# Patient Record
Sex: FEMALE | Race: WHITE | Employment: OTHER | URBAN - METROPOLITAN AREA
[De-identification: names, ages, dates, MRNs, and addresses within clinical notes are randomized per-mention and may not be internally consistent; named-entity substitution may affect disease eponyms.]

---

## 2018-01-16 ENCOUNTER — TELEPHONE (OUTPATIENT)
Dept: PULMONOLOGY | Age: 78
End: 2018-01-16

## 2018-01-30 ENCOUNTER — HOSPITAL ENCOUNTER (OUTPATIENT)
Dept: OCCUPATIONAL THERAPY | Age: 78
Discharge: HOME | End: 2018-01-31
Admitting: ORTHOPAEDIC SURGERY

## 2018-01-30 ENCOUNTER — OFFICE VISIT (OUTPATIENT)
Dept: ORTHOPEDIC SURGERY | Age: 78
End: 2018-01-30

## 2018-01-30 VITALS — HEIGHT: 66 IN | BODY MASS INDEX: 25.72 KG/M2 | WEIGHT: 160.05 LBS

## 2018-01-30 DIAGNOSIS — M25.552 PAIN IN LEFT HIP: ICD-10-CM

## 2018-01-30 DIAGNOSIS — S42.401A CLOSED FRACTURE OF DISTAL END OF RIGHT HUMERUS, UNSPECIFIED FRACTURE MORPHOLOGY, INITIAL ENCOUNTER: ICD-10-CM

## 2018-01-30 DIAGNOSIS — M89.8X2 PAIN OF RIGHT HUMERUS: Primary | ICD-10-CM

## 2018-01-30 PROCEDURE — G8484 FLU IMMUNIZE NO ADMIN: HCPCS | Performed by: ORTHOPAEDIC SURGERY

## 2018-01-30 PROCEDURE — 1036F TOBACCO NON-USER: CPT | Performed by: ORTHOPAEDIC SURGERY

## 2018-01-30 PROCEDURE — 99214 OFFICE O/P EST MOD 30 MIN: CPT | Performed by: ORTHOPAEDIC SURGERY

## 2018-01-30 PROCEDURE — 3017F COLORECTAL CA SCREEN DOC REV: CPT | Performed by: ORTHOPAEDIC SURGERY

## 2018-01-30 PROCEDURE — 1090F PRES/ABSN URINE INCON ASSESS: CPT | Performed by: ORTHOPAEDIC SURGERY

## 2018-01-30 PROCEDURE — G8400 PT W/DXA NO RESULTS DOC: HCPCS | Performed by: ORTHOPAEDIC SURGERY

## 2018-01-30 PROCEDURE — 4040F PNEUMOC VAC/ADMIN/RCVD: CPT | Performed by: ORTHOPAEDIC SURGERY

## 2018-01-30 PROCEDURE — G8419 CALC BMI OUT NRM PARAM NOF/U: HCPCS | Performed by: ORTHOPAEDIC SURGERY

## 2018-01-30 PROCEDURE — 1123F ACP DISCUSS/DSCN MKR DOCD: CPT | Performed by: ORTHOPAEDIC SURGERY

## 2018-01-30 PROCEDURE — G8427 DOCREV CUR MEDS BY ELIG CLIN: HCPCS | Performed by: ORTHOPAEDIC SURGERY

## 2018-02-01 ENCOUNTER — HOSPITAL ENCOUNTER (OUTPATIENT)
Dept: OCCUPATIONAL THERAPY | Age: 78
Discharge: HOME | End: 2018-02-28
Attending: ORTHOPAEDIC SURGERY | Admitting: ORTHOPAEDIC SURGERY

## 2018-02-27 ENCOUNTER — OFFICE VISIT (OUTPATIENT)
Dept: ORTHOPEDIC SURGERY | Age: 78
End: 2018-02-27

## 2018-02-27 VITALS — WEIGHT: 160.05 LBS | HEIGHT: 66 IN | BODY MASS INDEX: 25.72 KG/M2

## 2018-02-27 DIAGNOSIS — S42.401P: ICD-10-CM

## 2018-02-27 DIAGNOSIS — M89.8X2 PAIN OF RIGHT HUMERUS: Primary | ICD-10-CM

## 2018-02-27 PROCEDURE — G8484 FLU IMMUNIZE NO ADMIN: HCPCS | Performed by: ORTHOPAEDIC SURGERY

## 2018-02-27 PROCEDURE — G8400 PT W/DXA NO RESULTS DOC: HCPCS | Performed by: ORTHOPAEDIC SURGERY

## 2018-02-27 PROCEDURE — 4040F PNEUMOC VAC/ADMIN/RCVD: CPT | Performed by: ORTHOPAEDIC SURGERY

## 2018-02-27 PROCEDURE — 1036F TOBACCO NON-USER: CPT | Performed by: ORTHOPAEDIC SURGERY

## 2018-02-27 PROCEDURE — 1090F PRES/ABSN URINE INCON ASSESS: CPT | Performed by: ORTHOPAEDIC SURGERY

## 2018-02-27 PROCEDURE — G8427 DOCREV CUR MEDS BY ELIG CLIN: HCPCS | Performed by: ORTHOPAEDIC SURGERY

## 2018-02-27 PROCEDURE — 99212 OFFICE O/P EST SF 10 MIN: CPT | Performed by: ORTHOPAEDIC SURGERY

## 2018-02-27 PROCEDURE — G8419 CALC BMI OUT NRM PARAM NOF/U: HCPCS | Performed by: ORTHOPAEDIC SURGERY

## 2018-02-27 PROCEDURE — 3017F COLORECTAL CA SCREEN DOC REV: CPT | Performed by: ORTHOPAEDIC SURGERY

## 2018-02-27 PROCEDURE — 1123F ACP DISCUSS/DSCN MKR DOCD: CPT | Performed by: ORTHOPAEDIC SURGERY

## 2018-03-01 ENCOUNTER — HOSPITAL ENCOUNTER (OUTPATIENT)
Dept: OCCUPATIONAL THERAPY | Age: 78
Discharge: HOME | End: 2018-03-31
Attending: ORTHOPAEDIC SURGERY | Admitting: ORTHOPAEDIC SURGERY

## 2018-03-14 ENCOUNTER — TELEPHONE (OUTPATIENT)
Dept: PULMONOLOGY | Age: 78
End: 2018-03-14

## 2018-04-27 ENCOUNTER — OFFICE VISIT (OUTPATIENT)
Dept: ORTHOPEDIC SURGERY | Age: 78
End: 2018-04-27

## 2018-04-27 DIAGNOSIS — S42.201D OPEN FRACTURE OF PROXIMAL END OF RIGHT HUMERUS WITH ROUTINE HEALING, UNSPECIFIED FRACTURE MORPHOLOGY, SUBSEQUENT ENCOUNTER: Primary | ICD-10-CM

## 2018-04-27 PROCEDURE — 4040F PNEUMOC VAC/ADMIN/RCVD: CPT | Performed by: ORTHOPAEDIC SURGERY

## 2018-04-27 PROCEDURE — G8428 CUR MEDS NOT DOCUMENT: HCPCS | Performed by: ORTHOPAEDIC SURGERY

## 2018-04-27 PROCEDURE — 1090F PRES/ABSN URINE INCON ASSESS: CPT | Performed by: ORTHOPAEDIC SURGERY

## 2018-04-27 PROCEDURE — 1123F ACP DISCUSS/DSCN MKR DOCD: CPT | Performed by: ORTHOPAEDIC SURGERY

## 2018-04-27 PROCEDURE — G8400 PT W/DXA NO RESULTS DOC: HCPCS | Performed by: ORTHOPAEDIC SURGERY

## 2018-04-27 PROCEDURE — 99212 OFFICE O/P EST SF 10 MIN: CPT | Performed by: ORTHOPAEDIC SURGERY

## 2018-04-27 PROCEDURE — 3017F COLORECTAL CA SCREEN DOC REV: CPT | Performed by: ORTHOPAEDIC SURGERY

## 2018-04-27 PROCEDURE — G8419 CALC BMI OUT NRM PARAM NOF/U: HCPCS | Performed by: ORTHOPAEDIC SURGERY

## 2018-04-27 PROCEDURE — 1036F TOBACCO NON-USER: CPT | Performed by: ORTHOPAEDIC SURGERY

## 2018-08-30 ENCOUNTER — OFFICE VISIT (OUTPATIENT)
Dept: PULMONOLOGY | Age: 78
End: 2018-08-30

## 2018-08-30 VITALS
DIASTOLIC BLOOD PRESSURE: 86 MMHG | RESPIRATION RATE: 18 BRPM | OXYGEN SATURATION: 93 % | HEART RATE: 83 BPM | WEIGHT: 155 LBS | TEMPERATURE: 98.3 F | BODY MASS INDEX: 24.33 KG/M2 | HEIGHT: 67 IN | SYSTOLIC BLOOD PRESSURE: 130 MMHG

## 2018-08-30 DIAGNOSIS — R05.3 CHRONIC COUGH: ICD-10-CM

## 2018-08-30 DIAGNOSIS — G30.9 ALZHEIMER'S DEMENTIA WITHOUT BEHAVIORAL DISTURBANCE, UNSPECIFIED TIMING OF DEMENTIA ONSET: ICD-10-CM

## 2018-08-30 DIAGNOSIS — F02.80 ALZHEIMER'S DEMENTIA WITHOUT BEHAVIORAL DISTURBANCE, UNSPECIFIED TIMING OF DEMENTIA ONSET: ICD-10-CM

## 2018-08-30 DIAGNOSIS — R06.02 SHORTNESS OF BREATH: ICD-10-CM

## 2018-08-30 PROCEDURE — 99204 OFFICE O/P NEW MOD 45 MIN: CPT | Performed by: INTERNAL MEDICINE

## 2018-08-30 PROCEDURE — 1101F PT FALLS ASSESS-DOCD LE1/YR: CPT | Performed by: INTERNAL MEDICINE

## 2018-08-30 PROCEDURE — G8400 PT W/DXA NO RESULTS DOC: HCPCS | Performed by: INTERNAL MEDICINE

## 2018-08-30 PROCEDURE — 1036F TOBACCO NON-USER: CPT | Performed by: INTERNAL MEDICINE

## 2018-08-30 PROCEDURE — 4040F PNEUMOC VAC/ADMIN/RCVD: CPT | Performed by: INTERNAL MEDICINE

## 2018-08-30 PROCEDURE — G8420 CALC BMI NORM PARAMETERS: HCPCS | Performed by: INTERNAL MEDICINE

## 2018-08-30 PROCEDURE — G8427 DOCREV CUR MEDS BY ELIG CLIN: HCPCS | Performed by: INTERNAL MEDICINE

## 2018-08-30 PROCEDURE — 1090F PRES/ABSN URINE INCON ASSESS: CPT | Performed by: INTERNAL MEDICINE

## 2018-08-30 PROCEDURE — 3017F COLORECTAL CA SCREEN DOC REV: CPT | Performed by: INTERNAL MEDICINE

## 2018-08-30 PROCEDURE — 1123F ACP DISCUSS/DSCN MKR DOCD: CPT | Performed by: INTERNAL MEDICINE

## 2018-08-30 ASSESSMENT — SLEEP AND FATIGUE QUESTIONNAIRES
HOW LIKELY ARE YOU TO NOD OFF OR FALL ASLEEP WHILE LYING DOWN TO REST IN THE AFTERNOON WHEN CIRCUMSTANCES PERMIT: 0
HOW LIKELY ARE YOU TO NOD OFF OR FALL ASLEEP WHILE SITTING QUIETLY AFTER LUNCH WITHOUT ALCOHOL: 0
HOW LIKELY ARE YOU TO NOD OFF OR FALL ASLEEP WHILE WATCHING TV: 0
ESS TOTAL SCORE: 0
HOW LIKELY ARE YOU TO NOD OFF OR FALL ASLEEP WHILE SITTING INACTIVE IN A PUBLIC PLACE: 0
HOW LIKELY ARE YOU TO NOD OFF OR FALL ASLEEP WHEN YOU ARE A PASSENGER IN A CAR FOR AN HOUR WITHOUT A BREAK: 0
HOW LIKELY ARE YOU TO NOD OFF OR FALL ASLEEP IN A CAR, WHILE STOPPED FOR A FEW MINUTES IN TRAFFIC: 0
NECK CIRCUMFERENCE (INCHES): 14
HOW LIKELY ARE YOU TO NOD OFF OR FALL ASLEEP WHILE SITTING AND TALKING TO SOMEONE: 0
HOW LIKELY ARE YOU TO NOD OFF OR FALL ASLEEP WHILE SITTING AND READING: 0

## 2018-08-30 NOTE — PROGRESS NOTES
CHIEF COMPLAINT:  Cough and shortness of breath     Patient is being seen at the request of Dr. Nena Julian for a consultation for cough and shortness of breath     HPI: She is a 81-year-old woman with a past medical history of former tobacco abuse, Alzheimer's dementia, nursing home resident and diabetes mellitus who presents for evaluation of progressively worsening shortness of breath and chronic cough. Patient complains of shortness of breath. Symptoms include drainage from nose, dyspnea on exertion, morning cough and productive cough. Symptoms began 3 years ago, gradually worsening since that time. The dyspnea occurs with minimal activity. Patient denies hemoptysis . Svetlana Grayson Aggravating factors include exertion and exercise. The patient reports an exercise tolerance of approximately <1 block on the flat, limited primarily by dyspnea and back pain. She smoked < 1 ppd x 20 years, quit in 1995. She has been a resident in the nursing home for 3-4 years. She notes she has low oxygen saturation at nighttime and is on supplemental oxygen for the last year. She does not use any regular inhaled bronchodilators. REVIEW OF SYSTEMS:    CONSTITUTIONAL: Negative for fevers and chills, + sweats  EYES: no conjunctival injection, no redness or drainage  ENT: Negative for oropharyngeal exudate, post nasal drip, sinus pain / pressure, + nasal congestion, no oral ulcerations  RESPIRATORY:  No hemoptysis or pleuritic pain. CARDIOVASCULAR: Negative for chest pain, + palpitations, PND  GASTROINTESTINAL: Negative for nausea, vomiting, diarrhea, constipation and abdominal pain, + dysphagia  MUSCULOSKELETAL:  No arthralgias/arthritis or muscle weakness  HEMATOLOGICAL/LYMPH: Negative for adenopathy  SKIN:  No rash or nodules  EXTREMITIES: Negative for cyanosis or edema.   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 Social History:    TOBACCO:   reports that she quit smoking about 20 years ago. She has a 15.00 pack-year smoking history. She has never used smokeless tobacco.  ETOH:   reports that she does not drink alcohol. Patient currently lives in [place].   Family History:   Family History   Problem Relation Age of Onset    Heart Disease Mother        Current Medications:    Current Outpatient Prescriptions:     Menthol, Topical Analgesic, (BIOFREEZE) 4 % GEL, Apply topically, Disp: , Rfl:     cyanocobalamin 1000 MCG/ML injection, Inject 1,000 mcg into the muscle every 30 days, Disp: , Rfl:     fenofibrate (TRICOR) 145 MG tablet, Take 145 mg by mouth daily, Disp: , Rfl:     loratadine (CLARITIN) 10 MG capsule, Take 10 mg by mouth daily, Disp: , Rfl:     guaiFENesin (MUCINEX) 600 MG extended release tablet, Take 1,200 mg by mouth 2 times daily, Disp: , Rfl:     potassium chloride (MICRO-K) 10 MEQ extended murmur or rub. Normal S1 and S2. No edema. GI: Abdomen non-tender. Non-distended. No masses. No organomegaly. Normal bowel sounds. No hernia. Skin: Warm and dry. No nodules on exposed extremities. No rash on exposed extremities. Lymph: No cervical LAD. No supraclavicular LAD. M/S: No cyanosis. No synovitis or joint deformity. No clubbing. Neuro: Cranial nerves are grossly intact. Moving all extremities. Motor and sensation grossly intact. R hand tremor  Psych: Oriented x 3. No anxiety or agitation. DATA:      LABS:      No PFTs available for review today. IMAGING:      I personally reviewed and interpreted the following today in the office :     CXR (dated 1/5/18): The heart is mildly enlarged.  Mediastinum is normal.  Mild perihilar  opacities are present centrally. Daneil Leak is stable from prior comparison  study.  The lungs are otherwise clear.  There are no skeletal findings        ASSESSMENT AND PLAN:      Shortness of breath    The etiology of the patient's dyspnea is not clear. The Ddx is broad and includes obstructive lung disease, other pulmonary diseases (such as interstitial lung disease or pulmonary arterial hypertension),  cardiac disease, anemia or deconditioning. I plan to get full pfts with lung volumes and diffusing capacity and cxr to further evaluate. Chronic cough  The etiology of the patient's cough is not clear. The differential diagnosis includes the common causes such as GERD, post-nasal drip, cough-variant asthma, chronic bronchitis, medications. - get cxr  - PFTs    Alzheimer's disease  - appears mild by exam          The information above included the review and summarization of old records. The history was obtained from these old records and from the patient directly. Risks, benefits and alternatives to the management plan were discussed with the patient.

## 2018-08-30 NOTE — LETTER
[facility] Pulmonary, Critical Care, and Sleep  [address] , [address] [place] [address]  Phone: [phone]  Fax: [phone]    August 30, 2018     Patient: Tata Bowden   MR Number: [id number]   YOB: 1940   Date of Visit: 8/30/2018     Dear Dr. Alena Fitzgerald: Thank you for the request for consultation for Tanvir Gilbert to me for the evaluation of shortness of breath and cough. Below are the relevant portions of my assessment and plan of care. Shortness of breath    The etiology of the patient's dyspnea is not clear. The Ddx is broad and includes obstructive lung disease, other pulmonary diseases (such as interstitial lung disease or pulmonary arterial hypertension),  cardiac disease, anemia or deconditioning. I plan to get full pfts with lung volumes and diffusing capacity and cxr to further evaluate. Chronic cough  The etiology of the patient's cough is not clear. The differential diagnosis includes the common causes such as GERD, post-nasal drip, cough-variant asthma, chronic bronchitis, medications. - get cxr  - PFTs    Alzheimer's disease  - appears mild by exam      If you have questions, please do not hesitate to call me. I look forward to following [facility] along with you.     Sincerely,        Ira Weldon MD

## 2018-09-18 ENCOUNTER — TELEPHONE (OUTPATIENT)
Dept: PULMONOLOGY | Age: 78
End: 2018-09-18

## 2018-09-18 NOTE — TELEPHONE ENCOUNTER
Patient cancelled appointment on 9/20/18 with Ismael Go for follow up and same day PFT. Reason: Transportation     Patient did reschedule appointment. PFT rescheduled to 9/28/18    Appointment rescheduled for 10/25/18. ASSESSMENT AND PLAN:OV 8/30/18         Shortness of breath     The etiology of the patient's dyspnea is not clear. The Ddx is broad and includes obstructive lung disease, other pulmonary diseases (such as interstitial lung disease or pulmonary arterial hypertension),  cardiac disease, anemia or deconditioning.      I plan to get full pfts with lung volumes and diffusing capacity and cxr to further evaluate.         Chronic cough  The etiology of the patient's cough is not clear. The differential diagnosis includes the common causes such as GERD, post-nasal drip, cough-variant asthma, chronic bronchitis, medications.      - get cxr  - PFTs     Alzheimer's disease  - appears mild by exam              The information above included the review and summarization of old records. The history was obtained from these old records and from the patient directly. Risks, benefits and alternatives to the management plan were discussed with the patient.

## 2018-10-19 ENCOUNTER — HOSPITAL ENCOUNTER (OUTPATIENT)
Dept: PULMONOLOGY | Age: 78
Discharge: HOME | End: 2018-10-19
Payer: MEDICARE

## 2018-10-19 VITALS — OXYGEN SATURATION: 94 %

## 2018-10-19 PROCEDURE — 6360000002 HC RX W HCPCS: Performed by: INTERNAL MEDICINE

## 2018-10-19 PROCEDURE — 94726 PLETHYSMOGRAPHY LUNG VOLUMES: CPT

## 2018-10-19 PROCEDURE — 94729 DIFFUSING CAPACITY: CPT

## 2018-10-19 PROCEDURE — 94640 AIRWAY INHALATION TREATMENT: CPT

## 2018-10-19 PROCEDURE — 94664 DEMO&/EVAL PT USE INHALER: CPT

## 2018-10-19 PROCEDURE — 94060 EVALUATION OF WHEEZING: CPT

## 2018-10-19 PROCEDURE — 94760 N-INVAS EAR/PLS OXIMETRY 1: CPT

## 2018-10-19 RX ORDER — ALBUTEROL SULFATE 2.5 MG/3ML
2.5 SOLUTION RESPIRATORY (INHALATION) ONCE
Status: COMPLETED | OUTPATIENT
Start: 2018-10-19 | End: 2018-10-19

## 2018-10-19 RX ADMIN — ALBUTEROL SULFATE 2.5 MG: 2.5 SOLUTION RESPIRATORY (INHALATION) at 11:23

## 2018-10-25 ENCOUNTER — OFFICE VISIT (OUTPATIENT)
Dept: PULMONOLOGY | Age: 78
End: 2018-10-25
Payer: MEDICARE

## 2018-10-25 VITALS
HEIGHT: 67 IN | TEMPERATURE: 97.7 F | DIASTOLIC BLOOD PRESSURE: 72 MMHG | RESPIRATION RATE: 20 BRPM | HEART RATE: 74 BPM | WEIGHT: 152 LBS | OXYGEN SATURATION: 92 % | BODY MASS INDEX: 23.86 KG/M2 | SYSTOLIC BLOOD PRESSURE: 124 MMHG

## 2018-10-25 DIAGNOSIS — F02.80 LATE ONSET ALZHEIMER'S DISEASE WITHOUT BEHAVIORAL DISTURBANCE (HCC): ICD-10-CM

## 2018-10-25 DIAGNOSIS — R06.02 SHORTNESS OF BREATH: Primary | ICD-10-CM

## 2018-10-25 DIAGNOSIS — G30.1 LATE ONSET ALZHEIMER'S DISEASE WITHOUT BEHAVIORAL DISTURBANCE (HCC): ICD-10-CM

## 2018-10-25 DIAGNOSIS — R05.3 CHRONIC COUGH: ICD-10-CM

## 2018-10-25 PROCEDURE — 4040F PNEUMOC VAC/ADMIN/RCVD: CPT | Performed by: INTERNAL MEDICINE

## 2018-10-25 PROCEDURE — 1123F ACP DISCUSS/DSCN MKR DOCD: CPT | Performed by: INTERNAL MEDICINE

## 2018-10-25 PROCEDURE — 99214 OFFICE O/P EST MOD 30 MIN: CPT | Performed by: INTERNAL MEDICINE

## 2018-10-25 PROCEDURE — 1036F TOBACCO NON-USER: CPT | Performed by: INTERNAL MEDICINE

## 2018-10-25 PROCEDURE — 1090F PRES/ABSN URINE INCON ASSESS: CPT | Performed by: INTERNAL MEDICINE

## 2018-10-25 PROCEDURE — G8484 FLU IMMUNIZE NO ADMIN: HCPCS | Performed by: INTERNAL MEDICINE

## 2018-10-25 PROCEDURE — 1101F PT FALLS ASSESS-DOCD LE1/YR: CPT | Performed by: INTERNAL MEDICINE

## 2018-10-25 PROCEDURE — G8420 CALC BMI NORM PARAMETERS: HCPCS | Performed by: INTERNAL MEDICINE

## 2018-10-25 PROCEDURE — G8427 DOCREV CUR MEDS BY ELIG CLIN: HCPCS | Performed by: INTERNAL MEDICINE

## 2018-10-25 PROCEDURE — G8400 PT W/DXA NO RESULTS DOC: HCPCS | Performed by: INTERNAL MEDICINE

## 2018-10-25 RX ORDER — ALBUTEROL SULFATE 90 UG/1
2 AEROSOL, METERED RESPIRATORY (INHALATION) EVERY 6 HOURS PRN
Qty: 1 INHALER | Refills: 5 | Status: ON HOLD
Start: 2018-10-25 | End: 2021-08-19

## 2018-10-25 NOTE — PROGRESS NOTES
CHIEF COMPLAINT:  Cough and shortness of breath     Patient is being seen at the request of Dr. Alecia Jones for a consultation for cough and shortness of breath     HPI:   Presenting hx: She is a 82-year-old woman with a past medical history of former tobacco abuse, Alzheimer's dementia, nursing home resident and diabetes mellitus who presents for evaluation of progressively worsening shortness of breath and chronic cough. Patient complains of shortness of breath. Symptoms include drainage from nose, dyspnea on exertion, morning cough and productive cough. Symptoms began 3 years ago, gradually worsening since that time. The dyspnea occurs with minimal activity. Patient denies hemoptysis . Moriah Ac Aggravating factors include exertion and exercise. The patient reports an exercise tolerance of approximately <1 block on the flat, limited primarily by dyspnea and back pain. She smoked < 1 ppd x 20 years, quit in 1995. She has been a resident in the nursing home for 3-4 years. She notes she has low oxygen saturation at nighttime and is on supplemental oxygen for the last year. She does not use any regular inhaled bronchodilators. Since last clinic visit, the patient reports that her shortness of breath is about the same. She has a persistent cough with some postnasal drip. She's been taking Claritin and Flonase without relief. REVIEW OF SYSTEMS:    CONSTITUTIONAL: Negative for fevers and chills, + sweats  EYES: no conjunctival injection, no redness or drainage  ENT: Negative for oropharyngeal exudate, post nasal drip, sinus pain / pressure, + nasal congestion, no oral ulcerations  RESPIRATORY:  No hemoptysis or pleuritic pain.   CARDIOVASCULAR: Negative for chest pain, + palpitations, PND  GASTROINTESTINAL: Negative for nausea, vomiting, diarrhea, constipation and abdominal pain, + dysphagia  MUSCULOSKELETAL:  No arthralgias/arthritis or muscle weakness  HEMATOLOGICAL/LYMPH: Negative for adenopathy  SKIN:  No rash or drip including antihistamine and inhaled nasal steroid     Alzheimer's disease  - appears mild by exam

## 2018-12-10 ENCOUNTER — TELEPHONE (OUTPATIENT)
Dept: PULMONOLOGY | Age: 78
End: 2018-12-10

## 2018-12-10 NOTE — TELEPHONE ENCOUNTER
Per overdue results pt did not complete cxr as ordered    10/25/18    ASSESSMENT AND PLAN:  Shortness of breath  The etiology of the patient's dyspnea is not clear. The Ddx is broad and includes obstructive lung disease, other pulmonary diseases (such as interstitial lung disease or pulmonary arterial hypertension),  cardiac disease, anemia or deconditioning. PFTs were difficult to interpret as she had trouble with testing  - get cxr   - Trial of albuterol MDI as needed     Chronic cough  The etiology of the patient's cough is not clear.  The differential diagnosis includes the common causes such as GERD, post-nasal drip, cough-variant asthma, chronic bronchitis, medications.    - still need cxr- get that today  - On treatment for postnasal drip including antihistamine and inhaled nasal steroid     Alzheimer's disease  - appears mild by exam

## 2018-12-13 NOTE — TELEPHONE ENCOUNTER
Called [facility] to check status of CXR. Was on hold for over 5 minutes. Will try back at another time.

## 2018-12-21 ENCOUNTER — APPOINTMENT (OUTPATIENT)
Dept: CT IMAGING | Age: 78
DRG: 291 | End: 2018-12-21
Payer: MEDICARE

## 2018-12-21 ENCOUNTER — APPOINTMENT (OUTPATIENT)
Dept: GENERAL RADIOLOGY | Age: 78
DRG: 291 | End: 2018-12-21
Payer: MEDICARE

## 2018-12-21 ENCOUNTER — HOSPITAL ENCOUNTER (INPATIENT)
Age: 78
LOS: 3 days | Discharge: NURSING, REHAB OR LONG-TERM CARE | DRG: 291 | End: 2018-12-24
Attending: EMERGENCY MEDICINE | Admitting: INTERNAL MEDICINE
Payer: MEDICARE

## 2018-12-21 DIAGNOSIS — F41.9 ANXIETY: ICD-10-CM

## 2018-12-21 DIAGNOSIS — G60.9 IDIOPATHIC PERIPHERAL NEUROPATHY: ICD-10-CM

## 2018-12-21 DIAGNOSIS — I50.9 ACUTE CONGESTIVE HEART FAILURE, UNSPECIFIED HEART FAILURE TYPE (HCC): Primary | ICD-10-CM

## 2018-12-21 DIAGNOSIS — M54.5 LOW BACK PAIN, UNSPECIFIED BACK PAIN LATERALITY, UNSPECIFIED CHRONICITY, WITH SCIATICA PRESENCE UNSPECIFIED: ICD-10-CM

## 2018-12-21 DIAGNOSIS — R09.02 HYPOXIA: ICD-10-CM
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
## 2018-12-21 PROBLEM — R06.00 DYSPNEA: Status: ACTIVE | Noted: 2018-12-21

## 2018-12-21 LAB
A/G RATIO: 1 (ref 1.1–2.2)
ALBUMIN SERPL-MCNC: 3.8 G/DL (ref 3.4–5)
ALP BLD-CCNC: 61 U/L (ref 40–129)
ALT SERPL-CCNC: 10 U/L (ref 10–40)
ANION GAP SERPL CALCULATED.3IONS-SCNC: 10 MMOL/L (ref 3–16)
ANISOCYTOSIS: ABNORMAL
AST SERPL-CCNC: 17 U/L (ref 15–37)
BACTERIA: ABNORMAL /HPF
BANDED NEUTROPHILS RELATIVE PERCENT: 6 % (ref 0–7)
BASE EXCESS ARTERIAL: 7.8 MMOL/L (ref -3–3)
BASOPHILS ABSOLUTE: 0.2 K/UL (ref 0–0.2)
BASOPHILS RELATIVE PERCENT: 2 %
BILIRUB SERPL-MCNC: 0.6 MG/DL (ref 0–1)
BILIRUBIN URINE: NEGATIVE
BLOOD, URINE: NEGATIVE
BUN BLDV-MCNC: 16 MG/DL (ref 7–20)
CALCIUM SERPL-MCNC: 9.9 MG/DL (ref 8.3–10.6)
CARBOXYHEMOGLOBIN ARTERIAL: 1.5 % (ref 0–1.5)
CHLORIDE BLD-SCNC: 89 MMOL/L (ref 99–110)
CLARITY: ABNORMAL
CO2: 34 MMOL/L (ref 21–32)
COLOR: YELLOW
CREAT SERPL-MCNC: 0.8 MG/DL (ref 0.6–1.2)
EOSINOPHILS ABSOLUTE: 0.1 K/UL (ref 0–0.6)
EOSINOPHILS RELATIVE PERCENT: 1 %
GFR AFRICAN AMERICAN: >60
GFR NON-AFRICAN AMERICAN: >60
GLOBULIN: 3.9 G/DL
GLUCOSE BLD-MCNC: 247 MG/DL (ref 70–99)
GLUCOSE BLD-MCNC: 315 MG/DL (ref 70–99)
GLUCOSE URINE: 250 MG/DL
HCO3 ARTERIAL: 34.2 MMOL/L (ref 21–29)
HCT VFR BLD CALC: 35.3 % (ref 36–48)
HEMOGLOBIN, ART, EXTENDED: 11.3 G/DL (ref 12–16)
HEMOGLOBIN: 11.7 G/DL (ref 12–16)
KETONES, URINE: ABNORMAL MG/DL
LACTIC ACID, SEPSIS: 1.1 MMOL/L (ref 0.4–1.9)
LEUKOCYTE ESTERASE, URINE: NEGATIVE
LYMPHOCYTES ABSOLUTE: 0.4 K/UL (ref 1–5.1)
LYMPHOCYTES RELATIVE PERCENT: 5 %
MCH RBC QN AUTO: 30 PG (ref 26–34)
MCHC RBC AUTO-ENTMCNC: 33.2 G/DL (ref 31–36)
MCV RBC AUTO: 90.4 FL (ref 80–100)
METAMYELOCYTES RELATIVE PERCENT: 1 %
METHEMOGLOBIN ARTERIAL: 0.1 %
MICROSCOPIC EXAMINATION: YES
MONOCYTES ABSOLUTE: 0.1 K/UL (ref 0–1.3)
MONOCYTES RELATIVE PERCENT: 1 %
NEUTROPHILS ABSOLUTE: 7 K/UL (ref 1.7–7.7)
NEUTROPHILS RELATIVE PERCENT: 84 %
NITRITE, URINE: NEGATIVE
NUCLEATED RED BLOOD CELLS: 1 /100 WBC
O2 CONTENT ARTERIAL: 16 ML/DL
O2 SAT, ARTERIAL: 98.4 %
O2 THERAPY: ABNORMAL
PCO2 ARTERIAL: 57.2 MMHG (ref 35–45)
PDW BLD-RTO: 12.9 % (ref 12.4–15.4)
PERFORMED ON: ABNORMAL
PH ARTERIAL: 7.39 (ref 7.35–7.45)
PH UA: 7
PLATELET # BLD: 201 K/UL (ref 135–450)
PMV BLD AUTO: 8 FL (ref 5–10.5)
PO2 ARTERIAL: 125.7 MMHG (ref 75–108)
POLYCHROMASIA: ABNORMAL
POTASSIUM REFLEX MAGNESIUM: 4.5 MMOL/L (ref 3.5–5.1)
PRO-BNP: 2431 PG/ML (ref 0–449)
PROTEIN UA: 100 MG/DL
RBC # BLD: 3.91 M/UL (ref 4–5.2)
RBC UA: ABNORMAL /HPF (ref 0–2)
SODIUM BLD-SCNC: 133 MMOL/L (ref 136–145)
SPECIFIC GRAVITY UA: 1.02
TCO2 ARTERIAL: 36 MMOL/L
TOTAL PROTEIN: 7.7 G/DL (ref 6.4–8.2)
TROPONIN: <0.01 NG/ML
URINE TYPE: ABNORMAL
UROBILINOGEN, URINE: 0.2 E.U./DL
WBC # BLD: 7.7 K/UL (ref 4–11)
WBC UA: ABNORMAL /HPF (ref 0–5)

## 2018-12-21 PROCEDURE — 2060000000 HC ICU INTERMEDIATE R&B

## 2018-12-21 PROCEDURE — 81001 URINALYSIS AUTO W/SCOPE: CPT

## 2018-12-21 PROCEDURE — 94762 N-INVAS EAR/PLS OXIMTRY CONT: CPT

## 2018-12-21 PROCEDURE — 87077 CULTURE AEROBIC IDENTIFY: CPT

## 2018-12-21 PROCEDURE — 87086 URINE CULTURE/COLONY COUNT: CPT

## 2018-12-21 PROCEDURE — 96374 THER/PROPH/DIAG INJ IV PUSH: CPT

## 2018-12-21 PROCEDURE — 72125 CT NECK SPINE W/O DYE: CPT

## 2018-12-21 PROCEDURE — 6370000000 HC RX 637 (ALT 250 FOR IP): Performed by: INTERNAL MEDICINE

## 2018-12-21 PROCEDURE — 6360000002 HC RX W HCPCS: Performed by: EMERGENCY MEDICINE

## 2018-12-21 PROCEDURE — 87040 BLOOD CULTURE FOR BACTERIA: CPT

## 2018-12-21 PROCEDURE — 80053 COMPREHEN METABOLIC PANEL: CPT

## 2018-12-21 PROCEDURE — 87186 SC STD MICRODIL/AGAR DIL: CPT

## 2018-12-21 PROCEDURE — 83036 HEMOGLOBIN GLYCOSYLATED A1C: CPT

## 2018-12-21 PROCEDURE — 82803 BLOOD GASES ANY COMBINATION: CPT

## 2018-12-21 PROCEDURE — 83880 ASSAY OF NATRIURETIC PEPTIDE: CPT

## 2018-12-21 PROCEDURE — 71045 X-RAY EXAM CHEST 1 VIEW: CPT

## 2018-12-21 PROCEDURE — 36415 COLL VENOUS BLD VENIPUNCTURE: CPT

## 2018-12-21 PROCEDURE — 83605 ASSAY OF LACTIC ACID: CPT

## 2018-12-21 PROCEDURE — 2700000000 HC OXYGEN THERAPY PER DAY

## 2018-12-21 PROCEDURE — 70450 CT HEAD/BRAIN W/O DYE: CPT

## 2018-12-21 PROCEDURE — 84484 ASSAY OF TROPONIN QUANT: CPT

## 2018-12-21 PROCEDURE — 85025 COMPLETE CBC W/AUTO DIFF WBC: CPT

## 2018-12-21 PROCEDURE — 2580000003 HC RX 258: Performed by: INTERNAL MEDICINE

## 2018-12-21 PROCEDURE — 99285 EMERGENCY DEPT VISIT HI MDM: CPT

## 2018-12-21 PROCEDURE — 73502 X-RAY EXAM HIP UNI 2-3 VIEWS: CPT

## 2018-12-21 RX ORDER — MAGNESIUM OXIDE 400 MG/1
400 TABLET ORAL DAILY
COMMUNITY
End: 2022-09-07

## 2018-12-21 RX ORDER — FLUTICASONE PROPIONATE 50 MCG
2 SPRAY, SUSPENSION (ML) NASAL DAILY
Status: DISCONTINUED | OUTPATIENT
Start: 2018-12-22 | End: 2018-12-24 | Stop reason: HOSPADM

## 2018-12-21 RX ORDER — CARVEDILOL 6.25 MG/1
6.25 TABLET ORAL 2 TIMES DAILY WITH MEALS
Status: ON HOLD | COMMUNITY
End: 2022-09-13 | Stop reason: SDUPTHER

## 2018-12-21 RX ORDER — IPRATROPIUM BROMIDE AND ALBUTEROL SULFATE 2.5; .5 MG/3ML; MG/3ML
1 SOLUTION RESPIRATORY (INHALATION) EVERY 4 HOURS PRN
Status: DISCONTINUED | OUTPATIENT
Start: 2018-12-21 | End: 2018-12-24 | Stop reason: HOSPADM

## 2018-12-21 RX ORDER — PANTOPRAZOLE SODIUM 40 MG/1
40 TABLET, DELAYED RELEASE ORAL
Status: DISCONTINUED | OUTPATIENT
Start: 2018-12-22 | End: 2018-12-24 | Stop reason: HOSPADM

## 2018-12-21 RX ORDER — ALBUTEROL SULFATE 90 UG/1
2 AEROSOL, METERED RESPIRATORY (INHALATION) EVERY 6 HOURS PRN
Status: DISCONTINUED | OUTPATIENT
Start: 2018-12-21 | End: 2018-12-24 | Stop reason: HOSPADM

## 2018-12-21 RX ORDER — ALBUTEROL SULFATE 90 UG/1
2 AEROSOL, METERED RESPIRATORY (INHALATION) EVERY 6 HOURS PRN
Status: DISCONTINUED | OUTPATIENT
Start: 2018-12-21 | End: 2018-12-21

## 2018-12-21 RX ORDER — DONEPEZIL HYDROCHLORIDE 5 MG/1
10 TABLET, FILM COATED ORAL NIGHTLY
Status: DISCONTINUED | OUTPATIENT
Start: 2018-12-21 | End: 2018-12-24 | Stop reason: HOSPADM

## 2018-12-21 RX ORDER — SODIUM CHLORIDE 0.9 % (FLUSH) 0.9 %
10 SYRINGE (ML) INJECTION EVERY 12 HOURS SCHEDULED
Status: DISCONTINUED | OUTPATIENT
Start: 2018-12-21 | End: 2018-12-24 | Stop reason: HOSPADM

## 2018-12-21 RX ORDER — INSULIN GLARGINE 100 [IU]/ML
20 INJECTION, SOLUTION SUBCUTANEOUS NIGHTLY
Status: DISCONTINUED | OUTPATIENT
Start: 2018-12-21 | End: 2018-12-24 | Stop reason: HOSPADM

## 2018-12-21 RX ORDER — HYDROCHLOROTHIAZIDE 25 MG/1
25 TABLET ORAL DAILY
Status: DISCONTINUED | OUTPATIENT
Start: 2018-12-22 | End: 2018-12-24 | Stop reason: HOSPADM

## 2018-12-21 RX ORDER — IMIPRAMINE HCL 25 MG
50 TABLET ORAL NIGHTLY
Status: DISCONTINUED | OUTPATIENT
Start: 2018-12-21 | End: 2018-12-24 | Stop reason: HOSPADM

## 2018-12-21 RX ORDER — IPRATROPIUM BROMIDE AND ALBUTEROL SULFATE 2.5; .5 MG/3ML; MG/3ML
1 SOLUTION RESPIRATORY (INHALATION)
Status: DISCONTINUED | OUTPATIENT
Start: 2018-12-21 | End: 2018-12-21

## 2018-12-21 RX ORDER — ACETAMINOPHEN 325 MG/1
325 TABLET ORAL EVERY 6 HOURS PRN
Status: DISCONTINUED | OUTPATIENT
Start: 2018-12-21 | End: 2018-12-24 | Stop reason: HOSPADM

## 2018-12-21 RX ORDER — GUAIFENESIN 100 MG/5ML
10 SOLUTION ORAL EVERY 4 HOURS PRN
Status: DISCONTINUED | OUTPATIENT
Start: 2018-12-21 | End: 2018-12-24 | Stop reason: HOSPADM

## 2018-12-21 RX ORDER — DIPHENHYDRAMINE HCL 25 MG
25 TABLET ORAL EVERY 6 HOURS PRN
Status: ON HOLD | COMMUNITY
End: 2021-08-19

## 2018-12-21 RX ORDER — CETIRIZINE HYDROCHLORIDE 10 MG/1
10 TABLET ORAL DAILY
Status: DISCONTINUED | OUTPATIENT
Start: 2018-12-22 | End: 2018-12-24 | Stop reason: HOSPADM

## 2018-12-21 RX ORDER — CLONAZEPAM 0.5 MG/1
0.5 TABLET ORAL NIGHTLY
Status: DISCONTINUED | OUTPATIENT
Start: 2018-12-21 | End: 2018-12-24 | Stop reason: HOSPADM

## 2018-12-21 RX ORDER — DEXTROSE MONOHYDRATE 25 G/50ML
12.5 INJECTION, SOLUTION INTRAVENOUS PRN
Status: DISCONTINUED | OUTPATIENT
Start: 2018-12-21 | End: 2018-12-24 | Stop reason: HOSPADM

## 2018-12-21 RX ORDER — NICOTINE POLACRILEX 4 MG
15 LOZENGE BUCCAL PRN
Status: DISCONTINUED | OUTPATIENT
Start: 2018-12-21 | End: 2018-12-24 | Stop reason: HOSPADM

## 2018-12-21 RX ORDER — FUROSEMIDE 10 MG/ML
20 INJECTION INTRAMUSCULAR; INTRAVENOUS ONCE
Status: COMPLETED | OUTPATIENT
Start: 2018-12-21 | End: 2018-12-21

## 2018-12-21 RX ORDER — LOSARTAN POTASSIUM AND HYDROCHLOROTHIAZIDE 12.5; 5 MG/1; MG/1
2 TABLET ORAL DAILY
Status: DISCONTINUED | OUTPATIENT
Start: 2018-12-21 | End: 2018-12-21 | Stop reason: CLARIF

## 2018-12-21 RX ORDER — FERROUS SULFATE 325(65) MG
325 TABLET ORAL DAILY
Status: DISCONTINUED | OUTPATIENT
Start: 2018-12-22 | End: 2018-12-24 | Stop reason: HOSPADM

## 2018-12-21 RX ORDER — DEXTROSE MONOHYDRATE 50 MG/ML
100 INJECTION, SOLUTION INTRAVENOUS PRN
Status: DISCONTINUED | OUTPATIENT
Start: 2018-12-21 | End: 2018-12-24 | Stop reason: HOSPADM

## 2018-12-21 RX ORDER — IPRATROPIUM BROMIDE AND ALBUTEROL SULFATE 2.5; .5 MG/3ML; MG/3ML
1 SOLUTION RESPIRATORY (INHALATION) EVERY 6 HOURS PRN
Status: DISCONTINUED | OUTPATIENT
Start: 2018-12-21 | End: 2018-12-21

## 2018-12-21 RX ORDER — ONDANSETRON 4 MG/1
4 TABLET, ORALLY DISINTEGRATING ORAL EVERY 8 HOURS PRN
Status: DISCONTINUED | OUTPATIENT
Start: 2018-12-21 | End: 2018-12-24 | Stop reason: HOSPADM

## 2018-12-21 RX ORDER — FENOFIBRATE 54 MG/1
54 TABLET ORAL DAILY
Status: DISCONTINUED | OUTPATIENT
Start: 2018-12-22 | End: 2018-12-24 | Stop reason: HOSPADM

## 2018-12-21 RX ORDER — TRAMADOL HYDROCHLORIDE 50 MG/1
50 TABLET ORAL EVERY 6 HOURS PRN
Status: ON HOLD | COMMUNITY
End: 2018-12-24

## 2018-12-21 RX ORDER — ONDANSETRON 2 MG/ML
4 INJECTION INTRAMUSCULAR; INTRAVENOUS EVERY 6 HOURS PRN
Status: DISCONTINUED | OUTPATIENT
Start: 2018-12-21 | End: 2018-12-24 | Stop reason: HOSPADM

## 2018-12-21 RX ORDER — CARVEDILOL 6.25 MG/1
6.25 TABLET ORAL 2 TIMES DAILY WITH MEALS
Status: DISCONTINUED | OUTPATIENT
Start: 2018-12-21 | End: 2018-12-24 | Stop reason: HOSPADM

## 2018-12-21 RX ORDER — IPRATROPIUM BROMIDE AND ALBUTEROL SULFATE 2.5; .5 MG/3ML; MG/3ML
1 SOLUTION RESPIRATORY (INHALATION) EVERY 6 HOURS PRN
Status: DISCONTINUED | OUTPATIENT
Start: 2018-12-21 | End: 2018-12-24 | Stop reason: HOSPADM

## 2018-12-21 RX ORDER — SODIUM CHLORIDE 0.9 % (FLUSH) 0.9 %
10 SYRINGE (ML) INJECTION PRN
Status: DISCONTINUED | OUTPATIENT
Start: 2018-12-21 | End: 2018-12-24 | Stop reason: HOSPADM

## 2018-12-21 RX ORDER — PREDNISONE 1 MG/1
2.5 TABLET ORAL DAILY
Status: DISCONTINUED | OUTPATIENT
Start: 2018-12-22 | End: 2018-12-24 | Stop reason: HOSPADM

## 2018-12-21 RX ORDER — GABAPENTIN 300 MG/1
300 CAPSULE ORAL 3 TIMES DAILY
Status: DISCONTINUED | OUTPATIENT
Start: 2018-12-22 | End: 2018-12-22

## 2018-12-21 RX ORDER — AMLODIPINE BESYLATE 5 MG/1
5 TABLET ORAL DAILY
Status: DISCONTINUED | OUTPATIENT
Start: 2018-12-22 | End: 2018-12-24 | Stop reason: HOSPADM

## 2018-12-21 RX ORDER — POTASSIUM CHLORIDE 20 MEQ/1
40 TABLET, EXTENDED RELEASE ORAL DAILY
Status: DISCONTINUED | OUTPATIENT
Start: 2018-12-22 | End: 2018-12-24 | Stop reason: HOSPADM

## 2018-12-21 RX ORDER — DULOXETIN HYDROCHLORIDE 60 MG/1
60 CAPSULE, DELAYED RELEASE ORAL NIGHTLY
Status: DISCONTINUED | OUTPATIENT
Start: 2018-12-22 | End: 2018-12-24 | Stop reason: HOSPADM

## 2018-12-21 RX ORDER — PRAVASTATIN SODIUM 40 MG
80 TABLET ORAL NIGHTLY
Status: DISCONTINUED | OUTPATIENT
Start: 2018-12-21 | End: 2018-12-24 | Stop reason: HOSPADM

## 2018-12-21 RX ORDER — LOSARTAN POTASSIUM 100 MG/1
100 TABLET ORAL DAILY
Status: DISCONTINUED | OUTPATIENT
Start: 2018-12-22 | End: 2018-12-24 | Stop reason: HOSPADM

## 2018-12-21 RX ADMIN — CLONAZEPAM 0.5 MG: 0.5 TABLET ORAL at 22:06

## 2018-12-21 RX ADMIN — FUROSEMIDE 20 MG: 10 INJECTION, SOLUTION INTRAMUSCULAR; INTRAVENOUS at 17:26

## 2018-12-21 RX ADMIN — DONEPEZIL HYDROCHLORIDE 10 MG: 5 TABLET, FILM COATED ORAL at 22:07

## 2018-12-21 RX ADMIN — Medication 10 ML: at 22:08

## 2018-12-21 RX ADMIN — CARVEDILOL 6.25 MG: 6.25 TABLET, FILM COATED ORAL at 22:06

## 2018-12-21 RX ADMIN — IMIPRAMINE HYDROCHLORIDE 50 MG: 25 TABLET ORAL at 22:07

## 2018-12-21 RX ADMIN — INSULIN LISPRO 5 UNITS: 100 INJECTION, SOLUTION INTRAVENOUS; SUBCUTANEOUS at 22:07

## 2018-12-21 RX ADMIN — PRAVASTATIN SODIUM 80 MG: 40 TABLET ORAL at 22:06

## 2018-12-21 RX ADMIN — INSULIN GLARGINE 20 UNITS: 100 INJECTION, SOLUTION SUBCUTANEOUS at 22:08

## 2018-12-21 RX ADMIN — APIXABAN 5 MG: 5 TABLET, FILM COATED ORAL at 22:06

## 2018-12-21 ASSESSMENT — ENCOUNTER SYMPTOMS
BACK PAIN: 0
ABDOMINAL PAIN: 0
VOMITING: 0
COUGH: 0
DIARRHEA: 0
NAUSEA: 0
EYE DISCHARGE: 0
SORE THROAT: 0

## 2018-12-21 NOTE — LETTER
Beneficiary Notification Letter     This [place] Provider is Participating in an Innovative Payment and [address] [place] [address] [place] from [place]:   Neida is participating in a Medicare initiative called the [facility] for [facility]. You are receiving this letter because your health care provider has identified you as a patient who is receiving care through this initiative. Health care providers participating in the [facility], including Neida, will work with Medicare to improve care for patients. Your Medicare rights have not been changed. You still have all the same Medicare rights and protections, including the right to choose which hospital, doctor, or other health care provider you see. However, because Neida chose to participate in the [facility], all Medicare beneficiaries who meet the eligibility criteria of this initiative will receive care under the initiative. If you do not wish to receive care under the [facility], you must choose a health care provider that does not participate in this initiative for your care. Regardless of which health care provider you see, Medicare will continue to cover all of your medically necessary services. [employer] for Care Improvement Advanced aims to help improve your care     The [facility] [facility] [facility] is an innovative Medicare initiative that encourages your doctors, hospitals, and other health care providers to work more closely together so you get better care during and following certain hospital stays.  In this initiative, doctors and hospitals may work closely with certain health care providers and suppliers that help patients recover after discharge from the hospital,

## 2018-12-22 LAB
ANION GAP SERPL CALCULATED.3IONS-SCNC: 9 MMOL/L (ref 3–16)
ANISOCYTOSIS: ABNORMAL
BASOPHILS ABSOLUTE: 0.1 K/UL (ref 0–0.2)
BASOPHILS RELATIVE PERCENT: 1 %
BUN BLDV-MCNC: 21 MG/DL (ref 7–20)
CALCIUM SERPL-MCNC: 9.6 MG/DL (ref 8.3–10.6)
CHLORIDE BLD-SCNC: 91 MMOL/L (ref 99–110)
CO2: 36 MMOL/L (ref 21–32)
CREAT SERPL-MCNC: 0.8 MG/DL (ref 0.6–1.2)
EOSINOPHILS ABSOLUTE: 0 K/UL (ref 0–0.6)
EOSINOPHILS RELATIVE PERCENT: 0 %
ESTIMATED AVERAGE GLUCOSE: 200.1 MG/DL
GFR AFRICAN AMERICAN: >60
GFR NON-AFRICAN AMERICAN: >60
GLUCOSE BLD-MCNC: 160 MG/DL (ref 70–99)
GLUCOSE BLD-MCNC: 173 MG/DL (ref 70–99)
GLUCOSE BLD-MCNC: 182 MG/DL (ref 70–99)
GLUCOSE BLD-MCNC: 188 MG/DL (ref 70–99)
GLUCOSE BLD-MCNC: 242 MG/DL (ref 70–99)
GLUCOSE BLD-MCNC: 254 MG/DL (ref 70–99)
HBA1C MFR BLD: 8.6 %
HCT VFR BLD CALC: 30.4 % (ref 36–48)
HEMOGLOBIN: 10.2 G/DL (ref 12–16)
LYMPHOCYTES ABSOLUTE: 0.8 K/UL (ref 1–5.1)
LYMPHOCYTES RELATIVE PERCENT: 15 %
MCH RBC QN AUTO: 30.3 PG (ref 26–34)
MCHC RBC AUTO-ENTMCNC: 33.5 G/DL (ref 31–36)
MCV RBC AUTO: 90.3 FL (ref 80–100)
MONOCYTES ABSOLUTE: 0.7 K/UL (ref 0–1.3)
MONOCYTES RELATIVE PERCENT: 12 %
NEUTROPHILS ABSOLUTE: 4 K/UL (ref 1.7–7.7)
NEUTROPHILS RELATIVE PERCENT: 72 %
PDW BLD-RTO: 13.1 % (ref 12.4–15.4)
PERFORMED ON: ABNORMAL
PLATELET # BLD: 188 K/UL (ref 135–450)
PLATELET SLIDE REVIEW: ADEQUATE
PMV BLD AUTO: 8.3 FL (ref 5–10.5)
POLYCHROMASIA: ABNORMAL
POTASSIUM REFLEX MAGNESIUM: 3.7 MMOL/L (ref 3.5–5.1)
RBC # BLD: 3.36 M/UL (ref 4–5.2)
SLIDE REVIEW: ABNORMAL
SODIUM BLD-SCNC: 136 MMOL/L (ref 136–145)
WBC # BLD: 5.5 K/UL (ref 4–11)

## 2018-12-22 PROCEDURE — 6360000002 HC RX W HCPCS: Performed by: INTERNAL MEDICINE

## 2018-12-22 PROCEDURE — 36415 COLL VENOUS BLD VENIPUNCTURE: CPT

## 2018-12-22 PROCEDURE — 6370000000 HC RX 637 (ALT 250 FOR IP): Performed by: INTERNAL MEDICINE

## 2018-12-22 PROCEDURE — 2060000000 HC ICU INTERMEDIATE R&B

## 2018-12-22 PROCEDURE — 94664 DEMO&/EVAL PT USE INHALER: CPT

## 2018-12-22 PROCEDURE — 80048 BASIC METABOLIC PNL TOTAL CA: CPT

## 2018-12-22 PROCEDURE — 2580000003 HC RX 258: Performed by: INTERNAL MEDICINE

## 2018-12-22 PROCEDURE — 94762 N-INVAS EAR/PLS OXIMTRY CONT: CPT

## 2018-12-22 PROCEDURE — 94640 AIRWAY INHALATION TREATMENT: CPT

## 2018-12-22 PROCEDURE — 85025 COMPLETE CBC W/AUTO DIFF WBC: CPT

## 2018-12-22 PROCEDURE — 2580000003 HC RX 258

## 2018-12-22 PROCEDURE — 2700000000 HC OXYGEN THERAPY PER DAY

## 2018-12-22 RX ORDER — ASPIRIN 100 %
81 POWDER (GRAM) MISCELLANEOUS DAILY
Status: DISCONTINUED | OUTPATIENT
Start: 2018-12-22 | End: 2018-12-22

## 2018-12-22 RX ORDER — PREGABALIN 25 MG/1
75 CAPSULE ORAL 2 TIMES DAILY
Status: DISCONTINUED | OUTPATIENT
Start: 2018-12-22 | End: 2018-12-24 | Stop reason: HOSPADM

## 2018-12-22 RX ORDER — TRAMADOL HYDROCHLORIDE 50 MG/1
50 TABLET ORAL EVERY 6 HOURS PRN
Status: DISCONTINUED | OUTPATIENT
Start: 2018-12-22 | End: 2018-12-24 | Stop reason: HOSPADM

## 2018-12-22 RX ORDER — SODIUM CHLORIDE 9 MG/ML
INJECTION, SOLUTION INTRAVENOUS
Status: COMPLETED
Start: 2018-12-22 | End: 2018-12-22

## 2018-12-22 RX ORDER — FUROSEMIDE 20 MG/1
20 TABLET ORAL DAILY
Status: DISCONTINUED | OUTPATIENT
Start: 2018-12-22 | End: 2018-12-24 | Stop reason: HOSPADM

## 2018-12-22 RX ORDER — PREGABALIN 25 MG/1
50 CAPSULE ORAL 2 TIMES DAILY
Status: DISCONTINUED | OUTPATIENT
Start: 2018-12-22 | End: 2018-12-22

## 2018-12-22 RX ORDER — GUAIFENESIN 600 MG/1
1200 TABLET, EXTENDED RELEASE ORAL 2 TIMES DAILY
Status: DISCONTINUED | OUTPATIENT
Start: 2018-12-22 | End: 2018-12-24 | Stop reason: HOSPADM

## 2018-12-22 RX ORDER — LOPERAMIDE HYDROCHLORIDE 2 MG/1
2 CAPSULE ORAL 4 TIMES DAILY PRN
Status: DISCONTINUED | OUTPATIENT
Start: 2018-12-22 | End: 2018-12-24 | Stop reason: HOSPADM

## 2018-12-22 RX ORDER — LOSARTAN POTASSIUM AND HYDROCHLOROTHIAZIDE 12.5; 5 MG/1; MG/1
2 TABLET ORAL DAILY
Status: DISCONTINUED | OUTPATIENT
Start: 2018-12-23 | End: 2018-12-22

## 2018-12-22 RX ORDER — IPRATROPIUM BROMIDE AND ALBUTEROL SULFATE 2.5; .5 MG/3ML; MG/3ML
1 SOLUTION RESPIRATORY (INHALATION) 4 TIMES DAILY
Status: DISCONTINUED | OUTPATIENT
Start: 2018-12-22 | End: 2018-12-23

## 2018-12-22 RX ADMIN — INSULIN LISPRO 2 UNITS: 100 INJECTION, SOLUTION INTRAVENOUS; SUBCUTANEOUS at 18:04

## 2018-12-22 RX ADMIN — Medication 10 ML: at 21:14

## 2018-12-22 RX ADMIN — OYSTER SHELL CALCIUM WITH VITAMIN D 1 TABLET: 500; 200 TABLET, FILM COATED ORAL at 08:59

## 2018-12-22 RX ADMIN — PANTOPRAZOLE SODIUM 40 MG: 40 TABLET, DELAYED RELEASE ORAL at 06:53

## 2018-12-22 RX ADMIN — DULOXETINE HYDROCHLORIDE 60 MG: 60 CAPSULE, DELAYED RELEASE ORAL at 21:07

## 2018-12-22 RX ADMIN — LOSARTAN POTASSIUM 100 MG: 100 TABLET ORAL at 08:59

## 2018-12-22 RX ADMIN — CARVEDILOL 6.25 MG: 6.25 TABLET, FILM COATED ORAL at 18:02

## 2018-12-22 RX ADMIN — PREGABALIN 75 MG: 25 CAPSULE ORAL at 21:06

## 2018-12-22 RX ADMIN — APIXABAN 5 MG: 5 TABLET, FILM COATED ORAL at 08:59

## 2018-12-22 RX ADMIN — GUAIFENESIN 1200 MG: 600 TABLET, EXTENDED RELEASE ORAL at 15:31

## 2018-12-22 RX ADMIN — GABAPENTIN 300 MG: 300 CAPSULE ORAL at 08:59

## 2018-12-22 RX ADMIN — Medication 10 ML: at 09:00

## 2018-12-22 RX ADMIN — INSULIN LISPRO 2 UNITS: 100 INJECTION, SOLUTION INTRAVENOUS; SUBCUTANEOUS at 09:12

## 2018-12-22 RX ADMIN — FERROUS SULFATE TAB 325 MG (65 MG ELEMENTAL FE) 325 MG: 325 (65 FE) TAB at 08:59

## 2018-12-22 RX ADMIN — INSULIN LISPRO 2 UNITS: 100 INJECTION, SOLUTION INTRAVENOUS; SUBCUTANEOUS at 12:11

## 2018-12-22 RX ADMIN — INSULIN LISPRO 2 UNITS: 100 INJECTION, SOLUTION INTRAVENOUS; SUBCUTANEOUS at 21:07

## 2018-12-22 RX ADMIN — LINAGLIPTIN 5 MG: 5 TABLET, FILM COATED ORAL at 08:59

## 2018-12-22 RX ADMIN — LOPERAMIDE HYDROCHLORIDE 2 MG: 2 CAPSULE ORAL at 21:06

## 2018-12-22 RX ADMIN — PRAVASTATIN SODIUM 80 MG: 40 TABLET ORAL at 21:06

## 2018-12-22 RX ADMIN — CARVEDILOL 6.25 MG: 6.25 TABLET, FILM COATED ORAL at 09:00

## 2018-12-22 RX ADMIN — IMIPRAMINE HYDROCHLORIDE 50 MG: 25 TABLET ORAL at 21:13

## 2018-12-22 RX ADMIN — DONEPEZIL HYDROCHLORIDE 10 MG: 5 TABLET, FILM COATED ORAL at 21:06

## 2018-12-22 RX ADMIN — PREGABALIN 75 MG: 25 CAPSULE ORAL at 15:31

## 2018-12-22 RX ADMIN — OYSTER SHELL CALCIUM WITH VITAMIN D 1 TABLET: 500; 200 TABLET, FILM COATED ORAL at 21:07

## 2018-12-22 RX ADMIN — HYDROCHLOROTHIAZIDE 25 MG: 25 TABLET ORAL at 08:59

## 2018-12-22 RX ADMIN — PREDNISONE 2.5 MG: 5 TABLET ORAL at 08:59

## 2018-12-22 RX ADMIN — SODIUM CHLORIDE 250 ML: 9 INJECTION, SOLUTION INTRAVENOUS at 15:32

## 2018-12-22 RX ADMIN — GUAIFENESIN 1200 MG: 600 TABLET, EXTENDED RELEASE ORAL at 21:06

## 2018-12-22 RX ADMIN — FUROSEMIDE 20 MG: 20 TABLET ORAL at 15:31

## 2018-12-22 RX ADMIN — APIXABAN 5 MG: 5 TABLET, FILM COATED ORAL at 21:07

## 2018-12-22 RX ADMIN — INSULIN GLARGINE 20 UNITS: 100 INJECTION, SOLUTION SUBCUTANEOUS at 21:07

## 2018-12-22 RX ADMIN — CETIRIZINE HYDROCHLORIDE 10 MG: 10 TABLET ORAL at 09:00

## 2018-12-22 RX ADMIN — AMLODIPINE BESYLATE 5 MG: 5 TABLET ORAL at 08:59

## 2018-12-22 RX ADMIN — FENOFIBRATE 54 MG: 54 TABLET ORAL at 09:00

## 2018-12-22 RX ADMIN — IPRATROPIUM BROMIDE AND ALBUTEROL SULFATE 1 AMPULE: .5; 3 SOLUTION RESPIRATORY (INHALATION) at 20:27

## 2018-12-22 RX ADMIN — FLUTICASONE PROPIONATE 2 SPRAY: 50 SPRAY, METERED NASAL at 12:11

## 2018-12-22 RX ADMIN — CEFTRIAXONE SODIUM 1 G: 1 INJECTION, POWDER, FOR SOLUTION INTRAMUSCULAR; INTRAVENOUS at 15:31

## 2018-12-22 RX ADMIN — POTASSIUM CHLORIDE 40 MEQ: 20 TABLET, EXTENDED RELEASE ORAL at 08:59

## 2018-12-22 RX ADMIN — CLONAZEPAM 0.5 MG: 0.5 TABLET ORAL at 21:07

## 2018-12-22 RX ADMIN — VITAMIN D, TAB 1000IU (100/BT) 1000 UNITS: 25 TAB at 08:59

## 2018-12-22 NOTE — PROGRESS NOTES
Patient's EF (Ejection Fraction) is greater than 40%    Patient has a past medical history of Alzheimer disease; Alzheimer's dementia; Anxiety; Arthritis; Back pain; Depression; Diabetes mellitus ([facility] Fall; GERD (gastroesophageal reflux disease); Hepatitis A; Hernia; Hypercholesteremia; Hypertension; IBS (irritable bowel syndrome); Incontinence of urine; Kidney stone; MDRO (multiple drug resistant organisms) resistance; Memory change; Neuropathy; Seizures ([facility] and UTI (lower urinary tract infection). Comorbidities reviewed and education provided. Patient and family's stated goal of care: reduce shortness of breath, better understand heart failure and disease management, be more comfortable and reduce lower extremity edema prior to discharge    Patient's current functional capacity:  Slight limitation of physical activity. Comfortable at rest. Ordinary physical activity results in fatigue, palpitation, dyspnea. Pt resting in bed at this time on 2 L O2. Pt denies shortness of breath. Pt without lower extremity edema.  Patient's weights and intake/output reviewed:    Patient Vitals for the past 96 hrs (Last 3 readings):   Weight   12/21/18 2134 156 lb 14.4 oz (71.2 kg)   12/21/18 1458 150 lb (68 kg)       Intake/Output Summary (Last 24 hours) at 12/21/18 2243  Last data filed at 12/21/18 2204   Gross per 24 hour   Intake                0 ml   Output              351 ml   Net             -351 ml         >>For CHF and Comorbidity Education Time and Topics, please see education tab

## 2018-12-22 NOTE — PROGRESS NOTES
Pt is lying in bed with their eyes closed. Respirations are easy and even. Call light within reach bed in lowest position with the wheels locked. Will continue to monitor.  Chinyere Durham

## 2018-12-22 NOTE — DISCHARGE INSTR - COC
Continuity of Care Form    Patient Name: Linda Ortega   :  1940  MRN:  [id number]    Admit date:  2018  Discharge date:  18    Code Status Order: [facility]   Advance Directives:   [facility] Documentation     Date/Time Healthcare Directive Type of Healthcare Directive Copy in [address] Agent's Name Healthcare Agent's Phone Number    [phone]  Yes, patient has an advance directive for healthcare treatment  Living will  No, copy requested from medical records  --  --  --          Admitting Physician:  Ervin Chavarria MD  PCP: Jese Galvin MD    Discharging Nurse: [facility] Unit/Room#: /2663-39  Discharging Unit Phone Number: [phone]    Emergency Contact:   Extended Emergency Contact Information  Primary Emergency Contact: [facility] [id number] [place] [address] Phone: [phone]  Relation: Child  Secondary Emergency Contact: Fab Irwin  [address] [place] [address] Phone: [phone]  Relation: Child    Past Surgical History:  Past Surgical History:   Procedure Laterality Date    BREAST SURGERY      BENIGN CYST    CHOLECYSTECTOMY      OTHER SURGICAL HISTORY  12    OPEN REDUCTION INTERNAL FIXATION RIGHT PROXIMAL HUMERAL SHAFT FRACTURE SYNTHES    OTHER SURGICAL HISTORY  12    incision and drainage of bursa rt elbow    SHOULDER SURGERY Bilateral     rtc repair       Immunization History:   Immunization History   Administered Date(s) Administered    Influenza Whole 10/18/2010    Pneumococcal Conjugate 7-valent 2008       Active Problems:  Patient Active Problem List   Diagnosis Code    HTN (hypertension), benign I10    DM (diabetes mellitus), type 2, uncontrolled ([facility] E11.65    Fracture, humerus S42.309A    Anemia W31.4    Metabolic encephalopathy W21.18    Trochanteric bursitis, left hip M70.62    IT band syndrome M76.30    Left lumbar radiculitis M54.16    Spondylolisthesis, lumbar region M43.16    Low back pain M54.5    Left hip pain M25.552    Shortness of breath R06.02    Chronic cough R05    Alzheimer's disease G30.9, F02.80    Dyspnea R06.00       Isolation/Infection:   Isolation          No Isolation        Patient Infection Status     Infection Encounter Level? Added Added By Resolved Resolved By Review Date Onset Date    MDRO (multi-drug resistant organism) No 10/20/16 Shelton Tee RN 01/02/18 Cesilia Solis RN            Nurse Assessment:  Last Vital Signs: /75   Pulse 75   Temp 97.8 °F (36.6 °C) (Oral)   Resp 16   Wt 152 lb 9.6 oz (69.2 kg)   SpO2 98%   BMI 23.90 kg/m²     Last documented pain score (0-10 scale):    Last Weight:   Wt Readings from Last 1 Encounters:   12/22/18 152 lb 9.6 oz (69.2 kg)     Mental Status:  oriented and alert    IV Access:  - None    Nursing Mobility/ADLs:  Walking   Assisted  Transfer  Assisted  Bathing  Assisted  Dressing  Assisted  Toileting  Assisted  Feeding  Assisted  Med Admin  Assisted  Med Delivery   whole and prefers mixed with applesauce    Wound Care Documentation and Therapy:  Wound 04/21/12 Abrasion(s) Knee Right (Active)   Number of days: 2436       Incision 06/06/12 Shoulder Right (Active)   Number of days: 2389       Incision 07/18/12 Elbow Posterior (Active)   Number of days: 2348        Elimination:  Continence:   · Bowel: Yes  · Bladder: Yes  Urinary Catheter: None   Colostomy/Ileostomy/Ileal Conduit: No       Date of Last BM:     Intake/Output Summary (Last 24 hours) at 12/22/18 1603  Last data filed at 12/22/18 1242   Gross per 24 hour   Intake              330 ml   Output              676 ml   Net             -346 ml     I/O last 3 completed shifts:   In: 330 [P.O.:330]  Out: 676 [Urine:675; Emesis/NG output:1]    Safety Concerns:     History of Falls (last 30 days) and At Risk for Falls    Impairments/Disabilities:      None    Nutrition Therapy:  Current Nutrition

## 2018-12-22 NOTE — H&P
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 Prior to Admission medications    Medication Sig Start Date End Date Taking? Authorizing Provider   apixaban (ELIQUIS) 5 MG TABS tablet Take 5 mg by mouth 2 times daily   Yes Historical Provider, MD   diphenhydrAMINE (BENADRYL) 25 MG tablet Take 25 mg by mouth every 6 hours as needed for Itching   Yes Historical Provider, MD   carvedilol (COREG) 6.25 MG tablet Take 6.25 mg by mouth 2 times daily (with meals)   Yes Historical Provider, MD   magnesium oxide (MAG-OX) 400 MG tablet Take 400 mg by mouth daily   Yes Historical Provider, MD   traMADol (ULTRAM) 50 MG tablet Take 50 mg by mouth every 6 hours as needed for Pain. .   Yes Historical Provider, MD   albuterol sulfate HFA (PROAIR HFA) 108 (90 Base) MCG/ACT inhaler Inhale 2 puffs into the lungs every 6 hours as needed for Wheezing 10/25/18  Yes Pennelope , MD   cyanocobalamin 1000 MCG/ML injection Inject 1,000 mcg into the muscle every 30 days   Yes Historical Provider, MD   fenofibrate (TRICOR) 145 MG tablet Take 145 mg by mouth daily   Yes Historical Provider, MD   loratadine (CLARITIN) 10 MG capsule Take 10 mg by mouth daily   Yes Historical Provider, MD   guaiFENesin (MUCINEX) 600 MG extended release tablet Take 1,200 mg by mouth 2 times daily   Yes Historical Provider, MD   potassium chloride (MICRO-K) 10 MEQ extended release capsule Take 40 mEq by mouth daily    Yes Historical Provider, MD   predniSONE (DELTASONE) 2.5 MG tablet Take 2.5 mg by mouth daily   Yes Historical Provider, MD   guaiFENesin (ROBITUSSIN) 100 MG/5ML SOLN oral solution Take 10 mLs by mouth every 4 hours as needed for Cough   Yes Historical Provider, MD   pregabalin (LYRICA) 50 MG capsule Take 50 mg by mouth 2 times daily   Yes Historical Provider, MD   traZODone (DESYREL) 50 MG tablet Take 50 mg by mouth nightly   Yes Historical Provider, MD   nabumetone (RELAFEN) 500 MG tablet Take 1 tablet by mouth 2 times daily 10/24/16  Yes Maira Smith MD   amLODIPine (NORVASC) 5 MG tablet Take 1 tablet by mouth daily 10/18/16  Yes Kwan Pandya MD   loperamide (IMODIUM) 2 MG capsule Take 2 mg by mouth 4 times daily as needed for Diarrhea   Yes Historical Provider, MD   DULoxetine (CYMBALTA) 60 MG extended release capsule Take 60 mg by mouth nightly    Yes Historical Provider, MD   calcium-vitamin D (OSCAL-500) 500-200 MG-UNIT per tablet Take 1 tablet by mouth 2 times daily   Yes Historical Provider, MD   insulin glargine (LANTUS) 100 UNIT/ML injection vial Inject 20 Units into the skin nightly    Yes Historical Provider, MD   vitamin D (CHOLECALCIFEROL) 1000 UNIT TABS tablet Take 1,000 Units by mouth daily   Yes Historical Provider, MD   ipratropium-albuterol (DUONEB) 0.5-2.5 (3) MG/3ML SOLN nebulizer solution Inhale 1 vial into the lungs every 6 hours as needed for Shortness of Breath   Yes Historical Provider, MD   imipramine (TOFRANIL) 50 MG tablet Take 50 mg by mouth nightly   Yes Historical Provider, MD   fluticasone (FLONASE) 50 MCG/ACT nasal spray 2 sprays by Nasal route daily   Yes Historical Provider, MD   sitaGLIPtin (JANUVIA) 50 MG tablet Take 50 mg by mouth daily   Yes Historical Provider, MD   ondansetron (ZOFRAN ODT) 4 MG disintegrating tablet Take 1 tablet by mouth every 8 hours as needed for Nausea. Let dissolve in mouth. 8/5/13  Yes Jose Estrella,    acetaminophen (TYLENOL) 500 MG tablet Take 325 mg by mouth every 4 hours as needed    Yes Historical Provider, MD   clonazePAM (KLONOPIN) 0.5 MG tablet Take 1.5 mg by mouth nightly    Yes Historical Provider, MD   Donepezil HCl (ARICEPT) 23 MG TABS Take by mouth daily    Yes Historical Provider, MD   losartan-hydrochlorothiazide (HYZAAR) 50-12.5 MG per tablet Take 2 tablets by mouth daily    Yes Historical Provider, MD   ferrous sulfate 325 (65 FE) MG tablet Take 1 tablet by mouth daily. 4/23/12  Yes Rachid Malcolm MD   omeprazole (PRILOSEC) 20 MG capsule Take 20 mg by mouth nightly    Yes Historical Provider, MD   pravastatin (PRAVACHOL) 40 MG tablet Take 80 mg by mouth nightly    Yes Historical Provider, MD   gabapentin (NEURONTIN) 300 MG capsule Take 300 mg by mouth 3 times daily  1/15/11  Yes Historical Provider, MD   ASPIRIN Take 81 mg by mouth daily.  1/15/11  Yes Historical Provider, MD       Social History     Social History    Marital status:      Spouse name: N/A    Number of children: N/A    Years of education: N/A     Social History Main Topics    Smoking status: Former Smoker     Packs/day: 1.00     Years: 15.00     Quit date: 4/21/1998    Smokeless tobacco: Never Used    Alcohol use No    Drug use: No    Sexual activity: Not Currently     Other Topics Concern    None     Social History Narrative    None       Family History   Problem Relation Age of Onset    Heart Disease Mother        REVIEW OF SYSTEMS:   Constitutional: Negative for fever   HEENT: Negative for sore throat   Eyes: Negative for redness   Respiratory:Positive for dyspnea, cough -appears chronic   Cardiovascular: Negative for chest pain   Gastrointestinal: Negative for vomiting, diarrhea   Genitourinary: Negative for hematuria   Musculoskeletal: Negative for arthralgias   Chronic leg pain from severe neuropathy   Skin: Negative for rash

## 2018-12-22 NOTE — PROGRESS NOTES
RESPIRATORY THERAPY ASSESSMENT    Name:  Selena [facility] [address] [address] Record Number:  [id number]  Age: 70 y.o. Gender: female  : 1940  Today's Date:  2018  Room:  /0326-01    Assessment     Is the patient being admitted for a COPD or Asthma exacerbation? No   (If yes the patient will be seen every 4 hours for the first 24 hours and then reassessed)    Patient Admission Diagnosis      Allergies  Allergies   Allergen Reactions    Codeine Hives     Other reaction(s): Unknown  itching    Morphine And Related        Minimum Predicted Vital Capacity:     816          Actual Vital Capacity:      240          Pulmonary History:former smoker  Home Oxygen Therapy:  2L at hs  Home Respiratory Therapy:albuterolprn/duoneb prn   Current Respiratory Therapy:  duoneb q4wa          Respiratory Severity Index(RSI)   Patients with orders for inhalation medications, oxygen, or any therapeutic treatment modality will be placed on Respiratory Protocol. They will be assessed with the first treatment and at least every 72 hours thereafter. The following severity scale will be used to determine frequency of treatment intervention.     Smoking History: Pulmonary Disease or Smoking History, Greater than 15 pack year = 2    Social History  Social History   Substance Use Topics    Smoking status: Former Smoker     Packs/day: 1.00     Years: 15.00     Quit date: 1998    Smokeless tobacco: Never Used    Alcohol use No       Recent Surgical History: None = 0  Past Surgical History  Past Surgical History:   Procedure Laterality Date    BREAST SURGERY      BENIGN CYST    CHOLECYSTECTOMY      OTHER SURGICAL HISTORY  12    OPEN REDUCTION INTERNAL FIXATION RIGHT PROXIMAL HUMERAL SHAFT FRACTURE SYNTHES    OTHER SURGICAL HISTORY  12    incision and drainage of bursa rt elbow    SHOULDER SURGERY Bilateral     rtc repair       Level of Consciousness: Alert, Oriented, and Cooperative = 0    Level of Activity: Walking with assistance = 1    Respiratory Pattern: Regular Pattern; RR 8-20 = 0    Breath Sounds: Diminshed bilaterally and/or crackles = 2    Sputum   ,  ,    Cough: Strong, spontaneous, non-productive = 0    Vital Signs   /65   Pulse 76   Temp 97.7 °F (36.5 °C) (Oral)   Resp 16   Wt 150 lb (68 kg)   SpO2 94%   BMI 23.49 kg/m²   SPO2 (COPD values may differ): 90-91% on room air or greater than 92% on FiO2 24- 28% = 1    Peak Flow (asthma only): not applicable = 0    RSI: 5-6 = Q4hr PRN (every four hours as needed) for dyspnea        Plan       Goals: medication delivery    Patient/caregiver was educated on the proper method of use for Respiratory Care Devices:  Yes      Level of patient/caregiver understanding able to:   [] Verbalize understanding   [] Demonstrate understanding       [] Teach back        [] Needs reinforcement       []  No available caregiver               []  Other:     Response to education:  Very Good     Is patient being placed on Home Treatment Regimen? Yes     Does the patient have everything they need prior to discharge? NA     Comments: chart reviewed and patient assessed    Plan of Care: home regimen     Electronically signed by Dana Desai RCP on 12/21/2018 at 8:19 PM    Respiratory Protocol Guidelines     1. Assessment and treatment by Respiratory Therapy will be initiated for medication and therapeutic interventions upon initiation of aerosolized medication. 2. Physician will be contacted for respiratory rate (RR) greater than 35 breaths per minute. Therapy will be held for heart rate (HR) greater than 140 beats per minute, pending direction from physician. 3. Bronchodilators will be administered via Metered Dose Inhaler (MDI) with spacer when the following criteria are met:  a. Alert and cooperative     b. HR < 140 bpm  c. RR < 30 bpm                d. Can demonstrate a 2-3 second inspiratory hold  4.  Bronchodilators will be administered via Hand Held Nebulizer [facility]) to patients when ANY of the following criteria are met  a. Incognizant or uncooperative          b. Patients treated with HHN at Home        c. Unable to demonstrate proper use of MDI with spacer     d. RR > 30 bpm   5. Bronchodilators will be delivered via Metered Dose Inhaler (MDI), HHN, Aerogen to intubated patients on mechanical ventilation. 6. Inhalation medication orders will be delivered and/or substituted as outlined below. Aerosolized Medications Ordering and Administration Guidelines:    1. All Medications will be ordered by a physician, and their frequency and/or modality will be adjusted as defined by the patients Respiratory Severity Index (RSI) score. 2. If the patient does not have documented COPD, consider discontinuing anticholinergics when RSI is less than 9.  3. If the bronchospasm worsens (increased RSI), then the bronchodilator frequency can be increased to a maximum of every 4 hours. If greater than every 4 hours is required, the physician will be contacted. 4. If the bronchospasm improves, the frequency of the bronchodilator can be decreased, based on the patient's RSI, but not less than home treatment regimen frequency. 5. Bronchodilator(s) will be discontinued if patient has a RSI less than 9 and has received no scheduled or as needed treatment for 72  Hrs. Patients Ordered on a Mucolytic Agent:    1. Must always be administered with a bronchodilator. 2. Discontinue if patient experiences worsened bronchospasm, or secretions have lessened to the point that the patient is able to clear them with a cough. Anti-inflammatory and Combination Medications:    1. If the patient lacks prior history of lung disease, is not using inhaled anti-inflammatory medication at home, and lacks wheezing by examination or by history for at least 24 hours, contact physician for possible discontinuation.

## 2018-12-23 LAB
ANION GAP SERPL CALCULATED.3IONS-SCNC: 9 MMOL/L (ref 3–16)
BUN BLDV-MCNC: 20 MG/DL (ref 7–20)
CALCIUM SERPL-MCNC: 9.9 MG/DL (ref 8.3–10.6)
CHLORIDE BLD-SCNC: 90 MMOL/L (ref 99–110)
CO2: 37 MMOL/L (ref 21–32)
CREAT SERPL-MCNC: 0.8 MG/DL (ref 0.6–1.2)
GFR AFRICAN AMERICAN: >60
GFR NON-AFRICAN AMERICAN: >60
GLUCOSE BLD-MCNC: 141 MG/DL (ref 70–99)
GLUCOSE BLD-MCNC: 182 MG/DL (ref 70–99)
GLUCOSE BLD-MCNC: 209 MG/DL (ref 70–99)
GLUCOSE BLD-MCNC: 255 MG/DL (ref 70–99)
GLUCOSE BLD-MCNC: 88 MG/DL (ref 70–99)
GLUCOSE BLD-MCNC: 92 MG/DL (ref 70–99)
MAGNESIUM: 1.4 MG/DL (ref 1.8–2.4)
ORGANISM: ABNORMAL
PERFORMED ON: ABNORMAL
PERFORMED ON: NORMAL
PHOSPHORUS: 2.9 MG/DL (ref 2.5–4.9)
POTASSIUM SERPL-SCNC: 3.1 MMOL/L (ref 3.5–5.1)
SODIUM BLD-SCNC: 136 MMOL/L (ref 136–145)
URINE CULTURE, ROUTINE: ABNORMAL
URINE CULTURE, ROUTINE: ABNORMAL

## 2018-12-23 PROCEDURE — 80048 BASIC METABOLIC PNL TOTAL CA: CPT

## 2018-12-23 PROCEDURE — 2580000003 HC RX 258: Performed by: INTERNAL MEDICINE

## 2018-12-23 PROCEDURE — 6370000000 HC RX 637 (ALT 250 FOR IP): Performed by: INTERNAL MEDICINE

## 2018-12-23 PROCEDURE — 84100 ASSAY OF PHOSPHORUS: CPT

## 2018-12-23 PROCEDURE — 2060000000 HC ICU INTERMEDIATE R&B

## 2018-12-23 PROCEDURE — G8987 SELF CARE CURRENT STATUS: HCPCS

## 2018-12-23 PROCEDURE — G8978 MOBILITY CURRENT STATUS: HCPCS

## 2018-12-23 PROCEDURE — G8979 MOBILITY GOAL STATUS: HCPCS

## 2018-12-23 PROCEDURE — 94762 N-INVAS EAR/PLS OXIMTRY CONT: CPT

## 2018-12-23 PROCEDURE — 36415 COLL VENOUS BLD VENIPUNCTURE: CPT

## 2018-12-23 PROCEDURE — 94640 AIRWAY INHALATION TREATMENT: CPT

## 2018-12-23 PROCEDURE — 94664 DEMO&/EVAL PT USE INHALER: CPT

## 2018-12-23 PROCEDURE — 97535 SELF CARE MNGMENT TRAINING: CPT

## 2018-12-23 PROCEDURE — 97166 OT EVAL MOD COMPLEX 45 MIN: CPT

## 2018-12-23 PROCEDURE — 6360000002 HC RX W HCPCS: Performed by: INTERNAL MEDICINE

## 2018-12-23 PROCEDURE — 94150 VITAL CAPACITY TEST: CPT

## 2018-12-23 PROCEDURE — 97116 GAIT TRAINING THERAPY: CPT

## 2018-12-23 PROCEDURE — G8988 SELF CARE GOAL STATUS: HCPCS

## 2018-12-23 PROCEDURE — 83735 ASSAY OF MAGNESIUM: CPT

## 2018-12-23 PROCEDURE — 2700000000 HC OXYGEN THERAPY PER DAY

## 2018-12-23 PROCEDURE — 97161 PT EVAL LOW COMPLEX 20 MIN: CPT

## 2018-12-23 RX ORDER — POTASSIUM CHLORIDE 750 MG/1
40 TABLET, EXTENDED RELEASE ORAL ONCE
Status: COMPLETED | OUTPATIENT
Start: 2018-12-23 | End: 2018-12-23

## 2018-12-23 RX ORDER — POTASSIUM CHLORIDE 750 MG/1
40 TABLET, EXTENDED RELEASE ORAL DAILY
Status: DISCONTINUED | OUTPATIENT
Start: 2018-12-23 | End: 2018-12-23

## 2018-12-23 RX ORDER — IPRATROPIUM BROMIDE AND ALBUTEROL SULFATE 2.5; .5 MG/3ML; MG/3ML
1 SOLUTION RESPIRATORY (INHALATION) 3 TIMES DAILY
Status: DISCONTINUED | OUTPATIENT
Start: 2018-12-23 | End: 2018-12-24 | Stop reason: HOSPADM

## 2018-12-23 RX ADMIN — POTASSIUM CHLORIDE 40 MEQ: 10 TABLET, EXTENDED RELEASE ORAL at 17:59

## 2018-12-23 RX ADMIN — APIXABAN 5 MG: 5 TABLET, FILM COATED ORAL at 08:36

## 2018-12-23 RX ADMIN — DONEPEZIL HYDROCHLORIDE 10 MG: 5 TABLET, FILM COATED ORAL at 21:41

## 2018-12-23 RX ADMIN — FUROSEMIDE 20 MG: 20 TABLET ORAL at 08:32

## 2018-12-23 RX ADMIN — INSULIN LISPRO 3 UNITS: 100 INJECTION, SOLUTION INTRAVENOUS; SUBCUTANEOUS at 22:04

## 2018-12-23 RX ADMIN — PRAVASTATIN SODIUM 80 MG: 40 TABLET ORAL at 21:40

## 2018-12-23 RX ADMIN — IPRATROPIUM BROMIDE AND ALBUTEROL SULFATE 1 AMPULE: .5; 3 SOLUTION RESPIRATORY (INHALATION) at 11:34

## 2018-12-23 RX ADMIN — POTASSIUM CHLORIDE 40 MEQ: 20 TABLET, EXTENDED RELEASE ORAL at 08:35

## 2018-12-23 RX ADMIN — Medication 10 ML: at 08:36

## 2018-12-23 RX ADMIN — VITAMIN D, TAB 1000IU (100/BT) 1000 UNITS: 25 TAB at 08:36

## 2018-12-23 RX ADMIN — FLUTICASONE PROPIONATE 2 SPRAY: 50 SPRAY, METERED NASAL at 08:37

## 2018-12-23 RX ADMIN — IPRATROPIUM BROMIDE AND ALBUTEROL SULFATE 1 AMPULE: .5; 3 SOLUTION RESPIRATORY (INHALATION) at 15:10

## 2018-12-23 RX ADMIN — PREDNISONE 2.5 MG: 5 TABLET ORAL at 08:33

## 2018-12-23 RX ADMIN — APIXABAN 5 MG: 5 TABLET, FILM COATED ORAL at 21:41

## 2018-12-23 RX ADMIN — AMLODIPINE BESYLATE 5 MG: 5 TABLET ORAL at 08:36

## 2018-12-23 RX ADMIN — CETIRIZINE HYDROCHLORIDE 10 MG: 10 TABLET ORAL at 08:33

## 2018-12-23 RX ADMIN — PANTOPRAZOLE SODIUM 40 MG: 40 TABLET, DELAYED RELEASE ORAL at 08:36

## 2018-12-23 RX ADMIN — OYSTER SHELL CALCIUM WITH VITAMIN D 1 TABLET: 500; 200 TABLET, FILM COATED ORAL at 21:41

## 2018-12-23 RX ADMIN — PREGABALIN 75 MG: 25 CAPSULE ORAL at 08:36

## 2018-12-23 RX ADMIN — FERROUS SULFATE TAB 325 MG (65 MG ELEMENTAL FE) 325 MG: 325 (65 FE) TAB at 08:36

## 2018-12-23 RX ADMIN — HYDROCHLOROTHIAZIDE 25 MG: 25 TABLET ORAL at 08:36

## 2018-12-23 RX ADMIN — CARVEDILOL 6.25 MG: 6.25 TABLET, FILM COATED ORAL at 18:04

## 2018-12-23 RX ADMIN — INSULIN GLARGINE 20 UNITS: 100 INJECTION, SOLUTION SUBCUTANEOUS at 22:04

## 2018-12-23 RX ADMIN — Medication 10 ML: at 21:42

## 2018-12-23 RX ADMIN — IPRATROPIUM BROMIDE AND ALBUTEROL SULFATE 1 AMPULE: .5; 3 SOLUTION RESPIRATORY (INHALATION) at 07:30

## 2018-12-23 RX ADMIN — DULOXETINE HYDROCHLORIDE 60 MG: 60 CAPSULE, DELAYED RELEASE ORAL at 21:41

## 2018-12-23 RX ADMIN — OYSTER SHELL CALCIUM WITH VITAMIN D 1 TABLET: 500; 200 TABLET, FILM COATED ORAL at 08:36

## 2018-12-23 RX ADMIN — FENOFIBRATE 54 MG: 54 TABLET ORAL at 08:33

## 2018-12-23 RX ADMIN — INSULIN LISPRO 4 UNITS: 100 INJECTION, SOLUTION INTRAVENOUS; SUBCUTANEOUS at 18:05

## 2018-12-23 RX ADMIN — CARVEDILOL 6.25 MG: 6.25 TABLET, FILM COATED ORAL at 08:33

## 2018-12-23 RX ADMIN — GUAIFENESIN 1200 MG: 600 TABLET, EXTENDED RELEASE ORAL at 21:41

## 2018-12-23 RX ADMIN — CLONAZEPAM 0.5 MG: 0.5 TABLET ORAL at 21:41

## 2018-12-23 RX ADMIN — LOSARTAN POTASSIUM 100 MG: 100 TABLET ORAL at 08:35

## 2018-12-23 RX ADMIN — TRAMADOL HYDROCHLORIDE 50 MG: 50 TABLET, FILM COATED ORAL at 04:48

## 2018-12-23 RX ADMIN — GUAIFENESIN 1200 MG: 600 TABLET, EXTENDED RELEASE ORAL at 08:36

## 2018-12-23 RX ADMIN — IMIPRAMINE HYDROCHLORIDE 50 MG: 25 TABLET ORAL at 21:42

## 2018-12-23 RX ADMIN — CEFTRIAXONE SODIUM 1 G: 1 INJECTION, POWDER, FOR SOLUTION INTRAMUSCULAR; INTRAVENOUS at 16:26

## 2018-12-23 RX ADMIN — INSULIN LISPRO 2 UNITS: 100 INJECTION, SOLUTION INTRAVENOUS; SUBCUTANEOUS at 11:58

## 2018-12-23 RX ADMIN — IPRATROPIUM BROMIDE AND ALBUTEROL SULFATE 1 AMPULE: .5; 3 SOLUTION RESPIRATORY (INHALATION) at 19:56

## 2018-12-23 RX ADMIN — Medication 400 MG: at 08:36

## 2018-12-23 RX ADMIN — PREGABALIN 75 MG: 25 CAPSULE ORAL at 21:40

## 2018-12-23 RX ADMIN — LINAGLIPTIN 5 MG: 5 TABLET, FILM COATED ORAL at 08:32

## 2018-12-23 ASSESSMENT — PAIN SCALES - GENERAL: PAINLEVEL_OUTOF10: 9

## 2018-12-23 NOTE — PROGRESS NOTES
medication at home, and lacks wheezing by examination or by history for at least 24 hours, contact physician for possible discontinuation.

## 2018-12-23 NOTE — PROGRESS NOTES
[facility] awake in bed and denies needs.

## 2018-12-23 NOTE — FLOWSHEET NOTE
12/22/18 1911   Vital Signs   Temp 98.2 °F (36.8 °C)   Temp Source Oral   Pulse 78   Heart Rate Source Monitor   Resp 16   /65   BP Location Right Arm   BP Upper/Lower Upper   Patient Position Semi fowlers   Level of Consciousness 0   MEWS Score 1   Oxygen Therapy   SpO2 97 %   O2 Device Nasal cannula   O2 Flow Rate (L/min) 2 L/min   Patient assessment complete. Pt lying in bed quietly. Lung sounds diminished. Pt remain on 02 2liters via nasal canula. Nightly medications given with applesauce. Bed exit alarm on and working properly. Denies needs. Call light within reach.

## 2018-12-23 NOTE — PROGRESS NOTES
Physical Therapy  Inpatient Physical Therapy Evaluation and Treatment    Unit: PCU  Date:  12/23/2018  Patient Name:    Saumya Corrales  \"Lis\"  Admitting diagnosis:  Dyspnea [R06.00], Acute CHF, UTI,   Admit Date:  12/21/2018  Precautions/Restrictions/WB Status/ Lines/ Wounds/ Oxygen:  Hx R humeral fx, hx dementia, hx seizures. Treatment Time:  6391-0254  Treatment Number:  1     Patient Goals for Therapy: \"go home (to NH)\"        Discharge Recommendations:  Return to NH with PT intervention. DME needs for discharge:  None. AM-PAC Mobility Score   AM-PAC Inpatient Mobility Raw Score : 23     Preadmission Environment    Pt. Lives at [facility] [place] x5 years. Preadmission Status   Pt. Able to ambulate with rollator. Normally independent at NH    Pain    Rating:  Chronic low back pain, worse since fall, \"spinal stenosis\"  Location:  Pain Medicine Status: Denies need     Cognition    A&Ox4, patient appropriate and cooperative. Patient lying supine in bed with no family present. Follows 1 step and 2 step commands. Upper Extremity ROM/Strength  Please see OT evaluation. Lower Extremity ROM / Strength    AROM WFL B LE (donned her own socks, good functional external rotation). MMT deferred for functional mobility, 4-/5 B iliopsoas. Lower Extremity Sensation    Pain B feet (hx neuropathy). Lower Extremity Proprioception:   NT.  Has neuropathy from DM. Coordination and Tone  WNL    Balance  Static Sitting:  Intact. Dynamic Sitting:  Intact for donning socks and silas-care. Static Standing:  Intact. Dynamic Standing:  Intact for handwashing and managing pants. Bed mobility    Supine to sit:  Supervision, HOB up, rail up. Sit to supine: Supervision, HOB up, rail up. Scooting to head of bed:  Supervision. Scooting in sitting:  Supervision. Rolling:  Supervision. Transfers    Sit to stand:  SBA (commode, chair). Stand to sit:  SBA (commode, chair). Bed to chair:  Ambulated.     Gait    Ambulated with

## 2018-12-23 NOTE — PROGRESS NOTES
trimethoprim-sulfamethoxazole Sensitive <=0.5/9.5 mcg/mL               Assessment:  Active Problems:    Dyspnea  Resolved Problems:    * No resolved hospital problems. *      Plan: 1. Generalized weakness and falls  Appears multifactorial - > related to hypoxia, UTI  -And likely polypharmacy  -see plan of care below  -Increase activity as tolerated . Consult PTOT     2.  Hypoxia  -Acute on chronic hypoxic respiratory failure  -Likely secondary to mildly decompensated CHF, she also has underlying COPD  -Continue supplemental oxygen. Currently sats are stable on 2 L. Discussed with patient, she willlikely need continuous oxygen in the nursing home   -Discontinue trazodone at night     3. Acute on chronic diastolic CHF  - started on Lasix 20 mg daily - > continue   -Continue losartan/HCTZ  -Check echocardiogram  -Monitor strict Is and Os, and BMP     4. COPD  -On chronic prednisone  - ordered  nebulizer treatments     5. Severe peripheral neuropathy, chronic leg pain  - Resumed Lyrica- adjust dose for pain control. Stop gabapentin  -Continue Ultram/ Tylenol as needed for pain     6. Chronic back pain from spinal stenosis  -Pain management as above  -Stop Relafen     7.  Urinary tract infection  -Check urine cultures growing E coli  - continue  Rocephin     8. Hypertension  -Blood pressure stable  -Continue home medications     9. Patient is on chronic anticoagulation with  eliquis  - Unclear diagnosis     10 . Diabetes mellitus  -Insulin-dependent  -Continue home regimen     11. Mild dementia  -Continue Aricept     12. Hyperlipidemia   - on statins    13.   Hypokalemia   - replete        DNR-CC   DIET CARB CONTROL;     likely DC back to NH in AM     Hiral Loomis MD 12/23/2018 1:49 PM

## 2018-12-23 NOTE — PROGRESS NOTES
Assessment of Deficits: Self Care [id number]  Pt demonstrated decreased activity tolerance, decreased safety awareness, decreased strength, decreased ROM, decreased balance, and decreased bed mobility, transfers, dressing, and bathing. Pt. Limited during evaluation by mild cognitive impairments. Pt will benefit from skilled OT while in the hospital and upon d/c in order to facilitate progress toward safe and more indpt functioning. At end of evaluation, pt. In bed, alarm activated with call light and needs within reach. RN notified. Goals :   Self Care [id number]  To be met in 3 Visits:  1). Indep with UE ex x 10 reps    To be met in 5 Visits:  1). Supine to Sit: Indpt  3). Upper Body Bathing: Torres  4). Lower Body Bathing: Torres  5). Upper Body Dressing: Torres  6). Lower Body Dressing: Torres  7). Pt to nhung UE exs [id number]    Rehabilitation Potential:  Good for goals listed above. Strengths for achieving goals include: PLOF, motivated, friendly   Barriers to achieving goals include: mild cognitive impairment      Plan: To be seen 3-5x/week while in acute care setting for therapeutic exercises, bed mobility, transfers, dressing, bathing, family/patient education with adaptive equipment, breathing technique instruction.      Timed Code Treatment Minutes: 10 min    Total Treatment Time:   20   minutes    Signature and License #    KELLY Daniel, OTR/L   [id number]         If patient discharges from this facility prior to next visit, this note will serve as the Discharge Summary

## 2018-12-24 VITALS
HEART RATE: 84 BPM | WEIGHT: 146.8 LBS | OXYGEN SATURATION: 96 % | RESPIRATION RATE: 16 BRPM | DIASTOLIC BLOOD PRESSURE: 78 MMHG | BODY MASS INDEX: 22.99 KG/M2 | TEMPERATURE: 98.2 F | SYSTOLIC BLOOD PRESSURE: 145 MMHG

## 2018-12-24 PROBLEM — R06.00 DYSPNEA: Status: RESOLVED | Noted: 2018-12-21 | Resolved: 2018-12-24

## 2018-12-24 LAB
ANION GAP SERPL CALCULATED.3IONS-SCNC: 9 MMOL/L (ref 3–16)
BUN BLDV-MCNC: 24 MG/DL (ref 7–20)
CALCIUM SERPL-MCNC: 10.3 MG/DL (ref 8.3–10.6)
CHLORIDE BLD-SCNC: 91 MMOL/L (ref 99–110)
CO2: 33 MMOL/L (ref 21–32)
CREAT SERPL-MCNC: 0.7 MG/DL (ref 0.6–1.2)
GFR AFRICAN AMERICAN: >60
GFR NON-AFRICAN AMERICAN: >60
GLUCOSE BLD-MCNC: 154 MG/DL (ref 70–99)
GLUCOSE BLD-MCNC: 185 MG/DL (ref 70–99)
GLUCOSE BLD-MCNC: 78 MG/DL (ref 70–99)
GLUCOSE BLD-MCNC: 94 MG/DL (ref 70–99)
LV EF: 58 %
LVEF MODALITY: NORMAL
MAGNESIUM: 1.4 MG/DL (ref 1.8–2.4)
PERFORMED ON: ABNORMAL
PERFORMED ON: ABNORMAL
PERFORMED ON: NORMAL
PHOSPHORUS: 3.3 MG/DL (ref 2.5–4.9)
POTASSIUM SERPL-SCNC: 3.7 MMOL/L (ref 3.5–5.1)
SODIUM BLD-SCNC: 133 MMOL/L (ref 136–145)

## 2018-12-24 PROCEDURE — 84100 ASSAY OF PHOSPHORUS: CPT

## 2018-12-24 PROCEDURE — 6370000000 HC RX 637 (ALT 250 FOR IP): Performed by: INTERNAL MEDICINE

## 2018-12-24 PROCEDURE — 99238 HOSP IP/OBS DSCHRG MGMT 30/<: CPT | Performed by: INTERNAL MEDICINE

## 2018-12-24 PROCEDURE — 2580000003 HC RX 258: Performed by: INTERNAL MEDICINE

## 2018-12-24 PROCEDURE — 94640 AIRWAY INHALATION TREATMENT: CPT

## 2018-12-24 PROCEDURE — 94761 N-INVAS EAR/PLS OXIMETRY MLT: CPT

## 2018-12-24 PROCEDURE — 6360000002 HC RX W HCPCS: Performed by: INTERNAL MEDICINE

## 2018-12-24 PROCEDURE — 80048 BASIC METABOLIC PNL TOTAL CA: CPT

## 2018-12-24 PROCEDURE — 36415 COLL VENOUS BLD VENIPUNCTURE: CPT

## 2018-12-24 PROCEDURE — 93306 TTE W/DOPPLER COMPLETE: CPT

## 2018-12-24 PROCEDURE — 2700000000 HC OXYGEN THERAPY PER DAY

## 2018-12-24 PROCEDURE — 83735 ASSAY OF MAGNESIUM: CPT

## 2018-12-24 RX ORDER — CEFUROXIME AXETIL 500 MG/1
250 TABLET ORAL 2 TIMES DAILY
Qty: 7 TABLET | Refills: 0 | DISCHARGE
Start: 2018-12-24 | End: 2018-12-31

## 2018-12-24 RX ORDER — PREGABALIN 75 MG/1
75 CAPSULE ORAL 2 TIMES DAILY
Qty: 10 CAPSULE | Refills: 0 | Status: SHIPPED | OUTPATIENT
Start: 2018-12-24 | End: 2019-03-19

## 2018-12-24 RX ORDER — FUROSEMIDE 20 MG/1
20 TABLET ORAL DAILY
DISCHARGE
Start: 2018-12-25 | End: 2022-09-07

## 2018-12-24 RX ORDER — CLONAZEPAM 0.5 MG/1
0.5 TABLET ORAL NIGHTLY
Qty: 7 TABLET | Refills: 0 | Status: SHIPPED | OUTPATIENT
Start: 2018-12-24 | End: 2019-03-19

## 2018-12-24 RX ORDER — TRAMADOL HYDROCHLORIDE 50 MG/1
50 TABLET ORAL EVERY 6 HOURS PRN
Qty: 10 TABLET | Refills: 0 | Status: SHIPPED | OUTPATIENT
Start: 2018-12-24 | End: 2018-12-29

## 2018-12-24 RX ADMIN — HYDROCHLOROTHIAZIDE 25 MG: 25 TABLET ORAL at 09:29

## 2018-12-24 RX ADMIN — LINAGLIPTIN 5 MG: 5 TABLET, FILM COATED ORAL at 09:28

## 2018-12-24 RX ADMIN — CEFTRIAXONE SODIUM 1 G: 1 INJECTION, POWDER, FOR SOLUTION INTRAMUSCULAR; INTRAVENOUS at 11:19

## 2018-12-24 RX ADMIN — PREGABALIN 75 MG: 25 CAPSULE ORAL at 09:28

## 2018-12-24 RX ADMIN — PREDNISONE 2.5 MG: 5 TABLET ORAL at 09:28

## 2018-12-24 RX ADMIN — PANTOPRAZOLE SODIUM 40 MG: 40 TABLET, DELAYED RELEASE ORAL at 06:22

## 2018-12-24 RX ADMIN — AMLODIPINE BESYLATE 5 MG: 5 TABLET ORAL at 09:28

## 2018-12-24 RX ADMIN — Medication 10 ML: at 09:29

## 2018-12-24 RX ADMIN — VITAMIN D, TAB 1000IU (100/BT) 1000 UNITS: 25 TAB at 09:28

## 2018-12-24 RX ADMIN — POTASSIUM CHLORIDE 40 MEQ: 20 TABLET, EXTENDED RELEASE ORAL at 09:27

## 2018-12-24 RX ADMIN — FERROUS SULFATE TAB 325 MG (65 MG ELEMENTAL FE) 325 MG: 325 (65 FE) TAB at 09:29

## 2018-12-24 RX ADMIN — CARVEDILOL 6.25 MG: 6.25 TABLET, FILM COATED ORAL at 09:28

## 2018-12-24 RX ADMIN — IPRATROPIUM BROMIDE AND ALBUTEROL SULFATE 1 AMPULE: .5; 3 SOLUTION RESPIRATORY (INHALATION) at 07:12

## 2018-12-24 RX ADMIN — FENOFIBRATE 54 MG: 54 TABLET ORAL at 09:28

## 2018-12-24 RX ADMIN — OYSTER SHELL CALCIUM WITH VITAMIN D 1 TABLET: 500; 200 TABLET, FILM COATED ORAL at 09:28

## 2018-12-24 RX ADMIN — LOSARTAN POTASSIUM 100 MG: 100 TABLET ORAL at 09:27

## 2018-12-24 RX ADMIN — FLUTICASONE PROPIONATE 2 SPRAY: 50 SPRAY, METERED NASAL at 09:35

## 2018-12-24 RX ADMIN — APIXABAN 5 MG: 5 TABLET, FILM COATED ORAL at 09:28

## 2018-12-24 RX ADMIN — GUAIFENESIN 1200 MG: 600 TABLET, EXTENDED RELEASE ORAL at 09:28

## 2018-12-24 RX ADMIN — TRAMADOL HYDROCHLORIDE 50 MG: 50 TABLET, FILM COATED ORAL at 06:25

## 2018-12-24 RX ADMIN — CETIRIZINE HYDROCHLORIDE 10 MG: 10 TABLET ORAL at 09:27

## 2018-12-24 RX ADMIN — Medication 400 MG: at 09:28

## 2018-12-24 RX ADMIN — FUROSEMIDE 20 MG: 20 TABLET ORAL at 09:29

## 2018-12-24 RX ADMIN — INSULIN LISPRO 2 UNITS: 100 INJECTION, SOLUTION INTRAVENOUS; SUBCUTANEOUS at 09:37

## 2018-12-24 ASSESSMENT — PAIN SCALES - GENERAL: PAINLEVEL_OUTOF10: 8

## 2018-12-24 NOTE — DISCHARGE SUMMARY
Information about where to get these medications is not yet available    Ask your nurse or doctor about these medications  · cefUROXime 500 MG tablet  · furosemide 20 MG tablet           Discharged in stable condition to SNF    Follow Up:   Follow up with physician at [facility]          Manuela Monreal MD   12/24/18

## 2018-12-24 NOTE — PROGRESS NOTES
Bedside report and transfer of care given to [facility]. Pt with eyes closed in bed.

## 2018-12-24 NOTE — CARE COORDINATION
with her at facility. She is agreeable.      Follow Up  Future Appointments  Date Time Provider Luis Vela   2/21/2019 11:45 AM [facility]       Health Maintenance  Health Maintenance Due   Topic Date Due    DEXA (modify frequency per FRAX score)  10/13/2007       Elly Boyce, RN

## 2018-12-26 LAB
BLOOD CULTURE, ROUTINE: NORMAL
CULTURE, BLOOD 2: NORMAL

## 2019-02-21 ENCOUNTER — TELEPHONE (OUTPATIENT)
Dept: PULMONOLOGY | Age: 79
End: 2019-02-21

## 2019-02-21 ENCOUNTER — OFFICE VISIT (OUTPATIENT)
Dept: PULMONOLOGY | Age: 79
End: 2019-02-21
Payer: MEDICARE

## 2019-02-21 VITALS
DIASTOLIC BLOOD PRESSURE: 70 MMHG | OXYGEN SATURATION: 91 % | TEMPERATURE: 98 F | RESPIRATION RATE: 16 BRPM | HEIGHT: 67 IN | HEART RATE: 79 BPM | WEIGHT: 153.2 LBS | SYSTOLIC BLOOD PRESSURE: 118 MMHG | BODY MASS INDEX: 24.04 KG/M2

## 2019-02-21 DIAGNOSIS — R06.02 SHORTNESS OF BREATH: ICD-10-CM

## 2019-02-21 DIAGNOSIS — R05.3 CHRONIC COUGH: Primary | ICD-10-CM

## 2019-02-21 PROCEDURE — 1090F PRES/ABSN URINE INCON ASSESS: CPT | Performed by: INTERNAL MEDICINE

## 2019-02-21 PROCEDURE — G8420 CALC BMI NORM PARAMETERS: HCPCS | Performed by: INTERNAL MEDICINE

## 2019-02-21 PROCEDURE — G8427 DOCREV CUR MEDS BY ELIG CLIN: HCPCS | Performed by: INTERNAL MEDICINE

## 2019-02-21 PROCEDURE — 1036F TOBACCO NON-USER: CPT | Performed by: INTERNAL MEDICINE

## 2019-02-21 PROCEDURE — 99213 OFFICE O/P EST LOW 20 MIN: CPT | Performed by: INTERNAL MEDICINE

## 2019-02-21 PROCEDURE — G8400 PT W/DXA NO RESULTS DOC: HCPCS | Performed by: INTERNAL MEDICINE

## 2019-02-21 PROCEDURE — 1101F PT FALLS ASSESS-DOCD LE1/YR: CPT | Performed by: INTERNAL MEDICINE

## 2019-02-21 PROCEDURE — 4040F PNEUMOC VAC/ADMIN/RCVD: CPT | Performed by: INTERNAL MEDICINE

## 2019-02-21 PROCEDURE — G8484 FLU IMMUNIZE NO ADMIN: HCPCS | Performed by: INTERNAL MEDICINE

## 2019-02-21 PROCEDURE — 1123F ACP DISCUSS/DSCN MKR DOCD: CPT | Performed by: INTERNAL MEDICINE

## 2019-02-21 RX ORDER — TRAMADOL HYDROCHLORIDE 50 MG/1
50 TABLET ORAL EVERY 6 HOURS PRN
Status: ON HOLD | COMMUNITY
End: 2021-08-23 | Stop reason: SDUPTHER

## 2019-02-21 RX ORDER — CLONAZEPAM 1 MG/1
1 TABLET ORAL NIGHTLY
Status: ON HOLD | COMMUNITY
End: 2021-08-23 | Stop reason: SDUPTHER

## 2019-02-21 RX ORDER — PREGABALIN 75 MG/1
50 CAPSULE ORAL 2 TIMES DAILY
Status: ON HOLD | COMMUNITY
End: 2021-08-23 | Stop reason: SDUPTHER

## 2019-02-21 RX ORDER — BISACODYL 10 MG
10 SUPPOSITORY, RECTAL RECTAL DAILY PRN
Status: ON HOLD | COMMUNITY
End: 2021-08-19

## 2019-02-21 RX ORDER — DULOXETIN HYDROCHLORIDE 20 MG/1
20 CAPSULE, DELAYED RELEASE ORAL NIGHTLY
COMMUNITY

## 2019-02-21 RX ORDER — SENNA PLUS 8.6 MG/1
1 TABLET ORAL 2 TIMES DAILY
COMMUNITY
End: 2022-09-07

## 2019-03-19 RX ORDER — ACETAMINOPHEN 325 MG/1
650 TABLET ORAL EVERY 4 HOURS PRN
COMMUNITY

## 2019-03-20 RX ORDER — HYDROCORTISONE 0.5 %
CREAM (GRAM) TOPICAL EVERY 8 HOURS PRN
COMMUNITY

## 2019-03-20 RX ORDER — GUAIFENESIN 100 MG/5ML
10 SYRUP ORAL EVERY 4 HOURS PRN
COMMUNITY

## 2019-03-20 RX ORDER — IPRATROPIUM BROMIDE AND ALBUTEROL SULFATE 2.5; .5 MG/3ML; MG/3ML
1 SOLUTION RESPIRATORY (INHALATION) EVERY 6 HOURS PRN
Status: ON HOLD | COMMUNITY
End: 2021-08-19

## 2019-03-22 ENCOUNTER — ANESTHESIA EVENT (OUTPATIENT)
Dept: OPERATING ROOM | Age: 79
End: 2019-03-22
Payer: MEDICARE

## 2019-03-25 ENCOUNTER — ANESTHESIA (OUTPATIENT)
Dept: OPERATING ROOM | Age: 79
End: 2019-03-25
Payer: MEDICARE

## 2019-03-25 ENCOUNTER — HOSPITAL ENCOUNTER (OUTPATIENT)
Age: 79
Setting detail: OUTPATIENT SURGERY
Discharge: NURSING, REHAB OR LONG-TERM CARE | End: 2019-03-25
Attending: OPHTHALMOLOGY | Admitting: OPHTHALMOLOGY
Payer: MEDICARE

## 2019-03-25 VITALS
WEIGHT: 151 LBS | HEIGHT: 66 IN | RESPIRATION RATE: 15 BRPM | BODY MASS INDEX: 24.27 KG/M2 | OXYGEN SATURATION: 97 % | HEART RATE: 78 BPM | DIASTOLIC BLOOD PRESSURE: 57 MMHG | TEMPERATURE: 97.3 F | SYSTOLIC BLOOD PRESSURE: 129 MMHG

## 2019-03-25 VITALS — SYSTOLIC BLOOD PRESSURE: 154 MMHG | OXYGEN SATURATION: 100 % | DIASTOLIC BLOOD PRESSURE: 65 MMHG

## 2019-03-25 LAB
GLUCOSE BLD-MCNC: 119 MG/DL (ref 70–99)
GLUCOSE BLD-MCNC: 140 MG/DL (ref 70–99)
PERFORMED ON: ABNORMAL
PERFORMED ON: ABNORMAL

## 2019-03-25 PROCEDURE — 3700000001 HC ADD 15 MINUTES (ANESTHESIA): Performed by: OPHTHALMOLOGY

## 2019-03-25 PROCEDURE — 3700000000 HC ANESTHESIA ATTENDED CARE: Performed by: OPHTHALMOLOGY

## 2019-03-25 PROCEDURE — 6360000002 HC RX W HCPCS: Performed by: OPHTHALMOLOGY

## 2019-03-25 PROCEDURE — V2632 POST CHMBR INTRAOCULAR LENS: HCPCS | Performed by: OPHTHALMOLOGY

## 2019-03-25 PROCEDURE — 6360000002 HC RX W HCPCS: Performed by: NURSE ANESTHETIST, CERTIFIED REGISTERED

## 2019-03-25 PROCEDURE — 3600000003 HC SURGERY LEVEL 3 BASE: Performed by: OPHTHALMOLOGY

## 2019-03-25 PROCEDURE — 7100000010 HC PHASE II RECOVERY - FIRST 15 MIN: Performed by: OPHTHALMOLOGY

## 2019-03-25 PROCEDURE — 6370000000 HC RX 637 (ALT 250 FOR IP): Performed by: OPHTHALMOLOGY

## 2019-03-25 PROCEDURE — 7100000011 HC PHASE II RECOVERY - ADDTL 15 MIN: Performed by: OPHTHALMOLOGY

## 2019-03-25 PROCEDURE — 2500000003 HC RX 250 WO HCPCS: Performed by: NURSE ANESTHETIST, CERTIFIED REGISTERED

## 2019-03-25 PROCEDURE — 2500000003 HC RX 250 WO HCPCS: Performed by: OPHTHALMOLOGY

## 2019-03-25 PROCEDURE — 2709999900 HC NON-CHARGEABLE SUPPLY: Performed by: OPHTHALMOLOGY

## 2019-03-25 PROCEDURE — 2580000003 HC RX 258: Performed by: ANESTHESIOLOGY

## 2019-03-25 PROCEDURE — 3600000013 HC SURGERY LEVEL 3 ADDTL 15MIN: Performed by: OPHTHALMOLOGY

## 2019-03-25 PROCEDURE — 2580000003 HC RX 258: Performed by: OPHTHALMOLOGY

## 2019-03-25 DEVICE — ACRYSOF(R) IQ ASPHERIC IOL SP ACRYLIC FOLDABLELENS WULTRASERT(TM) DELIVERY SYSTEM UV WBLUE LIGHT FILTER. 13.0MM LENGTH 6.0MM ANTERIORASYMMETRIC BICONVEX OPTIC PLANAR HAPTICS.
Type: IMPLANTABLE DEVICE | Site: EYE | Status: FUNCTIONAL
Brand: ACRYSOF ULTRASERT

## 2019-03-25 RX ORDER — MORPHINE SULFATE 2 MG/ML
1 INJECTION, SOLUTION INTRAMUSCULAR; INTRAVENOUS EVERY 5 MIN PRN
Status: DISCONTINUED | OUTPATIENT
Start: 2019-03-25 | End: 2019-03-25 | Stop reason: HOSPADM

## 2019-03-25 RX ORDER — MIDAZOLAM HYDROCHLORIDE 1 MG/ML
INJECTION INTRAMUSCULAR; INTRAVENOUS PRN
Status: DISCONTINUED | OUTPATIENT
Start: 2019-03-25 | End: 2019-03-25 | Stop reason: SDUPTHER

## 2019-03-25 RX ORDER — SODIUM CHLORIDE 0.9 % (FLUSH) 0.9 %
10 SYRINGE (ML) INJECTION PRN
Status: DISCONTINUED | OUTPATIENT
Start: 2019-03-25 | End: 2019-03-25 | Stop reason: HOSPADM

## 2019-03-25 RX ORDER — MAGNESIUM HYDROXIDE 1200 MG/15ML
LIQUID ORAL PRN
Status: DISCONTINUED | OUTPATIENT
Start: 2019-03-25 | End: 2019-03-25 | Stop reason: ALTCHOICE

## 2019-03-25 RX ORDER — OXYCODONE HYDROCHLORIDE AND ACETAMINOPHEN 5; 325 MG/1; MG/1
1 TABLET ORAL PRN
Status: DISCONTINUED | OUTPATIENT
Start: 2019-03-25 | End: 2019-03-25 | Stop reason: HOSPADM

## 2019-03-25 RX ORDER — ONDANSETRON 2 MG/ML
4 INJECTION INTRAMUSCULAR; INTRAVENOUS PRN
Status: DISCONTINUED | OUTPATIENT
Start: 2019-03-25 | End: 2019-03-25 | Stop reason: HOSPADM

## 2019-03-25 RX ORDER — SODIUM CHLORIDE, SODIUM LACTATE, POTASSIUM CHLORIDE, CALCIUM CHLORIDE 600; 310; 30; 20 MG/100ML; MG/100ML; MG/100ML; MG/100ML
INJECTION, SOLUTION INTRAVENOUS CONTINUOUS
Status: DISCONTINUED | OUTPATIENT
Start: 2019-03-25 | End: 2019-03-25 | Stop reason: HOSPADM

## 2019-03-25 RX ORDER — DIPHENHYDRAMINE HYDROCHLORIDE 50 MG/ML
12.5 INJECTION INTRAMUSCULAR; INTRAVENOUS
Status: DISCONTINUED | OUTPATIENT
Start: 2019-03-25 | End: 2019-03-25 | Stop reason: HOSPADM

## 2019-03-25 RX ORDER — PROPOFOL 10 MG/ML
INJECTION, EMULSION INTRAVENOUS PRN
Status: DISCONTINUED | OUTPATIENT
Start: 2019-03-25 | End: 2019-03-25 | Stop reason: SDUPTHER

## 2019-03-25 RX ORDER — MORPHINE SULFATE 2 MG/ML
2 INJECTION, SOLUTION INTRAMUSCULAR; INTRAVENOUS EVERY 5 MIN PRN
Status: DISCONTINUED | OUTPATIENT
Start: 2019-03-25 | End: 2019-03-25 | Stop reason: HOSPADM

## 2019-03-25 RX ORDER — MEPERIDINE HYDROCHLORIDE 50 MG/ML
12.5 INJECTION INTRAMUSCULAR; INTRAVENOUS; SUBCUTANEOUS EVERY 5 MIN PRN
Status: DISCONTINUED | OUTPATIENT
Start: 2019-03-25 | End: 2019-03-25 | Stop reason: HOSPADM

## 2019-03-25 RX ORDER — SODIUM CHLORIDE 0.9 % (FLUSH) 0.9 %
10 SYRINGE (ML) INJECTION PRN
Status: DISCONTINUED | OUTPATIENT
Start: 2019-03-25 | End: 2019-03-25 | Stop reason: SDUPTHER

## 2019-03-25 RX ORDER — HYDRALAZINE HYDROCHLORIDE 20 MG/ML
5 INJECTION INTRAMUSCULAR; INTRAVENOUS EVERY 10 MIN PRN
Status: DISCONTINUED | OUTPATIENT
Start: 2019-03-25 | End: 2019-03-25 | Stop reason: HOSPADM

## 2019-03-25 RX ORDER — PROMETHAZINE HYDROCHLORIDE 25 MG/ML
6.25 INJECTION, SOLUTION INTRAMUSCULAR; INTRAVENOUS
Status: DISCONTINUED | OUTPATIENT
Start: 2019-03-25 | End: 2019-03-25 | Stop reason: HOSPADM

## 2019-03-25 RX ORDER — LIDOCAINE HYDROCHLORIDE 10 MG/ML
1 INJECTION, SOLUTION EPIDURAL; INFILTRATION; INTRACAUDAL; PERINEURAL
Status: DISCONTINUED | OUTPATIENT
Start: 2019-03-25 | End: 2019-03-25 | Stop reason: HOSPADM

## 2019-03-25 RX ORDER — SODIUM CHLORIDE 0.9 % (FLUSH) 0.9 %
10 SYRINGE (ML) INJECTION EVERY 12 HOURS SCHEDULED
Status: DISCONTINUED | OUTPATIENT
Start: 2019-03-25 | End: 2019-03-25 | Stop reason: HOSPADM

## 2019-03-25 RX ORDER — SODIUM CHLORIDE 0.9 % (FLUSH) 0.9 %
10 SYRINGE (ML) INJECTION EVERY 12 HOURS SCHEDULED
Status: DISCONTINUED | OUTPATIENT
Start: 2019-03-25 | End: 2019-03-25 | Stop reason: SDUPTHER

## 2019-03-25 RX ORDER — LABETALOL HYDROCHLORIDE 5 MG/ML
5 INJECTION, SOLUTION INTRAVENOUS EVERY 10 MIN PRN
Status: DISCONTINUED | OUTPATIENT
Start: 2019-03-25 | End: 2019-03-25 | Stop reason: HOSPADM

## 2019-03-25 RX ORDER — LIDOCAINE HYDROCHLORIDE 20 MG/ML
INJECTION, SOLUTION EPIDURAL; INFILTRATION; INTRACAUDAL; PERINEURAL PRN
Status: DISCONTINUED | OUTPATIENT
Start: 2019-03-25 | End: 2019-03-25 | Stop reason: SDUPTHER

## 2019-03-25 RX ORDER — OXYCODONE HYDROCHLORIDE AND ACETAMINOPHEN 5; 325 MG/1; MG/1
2 TABLET ORAL PRN
Status: DISCONTINUED | OUTPATIENT
Start: 2019-03-25 | End: 2019-03-25 | Stop reason: HOSPADM

## 2019-03-25 RX ADMIN — SODIUM CHLORIDE, POTASSIUM CHLORIDE, SODIUM LACTATE AND CALCIUM CHLORIDE: 600; 310; 30; 20 INJECTION, SOLUTION INTRAVENOUS at 08:19

## 2019-03-25 RX ADMIN — Medication 0.3 ML: at 08:18

## 2019-03-25 RX ADMIN — PROPOFOL 40 MG: 10 INJECTION, EMULSION INTRAVENOUS at 09:52

## 2019-03-25 RX ADMIN — MIDAZOLAM HYDROCHLORIDE 1 MG: 2 INJECTION, SOLUTION INTRAMUSCULAR; INTRAVENOUS at 09:50

## 2019-03-25 RX ADMIN — Medication 0.3 ML: at 08:29

## 2019-03-25 RX ADMIN — SODIUM CHLORIDE, POTASSIUM CHLORIDE, SODIUM LACTATE AND CALCIUM CHLORIDE: 600; 310; 30; 20 INJECTION, SOLUTION INTRAVENOUS at 09:24

## 2019-03-25 RX ADMIN — Medication 0.3 ML: at 08:08

## 2019-03-25 RX ADMIN — LIDOCAINE HYDROCHLORIDE 50 MG: 20 INJECTION, SOLUTION EPIDURAL; INFILTRATION; INTRACAUDAL; PERINEURAL at 09:52

## 2019-03-25 ASSESSMENT — PULMONARY FUNCTION TESTS
PIF_VALUE: 0
PIF_VALUE: 1
PIF_VALUE: 0
PIF_VALUE: 1
PIF_VALUE: 0
PIF_VALUE: 1
PIF_VALUE: 1
PIF_VALUE: 0
PIF_VALUE: 1
PIF_VALUE: 0
PIF_VALUE: 1
PIF_VALUE: 0

## 2019-03-25 ASSESSMENT — PAIN SCALES - GENERAL: PAINLEVEL_OUTOF10: 0

## 2019-03-25 ASSESSMENT — ENCOUNTER SYMPTOMS: SHORTNESS OF BREATH: 1

## 2019-03-25 ASSESSMENT — PAIN DESCRIPTION - DESCRIPTORS: DESCRIPTORS: BURNING;CONSTANT;ACHING

## 2019-03-25 ASSESSMENT — PAIN - FUNCTIONAL ASSESSMENT: PAIN_FUNCTIONAL_ASSESSMENT: 0-10

## 2019-04-01 NOTE — PROGRESS NOTES
Hasmukh Brasher    Age 70 y.o.    female    1940    MRN [id number]    Date___________   Arrival Time_____________  OR Time____________Duration____     Procedure(s):  PHACOEMULSIFICATION OF CATARACT LEFT EYE WITH INTRAOCULAR LENS IMPLANT    Surgeon  ________________________________  [place]   General   [place]       Phone [phone] (home)       [facility]  Date  [address]  Cell Work  ______________________________________________________________________________________________________________________________________________________________________________________________________________________________________________________________________________________________________________________________________________________________    PCP__________________________Phone__________________________________      H&P__________________Bringing      Chart              Epic    DOS           Called_______  EKG__________________Bringing      Chart              Epic    DOS           Called_______  LAB__________________ Bringing      Chart              Epic    DOS           Called_______  CardiacClearance _______Bringing      Chart              Epic    DOS           Called_______      Cardiologist________________________ Phone___________________________      ? Mandaen concerns / Waiver on Chart            PAT Communications________________  ? Pre-op Instructions Given [place]          _________________________________  ? Directions to [facility] Center                          _________________________________  ? Transportation Home_______________      _________________________________  ?  Crutches/Walker__________________        _________________________________      ________Pre-op Orders   _______Transcribed    _______Wt.  ________Pharmacy          _______SCD  ______VTE     ______Beta Blocker  ________Consent

## 2019-04-07 ENCOUNTER — ANESTHESIA EVENT (OUTPATIENT)
Dept: OPERATING ROOM | Age: 79
End: 2019-04-07
Payer: MEDICARE

## 2019-04-07 ASSESSMENT — ENCOUNTER SYMPTOMS: SHORTNESS OF BREATH: 1

## 2019-04-07 NOTE — ANESTHESIA PRE PROCEDURE
Department of Anesthesiology  Preprocedure Note       Name:  Daniel Hays   Age:  70 y.o.  :  1940                                          MRN:  [id number]         Date:  2019      Surgeon: Naomi Goins MD    Procedure: PHACOEMULSIFICATION OF CATARACT LEFT EYE WITH INTRAOCULAR LENS IMPLANT    Medications prior to admission:   ARTIFICIAL TEAR OP Apply 2 drops to eye 4 times daily   methyl salicylate-menthol (LYNNETTE NEGRO GREASELESS) 10-15 % CREA Apply topically every 8 hours as needed for Pain   CRANBERRY PO Take 2 tablets by mouth daily   guaiFENesin (ROBITUSSIN) 100 MG/5ML syrup Take 10 mLs by mouth every 4 hours as needed for Cough   ipratropium-albuterol (DUONEB)  solution Take 1 neb every 6 hours as needed for Shortness of Breath   acetaminophen (TYLENOL) 325 MG tablet Take 650 mg by mouth every 4 hours as needed for Pain   bisacodyl (DULCOLAX) 10 MG suppository Place 10 mg rectally daily as needed    clonazePAM (KLONOPIN) 1 MG tablet Take 1 mg by mouth nightly. DULoxetine (CYMBALTA) 20 MG extended release capsule Take 20 mg by mouth nightly   pregabalin (LYRICA) 50 MG capsule Take 50 mg by mouth 3 times daily. .   magnesium hydroxide (MILK OF MAGNESIA) 400 MG/5ML susp Take 30 mLs by mouth daily as needed for Constipation   traMADol (ULTRAM) 50 MG tablet Take 50 mg by mouth every 6 hours as needed for Pain. .   senna (SENOKOT) 8.6 MG tablet Take 1 tablet by mouth 2 times daily   furosemide (LASIX) 20 MG tablet Take 1 tablet by mouth daily   apixaban (ELIQUIS) 5 MG TABS tablet Take 5 mg by mouth 2 times daily   diphenhydrAMINE (BENADRYL) 25 MG tablet Take 25 mg by mouth every 6 hours as needed for Itching   carvedilol (COREG) 6.25 MG tablet Take 6.25 mg by mouth 2 times daily (with meals)   magnesium oxide (MAG-OX) 400 MG tablet Take 400 mg by mouth daily   albuterol sulfate HFA (PROAIR HFA) inhaler Inhale 2 puffs into the lungs every 6 hours as needed for Wheezing   cyanocobalamin 1000 MCG/ML injection Inject 1,000 mcg into the muscle every 30 days   fenofibrate (TRICOR) 145 MG tablet Take 145 mg by mouth daily   loratadine (CLARITIN) 10 MG capsule Take 10 mg by mouth daily   guaiFENesin (MUCINEX) 600 MG extended release tablet Take 600 mg by mouth 2 times daily    potassium chloride (MICRO-K) 10 MEQ XL Take 40 mEq by mouth daily    predniSONE (DELTASONE) 2.5 MG tablet Take 2.5 mg by mouth daily   amLODIPine (NORVASC) 5 MG tablet Take 1 tablet by mouth daily   loperamide (IMODIUM) 2 MG capsule Take 2 mg by mouth 4 times daily as needed for Diarrhea   DULoxetine (CYMBALTA) 60 MG XL Take 60 mg by mouth every morning    calcium-vitamin D (OSCAL-500) 500-200 MG-UNIT Take 1 tablet by mouth 2 times daily   insulin glargine (LANTUS) 100 UNIT/ML injection vial Inject 20 Units into the skin nightly    ipratropium-albuterol (DUONEB) nebulizer solution Inhale 1 vial every 6 hours as needed for Shortness of Breath   imipramine (TOFRANIL) 50 MG tablet Take 50 mg by mouth nightly   fluticasone (FLONASE) 50 MCG/ACT nasal spray 2 sprays by Nasal route daily   sitaGLIPtin (JANUVIA) 50 MG tablet Take 50 mg by mouth daily   ondansetron (ZOFRAN ODT) 4 MG disintegrating tablet Take 1 tablet by mouth every 8 hours as needed for Nausea. Donepezil HCl (ARICEPT) 23 MG TABS Take by mouth daily    losartan-hydrochlorothiazide (HYZAAR) 50-12.5 MG Take 2 tablets by mouth daily    ferrous sulfate 325 (65 FE) MG tablet Take 1 tablet by mouth daily. omeprazole (PRILOSEC) 20 MG capsule Take 20 mg by mouth nightly    pravastatin (PRAVACHOL) 40 MG tablet Take 80 mg by mouth nightly    ASPIRIN Take 81 mg by mouth daily.      Allergies:     Codeine Hives     itching    Morphine And Related      Problem List:      HTN (hypertension), benign I10    DM (diabetes mellitus), type 2, uncontrolled ([facility] [address] E11.65    Fracture, humerus S42.309A    Anemia N12.3    Metabolic encephalopathy M60.19    Trochanteric bursitis, left hip M70.62    IT band syndrome M76.30    Left lumbar radiculitis M54.16    Spondylolisthesis, lumbar region M43.16    Low back pain M54.5    Left hip pain M25.552    Shortness of breath R06.02    Chronic cough R05    Alzheimer's disease G30.9, F02.80    Acute congestive heart failure (HCC) I50.9    Hypoxia R09.02    Idiopathic peripheral neuropathy G60.9     Past Medical History:     A-fib ([facility] [address]     Alzheimer's dementia     Anxiety     Arthritis     Back pain     COPD (chronic obstructive pulmonary disease) ([facility] [address]     Depression     Diabetes mellitus (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    Memory change     dementia    Neuropathy     Seizures (HCC)     possible because of her falls pt unsure    UTI (lower urinary tract infection)      Past Surgical History:     BREAST SURGERY      BENIGN CYST    CHOLECYSTECTOMY      COLONOSCOPY      INTRACAPSULAR CATARACT EXTRACTION Right 3/25/2019    PHACOEMULSIFICATION OF CATARACT RIGHT EYE WITH INTRAOCULAR LENS IMPLANT performed by Shena Torres MD at [facility]  12    OPEN REDUCTION INTERNAL FIXATION RIGHT PROXIMAL HUMERAL SHAFT FRACTURE SYNTHES    OTHER SURGICAL HISTORY  12    incision and drainage of bursa rt elbow    PANCREAS BIOPSY      SHOULDER SURGERY Bilateral     rtc repair     Social History:     Smoking status: Former Smoker     Packs/day: 1.00     Years: 15.00     Pack years: 15.00     Types: Cigarettes     Last attempt to quit: 1998     Years since quittin.9    Smokeless tobacco: Never Used   Substance Use Topics    Alcohol use: No     Vital Signs (Current):      BP: 139/73 Pulse: 88   Resp: 16 SpO2: 94   Temp: 97.1 °F (36.2 °C)   Height: 5' 7\" (1.702 m)  (04/08/19) Weight: 150 lb (68 kg)  (04/08/19)   BMI: 23.5                         BP Readings from Last 3 Encounters:   03/25/19 (!) 154/65   03/25/19 (!) 129/57   02/21/19 118/70     NPO Status: >8 hrs    CBC:    HGB 10.2 12/22/2018    HCT 30.4 12/22/2018     12/22/2018     CMP:     12/24/2018    K 3.7 12/24/2018    CL 91 12/24/2018    CO2 33 12/24/2018    BUN 24 12/24/2018    CREATININE 0.7 12/24/2018    GLUCOSE 78 12/24/2018    PROT 7.7 12/21/2018    CALCIUM 10.3 12/24/2018    BILITOT 0.6 12/21/2018    ALKPHOS 61 12/21/2018    AST 17 12/21/2018    ALT 10 12/21/2018     Coags:    PROTIME 12.9 10/12/2016    INR 1.13 10/12/2016     ABGs:    PHART 7.395 12/21/2018    PO2ART 125.7 12/21/2018    CDU9WFP 57.2 12/21/2018    MBJ7CGE 34.2 12/21/2018    BEART 7.8 12/21/2018    R8LFNMXJ 98.4 12/21/2018      Anesthesia Evaluation  Patient summary reviewed and Nursing notes reviewed  Airway: Mallampati: II  TM distance: >3 FB   Neck ROM: limited  Mouth opening: > = 3 FB Dental:          Pulmonary:   (+) COPD: moderate and severe,  shortness of breath: chronic and no interval change,      (-) not a current smoker                           Cardiovascular:  Exercise tolerance: good (>4 METS),   (+) hypertension:, dysrhythmias: atrial fibrillation, CHF:, pulmonary hypertension: mild,             Echocardiogram reviewed               ROS comment: ECHO: Left ventricular systolic function is normal with ejection fraction estimated at 55-60 %. No regional wall motion abnormalities are noted. Left ventricular size is decreased. There is mild concentric left ventricular hypertrophy. Elevated left ventricular diastolic filling pressure.  Mild posterior mitral annular calcification is present. Aortic valve sclerosis without aortic stenosis. Moderate tricuspid regurgitation.  Systolic pulmonary artery pressure (SPAP) estimated at 40 consistent with mild pulmonary hypertension. Mild pulmonic regurgitation present. Neuro/Psych:   (+) seizures:, depression/anxiety              ROS comment: +Alzheimer's Dz- mild GI/Hepatic/Renal:   (+) GERD: well controlled, hepatitis: A,           Endo/Other:    (+) DiabetesType II DM, , .    (-) hypothyroidism               Abdominal:           Vascular:     - DVT and PE.       ROS comment: On Eliquis for A Fib. Anesthesia Plan      MAC and TIVA     ASA 3       Induction: intravenous. Anesthetic plan and risks discussed with patient. Plan discussed with CRNA.             Jake Aldrich MD

## 2019-04-08 ENCOUNTER — ANESTHESIA (OUTPATIENT)
Dept: OPERATING ROOM | Age: 79
End: 2019-04-08
Payer: MEDICARE

## 2019-04-08 ENCOUNTER — HOSPITAL ENCOUNTER (OUTPATIENT)
Age: 79
Setting detail: OUTPATIENT SURGERY
Discharge: HOME | End: 2019-04-08
Attending: OPHTHALMOLOGY | Admitting: OPHTHALMOLOGY
Payer: MEDICARE

## 2019-04-08 VITALS — OXYGEN SATURATION: 100 % | DIASTOLIC BLOOD PRESSURE: 83 MMHG | SYSTOLIC BLOOD PRESSURE: 168 MMHG

## 2019-04-08 VITALS
SYSTOLIC BLOOD PRESSURE: 162 MMHG | HEIGHT: 67 IN | RESPIRATION RATE: 16 BRPM | TEMPERATURE: 97.5 F | HEART RATE: 85 BPM | DIASTOLIC BLOOD PRESSURE: 75 MMHG | OXYGEN SATURATION: 98 % | BODY MASS INDEX: 23.54 KG/M2 | WEIGHT: 150 LBS

## 2019-04-08 LAB
GLUCOSE BLD-MCNC: 144 MG/DL (ref 70–99)
GLUCOSE BLD-MCNC: 157 MG/DL (ref 70–99)
PERFORMED ON: ABNORMAL
PERFORMED ON: ABNORMAL

## 2019-04-08 PROCEDURE — 7100000010 HC PHASE II RECOVERY - FIRST 15 MIN: Performed by: OPHTHALMOLOGY

## 2019-04-08 PROCEDURE — 6360000002 HC RX W HCPCS: Performed by: NURSE ANESTHETIST, CERTIFIED REGISTERED

## 2019-04-08 PROCEDURE — 7100000011 HC PHASE II RECOVERY - ADDTL 15 MIN: Performed by: OPHTHALMOLOGY

## 2019-04-08 PROCEDURE — 3600000003 HC SURGERY LEVEL 3 BASE: Performed by: OPHTHALMOLOGY

## 2019-04-08 PROCEDURE — 2580000003 HC RX 258: Performed by: ANESTHESIOLOGY

## 2019-04-08 PROCEDURE — 6360000002 HC RX W HCPCS: Performed by: OPHTHALMOLOGY

## 2019-04-08 PROCEDURE — 6370000000 HC RX 637 (ALT 250 FOR IP): Performed by: OPHTHALMOLOGY

## 2019-04-08 PROCEDURE — 6370000000 HC RX 637 (ALT 250 FOR IP)

## 2019-04-08 PROCEDURE — V2632 POST CHMBR INTRAOCULAR LENS: HCPCS | Performed by: OPHTHALMOLOGY

## 2019-04-08 PROCEDURE — 2709999900 HC NON-CHARGEABLE SUPPLY: Performed by: OPHTHALMOLOGY

## 2019-04-08 PROCEDURE — 2500000003 HC RX 250 WO HCPCS: Performed by: NURSE ANESTHETIST, CERTIFIED REGISTERED

## 2019-04-08 PROCEDURE — 2500000003 HC RX 250 WO HCPCS: Performed by: OPHTHALMOLOGY

## 2019-04-08 PROCEDURE — 3700000001 HC ADD 15 MINUTES (ANESTHESIA): Performed by: OPHTHALMOLOGY

## 2019-04-08 PROCEDURE — 3600000013 HC SURGERY LEVEL 3 ADDTL 15MIN: Performed by: OPHTHALMOLOGY

## 2019-04-08 PROCEDURE — 3700000000 HC ANESTHESIA ATTENDED CARE: Performed by: OPHTHALMOLOGY

## 2019-04-08 DEVICE — ACRYSOF(R) IQ ASPHERIC IOL SP ACRYLIC FOLDABLELENS WULTRASERT(TM) DELIVERY SYSTEM UV WBLUE LIGHT FILTER. 13.0MM LENGTH 6.0MM ANTERIORASYMMETRIC BICONVEX OPTIC PLANAR HAPTICS.
Type: IMPLANTABLE DEVICE | Site: ANTERIOR CHAMBER | Status: FUNCTIONAL
Brand: ACRYSOF ULTRASERT

## 2019-04-08 RX ORDER — LIDOCAINE HYDROCHLORIDE 10 MG/ML
1 INJECTION, SOLUTION EPIDURAL; INFILTRATION; INTRACAUDAL; PERINEURAL
Status: DISCONTINUED | OUTPATIENT
Start: 2019-04-08 | End: 2019-04-08 | Stop reason: HOSPADM

## 2019-04-08 RX ORDER — SODIUM CHLORIDE, POTASSIUM CHLORIDE, CALCIUM CHLORIDE, MAGNESIUM CHLORIDE, SODIUM ACETATE, AND SODIUM CITRATE 6.4; .75; .48; .3; 3.9; 1.7 MG/ML; MG/ML; MG/ML; MG/ML; MG/ML; MG/ML
SOLUTION IRRIGATION PRN
Status: DISCONTINUED | OUTPATIENT
Start: 2019-04-08 | End: 2019-04-08 | Stop reason: ALTCHOICE

## 2019-04-08 RX ORDER — BRIMONIDINE TARTRATE 2 MG/ML
SOLUTION/ DROPS OPHTHALMIC PRN
Status: DISCONTINUED | OUTPATIENT
Start: 2019-04-08 | End: 2019-04-08 | Stop reason: ALTCHOICE

## 2019-04-08 RX ORDER — SODIUM CHLORIDE, SODIUM LACTATE, POTASSIUM CHLORIDE, CALCIUM CHLORIDE 600; 310; 30; 20 MG/100ML; MG/100ML; MG/100ML; MG/100ML
INJECTION, SOLUTION INTRAVENOUS CONTINUOUS
Status: DISCONTINUED | OUTPATIENT
Start: 2019-04-08 | End: 2019-04-08 | Stop reason: HOSPADM

## 2019-04-08 RX ORDER — SODIUM CHLORIDE 0.9 % (FLUSH) 0.9 %
10 SYRINGE (ML) INJECTION EVERY 12 HOURS SCHEDULED
Status: DISCONTINUED | OUTPATIENT
Start: 2019-04-08 | End: 2019-04-08 | Stop reason: HOSPADM

## 2019-04-08 RX ORDER — TETRACAINE HYDROCHLORIDE 5 MG/ML
SOLUTION OPHTHALMIC PRN
Status: DISCONTINUED | OUTPATIENT
Start: 2019-04-08 | End: 2019-04-08 | Stop reason: ALTCHOICE

## 2019-04-08 RX ORDER — MOXIFLOXACIN 5 MG/ML
SOLUTION/ DROPS OPHTHALMIC PRN
Status: DISCONTINUED | OUTPATIENT
Start: 2019-04-08 | End: 2019-04-08 | Stop reason: ALTCHOICE

## 2019-04-08 RX ORDER — BACITRACIN 500 [USP'U]/G
OINTMENT OPHTHALMIC PRN
Status: DISCONTINUED | OUTPATIENT
Start: 2019-04-08 | End: 2019-04-08 | Stop reason: ALTCHOICE

## 2019-04-08 RX ORDER — LIDOCAINE HYDROCHLORIDE 20 MG/ML
INJECTION, SOLUTION EPIDURAL; INFILTRATION; INTRACAUDAL; PERINEURAL PRN
Status: DISCONTINUED | OUTPATIENT
Start: 2019-04-08 | End: 2019-04-08 | Stop reason: SDUPTHER

## 2019-04-08 RX ORDER — PROPOFOL 10 MG/ML
INJECTION, EMULSION INTRAVENOUS PRN
Status: DISCONTINUED | OUTPATIENT
Start: 2019-04-08 | End: 2019-04-08 | Stop reason: SDUPTHER

## 2019-04-08 RX ORDER — SODIUM CHLORIDE 0.9 % (FLUSH) 0.9 %
10 SYRINGE (ML) INJECTION PRN
Status: DISCONTINUED | OUTPATIENT
Start: 2019-04-08 | End: 2019-04-08 | Stop reason: HOSPADM

## 2019-04-08 RX ADMIN — Medication: at 08:22

## 2019-04-08 RX ADMIN — LIDOCAINE HYDROCHLORIDE 40 MG: 20 INJECTION, SOLUTION EPIDURAL; INFILTRATION; INTRACAUDAL; PERINEURAL at 09:58

## 2019-04-08 RX ADMIN — PROPOFOL 40 MG: 10 INJECTION, EMULSION INTRAVENOUS at 09:58

## 2019-04-08 RX ADMIN — SODIUM CHLORIDE, POTASSIUM CHLORIDE, SODIUM LACTATE AND CALCIUM CHLORIDE: 600; 310; 30; 20 INJECTION, SOLUTION INTRAVENOUS at 08:43

## 2019-04-08 ASSESSMENT — PULMONARY FUNCTION TESTS
PIF_VALUE: 0

## 2019-04-08 ASSESSMENT — LIFESTYLE VARIABLES: SMOKING_STATUS: 0

## 2019-04-08 ASSESSMENT — COPD QUESTIONNAIRES: CAT_SEVERITY: MODERATE

## 2019-04-08 ASSESSMENT — PAIN - FUNCTIONAL ASSESSMENT: PAIN_FUNCTIONAL_ASSESSMENT: 0-10

## 2019-04-08 ASSESSMENT — PAIN DESCRIPTION - DESCRIPTORS: DESCRIPTORS: ACHING

## 2019-04-08 NOTE — OP NOTE
[address] NOTE    Preoperative Diagnosis: Cataract, left eye    Postoperative Diagnosis: Cataract, left eye    Procedure: Phacoemulsification with intraocular lens inplantation, left eye    Surgeon: Sarah Clayton    Anesthesia: MAC with peribulbar injection    Complications: none    Specimens: none    Indications for procedure: The patient is a 70y.o. year old with decreased vision, glare and halos around lights, and trouble with activities of daily living. Examination revealed a visually significant cataract in the left eye. Risks, benefits, and alternatives to surgery were discussed with the patient and the patient elected to proceed with phacoemulsification with lens implantation. Details of the procedure: Following informed consent, the patient was taken to the operating room and placed in the supine position. A peribulbar injection of 2% lidocaine and Hylenex was performed. The eye was prepped and draped in the usual sterile fashion using aseptic technique for cataract surgery. A side port incision was made in the temporal cornea. The eye was filled with trypan and rinsed with Epi-Shugarcaine (given mild IFIS for first eye). The eye was then filled with viscoelastic and a 2.4 mm keratome blade was used to make a 3-plane clear corneal incision in the temporal cornea. The cystitome was used to make a tear in the anterior capsule and a Utrata forceps was used to make a complete curvilinear capsulorrhexis. The lens was hydrodissected and freely rotated. Phacoemulsification was performed. Irrigation/aspiration was used to remove all cortical material from the capsular bag. The eye was filled with viscoelastic and a foldable posterior chamber intraocular lens was injected into the capsular bag. Irrigation/aspiration was used to remove all excess viscoelastic. The eye was pressurized and the wounds were checked for leaks and none were found.   The patient had Vigamox and Alphagan and bacitrain solutions placed on the eye. The eye was covered with a metal shield. The patient tolerated the procedure well and was taken to the PACU in excellent condition. They will follow up with me tomorrow for postop care.     CDE: 4.47    Lens: AUOOTO 22.5 D, SN [id number]    Heather Auguste

## 2019-04-08 NOTE — PROGRESS NOTES
Surgeon out to evaluate operative eye- reviewed d/c restrictions with pt and family/verbalized understanding and all questions answered: Lens card and eye kit given:  Pt advanced from lying to sitting without adverse events VSS  Resp WNL-   Shield over operative eye - no periorbital redness/edema/ecchymosis/drainage or pain

## 2019-04-08 NOTE — PROGRESS NOTES
Waiting for ecf transport  Report called to ecf and reviewed discharge instructions with E[facility] RN per Liana Khan / copies sent with patient  Pt alert and orientedX4 / pivots  Stands  VSS

## 2019-04-08 NOTE — ANESTHESIA POSTPROCEDURE EVALUATION
Department of Anesthesiology  Postprocedure Note    Patient: Siobhan Cleary  MRN: [id number]  YOB: 1940  Date of evaluation: 4/8/2019    Procedure Summary     Date:  04/08/19 Room / Location:  [facility] OR 03 / [facility] OR    Anesthesia Start:  0946 Anesthesia Stop:  1033    Procedure:  PHACOEMULSIFICATION OF CATARACT LEFT EYE WITH INTRAOCULAR LENS IMPLANT (Left Eye) Diagnosis:       Combined form of age-related cataract, left eye      (Combined form of age-related cataract, left eye [H25.812])    Surgeon:  Katarina Davison MD Responsible Provider:  Bon Bravo MD    Anesthesia Type:  MAC, TIVA ASA Status:  3        Anesthesia Type: MAC, TIVA    Korey Phase I: Korey Score: 10    Korey Phase II:      Anesthesia Post Evaluation   Anesthetic Problems: no   Last Vitals:     BP: 139/73 Pulse: 88   Resp: 16 SpO2: 94   Temp: 97.1 °F (36.2 °C)     Cardiovascular System Stable: yes  Respiratory Function: Airway Patent yes  ETT no  Ventilator no  Level of consciousness: awake, alert and oriented  Post-op pain: adequate analgesia  Hydration Adequate: yes  Nausea/Vomiting:no  Other Issues:     Feli Sarabia MD

## 2019-04-08 NOTE — H&P
Update to the History and Physical    There have been no changes to the patient's past medical or surgical history since the pre-operative history and physical.  The patient has not been experiencing any new or worsening symptoms.     Naomi Goins

## 2019-04-08 NOTE — PROGRESS NOTES
POCT      Blood Glucose:   157      (Normal Range 70-99)    PT:      (Normal Range 9.8 - 13)    INR:      (Normal Range 0.86-1.14)

## 2019-04-17 NOTE — TELEPHONE ENCOUNTER
What Type Of Note Output Would You Prefer (Optional)?: Standard Output
noted
How Severe Is Your Rash?: moderate
Is This A New Presentation, Or A Follow-Up?: Rash

## 2019-06-21 ENCOUNTER — HOSPITAL ENCOUNTER (OUTPATIENT)
Dept: CT IMAGING | Age: 79
Discharge: HOME | End: 2019-06-21
Payer: MEDICARE

## 2019-06-21 DIAGNOSIS — N20.0 KIDNEY STONE: ICD-10-CM

## 2019-06-21 PROCEDURE — 74176 CT ABD & PELVIS W/O CONTRAST: CPT

## 2019-08-21 ENCOUNTER — TELEPHONE (OUTPATIENT)
Dept: PULMONOLOGY | Age: 79
End: 2019-08-21

## 2019-08-27 ENCOUNTER — OFFICE VISIT (OUTPATIENT)
Dept: ORTHOPEDIC SURGERY | Age: 79
End: 2019-08-27
Payer: MEDICARE

## 2019-08-27 ENCOUNTER — HOSPITAL ENCOUNTER (OUTPATIENT)
Dept: OCCUPATIONAL THERAPY | Age: 79
Setting detail: THERAPIES SERIES
Discharge: HOME | End: 2019-08-27
Payer: MEDICARE

## 2019-08-27 VITALS
HEART RATE: 92 BPM | BODY MASS INDEX: 23.53 KG/M2 | WEIGHT: 149.91 LBS | HEIGHT: 67 IN | DIASTOLIC BLOOD PRESSURE: 63 MMHG | SYSTOLIC BLOOD PRESSURE: 118 MMHG

## 2019-08-27 DIAGNOSIS — S42.401K CLOSED FRACTURE OF DISTAL END OF RIGHT HUMERUS WITH NONUNION, UNSPECIFIED FRACTURE MORPHOLOGY, SUBSEQUENT ENCOUNTER: ICD-10-CM

## 2019-08-27 DIAGNOSIS — M79.601 PAIN OF RIGHT ARM: Primary | ICD-10-CM

## 2019-08-27 PROCEDURE — 1090F PRES/ABSN URINE INCON ASSESS: CPT | Performed by: ORTHOPAEDIC SURGERY

## 2019-08-27 PROCEDURE — 99213 OFFICE O/P EST LOW 20 MIN: CPT | Performed by: ORTHOPAEDIC SURGERY

## 2019-08-27 PROCEDURE — L3702 EO W/O JOINTS CF: HCPCS | Performed by: OCCUPATIONAL THERAPIST

## 2019-08-27 PROCEDURE — 1036F TOBACCO NON-USER: CPT | Performed by: ORTHOPAEDIC SURGERY

## 2019-08-27 PROCEDURE — G8427 DOCREV CUR MEDS BY ELIG CLIN: HCPCS | Performed by: ORTHOPAEDIC SURGERY

## 2019-08-27 PROCEDURE — G8420 CALC BMI NORM PARAMETERS: HCPCS | Performed by: ORTHOPAEDIC SURGERY

## 2019-08-27 PROCEDURE — 1123F ACP DISCUSS/DSCN MKR DOCD: CPT | Performed by: ORTHOPAEDIC SURGERY

## 2019-08-27 PROCEDURE — 4040F PNEUMOC VAC/ADMIN/RCVD: CPT | Performed by: ORTHOPAEDIC SURGERY

## 2019-08-27 PROCEDURE — G8400 PT W/DXA NO RESULTS DOC: HCPCS | Performed by: ORTHOPAEDIC SURGERY

## 2019-08-27 NOTE — PROGRESS NOTES
file     Emotionally abused: Not on file     Physically abused: Not on file     Forced sexual activity: Not on file   Other Topics Concern    Not on file   Social History Narrative    Not on file       Family History   Problem Relation Age of Onset    Heart Disease Mother         Review of Systems  Pertinent items are noted in HPI  Review of systems reviewed from Patient History Form dated on 8/27/2019 and available in the patient's chart under the Media tab. Vital Signs  Vitals:    08/27/19 1400   BP: 118/63   Pulse: 92       General Exam:   Constitutional: Patient is adequately groomed with no evidence of malnutrition  Mental Status: The patient is oriented to time, place and person. The patient's mood and affect are appropriate. Lymphatic: The lymphatic examination bilaterally reveals all areas to be without enlargement or induration. Neurological: The patient has good coordination. There is no weakness or sensory deficit. 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 Able to palpate the distal humeral fracture fragments. Range of Motion: Elbow extension to 0, flexion to 130 with full pronation and supination with motion through the fracture site also noted with rotation    Sensation: In tact to light touch all nerve distributions     Strength: Motor intact AIN/PIN/M/R/U    Special Tests: None    Skin: There are no additional worrisome rashes, ulcerations or lesions. Circulation normal    Additional Examinations:  Left Upper Extremity: Examination of the left upper extremity does not show any tenderness, deformity or injury. Range of motion is unremarkable. There is no gross instability. There are no rashes, ulcerations or lesions. Strength and tone are normal.      Radiology:     X-rays obtained and reviewed in office:  AP and lateral 2 views of the right humerus obtained 8/27/2019 demonstrate nonunion with significant displacement at the right distal humerus fracture. Previous hardware is unchanged. No major degenerative changes to the elbow joint. .      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 Initially last year we attempted nonsurgical treatment after she was seen by another surgeon and then had nonsurgical treatment for the first several weeks before presenting. She did appear to be healing and was doing well clinically upon her last visit. Is unclear when she had a reinjury or refracturing of her fracture site, but it is possible that she had a fibrous nonunion for the most part and that a small amount of force to cause it to become unstable once again. We discussed that there is minimal chance of her healing at this point. We discussed treatment options including continued nonsurgical treatment versus surgery. We discussed that surgery is possible, however it is a relatively high risk surgery given her age, medical comorbidities, and the issue that she does have.   We discussed that we could continue with nonsurgical treatment and provide her with a brace today which would allow her to use her arm for activities that she is able to do comfortably, however I would avoid

## 2019-08-27 NOTE — PLAN OF CARE
[address], [address] [place], [address]              Occupational Therapy Splint Certification Form    Dear Referring Practitioner: Dr. Jaelyn Lawton,  The following patient has been evaluated for occupational therapy services for fabrication of a custom elbow splint. Insurance requires the referring physician to review the treatment plan. Please review the attached evaluation and/or summary of the patient's plan of care, and verify that you agree by signing the attached document and sending it back to our office. Plan of Care/Treatment to date:  [x] Fabrication of custom posterior elbow splint        [x] Instruction on splint use, care and wearing schedule        [x] Follow up as needed for splint modifications          Frequency/Duration:  [x] One time visit for splint fabrication and instructions. Follow up as needed for splint modifcations        [] Splint fabricated, patient to return for full evaluation. Rehab Potential: [x] good [] fair  [] poor         SPLINT EVALUATION  Date: 2019  Name: Jean Paul Hoover            : 1940      Medical/Treatment Diagnosis Information:  · Diagnosis: distal humerus fx (U62.061H)    Insurance/Certification information:  OT Insurance Information: Medicare  Physician Information:  Referring Practitioner: Dr. Jaelyn Lawton       Next MD Appointment: as needed     Subjective  History of Injury/ Mechanism of Injury: previous injury; new fx at distal humerus  Surgery/ Injury Date: few months ago  Dominant Hand:    [x] Right []Left  Occupational/Vocational Status: retired - lives in nursing home  Progress of any previous OT/PT: the patient []has/ [x]has not received OT/PT previously for this diagnosis.     Pain: 0/10    Type of splint:   Posterior elbow splint    Splint Purpose: [x]Immobilize or protect []Promote healing of    []Relieve pain  []Provide support for improved hand

## 2019-10-03 ENCOUNTER — TELEPHONE (OUTPATIENT)
Dept: PULMONOLOGY | Age: 79
End: 2019-10-03

## 2019-10-22 ENCOUNTER — TELEPHONE (OUTPATIENT)
Dept: PULMONOLOGY | Age: 79
End: 2019-10-22

## 2019-12-02 ENCOUNTER — OFFICE VISIT (OUTPATIENT)
Dept: PULMONOLOGY | Age: 79
End: 2019-12-02
Payer: MEDICARE

## 2019-12-02 VITALS
WEIGHT: 151 LBS | DIASTOLIC BLOOD PRESSURE: 70 MMHG | BODY MASS INDEX: 23.7 KG/M2 | RESPIRATION RATE: 18 BRPM | OXYGEN SATURATION: 96 % | HEIGHT: 67 IN | TEMPERATURE: 97.9 F | SYSTOLIC BLOOD PRESSURE: 122 MMHG | HEART RATE: 69 BPM

## 2019-12-02 DIAGNOSIS — R05.3 CHRONIC COUGH: ICD-10-CM

## 2019-12-02 DIAGNOSIS — G30.1 LATE ONSET ALZHEIMER'S DISEASE WITHOUT BEHAVIORAL DISTURBANCE (HCC): ICD-10-CM

## 2019-12-02 DIAGNOSIS — R06.02 SHORTNESS OF BREATH: Primary | ICD-10-CM

## 2019-12-02 DIAGNOSIS — F02.80 LATE ONSET ALZHEIMER'S DISEASE WITHOUT BEHAVIORAL DISTURBANCE (HCC): ICD-10-CM

## 2019-12-02 PROCEDURE — 4040F PNEUMOC VAC/ADMIN/RCVD: CPT | Performed by: INTERNAL MEDICINE

## 2019-12-02 PROCEDURE — 1123F ACP DISCUSS/DSCN MKR DOCD: CPT | Performed by: INTERNAL MEDICINE

## 2019-12-02 PROCEDURE — G8427 DOCREV CUR MEDS BY ELIG CLIN: HCPCS | Performed by: INTERNAL MEDICINE

## 2019-12-02 PROCEDURE — 1090F PRES/ABSN URINE INCON ASSESS: CPT | Performed by: INTERNAL MEDICINE

## 2019-12-02 PROCEDURE — 1036F TOBACCO NON-USER: CPT | Performed by: INTERNAL MEDICINE

## 2019-12-02 PROCEDURE — 99213 OFFICE O/P EST LOW 20 MIN: CPT | Performed by: INTERNAL MEDICINE

## 2019-12-02 PROCEDURE — G8420 CALC BMI NORM PARAMETERS: HCPCS | Performed by: INTERNAL MEDICINE

## 2019-12-02 PROCEDURE — G8400 PT W/DXA NO RESULTS DOC: HCPCS | Performed by: INTERNAL MEDICINE

## 2019-12-02 PROCEDURE — G8484 FLU IMMUNIZE NO ADMIN: HCPCS | Performed by: INTERNAL MEDICINE

## 2020-12-18 ENCOUNTER — VIRTUAL VISIT (OUTPATIENT)
Dept: PULMONOLOGY | Age: 80
End: 2020-12-18
Payer: COMMERCIAL

## 2020-12-18 PROCEDURE — 99442 PR PHYS/QHP TELEPHONE EVALUATION 11-20 MIN: CPT | Performed by: INTERNAL MEDICINE

## 2020-12-18 NOTE — PROGRESS NOTES
Bailey Potter is a 68 y.o. female evaluated via telephone on 2020. Consent:  She and/or health care decision maker is aware that that she may receive a bill for this telephone service, depending on her insurance coverage, and has provided verbal consent to proceed: Yes      Documentation:  I communicated with the patient and/or health care decision maker about  Cough and dyspnea. Details of this discussion including any medical advice provided: treatment      I affirm this is a Patient Initiated Episode with a Patient who has not had a related appointment within my department in the past 7 days or scheduled within the next 24 hours. Patient identification was verified at the start of the visit: Yes    Total Time: minutes: 11-20 minutes    Note: not billable if this call serves to triage the patient into an appointment for the relevant concern      [address] [place] [address] (:  1940) has requested an audio/video evaluation for the following concern(s): dyspnea and chronic cough    Since last clinic visit, the patient reports she has been somewhat short of breath. She uses Inhaled bronchodilators via nebulizer a couple times per day. She has a persistent cough- mostly dry. She has not had covid 19. Prior to Visit Medications    Medication Sig Taking?  Authorizing Provider   OXYGEN Inhale into the lungs 2 liters at night  Historical Provider, MD   ARTIFICIAL TEAR OP Apply 2 drops to eye 4 times daily  Historical Provider, MD   methyl salicylate-menthol (LYNNETTE NEGRO GREASELESS) 10-15 % CREA Apply topically every 8 hours as needed for Pain  Historical Provider, MD   CRANBERRY PO Take 2 tablets by mouth daily  Historical Provider, MD   guaiFENesin (ROBITUSSIN) 100 MG/5ML syrup Take 10 mLs by mouth every 4 hours as needed for Cough  Historical Provider, MD ipratropium-albuterol (DUONEB) 0.5-2.5 (3) MG/3ML SOLN nebulizer solution Take 1 vial by nebulization every 6 hours as needed for Shortness of Breath  Historical Provider, MD   acetaminophen (TYLENOL) 325 MG tablet Take 650 mg by mouth every 4 hours as needed for Pain  Historical Provider, MD   bisacodyl (DULCOLAX) 10 MG suppository Place 10 mg rectally daily as needed   Historical Provider, MD   bisacodyl (DULCOLAX) 5 MG EC tablet Take 10 mg by mouth daily as needed for Constipation   Historical Provider, MD   clonazePAM (KLONOPIN) 1 MG tablet Take 1 mg by mouth nightly. Historical Provider, MD   DULoxetine (CYMBALTA) 20 MG extended release capsule Take 20 mg by mouth nightly  Historical Provider, MD   pregabalin (LYRICA) 50 MG capsule Take 50 mg by mouth 3 times daily. Cornelius Ro Historical Provider, MD   magnesium hydroxide (MILK OF MAGNESIA) 400 MG/5ML suspension Take 30 mLs by mouth daily as needed for Constipation  Historical Provider, MD   traMADol (ULTRAM) 50 MG tablet Take 50 mg by mouth every 6 hours as needed for Pain. Cornelius Ro   Historical Provider, MD   senna (SENOKOT) 8.6 MG tablet Take 1 tablet by mouth 2 times daily  Historical Provider, MD   furosemide (LASIX) 20 MG tablet Take 1 tablet by mouth daily  Claudene Rose, MD   apixaban (ELIQUIS) 5 MG TABS tablet Take 5 mg by mouth 2 times daily  Historical Provider, MD   diphenhydrAMINE (BENADRYL) 25 MG tablet Take 25 mg by mouth every 6 hours as needed for Itching  Historical Provider, MD   carvedilol (COREG) 6.25 MG tablet Take 6.25 mg by mouth 2 times daily (with meals)  Historical Provider, MD   magnesium oxide (MAG-OX) 400 MG tablet Take 400 mg by mouth daily  Historical Provider, MD   albuterol sulfate HFA (PROAIR HFA) 108 (90 Base) MCG/ACT inhaler Inhale 2 puffs into the lungs every 6 hours as needed for Carlene Romo MD   cyanocobalamin 1000 MCG/ML injection Inject 1,000 mcg into the muscle every 30 days  Historical Provider, MD ferrous sulfate 325 (65 FE) MG tablet Take 1 tablet by mouth daily. Be Christiansen MD   omeprazole (PRILOSEC) 20 MG capsule Take 20 mg by mouth nightly   Historical Provider, MD   pravastatin (PRAVACHOL) 40 MG tablet Take 80 mg by mouth nightly   Historical Provider, MD   ASPIRIN Take 81 mg by mouth daily. Historical Provider, MD           ASSESSMENT AND PLAN:  Shortness of breath  The etiology of the patient's dyspnea is not clear. PFTs were difficult to interpret as she had trouble with testing. CXR was ok in 12/18.  Possible COPD.   -albuterol MDI/nebs  as needed     Chronic cough  The etiology of the patient's cough is possibly PND from [place]   -Previously cxr did not show any clear cause  - continue current treatment for postnasal drip including antihistamine and inhaled nasal steroid     Alzheimer's disease  - appears mild by exam      Orders  - refill inhalers  - f/u in 1 year

## 2021-08-19 ENCOUNTER — APPOINTMENT (OUTPATIENT)
Dept: GENERAL RADIOLOGY | Age: 81
DRG: 177 | End: 2021-08-19
Payer: COMMERCIAL

## 2021-08-19 ENCOUNTER — HOSPITAL ENCOUNTER (INPATIENT)
Age: 81
LOS: 4 days | Discharge: HOME | DRG: 177 | End: 2021-08-23
Attending: STUDENT IN AN ORGANIZED HEALTH CARE EDUCATION/TRAINING PROGRAM | Admitting: INTERNAL MEDICINE
Payer: COMMERCIAL

## 2021-08-19 DIAGNOSIS — F51.01 PRIMARY INSOMNIA: ICD-10-CM

## 2021-08-19 DIAGNOSIS — M54.16 LEFT LUMBAR RADICULITIS: ICD-10-CM

## 2021-08-19 DIAGNOSIS — U07.1 COVID-19: Primary | ICD-10-CM

## 2021-08-19 DIAGNOSIS — J44.1 COPD EXACERBATION (HCC): ICD-10-CM

## 2021-08-19 DIAGNOSIS — R09.02 HYPOXIA: ICD-10-CM

## 2021-08-19 DIAGNOSIS — M43.16 SPONDYLOLISTHESIS, LUMBAR REGION: ICD-10-CM

## 2021-08-19 LAB
A/G RATIO: 1.4 (ref 1.1–2.2)
ALBUMIN SERPL-MCNC: 4.1 G/DL (ref 3.4–5)
ALP BLD-CCNC: 63 U/L (ref 40–129)
ALT SERPL-CCNC: 19 U/L (ref 10–40)
ANION GAP SERPL CALCULATED.3IONS-SCNC: 10 MMOL/L (ref 3–16)
AST SERPL-CCNC: 24 U/L (ref 15–37)
BASE EXCESS VENOUS: 3.3 MMOL/L (ref -3–3)
BASOPHILS ABSOLUTE: 0 K/UL (ref 0–0.2)
BASOPHILS RELATIVE PERCENT: 0.4 %
BILIRUB SERPL-MCNC: <0.2 MG/DL (ref 0–1)
BUN BLDV-MCNC: 28 MG/DL (ref 7–20)
CALCIUM SERPL-MCNC: 9.5 MG/DL (ref 8.3–10.6)
CARBOXYHEMOGLOBIN: 7.3 % (ref 0–1.5)
CHLORIDE BLD-SCNC: 94 MMOL/L (ref 99–110)
CO2: 30 MMOL/L (ref 21–32)
CREAT SERPL-MCNC: 1.1 MG/DL (ref 0.6–1.2)
EKG ATRIAL RATE: 357 BPM
EKG DIAGNOSIS: NORMAL
EKG Q-T INTERVAL: 354 MS
EKG QRS DURATION: 68 MS
EKG QTC CALCULATION (BAZETT): 418 MS
EKG R AXIS: -26 DEGREES
EKG T AXIS: 90 DEGREES
EKG VENTRICULAR RATE: 84 BPM
EOSINOPHILS ABSOLUTE: 0.1 K/UL (ref 0–0.6)
EOSINOPHILS RELATIVE PERCENT: 1.6 %
GFR AFRICAN AMERICAN: 58
GFR NON-AFRICAN AMERICAN: 48
GLOBULIN: 3 G/DL
GLUCOSE BLD-MCNC: 241 MG/DL (ref 70–99)
GLUCOSE BLD-MCNC: 248 MG/DL (ref 70–99)
GLUCOSE BLD-MCNC: 263 MG/DL (ref 70–99)
GLUCOSE BLD-MCNC: 368 MG/DL (ref 70–99)
HCO3 VENOUS: 29.2 MMOL/L (ref 23–29)
HCT VFR BLD CALC: 40.5 % (ref 36–48)
HEMOGLOBIN: 13.3 G/DL (ref 12–16)
INFLUENZA A: NOT DETECTED
INFLUENZA B: NOT DETECTED
LYMPHOCYTES ABSOLUTE: 0.9 K/UL (ref 1–5.1)
LYMPHOCYTES RELATIVE PERCENT: 15.4 %
MCH RBC QN AUTO: 29.6 PG (ref 26–34)
MCHC RBC AUTO-ENTMCNC: 32.9 G/DL (ref 31–36)
MCV RBC AUTO: 89.9 FL (ref 80–100)
METHEMOGLOBIN VENOUS: 0.3 %
MONOCYTES ABSOLUTE: 0.9 K/UL (ref 0–1.3)
MONOCYTES RELATIVE PERCENT: 16.3 %
NEUTROPHILS ABSOLUTE: 3.8 K/UL (ref 1.7–7.7)
NEUTROPHILS RELATIVE PERCENT: 66.3 %
O2 CONTENT, VEN: 16 VOL %
O2 SAT, VEN: 85 %
O2 THERAPY: ABNORMAL
PCO2, VEN: 49.1 MMHG (ref 40–50)
PDW BLD-RTO: 14.2 % (ref 12.4–15.4)
PERFORMED ON: ABNORMAL
PH VENOUS: 7.39 (ref 7.35–7.45)
PLATELET # BLD: 120 K/UL (ref 135–450)
PMV BLD AUTO: 8.6 FL (ref 5–10.5)
PO2, VEN: 50.1 MMHG (ref 25–40)
POTASSIUM REFLEX MAGNESIUM: 4.1 MMOL/L (ref 3.5–5.1)
PRO-BNP: 552 PG/ML (ref 0–449)
PROCALCITONIN: 0.08 NG/ML (ref 0–0.15)
RBC # BLD: 4.5 M/UL (ref 4–5.2)
SARS-COV-2 RNA, RT PCR: DETECTED
SODIUM BLD-SCNC: 134 MMOL/L (ref 136–145)
TCO2 CALC VENOUS: 31 MMOL/L
TOTAL PROTEIN: 7.1 G/DL (ref 6.4–8.2)
TROPONIN: <0.01 NG/ML
WBC # BLD: 5.8 K/UL (ref 4–11)

## 2021-08-19 PROCEDURE — 6370000000 HC RX 637 (ALT 250 FOR IP): Performed by: NURSE PRACTITIONER

## 2021-08-19 PROCEDURE — XW033E5 INTRODUCTION OF REMDESIVIR ANTI-INFECTIVE INTO PERIPHERAL VEIN, PERCUTANEOUS APPROACH, NEW TECHNOLOGY GROUP 5: ICD-10-PCS | Performed by: INTERNAL MEDICINE

## 2021-08-19 PROCEDURE — 2580000003 HC RX 258: Performed by: PHYSICIAN ASSISTANT

## 2021-08-19 PROCEDURE — 36415 COLL VENOUS BLD VENIPUNCTURE: CPT

## 2021-08-19 PROCEDURE — 99285 EMERGENCY DEPT VISIT HI MDM: CPT

## 2021-08-19 PROCEDURE — 6370000000 HC RX 637 (ALT 250 FOR IP): Performed by: STUDENT IN AN ORGANIZED HEALTH CARE EDUCATION/TRAINING PROGRAM

## 2021-08-19 PROCEDURE — 80053 COMPREHEN METABOLIC PANEL: CPT

## 2021-08-19 PROCEDURE — 93010 ELECTROCARDIOGRAM REPORT: CPT | Performed by: INTERNAL MEDICINE

## 2021-08-19 PROCEDURE — 82803 BLOOD GASES ANY COMBINATION: CPT

## 2021-08-19 PROCEDURE — 84484 ASSAY OF TROPONIN QUANT: CPT

## 2021-08-19 PROCEDURE — 6370000000 HC RX 637 (ALT 250 FOR IP): Performed by: PHYSICIAN ASSISTANT

## 2021-08-19 PROCEDURE — 6360000002 HC RX W HCPCS: Performed by: STUDENT IN AN ORGANIZED HEALTH CARE EDUCATION/TRAINING PROGRAM

## 2021-08-19 PROCEDURE — 94761 N-INVAS EAR/PLS OXIMETRY MLT: CPT

## 2021-08-19 PROCEDURE — 85025 COMPLETE CBC W/AUTO DIFF WBC: CPT

## 2021-08-19 PROCEDURE — 99223 1ST HOSP IP/OBS HIGH 75: CPT | Performed by: PHYSICIAN ASSISTANT

## 2021-08-19 PROCEDURE — 99223 1ST HOSP IP/OBS HIGH 75: CPT | Performed by: INTERNAL MEDICINE

## 2021-08-19 PROCEDURE — 71045 X-RAY EXAM CHEST 1 VIEW: CPT

## 2021-08-19 PROCEDURE — 1200000000 HC SEMI PRIVATE

## 2021-08-19 PROCEDURE — 84145 PROCALCITONIN (PCT): CPT

## 2021-08-19 PROCEDURE — 94640 AIRWAY INHALATION TREATMENT: CPT

## 2021-08-19 PROCEDURE — 87636 SARSCOV2 & INF A&B AMP PRB: CPT

## 2021-08-19 PROCEDURE — 96374 THER/PROPH/DIAG INJ IV PUSH: CPT

## 2021-08-19 PROCEDURE — 6370000000 HC RX 637 (ALT 250 FOR IP): Performed by: INTERNAL MEDICINE

## 2021-08-19 PROCEDURE — 2700000000 HC OXYGEN THERAPY PER DAY

## 2021-08-19 PROCEDURE — 93005 ELECTROCARDIOGRAM TRACING: CPT | Performed by: STUDENT IN AN ORGANIZED HEALTH CARE EDUCATION/TRAINING PROGRAM

## 2021-08-19 PROCEDURE — 2580000003 HC RX 258: Performed by: NURSE PRACTITIONER

## 2021-08-19 PROCEDURE — 83880 ASSAY OF NATRIURETIC PEPTIDE: CPT

## 2021-08-19 PROCEDURE — 2500000003 HC RX 250 WO HCPCS: Performed by: PHYSICIAN ASSISTANT

## 2021-08-19 RX ORDER — CHOLECALCIFEROL (VITAMIN D3) 125 MCG
500 CAPSULE ORAL DAILY
Status: DISCONTINUED | OUTPATIENT
Start: 2021-08-19 | End: 2021-08-23 | Stop reason: HOSPADM

## 2021-08-19 RX ORDER — OLOPATADINE HYDROCHLORIDE 1 MG/ML
1 SOLUTION/ DROPS OPHTHALMIC DAILY
COMMUNITY
End: 2022-09-07

## 2021-08-19 RX ORDER — BUDESONIDE AND FORMOTEROL FUMARATE DIHYDRATE 160; 4.5 UG/1; UG/1
2 AEROSOL RESPIRATORY (INHALATION) 2 TIMES DAILY
Status: DISCONTINUED | OUTPATIENT
Start: 2021-08-19 | End: 2021-08-19

## 2021-08-19 RX ORDER — DULOXETIN HYDROCHLORIDE 60 MG/1
60 CAPSULE, DELAYED RELEASE ORAL EVERY MORNING
Status: DISCONTINUED | OUTPATIENT
Start: 2021-08-20 | End: 2021-08-23 | Stop reason: HOSPADM

## 2021-08-19 RX ORDER — FLUTICASONE PROPIONATE 50 MCG
2 SPRAY, SUSPENSION (ML) NASAL DAILY
Status: DISCONTINUED | OUTPATIENT
Start: 2021-08-19 | End: 2021-08-23 | Stop reason: HOSPADM

## 2021-08-19 RX ORDER — ATORVASTATIN CALCIUM 40 MG/1
40 TABLET, FILM COATED ORAL NIGHTLY
Status: DISCONTINUED | OUTPATIENT
Start: 2021-08-19 | End: 2021-08-23 | Stop reason: HOSPADM

## 2021-08-19 RX ORDER — INSULIN GLARGINE 100 [IU]/ML
30 INJECTION, SOLUTION SUBCUTANEOUS 2 TIMES DAILY
COMMUNITY

## 2021-08-19 RX ORDER — ASPIRIN 81 MG/1
81 TABLET, CHEWABLE ORAL DAILY
Status: DISCONTINUED | OUTPATIENT
Start: 2021-08-19 | End: 2021-08-23 | Stop reason: HOSPADM

## 2021-08-19 RX ORDER — CETIRIZINE HYDROCHLORIDE 10 MG/1
10 TABLET ORAL DAILY
Status: DISCONTINUED | OUTPATIENT
Start: 2021-08-19 | End: 2021-08-23 | Stop reason: HOSPADM

## 2021-08-19 RX ORDER — ACETAMINOPHEN 325 MG/1
650 TABLET ORAL ONCE
Status: COMPLETED | OUTPATIENT
Start: 2021-08-19 | End: 2021-08-19

## 2021-08-19 RX ORDER — ALBUTEROL SULFATE 90 UG/1
2 AEROSOL, METERED RESPIRATORY (INHALATION) EVERY 6 HOURS PRN
Status: DISCONTINUED | OUTPATIENT
Start: 2021-08-19 | End: 2021-08-23 | Stop reason: HOSPADM

## 2021-08-19 RX ORDER — HYDROCHLOROTHIAZIDE 12.5 MG/1
12.5 CAPSULE, GELATIN COATED ORAL DAILY
Status: ON HOLD | COMMUNITY
End: 2021-08-23 | Stop reason: HOSPADM

## 2021-08-19 RX ORDER — DEXAMETHASONE SODIUM PHOSPHATE 10 MG/ML
6 INJECTION, SOLUTION INTRAMUSCULAR; INTRAVENOUS EVERY 24 HOURS
Status: DISCONTINUED | OUTPATIENT
Start: 2021-08-20 | End: 2021-08-23 | Stop reason: HOSPADM

## 2021-08-19 RX ORDER — IMIPRAMINE HCL 25 MG
50 TABLET ORAL NIGHTLY
Status: DISCONTINUED | OUTPATIENT
Start: 2021-08-19 | End: 2021-08-19 | Stop reason: SDUPTHER

## 2021-08-19 RX ORDER — PRAVASTATIN SODIUM 40 MG
80 TABLET ORAL NIGHTLY
Status: DISCONTINUED | OUTPATIENT
Start: 2021-08-19 | End: 2021-08-19

## 2021-08-19 RX ORDER — POLYETHYLENE GLYCOL 3350 17 G/17G
17 POWDER, FOR SOLUTION ORAL DAILY PRN
Status: DISCONTINUED | OUTPATIENT
Start: 2021-08-19 | End: 2021-08-23 | Stop reason: HOSPADM

## 2021-08-19 RX ORDER — ONDANSETRON 2 MG/ML
4 INJECTION INTRAMUSCULAR; INTRAVENOUS EVERY 6 HOURS PRN
Status: DISCONTINUED | OUTPATIENT
Start: 2021-08-19 | End: 2021-08-23 | Stop reason: HOSPADM

## 2021-08-19 RX ORDER — ATORVASTATIN CALCIUM 40 MG/1
40 TABLET, FILM COATED ORAL NIGHTLY
COMMUNITY
End: 2022-09-07

## 2021-08-19 RX ORDER — SODIUM CHLORIDE 0.9 % (FLUSH) 0.9 %
5-40 SYRINGE (ML) INJECTION PRN
Status: DISCONTINUED | OUTPATIENT
Start: 2021-08-19 | End: 2021-08-23 | Stop reason: HOSPADM

## 2021-08-19 RX ORDER — INSULIN GLARGINE 100 [IU]/ML
20 INJECTION, SOLUTION SUBCUTANEOUS 2 TIMES DAILY
Status: DISCONTINUED | OUTPATIENT
Start: 2021-08-19 | End: 2021-08-23 | Stop reason: HOSPADM

## 2021-08-19 RX ORDER — FENOFIBRATE 160 MG/1
160 TABLET ORAL DAILY
Status: DISCONTINUED | OUTPATIENT
Start: 2021-08-19 | End: 2021-08-23 | Stop reason: HOSPADM

## 2021-08-19 RX ORDER — FERROUS SULFATE 325(65) MG
325 TABLET ORAL DAILY
Status: DISCONTINUED | OUTPATIENT
Start: 2021-08-19 | End: 2021-08-23 | Stop reason: HOSPADM

## 2021-08-19 RX ORDER — ONDANSETRON 4 MG/1
4 TABLET, ORALLY DISINTEGRATING ORAL EVERY 8 HOURS PRN
Status: DISCONTINUED | OUTPATIENT
Start: 2021-08-19 | End: 2021-08-23 | Stop reason: HOSPADM

## 2021-08-19 RX ORDER — VITAMIN B COMPLEX
1000 TABLET ORAL DAILY
Status: DISCONTINUED | OUTPATIENT
Start: 2021-08-19 | End: 2021-08-23 | Stop reason: HOSPADM

## 2021-08-19 RX ORDER — BUDESONIDE AND FORMOTEROL FUMARATE DIHYDRATE 160; 4.5 UG/1; UG/1
2 AEROSOL RESPIRATORY (INHALATION) 2 TIMES DAILY
Status: DISCONTINUED | OUTPATIENT
Start: 2021-08-19 | End: 2021-08-23 | Stop reason: HOSPADM

## 2021-08-19 RX ORDER — INSULIN GLARGINE 100 [IU]/ML
20 INJECTION, SOLUTION SUBCUTANEOUS NIGHTLY
Status: DISCONTINUED | OUTPATIENT
Start: 2021-08-19 | End: 2021-08-19

## 2021-08-19 RX ORDER — IMIPRAMINE HCL 25 MG
50 TABLET ORAL NIGHTLY
Status: DISCONTINUED | OUTPATIENT
Start: 2021-08-19 | End: 2021-08-23 | Stop reason: HOSPADM

## 2021-08-19 RX ORDER — DONEPEZIL HYDROCHLORIDE 23 MG/1
23 TABLET, FILM COATED ORAL DAILY
Status: DISCONTINUED | OUTPATIENT
Start: 2021-08-19 | End: 2021-08-19

## 2021-08-19 RX ORDER — SENNA PLUS 8.6 MG/1
1 TABLET ORAL 2 TIMES DAILY
Status: DISCONTINUED | OUTPATIENT
Start: 2021-08-19 | End: 2021-08-23 | Stop reason: HOSPADM

## 2021-08-19 RX ORDER — ACETAMINOPHEN 650 MG/1
650 SUPPOSITORY RECTAL EVERY 6 HOURS PRN
Status: DISCONTINUED | OUTPATIENT
Start: 2021-08-19 | End: 2021-08-23 | Stop reason: HOSPADM

## 2021-08-19 RX ORDER — SODIUM CHLORIDE 9 MG/ML
25 INJECTION, SOLUTION INTRAVENOUS PRN
Status: DISCONTINUED | OUTPATIENT
Start: 2021-08-19 | End: 2021-08-23 | Stop reason: HOSPADM

## 2021-08-19 RX ORDER — DULOXETIN HYDROCHLORIDE 60 MG/1
60 CAPSULE, DELAYED RELEASE ORAL DAILY
COMMUNITY

## 2021-08-19 RX ORDER — MAGNESIUM OXIDE 400 MG/1
400 TABLET ORAL DAILY
Status: DISCONTINUED | OUTPATIENT
Start: 2021-08-19 | End: 2021-08-19

## 2021-08-19 RX ORDER — GUAIFENESIN 600 MG/1
600 TABLET, EXTENDED RELEASE ORAL 2 TIMES DAILY
Status: DISCONTINUED | OUTPATIENT
Start: 2021-08-19 | End: 2021-08-23 | Stop reason: HOSPADM

## 2021-08-19 RX ORDER — BUDESONIDE AND FORMOTEROL FUMARATE DIHYDRATE 160; 4.5 UG/1; UG/1
2 AEROSOL RESPIRATORY (INHALATION) 2 TIMES DAILY
COMMUNITY

## 2021-08-19 RX ORDER — LOSARTAN POTASSIUM AND HYDROCHLOROTHIAZIDE 12.5; 5 MG/1; MG/1
2 TABLET ORAL DAILY
Status: DISCONTINUED | OUTPATIENT
Start: 2021-08-19 | End: 2021-08-19

## 2021-08-19 RX ORDER — CARVEDILOL 6.25 MG/1
6.25 TABLET ORAL 2 TIMES DAILY WITH MEALS
Status: DISCONTINUED | OUTPATIENT
Start: 2021-08-19 | End: 2021-08-23 | Stop reason: HOSPADM

## 2021-08-19 RX ORDER — TRAMADOL HYDROCHLORIDE 50 MG/1
50 TABLET ORAL EVERY 6 HOURS PRN
Status: DISCONTINUED | OUTPATIENT
Start: 2021-08-19 | End: 2021-08-23 | Stop reason: HOSPADM

## 2021-08-19 RX ORDER — SODIUM CHLORIDE 9 MG/ML
INJECTION, SOLUTION INTRAVENOUS CONTINUOUS
Status: DISCONTINUED | OUTPATIENT
Start: 2021-08-19 | End: 2021-08-22

## 2021-08-19 RX ORDER — PREGABALIN 25 MG/1
50 CAPSULE ORAL 3 TIMES DAILY
Status: DISCONTINUED | OUTPATIENT
Start: 2021-08-19 | End: 2021-08-23 | Stop reason: HOSPADM

## 2021-08-19 RX ORDER — CLONAZEPAM 1 MG/1
1 TABLET ORAL NIGHTLY
Status: DISCONTINUED | OUTPATIENT
Start: 2021-08-19 | End: 2021-08-23 | Stop reason: HOSPADM

## 2021-08-19 RX ORDER — HYDROCHLOROTHIAZIDE 25 MG/1
12.5 TABLET ORAL DAILY
Status: DISCONTINUED | OUTPATIENT
Start: 2021-08-20 | End: 2021-08-23 | Stop reason: HOSPADM

## 2021-08-19 RX ORDER — LOSARTAN POTASSIUM 25 MG/1
50 TABLET ORAL DAILY
Status: DISCONTINUED | OUTPATIENT
Start: 2021-08-19 | End: 2021-08-19

## 2021-08-19 RX ORDER — LOSARTAN POTASSIUM 100 MG/1
100 TABLET ORAL DAILY
Status: DISCONTINUED | OUTPATIENT
Start: 2021-08-20 | End: 2021-08-23 | Stop reason: HOSPADM

## 2021-08-19 RX ORDER — POTASSIUM CHLORIDE 750 MG/1
40 TABLET, EXTENDED RELEASE ORAL DAILY
Status: DISCONTINUED | OUTPATIENT
Start: 2021-08-19 | End: 2021-08-23 | Stop reason: HOSPADM

## 2021-08-19 RX ORDER — GUAIFENESIN 100 MG/5ML
10 SOLUTION ORAL EVERY 4 HOURS PRN
Status: DISCONTINUED | OUTPATIENT
Start: 2021-08-19 | End: 2021-08-23 | Stop reason: HOSPADM

## 2021-08-19 RX ORDER — DEXTROSE MONOHYDRATE 25 G/50ML
12.5 INJECTION, SOLUTION INTRAVENOUS PRN
Status: DISCONTINUED | OUTPATIENT
Start: 2021-08-19 | End: 2021-08-23 | Stop reason: HOSPADM

## 2021-08-19 RX ORDER — FUROSEMIDE 20 MG/1
20 TABLET ORAL DAILY
Status: DISCONTINUED | OUTPATIENT
Start: 2021-08-19 | End: 2021-08-23 | Stop reason: HOSPADM

## 2021-08-19 RX ORDER — PANTOPRAZOLE SODIUM 40 MG/1
40 TABLET, DELAYED RELEASE ORAL
Status: DISCONTINUED | OUTPATIENT
Start: 2021-08-20 | End: 2021-08-23 | Stop reason: HOSPADM

## 2021-08-19 RX ORDER — DEXTROSE MONOHYDRATE 50 MG/ML
100 INJECTION, SOLUTION INTRAVENOUS PRN
Status: DISCONTINUED | OUTPATIENT
Start: 2021-08-19 | End: 2021-08-23 | Stop reason: HOSPADM

## 2021-08-19 RX ORDER — HYDROCHLOROTHIAZIDE 25 MG/1
12.5 TABLET ORAL DAILY
Status: DISCONTINUED | OUTPATIENT
Start: 2021-08-19 | End: 2021-08-19

## 2021-08-19 RX ORDER — ACETAMINOPHEN 325 MG/1
650 TABLET ORAL EVERY 6 HOURS PRN
Status: DISCONTINUED | OUTPATIENT
Start: 2021-08-19 | End: 2021-08-23 | Stop reason: HOSPADM

## 2021-08-19 RX ORDER — AMLODIPINE BESYLATE 5 MG/1
5 TABLET ORAL DAILY
Status: DISCONTINUED | OUTPATIENT
Start: 2021-08-19 | End: 2021-08-23 | Stop reason: HOSPADM

## 2021-08-19 RX ORDER — SODIUM CHLORIDE 0.9 % (FLUSH) 0.9 %
5-40 SYRINGE (ML) INJECTION EVERY 12 HOURS SCHEDULED
Status: DISCONTINUED | OUTPATIENT
Start: 2021-08-19 | End: 2021-08-23 | Stop reason: HOSPADM

## 2021-08-19 RX ORDER — DEXAMETHASONE SODIUM PHOSPHATE 10 MG/ML
10 INJECTION, SOLUTION INTRAMUSCULAR; INTRAVENOUS ONCE
Status: COMPLETED | OUTPATIENT
Start: 2021-08-19 | End: 2021-08-19

## 2021-08-19 RX ORDER — CHOLECALCIFEROL (VITAMIN D3) 125 MCG
500 CAPSULE ORAL DAILY
COMMUNITY

## 2021-08-19 RX ORDER — IPRATROPIUM BROMIDE AND ALBUTEROL SULFATE 2.5; .5 MG/3ML; MG/3ML
1 SOLUTION RESPIRATORY (INHALATION) ONCE
Status: COMPLETED | OUTPATIENT
Start: 2021-08-19 | End: 2021-08-19

## 2021-08-19 RX ORDER — DULOXETIN HYDROCHLORIDE 20 MG/1
20 CAPSULE, DELAYED RELEASE ORAL NIGHTLY
Status: DISCONTINUED | OUTPATIENT
Start: 2021-08-19 | End: 2021-08-23 | Stop reason: HOSPADM

## 2021-08-19 RX ORDER — LOSARTAN POTASSIUM 100 MG/1
50 TABLET ORAL DAILY
Status: ON HOLD | COMMUNITY
End: 2022-09-13 | Stop reason: SDUPTHER

## 2021-08-19 RX ORDER — NICOTINE POLACRILEX 4 MG
15 LOZENGE BUCCAL PRN
Status: DISCONTINUED | OUTPATIENT
Start: 2021-08-19 | End: 2021-08-23 | Stop reason: HOSPADM

## 2021-08-19 RX ORDER — OYSTER SHELL CALCIUM WITH VITAMIN D 500; 200 MG/1; [IU]/1
1 TABLET, FILM COATED ORAL 2 TIMES DAILY
Status: DISCONTINUED | OUTPATIENT
Start: 2021-08-19 | End: 2021-08-23 | Stop reason: HOSPADM

## 2021-08-19 RX ADMIN — ASPIRIN 81 MG: 81 TABLET, CHEWABLE ORAL at 14:48

## 2021-08-19 RX ADMIN — INSULIN LISPRO 2 UNITS: 100 INJECTION, SOLUTION INTRAVENOUS; SUBCUTANEOUS at 22:23

## 2021-08-19 RX ADMIN — ACETAMINOPHEN 650 MG: 325 TABLET ORAL at 21:57

## 2021-08-19 RX ADMIN — INSULIN GLARGINE 20 UNITS: 100 INJECTION, SOLUTION SUBCUTANEOUS at 22:23

## 2021-08-19 RX ADMIN — Medication 500 MCG: at 14:57

## 2021-08-19 RX ADMIN — TRAMADOL HYDROCHLORIDE 50 MG: 50 TABLET, FILM COATED ORAL at 21:57

## 2021-08-19 RX ADMIN — DULOXETINE 20 MG: 20 CAPSULE, DELAYED RELEASE ORAL at 21:56

## 2021-08-19 RX ADMIN — FUROSEMIDE 20 MG: 20 TABLET ORAL at 14:49

## 2021-08-19 RX ADMIN — SENNOSIDES 8.6 MG: 8.6 TABLET, FILM COATED ORAL at 21:56

## 2021-08-19 RX ADMIN — GUAIFENESIN 600 MG: 600 TABLET, EXTENDED RELEASE ORAL at 14:48

## 2021-08-19 RX ADMIN — IMIPRAMINE HYDROCHLORIDE 50 MG: 25 TABLET ORAL at 21:56

## 2021-08-19 RX ADMIN — CETIRIZINE HYDROCHLORIDE 10 MG: 10 TABLET ORAL at 14:49

## 2021-08-19 RX ADMIN — Medication 2 PUFF: at 18:47

## 2021-08-19 RX ADMIN — ACETAMINOPHEN 650 MG: 325 TABLET ORAL at 06:54

## 2021-08-19 RX ADMIN — POTASSIUM CHLORIDE 40 MEQ: 750 TABLET, EXTENDED RELEASE ORAL at 14:50

## 2021-08-19 RX ADMIN — APIXABAN 5 MG: 5 TABLET, FILM COATED ORAL at 14:48

## 2021-08-19 RX ADMIN — FERROUS SULFATE TAB 325 MG (65 MG ELEMENTAL FE) 325 MG: 325 (65 FE) TAB at 14:49

## 2021-08-19 RX ADMIN — Medication 1000 UNITS: at 14:57

## 2021-08-19 RX ADMIN — IPRATROPIUM BROMIDE AND ALBUTEROL SULFATE 1 AMPULE: .5; 3 SOLUTION RESPIRATORY (INHALATION) at 06:54

## 2021-08-19 RX ADMIN — GUAIFENESIN 600 MG: 600 TABLET, EXTENDED RELEASE ORAL at 21:55

## 2021-08-19 RX ADMIN — DICLOFENAC SODIUM 2 G: 10 GEL TOPICAL at 22:08

## 2021-08-19 RX ADMIN — CARVEDILOL 6.25 MG: 6.25 TABLET, FILM COATED ORAL at 17:12

## 2021-08-19 RX ADMIN — APIXABAN 5 MG: 5 TABLET, FILM COATED ORAL at 21:56

## 2021-08-19 RX ADMIN — PREGABALIN 50 MG: 25 CAPSULE ORAL at 21:56

## 2021-08-19 RX ADMIN — PREGABALIN 50 MG: 25 CAPSULE ORAL at 14:48

## 2021-08-19 RX ADMIN — SENNOSIDES 8.6 MG: 8.6 TABLET, FILM COATED ORAL at 14:48

## 2021-08-19 RX ADMIN — DEXAMETHASONE SODIUM PHOSPHATE 10 MG: 10 INJECTION, SOLUTION INTRAMUSCULAR; INTRAVENOUS at 07:59

## 2021-08-19 RX ADMIN — SODIUM CHLORIDE: 9 INJECTION, SOLUTION INTRAVENOUS at 14:44

## 2021-08-19 RX ADMIN — ATORVASTATIN CALCIUM 40 MG: 40 TABLET, FILM COATED ORAL at 21:56

## 2021-08-19 RX ADMIN — SODIUM CHLORIDE, PRESERVATIVE FREE 10 ML: 5 INJECTION INTRAVENOUS at 14:53

## 2021-08-19 RX ADMIN — FENOFIBRATE 160 MG: 160 TABLET ORAL at 14:49

## 2021-08-19 RX ADMIN — REMDESIVIR 200 MG: 100 INJECTION, POWDER, LYOPHILIZED, FOR SOLUTION INTRAVENOUS at 17:05

## 2021-08-19 RX ADMIN — CLONAZEPAM 1 MG: 1 TABLET ORAL at 21:56

## 2021-08-19 RX ADMIN — MAGNESIUM GLUCONATE 500 MG ORAL TABLET 400 MG: 500 TABLET ORAL at 14:49

## 2021-08-19 RX ADMIN — AMLODIPINE BESYLATE 5 MG: 5 TABLET ORAL at 14:49

## 2021-08-19 RX ADMIN — CALCIUM CARBONATE-VITAMIN D TAB 500 MG-200 UNIT 1 TABLET: 500-200 TAB at 21:55

## 2021-08-19 ASSESSMENT — PAIN SCALES - GENERAL
PAINLEVEL_OUTOF10: 8
PAINLEVEL_OUTOF10: 3
PAINLEVEL_OUTOF10: 0

## 2021-08-19 ASSESSMENT — ENCOUNTER SYMPTOMS
PHOTOPHOBIA: 0
COUGH: 1
RHINORRHEA: 0
VOMITING: 0
SORE THROAT: 0
ABDOMINAL PAIN: 0
SHORTNESS OF BREATH: 1
STRIDOR: 0
NAUSEA: 0
BACK PAIN: 0

## 2021-08-19 NOTE — CONSULTS
Patient is being seen at the request of Brianna Dumas for a consultation for COVID-19 and respiratory failure    HISTORY OF PRESENT ILLNESS: This is a 47-year-old previously vaccinated nursing home resident who presented to the emergency department with a 3-day history of general malaise. She subsequently developed a 1 day history of mild to moderate shortness of breath, worse with exertion, associated with hypoxemia. She was treated with oxygen with improvement and was noted to be Covid positive.     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    Memory change     dementia    Neuropathy     Seizures ([address]     possible because of her falls pt unsure    UTI (lower urinary tract infection)      PAST SURGICAL HISTORY:  Past Surgical History:   Procedure Laterality Date    BREAST SURGERY      BENIGN CYST    CHOLECYSTECTOMY      COLONOSCOPY      INTRACAPSULAR CATARACT EXTRACTION Right 3/25/2019    PHACOEMULSIFICATION OF CATARACT RIGHT EYE WITH INTRAOCULAR LENS IMPLANT performed by Glen Blair MD at [facility] Left 4/8/2019    PHACOEMULSIFICATION OF CATARACT LEFT EYE WITH INTRAOCULAR LENS IMPLANT performed by Glen Blair [facility] at [facility]  6-6-12    OPEN REDUCTION INTERNAL FIXATION RIGHT PROXIMAL HUMERAL SHAFT FRACTURE SYNTHES    OTHER SURGICAL HISTORY  07/18/12    incision and drainage of bursa rt elbow    PANCREAS BIOPSY      SHOULDER SURGERY Bilateral     rtc repair       FAMILY HISTORY:  family history includes Heart Disease in her mother. SOCIAL HISTORY:   reports that she quit smoking about 23 years ago. Her smoking use included cigarettes. She has a 15.00 pack-year smoking history.  She has never used smokeless tobacco.    Scheduled Meds:   amLODIPine  5 mg Oral Daily    apixaban  5 mg Oral BID    aspirin  81 mg Oral Daily    calcium-vitamin D  1 tablet Oral BID    carvedilol  6.25 mg Oral BID WC    clonazePAM  1 mg Oral Nightly    DULoxetine  20 mg Oral Nightly    [START ON 8/20/2021] DULoxetine  60 mg Oral QAM    fenofibrate  160 mg Oral Daily    ferrous sulfate  325 mg Oral Daily    fluticasone  2 spray Nasal Daily    furosemide  20 mg Oral Daily    guaiFENesin  600 mg Oral BID    cetirizine  10 mg Oral Daily    [START ON 8/20/2021] pantoprazole  40 mg Oral QAM AC    potassium chloride  40 mEq Oral Daily    pregabalin  50 mg Oral TID    senna  1 tablet Oral BID    sodium chloride flush  5-40 mL Intravenous 2 times per day    insulin lispro  0-6 Units Subcutaneous TID WC    insulin lispro  0-3 Units Subcutaneous Nightly    [START ON 8/20/2021] dexamethasone  6 mg Intravenous Q24H    magnesium oxide  400 mg Oral Daily    atorvastatin  40 mg Oral Nightly    diclofenac sodium  2 g Topical Nightly    vitamin B-12  500 mcg Oral Daily    Vitamin D  1,000 Units Oral Daily    [START ON 8/20/2021] losartan  100 mg Oral Daily    And    [START ON 8/20/2021] hydroCHLOROthiazide  12.5 mg Oral Daily    insulin glargine  20 Units Subcutaneous BID    imipramine  50 mg Oral Nightly    [START ON 8/20/2021] remdesivir IVPB  100 mg Intravenous Q24H    budesonide-formoterol  2 puff Inhalation BID     Continuous Infusions:   sodium chloride      sodium chloride 75 mL/hr at 08/19/21 1444    dextrose       PRN Meds:  albuterol sulfate HFA, guaiFENesin, traMADol, sodium chloride flush, sodium chloride, ondansetron **OR** ondansetron, polyethylene glycol, acetaminophen **OR** acetaminophen, glucose, dextrose, glucagon (rDNA), dextrose    ALLERGIES:  Patient is allergic to codeine and morphine and related. REVIEW OF SYSTEMS:  Constitutional: Negative for fever  HENT: Negative for sore throat  Eyes: Negative for redness   Respiratory: + for dyspnea, cough  Cardiovascular: Negative for chest pain  Gastrointestinal: Negative for vomiting, diarrhea   Genitourinary: Negative for hematuria   Musculoskeletal: Negative for arthralgias   Skin: Negative for rash  Neurological: Negative for syncope, positive for fatigue and memory loss  Hematological: Negative for adenopathy  Psychiatric/Behavorial: Negative for anxiety    PHYSICAL EXAM:  Blood pressure 122/71, pulse 83, temperature 97.2 °F (36.2 °C), temperature source Oral, resp. rate 18, height 5' 7\" (1.702 m), weight 160 lb (72.6 kg), SpO2 98 %, not currently breastfeeding.' on 3 L, 86% on room air when I was in the room  Gen: No distress. Eyes: PERRL. No sclera icterus. No conjunctival injection. ENT: No discharge. Pharynx clear. Neck: Trachea midline. No obvious mass. Resp: No accessory muscle use. + crackles. No wheezes. No rhonchi. No dullness on percussion. CV: Regular rate. Regular rhythm. No murmur or rub. No edema. Peripheral pulses are 2+. Capillary refill is less than 3 seconds. GI: Non-tender. Non-distended. No hernia. Skin: Warm and dry. No nodule on exposed extremities. Lymph: No cervical LAD. No supraclavicular LAD. M/S: No cyanosis. No joint deformity. No clubbing. Neuro: Awake. Alert. Moves all four extremities. Psych: Oriented x 3. No anxiety.      LABS:  CBC:   Recent Labs     08/19/21  0650   WBC 5.8   HGB 13.3   HCT 40.5   MCV 89.9   *     BMP:   Recent Labs     08/19/21  0752   *   K 4.1   CL 94*   CO2 30   BUN 28*   CREATININE 1.1     LIVER PROFILE:   Recent Labs 08/19/21  0752   AST 24   ALT 19   BILITOT <0.2   ALKPHOS 63     PT/INR: No results for input(s): PROTIME, INR in the last 72 hours. APTT: No results for input(s): APTT in the last 72 hours. UA:No results for input(s): NITRITE, COLORU, PHUR, LABCAST, WBCUA, RBCUA, MUCUS, TRICHOMONAS, YEAST, BACTERIA, CLARITYU, SPECGRAV, LEUKOCYTESUR, UROBILINOGEN, BILIRUBINUR, BLOODU, GLUCOSEU, AMORPHOUS in the last 72 hours. Invalid input(s): KETONESU  No results for input(s): PHART, DTC5CWM, PO2ART in the last 72 hours.     Micro:  8/19/2021 SARS-CoV-2 positive    Imaging:  Chest imaging was reviewed by me and showed   CXR 8/19/2021 I suspect right lower lobe infiltrate, however the overlying external devices/wires make this final interpretation difficult    ASSESSMENT:  · COVID-19 pneumonia, with hypoxemia and positive exam findings and some suspicion on x-ray, this does represent Covid pneumonia  · Acute hypoxemic respiratory failure  · Atrial fibrillation on Eliquis  · COPD  · Dementia the Alzheimer's type  · Chronic pain syndrome  · Hyperglycemia    PLAN:   COVID-19 isolation, droplet plus   Supplemental oxygen to maintain SaO2 >92%; monitor saturations closely    Remdesevir D# 1, LFT monitoring for high risk medication   Decadron D#1, 6 mg daily    Check CRP   Inhaled bronchodilators only as needed, MDI preferred    On Eliquis

## 2021-08-19 NOTE — PROGRESS NOTES
PHARMACY NOTE  Treliana Whitlock was ordered D-Mannose capsules. Per the [address], this medication is non-formulary and not stocked by pharmacy. The medication can be reordered at discharge.    JASON BlandPh.8/19/20212:06 PM

## 2021-08-19 NOTE — CARE COORDINATION
Case Management Assessment  Initial Evaluation      Patient Name: Constantino Roldan  YOB: 1940  Diagnosis: Hypoxia [R09.02]  COPD exacerbation ([facility] [J44.1]  YDOIY-27 [U07.1]  Date / Time: 8/19/2021  5:47 AM    Admission status/Date:INPT 8/19/21  Chart Reviewed: Yes      Patient Interviewed: No   Family Interviewed:  Yes Cherrie Matute, son: [phone]      Hospitalization in the last 30 days:  No      Health Care Decision Maker :   Rylee Rodriguez, son: [phone]  (CM - must 1st enter selection under Navigator - emergency contact- [place] Relationship and pick relationship)   Who do you trust or have selected to make healthcare decisions for you      Met with: pt son, Humble Pratt, via telephone  Interview conducted  (bedside/phone):    Current PCP: Damion Gutierres required for SNF : Y, N          3 night stay required - Y, [employer]  Support Systems/Care Needs:    Transportation: EMS transportation    Meal Preparation: per facility    Housing  Living Arrangements: [address] [place] [address]  Steps: n/a  Intent for return to present living arrangements: Yes  Identified Issues: -    Home Care Information  Active with [address] : No Agency:(Services)     Passport/Waiver : No  :                      Phone Number:    Passport/Waiver Services: -          Durable Medical Equiptment   DME Provider: per facility  Equipment: per facility  Walker___Cane___RTS___ BSC___Shower Chair___Hospital Bed___W/C____Other________  02 at ____Liter(s)---wears(frequency)_______ [facility] ___ CPAP___ BiPap___   N/A____      Home O2 Use :  No    If No for home O2---if presently on O2 during hospitalization:  Yes  if yes CM to follow for potential DC O2 need  Informed of need for care provider to bring portable home O2 tank on day of discharge for nursing to connect prior to leaving:   Not Indicated  Verbalized agreement/Understanding:   Not Indicated    Community Service Affiliation  Dialysis:  No    · Agency:  · Location:  · Dialysis Schedule:  · Phone:   · Fax: Other Community Services: (ex:PT/OT,Mental Health,Wound Clinic, Cardio/Pul [address])    DISCHARGE PLAN: Explained Case Management role/services. Reviewed chart and spoke with pt son, Nicole Pinedo, via telephone. Nicole Pinedo states that pt lives at [facility] and will return at discharge. CM spoke with Jalyn Alvarado with [facility] who states that pt can return at discharge as they are opening up their COVID unit today. Jalyn Alvarado states that pt will not need a C-19 test to return and will not require a prior auth at discharge as pt has dual coverage and they can obtain auth when pt returns. CM will cont to follow.

## 2021-08-19 NOTE — PLAN OF CARE
Problem: Falls - Risk of:  Goal: Will remain free from falls  Description: Will remain free from falls  Outcome: Ongoing     Problem: Airway Clearance - Ineffective  Goal: Achieve or maintain patent airway  Outcome: Ongoing     Bed alarm on, and 02 sat 98% on 3l nc.

## 2021-08-19 NOTE — PROGRESS NOTES
RESPIRATORY THERAPY ASSESSMENT    Name:  [facility] [place] [address] [address] Record Number:  [id number]  Age: 68 y.o. Gender: female  : 1940  Today's Date:  2021  Room:  [address] [place] [address]-    Assessment     Is the patient being admitted for a COPD or Asthma exacerbation? No   (If yes the patient will be seen every 4 hours for the first 24 hours and then reassessed)    Patient Admission Diagnosis      Allergies  Allergies   Allergen Reactions    Codeine Hives     Other reaction(s): Unknown  itching    Morphine And Related        Minimum Predicted Vital Capacity:     NA          Actual Vital Capacity:      NA              Pulmonary History:COPD  Home Oxygen Therapy:  room air  Home Respiratory Therapy:Albuterol, Albuterol/Ipratropium Bromide HHN and Budesonide/Formoterol    Current Respiratory Therapy:  Albuterol MDI PRN and Symbicort          Respiratory Severity Index(RSI)   Patients with orders for inhalation medications, oxygen, or any therapeutic treatment modality will be placed on Respiratory Protocol. They will be assessed with the first treatment and at least every 72 hours thereafter. The following severity scale will be used to determine frequency of treatment intervention.     Smoking History: Pulmonary Disease or Smoking History, Greater than 15 pack year = 2    Social History  Social History     Tobacco Use    Smoking status: Former Smoker     Packs/day: 1.00     Years: 15.00     Pack years: 15.00     Types: Cigarettes     Quit date: 1998     Years since quittin.3    Smokeless tobacco: Never Used   Vaping Use    Vaping Use: Never used   Substance Use Topics    Alcohol use: No    Drug use: No       Recent Surgical History: None = 0  Past Surgical History  Past Surgical History:   Procedure Laterality Date    BREAST SURGERY      BENIGN CYST    CHOLECYSTECTOMY      COLONOSCOPY      INTRACAPSULAR CATARACT EXTRACTION Right 3/25/2019    PHACOEMULSIFICATION OF CATARACT RIGHT EYE WITH INTRAOCULAR LENS IMPLANT performed by Ayden Pierson MD at [facility] Left 4/8/2019    PHACOEMULSIFICATION OF CATARACT LEFT EYE WITH INTRAOCULAR LENS IMPLANT performed by [facility]den [facility]erson MD at [facility]  6-6-12    OPEN REDUCTION INTERNAL FIXATION RIGHT PROXIMAL HUMERAL SHAFT FRACTURE SYNTHES    OTHER SURGICAL HISTORY  07/18/12    incision and drainage of bursa rt elbow    PANCREAS BIOPSY      SHOULDER SURGERY Bilateral     rtc repair       Level of Consciousness: Alert, Oriented, and Cooperative = 0    Level of Activity: Mostly sedentary, minimal walking = 2    Respiratory Pattern: Regular Pattern; RR 8-20 = 0    Breath Sounds: Diminshed bilaterally and/or crackles = 2    Sputum   ,  ,    Cough: Strong, spontaneous, non-productive = 0    Vital Signs   /71   Pulse 83   Temp 97.2 °F (36.2 °C) (Oral)   Resp 18   Ht 5' 7\" (1.702 m)   Wt 160 lb (72.6 kg)   SpO2 97%   BMI 25.06 kg/m²   SPO2 (COPD values may differ): 90-91% on room air or greater than 92% on FiO2 24- 28% = 1    Peak Flow (asthma only): not applicable = 0    RSI: 5-6 = Q4hr PRN (every four hours as needed) for dyspnea        Plan       Goals: medication delivery, mobilize retained secretions, volume expansion and improve oxygenation    Patient/caregiver was educated on the proper method of use for Respiratory Care Devices:  Yes      Level of patient/caregiver understanding able to:   ? Verbalize understanding   ? Demonstrate understanding       ? Teach back        ? Needs reinforcement       ? No available caregiver               ? Other:     Response to education:  Excellent     Is patient being placed on Home Treatment Regimen? yes     Does the patient have everything they need prior to discharge? NA     Comments: chart reviewed, patient assessed    Plan of Care: continue current regimen which is equivalent to patients home regimen.      Electronically signed by Loren Hale be discontinued if patient has a RSI less than 9 and has received no scheduled or as needed treatment for 72  Hrs. Patients Ordered on a Mucolytic Agent:    1. Must always be administered with a bronchodilator. 2. Discontinue if patient experiences worsened bronchospasm, or secretions have lessened to the point that the patient is able to clear them with a cough. Anti-inflammatory and Combination Medications:    1. If the patient lacks prior history of lung disease, is not using inhaled anti-inflammatory medication at home, and lacks wheezing by examination or by history for at least 24 hours, contact physician for possible discontinuation.

## 2021-08-19 NOTE — PROGRESS NOTES
Patient admitted to room _20  /ER. Patient oriented to room, call light, bed rails, phone, lights and bathroom. Patient instructed about the schedule of the day including: vital sign frequency, lab draws, possible tests, frequency of MD and staff rounds, daily weights, I &O's and prescribed diet. Bed alarm on patient high fall risk. Telemetry box in place, patient aware of placement and reason. Bed locked, in lowest position, side rails up 2/4, call light within reach. Recliner Assessment:     Patient is not able to demonstrated the ability to move from a reclining position to an upright position within the recliner. however patient is alert, oriented and able to provide informed consent       4 Eyes Skin Assessment     The patient is being assess for   Admission    I agree that 2 RN's have performed a thorough Head to Toe Skin Assessment on the patient. ALL assessment sites listed below have been assessed. Areas assessed for pressure by both nurses:   [x]   Head, Face, and Ears   [x]   Shoulders, Back, and Chest, Abdomen  [x]   Arms, Elbows, and Hands   [x]   Coccyx, Sacrum, and Ischium  [x]   Legs, Feet, and Heels        Skin Assessed Under all Medical Devices by both nurses:  O2 device tubing              All Mepilex Borders were peeled back and area peeked at by both nurses:  yes  Please list where Mepilex Borders are located:  Left elbow skin tear, dime size, and Right forearm treatment dime size piece of silver under per the dermatologist to wound. **SHARE this note so that the co-signing nurse is able to place an eSignature**    Co-signer eSignature: Electronically signed by Emerald Altamirano RN on 3/32/96 at 5:50 PM EDT    Does the Patient have Skin Breakdown related to pressure?   No            Jimmy Prevention initiated:  Yes   Wound Care Orders initiated:  no   [facility] nurse consulted for Pressure Injury (Stage 3,4, Unstageable, DTI, NWPT, Complex wounds)and New or Established Ostomies:  No Primary Nurse eSignature: Electronically signed by Marcio Be RN on 8/19/21 at 2:27 PM EDT

## 2021-08-19 NOTE — H&P
Hospital Medicine History & Physical      PCP: Joseph White MD    Date of Admission: 8/19/2021    Date of Service: Pt seen/examined on 8/19/2021      Chief Complaint:    Chief Complaint   Patient presents with    Shortness of Breath     pt from [facility]. COVID+. pt states she woke up around      History Of Present Illness: The patient is a 68 y.o. female with atrial fib, alzheimer's dementia, COPD, HTN, HLD who presented to [facility] ED with complaint of illness. Patient reports feeling unwell for the last 3 days. She has had NP cough, fever, nausea, general weakness. She started to feel short of breath and was sent to ER last night where she was found to be COVID 19 + and hypoxic requiring supplemental O2. She was vaccinated for COVID 19, completed series Jan 12, 2021.   She is on daily prednisone 2.5 mg      Past Medical History:        Diagnosis Date    A-fib ([address]     Alzheimer's dementia ([address]     Anxiety     Arthritis     Back pain     COPD (chronic obstructive pulmonary disease) ([address]     Depression     Diabetes mellitus ([address]     Fall     multiple falls    GERD (gastroesophageal reflux disease)     Hepatitis A     age 27    Hernia     Hypercholesteremia     Hypertension     IBS (irritable bowel syndrome)     diarrhea  x 20 years    Incontinence of urine     not all the time    Kidney stone     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    Memory change     dementia    Neuropathy     Seizures ([address]     possible because of her falls pt unsure    UTI (lower urinary tract infection)        Past Surgical History:        Procedure Laterality Date    BREAST SURGERY      BENIGN CYST    CHOLECYSTECTOMY      COLONOSCOPY      INTRACAPSULAR CATARACT EXTRACTION Right 3/25/2019    PHACOEMULSIFICATION OF CATARACT RIGHT EYE WITH INTRAOCULAR LENS IMPLANT performed by Denise Cintron MD at [facility] 4/8/2019    PHACOEMULSIFICATION OF CATARACT LEFT EYE WITH INTRAOCULAR LENS IMPLANT performed by Capo López MD at [facility]  6-6-12    OPEN REDUCTION INTERNAL FIXATION RIGHT PROXIMAL HUMERAL SHAFT FRACTURE SYNTHES    OTHER SURGICAL HISTORY  07/18/12    incision and drainage of bursa rt elbow    PANCREAS BIOPSY      SHOULDER SURGERY Bilateral     rtc repair       Medications Prior to Admission:    Prior to Admission medications    Medication Sig Start Date End Date Taking? Authorizing Provider   OXYGEN Inhale into the lungs 2 liters at night    Historical Provider, MD   ARTIFICIAL TEAR OP Apply 2 drops to eye 4 times daily    Historical Provider, MD   methyl salicylate-menthol (LYNNETTE NEGRO GREASELESS) 10-15 % CREA Apply topically every 8 hours as needed for Pain    Historical Provider, MD   CRANBERRY PO Take 2 tablets by mouth daily    Historical Provider, MD   guaiFENesin (ROBITUSSIN) 100 MG/5ML syrup Take 10 mLs by mouth every 4 hours as needed for Cough    Historical Provider, MD   ipratropium-albuterol (DUONEB) 0.5-2.5 (3) MG/3ML SOLN nebulizer solution Take 1 vial by nebulization every 6 hours as needed for Shortness of Breath    Historical Provider, MD   acetaminophen (TYLENOL) 325 MG tablet Take 650 mg by mouth every 4 hours as needed for Pain    Historical Provider, MD   bisacodyl (DULCOLAX) 10 MG suppository Place 10 mg rectally daily as needed     Historical Provider, MD   bisacodyl (DULCOLAX) 5 MG EC tablet Take 10 mg by mouth daily as needed for Constipation     Historical Provider, MD   clonazePAM (KLONOPIN) 1 MG tablet Take 1 mg by mouth nightly. Historical Provider, MD   DULoxetine (CYMBALTA) 20 MG extended release capsule Take 20 mg by mouth nightly    Historical Provider, MD   pregabalin (LYRICA) 50 MG capsule Take 50 mg by mouth 3 times daily. Ana Money     Historical Provider, MD   magnesium hydroxide (MILK OF MAGNESIA) 400 MG/5ML suspension Take 30 mLs by mouth daily as needed for Constipation    Historical Provider, MD   traMADol (ULTRAM) 50 MG tablet Take 50 mg by mouth every 6 hours as needed for Pain. Ana Logan     Historical Provider, MD   senna (SENOKOT) 8.6 MG tablet Take 1 tablet by mouth 2 times daily    Historical Provider, MD   furosemide (LASIX) 20 MG tablet Take 1 tablet by mouth daily 12/25/18   Johny Brambila MD   apixaban (ELIQUIS) 5 MG TABS tablet Take 5 mg by mouth 2 times daily    Historical Provider, MD   diphenhydrAMINE (BENADRYL) 25 MG tablet Take 25 mg by mouth every 6 hours as needed for Itching    Historical Provider, MD   carvedilol (COREG) 6.25 MG tablet Take 6.25 mg by mouth 2 times daily (with meals)    Historical Provider, MD   magnesium oxide (MAG-OX) 400 MG tablet Take 400 mg by mouth daily    Historical Provider, MD   albuterol sulfate HFA (PROAIR HFA) 108 (90 Base) MCG/ACT inhaler Inhale 2 puffs into the lungs every 6 hours as needed for Wheezing 10/25/18   Mauricio Paz MD   cyanocobalamin 1000 MCG/ML injection Inject 1,000 mcg into the muscle every 30 days    Historical Provider, MD   fenofibrate (TRICOR) 145 MG tablet Take 145 mg by mouth daily    Historical Provider, MD   loratadine (CLARITIN) 10 MG capsule Take 10 mg by mouth daily    Historical Provider, MD   guaiFENesin (MUCINEX) 600 MG extended release tablet Take 600 mg by mouth 2 times daily     Historical Provider, MD   potassium chloride (MICRO-K) 10 MEQ extended release capsule Take 40 mEq by mouth daily     Historical Provider, MD   predniSONE (DELTASONE) 2.5 MG tablet Take 2.5 mg by mouth daily    Historical Provider, MD   amLODIPine (NORVASC) 5 MG tablet Take 1 tablet by mouth daily 10/18/16   Kurt Cheng MD   loperamide (IMODIUM) 2 MG capsule Take 2 mg by mouth 4 times daily as needed for Diarrhea    Historical Provider, MD   DULoxetine (CYMBALTA) 60 MG extended release capsule Take 60 mg by mouth every morning     Historical Provider, MD   calcium-vitamin D (Molina Cely) 500-200 MG-UNIT per tablet Take 1 tablet by mouth 2 times daily    Historical Provider, MD   insulin glargine (LANTUS) 100 UNIT/ML injection vial Inject 20 Units into the skin nightly     Historical Provider, MD   ipratropium-albuterol (DUONEB) 0.5-2.5 (3) MG/3ML SOLN nebulizer solution Inhale 1 vial into the lungs every 6 hours as needed for Shortness of Breath    Historical Provider, MD   imipramine (TOFRANIL) 50 MG tablet Take 50 mg by mouth nightly    Historical Provider, MD   fluticasone (FLONASE) 50 MCG/ACT nasal spray 2 sprays by Nasal route daily    Historical Provider, MD   sitaGLIPtin (JANUVIA) 50 MG tablet Take 50 mg by mouth daily    Historical Provider, MD   ondansetron (ZOFRAN ODT) 4 MG disintegrating tablet Take 1 tablet by mouth every 8 hours as needed for Nausea. Let dissolve in mouth. 8/5/13   Jason Estrella DO   Donepezil HCl (ARICEPT) 23 MG TABS Take by mouth daily     Historical Provider, MD   losartan-hydrochlorothiazide (HYZAAR) 50-12.5 MG per tablet Take 2 tablets by mouth daily     Historical Provider, MD   ferrous sulfate 325 (65 FE) MG tablet Take 1 tablet by mouth daily. 4/23/12   Frutoso Gip, MD   omeprazole (PRILOSEC) 20 MG capsule Take 20 mg by mouth nightly     Historical Provider, MD   pravastatin (PRAVACHOL) 40 MG tablet Take 80 mg by mouth nightly     Historical Provider, MD   ASPIRIN Take 81 mg by mouth daily. 1/15/11   Historical Provider, MD       Allergies:  Codeine and Morphine and related    Social History:  The patient currently lives at [place] [address]:   reports that she quit smoking about 23 years ago. Her smoking use included cigarettes. She has a 15.00 pack-year smoking history. She has never used smokeless tobacco.  ETOH:   reports no history of alcohol use.       Family History:   Positive as follows:        Problem Relation Age of Onset    Heart Disease Mother        REVIEW OF SYSTEMS:       Constitutional: +for fever   HENT: Negative for sore throat   Eyes: Negative for redness   Respiratory: +for dyspnea, cough   Cardiovascular: Negative for chest pain   Gastrointestinal: Negative for vomiting, diarrhea   + nausea   Genitourinary: Negative for hematuria   Musculoskeletal: Negative for arthralgias   Skin: Negative for rash   Neurological: Negative for syncope   Hematological: Negative for adenopathy   Psychiatric/Behavorial: Negative for anxiety    PHYSICAL EXAM:    /62   Pulse 80   Temp 99 °F (37.2 °C) (Oral)   Resp 18   Ht 5' 7\" (1.702 m)   Wt 160 lb (72.6 kg)   SpO2 96%   BMI 25.06 kg/m²     Gen: No distress. Alert. Eyes: PERRL. No sclera icterus. No conjunctival injection. ENT: No discharge. Pharynx clear. Neck: No JVD. No Carotid Bruit. Trachea midline. Resp: No accessory muscle use. + crackles. No wheezes. No rhonchi. CV: Regular rate. Irregularly irregular rhythm. No murmur. No rub. No edema. GI: Non-tender. Non-distended. No masses. No organomegaly. Normal bowel sounds. Ventral hernia, soft not tender   Skin: Warm and dry. No nodule on exposed extremities. No rash on exposed extremities. M/S: No cyanosis. No joint deformity. No clubbing. Broken right elbow- chronic deformity   Neuro: Awake. Grossly nonfocal    Psych: Oriented x 3. No anxiety or agitation. CBC:   Recent Labs     08/19/21  0650   WBC 5.8   HGB 13.3   HCT 40.5   MCV 89.9   *     BMP:   Recent Labs     08/19/21  0752   *   K 4.1   CL 94*   CO2 30   BUN 28*   CREATININE 1.1     LIVER PROFILE:   Recent Labs     08/19/21  0752   AST 24   ALT 19   BILITOT <0.2   ALKPHOS 63     PT/INR: No results for input(s): PROTIME, INR in the last 72 hours. APTT: No results for input(s): APTT in the last 72 hours. UA:No results for input(s): NITRITE, COLORU, PHUR, LABCAST, WBCUA, RBCUA, MUCUS, TRICHOMONAS, YEAST, BACTERIA, CLARITYU, SPECGRAV, LEUKOCYTESUR, UROBILINOGEN, BILIRUBINUR, BLOODU, GLUCOSEU, AMORPHOUS in the last 72 hours.     Invalid input(s): [place] ENZYMES  Recent Labs     08/19/21  0752   TROPONINI <0.01       U/A:    Lab Results   Component Value Date    COLORU Yellow 12/21/2018    WBCUA 10-20 12/21/2018    RBCUA None seen 12/21/2018    MUCUS 1+ 08/04/2013    BACTERIA 4+ 12/21/2018    CLARITYU CLOUDY 12/21/2018    SPECGRAV 1.025 12/21/2018    LEUKOCYTESUR Negative 12/21/2018    BLOODU Negative 12/21/2018    GLUCOSEU 250 12/21/2018    GLUCOSEU NEGATIVE 04/21/2012    AMORPHOUS 2+ 10/15/2016       ABG    Lab Results   Component Value Date    IVY4IMM 34.2 12/21/2018    BEART 7.8 12/21/2018    B4YLYZSP 98.4 12/21/2018    PHART 7.395 12/21/2018    CNR1AZV 57.2 12/21/2018    PO2ART 125.7 12/21/2018    TUY4GMD 36.0 12/21/2018       CULTURES  COVID 19, PCR: detected     EKG:  I have reviewed the EKG with the following interpretation:   Atrial fib    RADIOLOGY  XR CHEST PORTABLE   Final Result   No radiographic evidence of acute pulmonary disease. ASSESSMENT/PLAN:    #COVID 23  #Hypoxia  - patient completed vaccination on 1/12/2021. She is on low dose prednisone at 2.5 mg daily   - decadron D#1  - remdesivir D#1  - she does not use continuous O2 at the NH. Currently on 2L - cont supplemental O2 and wean as able    #Atrial fibrillation  - woth CVR  - cont coreg and eliquis    #HTN  - BP stable cont meds losartan, HCTZ, coreg, norvasc    #COPD  - no AE, cont inhalers    #Alzheimer's dementia   - supportive care    #DM2  - cont Lantus, use SSI    #Chronic deformity right elbow  - supportive care     #GERD  - PPI    #Chronic pain  - cont Lyrica, cymbalta , tramadol PRN     #Thrombocytopenia  - could be related to acute viral infection. Monitor CBC    DVT Prophylaxis: Eliquis   Diet: ADULT DIET;  Regular; 4 carb choices (60 gm/meal)  Code Status: DNR-CC    Hector Tinsley PA-C  8/19/2021 2:01 PM

## 2021-08-19 NOTE — PROGRESS NOTES
Consult has been called to Dr. Mario on 8/19/21. Spoke with chris.  11:09 AM    Mahnaz January 8/19/2021

## 2021-08-19 NOTE — ED PROVIDER NOTES
The patient's care was signed out to my by the preceding provider. Please refer to their documentation for further information. CHIEF COMPLAINT  Chief Complaint   Patient presents with    Shortness of Breath     pt from [facility]. COVID+. pt states she woke up around        Briefly, Jose David Jones is a 68 y.o. female  who presents to the ED complaining of cough shortness of breath. Found to be Covid positive    FOCUSED PHYSICAL EXAMINATION  /62   Pulse 81   Temp 99 °F (37.2 °C) (Oral)   Resp 19   Ht 5' 7\" (1.702 m)   Wt 160 lb (72.6 kg)   SpO2 96%   BMI 25.06 kg/m²      Focused physical examination:  General appearance:  Cooperative. No acute distress. Skin:  Warm. Dry. Eye:  Extraocular movements intact. Ears, nose, mouth and throat:  Oral mucosa moist,  Neck:  Trachea midline. Heart:  Regular rate and rhythm  Perfusion:  intact  Respiratory:  Lungs clear to auscultation bilaterally. Respirations nonlabored. Abdominal:   Non distended. Nontender  Neurological:  Alert and oriented x 3. Moves all extremities spontaneously  Musculoskeletal:   Normal ROM, no deformities          Psychiatric:  Normal mood]      MDM: Patient coming from the [address] [place] [address] after being diagnosed with COVID-19. Complaining of shortness of breath. Found to have hypoxic respiratory failure requiring nasal cannula oxygen. Chest x-ray clear. Ultimately did test positive for COVID-19. Remainder of labs are unremarkable the patient will be admitted to the hospital given her hypoxic respiratory failure    CRITICAL CARE TIME   Total Critical Care time was 30 minutes, excluding separately reportable procedures. There was a high probability of clinically significant/life threatening deterioration in the patient's condition which required my urgent intervention.   This time was spent reviewing the patient chart, interpreting diagnostic/laboratory data, administration of supplemental oxygen for hypoxic respiratory failure      During the patient's ED course, the patient was given:  Medications   dexamethasone (PF) (DECADRON) injection 10 mg (10 mg Intravenous Given 8/19/21 3175)   ipratropium-albuterol (DUONEB) nebulizer solution 1 ampule (1 ampule Inhalation Given 8/19/21 0654)   acetaminophen (TYLENOL) tablet 650 mg (650 mg Oral Given 8/19/21 0654)        CLINICAL IMPRESSION  1. COVID-19    2. COPD exacerbation ([facility]    3. Hypoxia        DISPOSITION  Admission      This chart was created using Dragon dictation software. Efforts were made by me to ensure accuracy, however some errors may be present due to limitations of this technology.         Rashida Loredo MD  08/19/21 1365

## 2021-08-19 NOTE — ACP (ADVANCE CARE PLANNING)
Advance Care Planning   Healthcare Decision Maker: Sakshi Hatfield, son: [phone]  Gilmarsharlene Pannick, son: [phone]      Click here to complete Healthcare Decision Makers including selection of the Healthcare Decision Maker Relationship (ie \"Primary\").

## 2021-08-19 NOTE — ED NOTES
[address] [facility] served Dr. Tamra Griffith  08/19/21 0854    0900 - Aydee Wright called back.       Abby Reynolds  08/19/21 0901
Lab called need new green top.      Phyllistine Dancer  08/19/21 2113
160.02

## 2021-08-19 NOTE — ED PROVIDER NOTES
[address] ED  EMERGENCY DEPARTMENT ENCOUNTER      Pt Name: Gianna Cifuentes  MRN: [id number]  [place] 1940  Date of evaluation: 8/19/2021  Provider: Anabela Bishop DO    CHIEF COMPLAINT       Chief Complaint   Patient presents with    Shortness of Breath     pt from [facility]. COVID+. pt states she woke up around          HISTORY OF PRESENT ILLNESS   (Location/Symptom, Timing/Onset, Context/Setting, Quality, Duration, Modifying Factors, Severity)  Note limiting factors. Gianna Cifuentes is a 68 y.o. female who presents to the emergency department complaining of right with complaint of shortness of breath ongoing since overnight. Waking up gasping for air. History of COPD, former smoker. Does not wear oxygen at baseline but was found to be hypoxic into the upper 80s on arrival here. Placed on 2 L. Subjectively feeling better. Denies chest pain abdominal pain nausea vomiting fevers chills. Believes she is been symptomatic for the last 4 to 5 days mainly with body aches and fatigue. Nursing Notes were reviewed.     PAST MEDICAL HISTORY     Past Medical History:   Diagnosis Date    A-fib [facility])     Alzheimer's dementia ([address]     Anxiety     Arthritis     Back pain     COPD (chronic obstructive pulmonary disease) ([address]     Depression     Diabetes mellitus ([address]     Fall     multiple falls    GERD (gastroesophageal reflux disease)     Hepatitis A     age 27    Hernia     Hypercholesteremia     Hypertension     IBS (irritable bowel syndrome)     diarrhea  x 20 years    Incontinence of urine     not all the time    Kidney stone     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    Memory change     dementia    Neuropathy     Seizures ([address]     possible because of her falls pt unsure    UTI (lower urinary tract infection)          SURGICAL HISTORY       Past Surgical History:   Procedure Laterality Date    BREAST SURGERY      BENIGN CYST    CHOLECYSTECTOMY      COLONOSCOPY      INTRACAPSULAR CATARACT EXTRACTION Right 3/25/2019    PHACOEMULSIFICATION OF CATARACT RIGHT EYE WITH INTRAOCULAR LENS IMPLANT performed by Isabel Martines MD at [facility] Left 4/8/2019    PHACOEMULSIFICATION OF CATARACT LEFT EYE WITH INTRAOCULAR LENS IMPLANT performed by Isabel [facility]ines MD at [facility]  6-6-12    OPEN REDUCTION INTERNAL FIXATION RIGHT PROXIMAL HUMERAL SHAFT FRACTURE SYNTHES    OTHER SURGICAL HISTORY  07/18/12    incision and drainage of bursa rt elbow    PANCREAS BIOPSY      SHOULDER SURGERY Bilateral     rtc repair         CURRENT MEDICATIONS       Previous Medications    ACETAMINOPHEN (TYLENOL) 325 MG TABLET    Take 650 mg by mouth every 4 hours as needed for Pain    ALBUTEROL SULFATE HFA (PROAIR HFA) 108 (90 BASE) MCG/ACT INHALER    Inhale 2 puffs into the lungs every 6 hours as needed for Wheezing    AMLODIPINE (NORVASC) 5 MG TABLET    Take 1 tablet by mouth daily    APIXABAN (ELIQUIS) 5 MG TABS TABLET    Take 5 mg by mouth 2 times daily    ARTIFICIAL TEAR OP    Apply 2 drops to eye 4 times daily    ASPIRIN    Take 81 mg by mouth daily. BISACODYL (DULCOLAX) 10 MG SUPPOSITORY    Place 10 mg rectally daily as needed     BISACODYL (DULCOLAX) 5 MG EC TABLET    Take 10 mg by mouth daily as needed for Constipation     CALCIUM-VITAMIN D (OSCAL-500) 500-200 MG-UNIT PER TABLET    Take 1 tablet by mouth 2 times daily    CARVEDILOL (COREG) 6.25 MG TABLET    Take 6.25 mg by mouth 2 times daily (with meals)    CLONAZEPAM (KLONOPIN) 1 MG TABLET    Take 1 mg by mouth nightly.      CRANBERRY PO    Take 2 tablets by mouth daily    CYANOCOBALAMIN 1000 MCG/ML INJECTION    Inject 1,000 mcg into the muscle every 30 days    DIPHENHYDRAMINE (BENADRYL) 25 MG TABLET    Take 25 mg by mouth every 6 hours as needed for Itching    DONEPEZIL HCL (ARICEPT) 23 MG TABS    Take by mouth daily     DULOXETINE (CYMBALTA) 20 MG EXTENDED RELEASE CAPSULE    Take 20 mg by mouth nightly    DULOXETINE (CYMBALTA) 60 MG EXTENDED RELEASE CAPSULE    Take 60 mg by mouth every morning     FENOFIBRATE (TRICOR) 145 MG TABLET    Take 145 mg by mouth daily    FERROUS SULFATE 325 (65 FE) MG TABLET    Take 1 tablet by mouth daily. FLUTICASONE (FLONASE) 50 MCG/ACT NASAL SPRAY    2 sprays by Nasal route daily    FUROSEMIDE (LASIX) 20 MG TABLET    Take 1 tablet by mouth daily    GUAIFENESIN (MUCINEX) 600 MG EXTENDED RELEASE TABLET    Take 600 mg by mouth 2 times daily     GUAIFENESIN (ROBITUSSIN) 100 MG/5ML SYRUP    Take 10 mLs by mouth every 4 hours as needed for Cough    IMIPRAMINE (TOFRANIL) 50 MG TABLET    Take 50 mg by mouth nightly    INSULIN GLARGINE (LANTUS) 100 UNIT/ML INJECTION VIAL    Inject 20 Units into the skin nightly     IPRATROPIUM-ALBUTEROL (DUONEB) 0.5-2.5 (3) MG/3ML SOLN NEBULIZER SOLUTION    Inhale 1 vial into the lungs every 6 hours as needed for Shortness of Breath    IPRATROPIUM-ALBUTEROL (DUONEB) 0.5-2.5 (3) MG/3ML SOLN NEBULIZER SOLUTION    Take 1 vial by nebulization every 6 hours as needed for Shortness of Breath    LOPERAMIDE (IMODIUM) 2 MG CAPSULE    Take 2 mg by mouth 4 times daily as needed for Diarrhea    LORATADINE (CLARITIN) 10 MG CAPSULE    Take 10 mg by mouth daily    LOSARTAN-HYDROCHLOROTHIAZIDE (HYZAAR) 50-12.5 MG PER TABLET    Take 2 tablets by mouth daily     MAGNESIUM HYDROXIDE (MILK OF MAGNESIA) 400 MG/5ML SUSPENSION    Take 30 mLs by mouth daily as needed for Constipation    MAGNESIUM OXIDE (MAG-OX) 400 MG TABLET    Take 400 mg by mouth daily    METHYL SALICYLATE-MENTHOL (LYNNETTE NEGRO GREASELESS) 10-15 % CREA    Apply topically every 8 hours as needed for Pain    OMEPRAZOLE (PRILOSEC) 20 MG CAPSULE    Take 20 mg by mouth nightly     ONDANSETRON (ZOFRAN ODT) 4 MG DISINTEGRATING TABLET    Take 1 tablet by mouth every 8 hours as needed for Nausea. Let dissolve in mouth.     OXYGEN    Inhale into the lungs 2 liters at night    POTASSIUM CHLORIDE (MICRO-K) 10 MEQ EXTENDED RELEASE CAPSULE    Take 40 mEq by mouth daily     PRAVASTATIN (PRAVACHOL) 40 MG TABLET    Take 80 mg by mouth nightly     PREDNISONE (DELTASONE) 2.5 MG TABLET    Take 2.5 mg by mouth daily    PREGABALIN (LYRICA) 50 MG CAPSULE    Take 50 mg by mouth 3 times daily. .    SENNA (SENOKOT) 8.6 MG TABLET    Take 1 tablet by mouth 2 times daily    SITAGLIPTIN (JANUVIA) 50 MG TABLET    Take 50 mg by mouth daily    TRAMADOL (ULTRAM) 50 MG TABLET    Take 50 mg by mouth every 6 hours as needed for Pain. Roger Song ALLERGIES     Codeine and Morphine and related    FAMILY HISTORY       Family History   Problem Relation Age of Onset    Heart Disease Mother           SOCIAL HISTORY       Social History     Socioeconomic History    Marital status:      Spouse name: None    Number of children: None    Years of education: None    Highest education level: None   Occupational History    None   Tobacco Use    Smoking status: Former Smoker     Packs/day: 1.00     Years: 15.00     Pack years: 15.00     Types: Cigarettes     Quit date: 1998     Years since quittin.3    Smokeless tobacco: Never Used   Vaping Use    Vaping Use: Never used   Substance and Sexual Activity    Alcohol use: No    Drug use: No    Sexual activity: Not Currently   Other Topics Concern    None   Social History Narrative    None     Social Determinants of Health     Financial Resource Strain:     Difficulty of Paying Living Expenses:    Food Insecurity:     Worried About Running Out of Food in the Last Year:     Ran Out of Food in the Last Year:    Transportation Needs:     Lack of Transportation (Medical):      Lack of Transportation (Non-Medical):    Physical Activity:     Days of Exercise per Week:     Minutes of Exercise per Session:    Stress:     Feeling of Stress :    Social Connections:     Frequency of Communication with Friends and Family:     Frequency of Social Gatherings with Friends and Family:     Attends Denominational Services:     Active Member of Clubs or Organizations:     Attends Club or Organization Meetings:     Marital Status:    Intimate Partner Violence:     Fear of Current or Ex-Partner:     Emotionally Abused:     Physically Abused:     Sexually Abused:        SCREENINGS        [place] Coma Scale  Eye Opening: Spontaneous  Best Verbal Response: Oriented  Best Motor Response: Obeys commands  Emigdio Coma Scale Score: 15                   REVIEW OF SYSTEMS    (2-9 systems for level 4, 10 or more for level 5)   Review of Systems   Constitutional: Positive for fatigue. Negative for chills and fever. HENT: Negative for congestion, rhinorrhea and sore throat. Eyes: Negative for photophobia and visual disturbance. Respiratory: Positive for cough and shortness of breath. Negative for stridor. Cardiovascular: Negative for chest pain, palpitations and leg swelling. Gastrointestinal: Negative for abdominal pain, nausea and vomiting. Genitourinary: Negative for decreased urine volume. Musculoskeletal: Positive for myalgias. Negative for back pain, neck pain and neck stiffness. Skin: Negative for rash. Allergic/Immunologic: Negative for environmental allergies. Hematological: Negative for adenopathy. Psychiatric/Behavioral: Negative for confusion. PHYSICAL EXAM    (up to 7 for level 4, 8 or more for level 5)   RECENT VITALS:     Temp: 100.1 °F (37.8 °C),  Pulse: 83, Resp: 17, BP: (!) 161/100, SpO2: 98 %    Physical Exam  Constitutional:       General: She is not in acute distress. Appearance: She is not diaphoretic. HENT:      Head: Normocephalic and atraumatic. Eyes:      Pupils: Pupils are equal, round, and reactive to light. Neck:      Trachea: No tracheal deviation. Cardiovascular:      Rate and Rhythm: Normal rate and regular rhythm. Pulmonary:      Effort: Pulmonary effort is normal. No respiratory distress. Breath sounds: Wheezing present. Abdominal:      General: There is no distension. Palpations: Abdomen is soft. Musculoskeletal:      Cervical back: Normal range of motion and neck supple. Comments: Deformity right elbow, chronic neurovascular tact distally. Skin:     General: Skin is warm. Neurological:      Mental Status: She is oriented to person, place, and time. DIAGNOSTIC RESULTS     EKG: All EKG's are interpreted by the Emergency Department Physician who either signs or Co-signs this chart in the absence of a cardiologist.      The Ekg interpreted by me shows      RADIOLOGY:   Non-plain film images such as CT, Ultrasound and MRI are read by the radiologist. Plain radiographic images are visualized and preliminarily interpreted by the emergency physician. Interpretation per the Radiologist below, if available at the time of this note:    XR CHEST PORTABLE    (Results Pending)         LABS:  Labs Reviewed   COVID-19 & INFLUENZA COMBO   PROCALCITONIN   CBC WITH AUTO DIFFERENTIAL   COMPREHENSIVE METABOLIC PANEL W/ REFLEX TO MG FOR LOW K   TROPONIN   BLOOD GAS, VENOUS   BRAIN NATRIURETIC PEPTIDE       All other labs were within normal range or not returned as of this dictation. EMERGENCY DEPARTMENT COURSE and DIFFERENTIAL DIAGNOSIS/MDM:   Jolie Ayala is a 68 y.o. female who presents to the emergency department with the complaint of rash to the complaint shortness of breath, Covid positive from [facility] on arrival satting in the upper 80s, history of COPD will treat with Decadron, DuoNeb treatments. Placed on 2 L oxygen, dyspnea mission for Covid. Patient was signed out to oncoming physician pending laboratory eval, imaging and at disposition, anticipate admission for Covid based on new oxygen requirement      CRITICAL CARE TIME   Total Critical Care time was 0 minutes, excluding separately reportable procedures.   There was a high probability of clinically significant/life threatening deterioration in the patient's condition which required my urgent intervention. Clinical concern   Intervention     CONSULTS:  None    PROCEDURES:  Unless otherwise noted below, none     Procedures        FINAL IMPRESSION      1. COVID-19    2. COPD exacerbation ([address]    3. Hypoxia          DISPOSITION/PLAN   DISPOSITION  admit      PATIENT REFERRED TO:  No follow-up provider specified. DISCHARGE MEDICATIONS:  New Prescriptions    No medications on file     Controlled Substances Monitoring:     RX Monitoring 4/7/2017   Periodic Controlled Substance Monitoring No signs of potential drug abuse or diversion identified.        (Please note that portions of this note were completed with a voice recognition program.  Efforts were made to edit the dictations but occasionally words are mis-transcribed.)    Agustin Gaytan DO (electronically signed)  Attending Emergency Physician        Agustin Gaytan DO  08/19/21 8033

## 2021-08-20 LAB
A/G RATIO: 1 (ref 1.1–2.2)
ALBUMIN SERPL-MCNC: 3.4 G/DL (ref 3.4–5)
ALP BLD-CCNC: 51 U/L (ref 40–129)
ALT SERPL-CCNC: 18 U/L (ref 10–40)
ANION GAP SERPL CALCULATED.3IONS-SCNC: 7 MMOL/L (ref 3–16)
AST SERPL-CCNC: 24 U/L (ref 15–37)
BILIRUB SERPL-MCNC: <0.2 MG/DL (ref 0–1)
BUN BLDV-MCNC: 27 MG/DL (ref 7–20)
C-REACTIVE PROTEIN: 67.1 MG/L (ref 0–5.1)
CALCIUM SERPL-MCNC: 9.6 MG/DL (ref 8.3–10.6)
CHLORIDE BLD-SCNC: 100 MMOL/L (ref 99–110)
CO2: 29 MMOL/L (ref 21–32)
CREAT SERPL-MCNC: 0.8 MG/DL (ref 0.6–1.2)
GFR AFRICAN AMERICAN: >60
GFR NON-AFRICAN AMERICAN: >60
GLOBULIN: 3.3 G/DL
GLUCOSE BLD-MCNC: 198 MG/DL (ref 70–99)
GLUCOSE BLD-MCNC: 229 MG/DL (ref 70–99)
GLUCOSE BLD-MCNC: 249 MG/DL (ref 70–99)
GLUCOSE BLD-MCNC: 267 MG/DL (ref 70–99)
GLUCOSE BLD-MCNC: 277 MG/DL (ref 70–99)
GLUCOSE BLD-MCNC: 306 MG/DL (ref 70–99)
HCT VFR BLD CALC: 33.9 % (ref 36–48)
HEMOGLOBIN: 11.2 G/DL (ref 12–16)
MCH RBC QN AUTO: 29.6 PG (ref 26–34)
MCHC RBC AUTO-ENTMCNC: 32.9 G/DL (ref 31–36)
MCV RBC AUTO: 89.7 FL (ref 80–100)
PDW BLD-RTO: 13.7 % (ref 12.4–15.4)
PERFORMED ON: ABNORMAL
PLATELET # BLD: 129 K/UL (ref 135–450)
PMV BLD AUTO: 8.8 FL (ref 5–10.5)
POTASSIUM SERPL-SCNC: 4 MMOL/L (ref 3.5–5.1)
RBC # BLD: 3.78 M/UL (ref 4–5.2)
SODIUM BLD-SCNC: 136 MMOL/L (ref 136–145)
TOTAL PROTEIN: 6.7 G/DL (ref 6.4–8.2)
WBC # BLD: 4.2 K/UL (ref 4–11)

## 2021-08-20 PROCEDURE — 6370000000 HC RX 637 (ALT 250 FOR IP): Performed by: PHYSICIAN ASSISTANT

## 2021-08-20 PROCEDURE — 2580000003 HC RX 258: Performed by: PHYSICIAN ASSISTANT

## 2021-08-20 PROCEDURE — 86140 C-REACTIVE PROTEIN: CPT

## 2021-08-20 PROCEDURE — 80053 COMPREHEN METABOLIC PANEL: CPT

## 2021-08-20 PROCEDURE — 97116 GAIT TRAINING THERAPY: CPT

## 2021-08-20 PROCEDURE — 2580000003 HC RX 258: Performed by: NURSE PRACTITIONER

## 2021-08-20 PROCEDURE — 97161 PT EVAL LOW COMPLEX 20 MIN: CPT

## 2021-08-20 PROCEDURE — 99232 SBSQ HOSP IP/OBS MODERATE 35: CPT | Performed by: INTERNAL MEDICINE

## 2021-08-20 PROCEDURE — 94761 N-INVAS EAR/PLS OXIMETRY MLT: CPT

## 2021-08-20 PROCEDURE — 85027 COMPLETE CBC AUTOMATED: CPT

## 2021-08-20 PROCEDURE — 2700000000 HC OXYGEN THERAPY PER DAY

## 2021-08-20 PROCEDURE — 97165 OT EVAL LOW COMPLEX 30 MIN: CPT

## 2021-08-20 PROCEDURE — 1200000000 HC SEMI PRIVATE

## 2021-08-20 PROCEDURE — 97535 SELF CARE MNGMENT TRAINING: CPT

## 2021-08-20 PROCEDURE — 6370000000 HC RX 637 (ALT 250 FOR IP): Performed by: NURSE PRACTITIONER

## 2021-08-20 PROCEDURE — 6360000002 HC RX W HCPCS: Performed by: NURSE PRACTITIONER

## 2021-08-20 PROCEDURE — 36415 COLL VENOUS BLD VENIPUNCTURE: CPT

## 2021-08-20 PROCEDURE — 97530 THERAPEUTIC ACTIVITIES: CPT

## 2021-08-20 PROCEDURE — 94640 AIRWAY INHALATION TREATMENT: CPT

## 2021-08-20 PROCEDURE — 2500000003 HC RX 250 WO HCPCS: Performed by: PHYSICIAN ASSISTANT

## 2021-08-20 RX ADMIN — CARVEDILOL 6.25 MG: 6.25 TABLET, FILM COATED ORAL at 09:54

## 2021-08-20 RX ADMIN — CETIRIZINE HYDROCHLORIDE 10 MG: 10 TABLET ORAL at 09:54

## 2021-08-20 RX ADMIN — Medication 1000 UNITS: at 09:54

## 2021-08-20 RX ADMIN — CALCIUM CARBONATE-VITAMIN D TAB 500 MG-200 UNIT 1 TABLET: 500-200 TAB at 09:57

## 2021-08-20 RX ADMIN — PREGABALIN 50 MG: 25 CAPSULE ORAL at 14:39

## 2021-08-20 RX ADMIN — SENNOSIDES 8.6 MG: 8.6 TABLET, FILM COATED ORAL at 09:54

## 2021-08-20 RX ADMIN — SODIUM CHLORIDE: 9 INJECTION, SOLUTION INTRAVENOUS at 03:41

## 2021-08-20 RX ADMIN — PREGABALIN 50 MG: 25 CAPSULE ORAL at 09:53

## 2021-08-20 RX ADMIN — HYDROCHLOROTHIAZIDE 12.5 MG: 25 TABLET ORAL at 09:52

## 2021-08-20 RX ADMIN — INSULIN GLARGINE 20 UNITS: 100 INJECTION, SOLUTION SUBCUTANEOUS at 21:44

## 2021-08-20 RX ADMIN — GUAIFENESIN 600 MG: 600 TABLET, EXTENDED RELEASE ORAL at 21:28

## 2021-08-20 RX ADMIN — FUROSEMIDE 20 MG: 20 TABLET ORAL at 09:53

## 2021-08-20 RX ADMIN — REMDESIVIR 100 MG: 100 INJECTION, POWDER, LYOPHILIZED, FOR SOLUTION INTRAVENOUS at 16:58

## 2021-08-20 RX ADMIN — DULOXETINE HYDROCHLORIDE 60 MG: 60 CAPSULE, DELAYED RELEASE ORAL at 09:54

## 2021-08-20 RX ADMIN — APIXABAN 5 MG: 5 TABLET, FILM COATED ORAL at 21:27

## 2021-08-20 RX ADMIN — INSULIN LISPRO 3 UNITS: 100 INJECTION, SOLUTION INTRAVENOUS; SUBCUTANEOUS at 21:44

## 2021-08-20 RX ADMIN — SENNOSIDES 8.6 MG: 8.6 TABLET, FILM COATED ORAL at 21:26

## 2021-08-20 RX ADMIN — SODIUM CHLORIDE: 9 INJECTION, SOLUTION INTRAVENOUS at 14:38

## 2021-08-20 RX ADMIN — ATORVASTATIN CALCIUM 40 MG: 40 TABLET, FILM COATED ORAL at 21:27

## 2021-08-20 RX ADMIN — LOSARTAN POTASSIUM 100 MG: 100 TABLET, FILM COATED ORAL at 09:53

## 2021-08-20 RX ADMIN — AMLODIPINE BESYLATE 5 MG: 5 TABLET ORAL at 09:52

## 2021-08-20 RX ADMIN — INSULIN GLARGINE 20 UNITS: 100 INJECTION, SOLUTION SUBCUTANEOUS at 08:00

## 2021-08-20 RX ADMIN — POTASSIUM CHLORIDE 40 MEQ: 750 TABLET, EXTENDED RELEASE ORAL at 09:53

## 2021-08-20 RX ADMIN — APIXABAN 5 MG: 5 TABLET, FILM COATED ORAL at 09:52

## 2021-08-20 RX ADMIN — CLONAZEPAM 1 MG: 1 TABLET ORAL at 21:26

## 2021-08-20 RX ADMIN — GUAIFENESIN 600 MG: 600 TABLET, EXTENDED RELEASE ORAL at 09:54

## 2021-08-20 RX ADMIN — IMIPRAMINE HYDROCHLORIDE 50 MG: 25 TABLET ORAL at 21:27

## 2021-08-20 RX ADMIN — Medication 2 PUFF: at 07:05

## 2021-08-20 RX ADMIN — ASPIRIN 81 MG: 81 TABLET, CHEWABLE ORAL at 09:54

## 2021-08-20 RX ADMIN — DULOXETINE 20 MG: 20 CAPSULE, DELAYED RELEASE ORAL at 21:27

## 2021-08-20 RX ADMIN — Medication 1000 UNITS: at 09:56

## 2021-08-20 RX ADMIN — Medication 500 MCG: at 09:53

## 2021-08-20 RX ADMIN — FENOFIBRATE 160 MG: 160 TABLET ORAL at 09:53

## 2021-08-20 RX ADMIN — Medication 2 PUFF: at 21:22

## 2021-08-20 RX ADMIN — CALCIUM CARBONATE-VITAMIN D TAB 500 MG-200 UNIT 1 TABLET: 500-200 TAB at 21:29

## 2021-08-20 RX ADMIN — FLUTICASONE PROPIONATE 2 SPRAY: 50 SPRAY, METERED NASAL at 09:43

## 2021-08-20 RX ADMIN — PANTOPRAZOLE SODIUM 40 MG: 40 TABLET, DELAYED RELEASE ORAL at 10:04

## 2021-08-20 RX ADMIN — PREGABALIN 50 MG: 25 CAPSULE ORAL at 21:26

## 2021-08-20 RX ADMIN — CARVEDILOL 6.25 MG: 6.25 TABLET, FILM COATED ORAL at 16:57

## 2021-08-20 RX ADMIN — FERROUS SULFATE TAB 325 MG (65 MG ELEMENTAL FE) 325 MG: 325 (65 FE) TAB at 09:52

## 2021-08-20 RX ADMIN — MAGNESIUM GLUCONATE 500 MG ORAL TABLET 400 MG: 500 TABLET ORAL at 09:54

## 2021-08-20 RX ADMIN — DEXAMETHASONE SODIUM PHOSPHATE 6 MG: 10 INJECTION, SOLUTION INTRAMUSCULAR; INTRAVENOUS at 09:52

## 2021-08-20 ASSESSMENT — PAIN SCALES - GENERAL
PAINLEVEL_OUTOF10: 0
PAINLEVEL_OUTOF10: 0

## 2021-08-20 NOTE — FLOWSHEET NOTE
08/20/21 0224   Vital Signs   Temp 97 °F (36.1 °C)   Temp Source Oral   Pulse 57   Heart Rate Source Monitor   Resp 16   BP (!) 97/59   BP Location Left upper arm   Patient Position Semi fowlers   Level of Consciousness Alert (0)   MEWS Score 2   Oxygen Therapy   SpO2 93 %   O2 Device Nasal cannula   O2 Flow Rate (L/min) 3 L/min   Pt resting in bed, eyes closed. Resp easy/even.  Esteban Godoy RN

## 2021-08-20 NOTE — CONSULTS
[facility] Wound Ostomy Continence Nurse  Consult Note       [facility] REC[facility]:  [id number]  AGE: 68 y.o. GENDER: female  : 1940  TODAY'S DATE:  2021    Subjective Pt somewhat confused, inapprpriate at times. Poor historian. Reason for WOCN Evaluation and Assessment: right arm, left elbow skin tear. Corey Gunn is a 68 y.o. female referred by:   [] Physician  [x] Nursing  [] Other:     Wound Identification:  Wound Type: skin tear and wound of unknown etiology to right arm  Contributing Factors: diabetes and right arm fractured    Pt seen for wound care to the right arm and left elbow skin tear apparently from a fall. Pt unable to recall how long right arm wound has been present. Old dressing removed of foam and Hydrofera Blue in place since .       Patient Goal of Care:  [x] Wound Healing  [] Odor Control  [] Palliative Care  [] Pain Control   [] Other:         PAST MEDICAL HISTORY        Diagnosis Date    A-fib ([address]     Alzheimer's dementia ([address]     Anxiety     Arthritis     Back pain     COPD (chronic obstructive pulmonary disease) ([address]     COVID-19 2021    Depression     Diabetes mellitus ([address]     Fall     multiple falls    GERD (gastroesophageal reflux disease)     Hepatitis A     age 27    Hernia     Hypercholesteremia     Hypertension     IBS (irritable bowel syndrome)     diarrhea  x 20 years    Incontinence of urine     not all the time    Kidney stone     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    Memory change     dementia    Neuropathy     Seizures ([address]     possible because of her falls pt unsure    UTI (lower urinary tract infection)        PAST SURGICAL HISTORY    Past Surgical History:   Procedure Laterality Date    BREAST SURGERY      BENIGN CYST    CHOLECYSTECTOMY      COLONOSCOPY      INTRACAPSULAR CATARACT EXTRACTION Right 3/25/2019 PHACOEMULSIFICATION OF CATARACT RIGHT EYE WITH INTRAOCULAR LENS IMPLANT performed [facility] Lisha [facility] Left 2019    PHACOEMULSIFICATION OF CATARACT LEFT EYE WITH INTRAOCULAR LENS IMPLANT performed by Lisha Tate MD at [facility]  12    OPEN REDUCTION INTERNAL FIXATION RIGHT PROXIMAL HUMERAL SHAFT FRACTURE SYNTHES    OTHER SURGICAL HISTORY  12    incision and drainage of bursa rt elbow    PANCREAS BIOPSY      SHOULDER SURGERY Bilateral     rtc repair       FAMILY HISTORY    Family History   Problem Relation Age of Onset    Heart Disease Mother        SOCIAL HISTORY    Social History     Tobacco Use    Smoking status: Former Smoker     Packs/day: 1.00     Years: 15.00     Pack years: 15.00     Types: Cigarettes     Quit date: 1998     Years since quittin.3    Smokeless tobacco: Never Used   Vaping Use    Vaping Use: Never used   Substance Use Topics    Alcohol use: No    Drug use: No       ALLERGIES    Allergies   Allergen Reactions    Codeine Hives     Other reaction(s): Unknown  itching    Morphine And Related        MEDICATIONS    No current facility-administered medications on file prior to encounter.      Current Outpatient Medications on File Prior to Encounter   Medication Sig Dispense Refill    insulin glargine (BASAGLAR KWIKPEN) 100 UNIT/ML injection pen Inject 20 Units into the skin 2 times daily      vitamin B-12 (CYANOCOBALAMIN) 500 MCG tablet Take 500 mcg by mouth daily      D-Mannose 500 MG CAPS Take 1 capsule by mouth daily      DULoxetine (CYMBALTA) 60 MG extended release capsule Take 60 mg by mouth daily      losartan (COZAAR) 100 MG tablet Take 100 mg by mouth daily      hydroCHLOROthiazide (MICROZIDE) 12.5 MG capsule Take 12.5 mg by mouth daily      olopatadine (PATANOL) 0.1 % ophthalmic solution Place 1 drop into both eyes daily      Albuterol Sulfate (PROAIR HFA IN) Inhale into the lungs      budesonide-formoterol (SYMBICORT) 160-4.5 MCG/ACT AERO Inhale 2 puffs into the lungs 2 times daily      vitamin D 25 MCG (1000 UT) CAPS Take 1,000 Units by mouth daily      atorvastatin (LIPITOR) 40 MG tablet Take 40 mg by mouth nightly      OXYGEN Inhale into the lungs 2 liters at night      ARTIFICIAL TEAR OP Apply 2 drops to eye 4 times daily      guaiFENesin (ROBITUSSIN) 100 MG/5ML syrup Take 10 mLs by mouth every 4 hours as needed for Cough      acetaminophen (TYLENOL) 325 MG tablet Take 650 mg by mouth every 4 hours as needed for Pain      bisacodyl (DULCOLAX) 5 MG EC tablet Take 10 mg by mouth daily as needed for Constipation       clonazePAM (KLONOPIN) 1 MG tablet Take 1 mg by mouth nightly.  DULoxetine (CYMBALTA) 20 MG extended release capsule Take 20 mg by mouth nightly      pregabalin (LYRICA) 75 MG capsule Take 50 mg by mouth 2 times daily.  magnesium hydroxide (MILK OF MAGNESIA) 400 MG/5ML suspension Take 30 mLs by mouth daily as needed for Constipation      traMADol (ULTRAM) 50 MG tablet Take 50 mg by mouth every 6 hours as needed for Pain. Jaison Fonda       senna (SENOKOT) 8.6 MG tablet Take 1 tablet by mouth 2 times daily      furosemide (LASIX) 20 MG tablet Take 1 tablet by mouth daily      apixaban (ELIQUIS) 5 MG TABS tablet Take 5 mg by mouth 2 times daily      carvedilol (COREG) 6.25 MG tablet Take 6.25 mg by mouth 2 times daily (with meals)      magnesium oxide (MAG-OX) 400 MG tablet Take 400 mg by mouth daily      fenofibrate (TRICOR) 54 MG tablet Take 54 mg by mouth daily       loratadine (CLARITIN) 10 MG capsule Take 10 mg by mouth daily      guaiFENesin (MUCINEX) 600 MG extended release tablet Take 600 mg by mouth 2 times daily       potassium chloride (MICRO-K) 10 MEQ extended release capsule Take 40 mEq by mouth 2 times daily       predniSONE (DELTASONE) 2.5 MG tablet Take 2.5 mg by mouth daily      amLODIPine (NORVASC) 5 MG tablet Take 1 tablet by mouth daily 30 tablet 0    loperamide (IMODIUM) 2 MG capsule Take 2 mg by mouth 4 times daily as needed for Diarrhea      ipratropium-albuterol (DUONEB) 0.5-2.5 (3) MG/3ML SOLN nebulizer solution Inhale 1 vial into the lungs every 6 hours as needed for Shortness of Breath      imipramine (TOFRANIL) 50 MG tablet Take 50 mg by mouth nightly      fluticasone (FLONASE) 50 MCG/ACT nasal spray 2 sprays by Nasal route daily      sitaGLIPtin (JANUVIA) 50 MG tablet Take 50 mg by mouth daily      ondansetron (ZOFRAN ODT) 4 MG disintegrating tablet Take 1 tablet by mouth every 8 hours as needed for Nausea. Let dissolve in mouth. 10 tablet 0    omeprazole (PRILOSEC) 20 MG capsule Take 40 mg by mouth nightly       ASPIRIN Take 81 mg by mouth daily.       diclofenac sodium (VOLTAREN) 1 % GEL Apply topically nightly      methyl salicylate-menthol (LYNNETTE NEGRO GREASELESS) 10-15 % CREA Apply topically every 8 hours as needed for Pain         Objective    /63   Pulse 68   Temp 97.4 °F (36.3 °C) (Oral)   Resp 18   Ht 5' 7\" (1.702 m)   Wt 160 lb (72.6 kg)   SpO2 96%   BMI 25.06 kg/m²     LABS:  WBC:    Lab Results   Component Value Date    WBC 4.2 08/20/2021     H/H:    Lab Results   Component Value Date    HGB 11.2 08/20/2021    HCT 33.9 08/20/2021     PTT:    Lab Results   Component Value Date    APTT 23.3 04/24/2012   [APTT}  PT/INR:    Lab Results   Component Value Date    PROTIME 12.9 10/12/2016    INR 1.13 10/12/2016     HgBA1c:    Lab Results   Component Value Date    LABA1C 8.6 12/21/2018       Assessment   Jimmy Risk Score: Jimmy Scale Score: 18    Patient Active Problem List   Diagnosis Code    HTN (hypertension), benign I10    DM (diabetes mellitus), type 2, uncontrolled ([facility] E11.65    Fracture, humerus S42.309A    Anemia M29.7    Metabolic encephalopathy D07.67    Trochanteric bursitis, left hip M70.62    IT band syndrome M76.30    Left lumbar radiculitis M54.16    Spondylolisthesis, lumbar region M43.16    Low back pain M54.5    Left hip pain M25.552    Shortness of breath R06.02    Chronic cough R05    Alzheimer's disease ([facility]) G30.9, F02.80    Acute congestive heart failure ([facility]) I50.9    Hypoxia R09.02    Idiopathic peripheral neuropathy G60.9    COVID-19 U07.1    A-fib ([facility]) I48.91    Pneumonia due to COVID-19 virus U07.1, J12.82    Acute hypoxemic respiratory failure ([facility]) J96.01       Measurements:  Incision 07/18/12 Elbow Posterior (Active)   Number of days: 3320     Incision 04/08/19 Eye Left (Active)   Number of days: 865     Left elbow:  Skin tear with partial flap, flap is nonviable, very dusky. No soi. Old dressing with moderate sanguinous drainage    right arm:  1.1x2x0.1cm, 100% red, partial thickness skin loss with small amount of sanguinous drainage. No soi. Response to treatment:  Well tolerated by patient. Pain Assessment:  Severity:  0 / 10  Quality of pain: N/A  Wound Pain Timing/Severity: none  Premedicated: N/A    Plan  Right arm:  Cleanse with NS, pat dry. Apply Alginate ag and cover with foam dressing, change every 3 days and prn. Left elbow skin tear: Follow skin tear guidelines. Cleanse with ns, pat dry. Apply Versatel contact layer, cover with dry gauze, secure with roll gauze, change out dressing daily. Leave Versatel in place x 5 days and repeat if not healed. Plan of Care:      Specialty Bed Required : N/A   [] Low Air Loss   [] Pressure Redistribution  [] Fluid Immersion  [] Bariatric  [] Total Pressure Relief  [] Other:     Current Diet: ADULT DIET;  Regular; 4 carb choices (60 gm/meal)  Dietician consult:  Yes    Discharge Plan:  Placement for patient upon discharge: skilled nursing    Patient appropriate for Outpatient [address]: Yes    Referrals:  []   [] [address] [employer] [facility]  [] Supplies  [] Other    Patient/Caregiver Teaching:  Level of patient/caregiver understanding able to:   [] Indicates understanding [x] Needs reinforcement  [] Unsuccessful      [] Verbal Understanding  [] Demonstrated understanding       [] No evidence of learning  [] Refused teaching         [] N/A       Electronically signed by Bill Condon RN, Faina Vora on 8/20/2021 at 3:53 PM

## 2021-08-20 NOTE — PROGRESS NOTES
Pt resting in bed quietly at this time. Denies any needs. All needs met and call light within reach.

## 2021-08-20 NOTE — DISCHARGE INSTR - COC
Continuity of Care Form    Patient Name: Jasbir Mays   :  1940  MRN:  [id number]    Admit date:  2021  Discharge date:  2021    Code Status Order: DNR-CC   Advance Directives:      Admitting Physician:  Clifford Zarco MD  PCP: Aleah Rowley MD    Discharging Nurse: [facility] Unit/Room#: 0206/0206-01  Discharging Unit Phone Number: [phone]    Emergency Contact:   Extended Emergency Contact Information  Primary Emergency Contact: [facility] AND [facility] [id number] [place] [address] Phone: [phone]  Relation: Child  Secondary Emergency Contact: Fab Irwin  [address] [place] [address] Phone: [phone]  Relation: Child    Past Surgical History:  Past Surgical History:   Procedure Laterality Date    BREAST SURGERY      BENIGN CYST    CHOLECYSTECTOMY      COLONOSCOPY      INTRACAPSULAR CATARACT EXTRACTION Right 3/25/2019    PHACOEMULSIFICATION OF CATARACT RIGHT EYE WITH INTRAOCULAR LENS IMPLANT performed by Ayden Pierson MD at [facility] Left 2019    PHACOEMULSIFICATION OF CATARACT LEFT EYE WITH INTRAOCULAR LENS IMPLANT performed by Ayden Pierson MD at [facility]  12    OPEN REDUCTION INTERNAL FIXATION RIGHT PROXIMAL HUMERAL SHAFT FRACTURE SYNTHES    OTHER SURGICAL HISTORY  12    incision and drainage of bursa rt elbow    PANCREAS BIOPSY      SHOULDER SURGERY Bilateral     rtc repair       Immunization History:   Immunization History   Administered Date(s) Administered    COVID-19, Pfizer, PF, 30mcg/0.3mL 2020, 2021    Influenza A (U1E6-30) Vaccine PF IM 2009, 2009    Influenza Vaccine, unspecified formulation 2018    Influenza Virus Vaccine 2018    Influenza Whole 10/18/2010    Pneumococcal Conjugate 7-valent (Prevnar7) 2008       Active Problems:  Patient Active Problem List   Diagnosis Code  HTN (hypertension), benign I10    DM (diabetes mellitus), type 2, uncontrolled ([facility] E11.65    Fracture, humerus S42.309A    Anemia U10.4    Metabolic encephalopathy H49.60    Trochanteric bursitis, left hip M70.62    IT band syndrome M76.30    Left lumbar radiculitis M54.16    Spondylolisthesis, lumbar region M43.16    Low back pain M54.5    Left hip pain M25.552    Shortness of breath R06.02    Chronic cough R05    Alzheimer's disease ([facility] G30.9, F02.80    Acute congestive heart failure (HCC) I50.9    Hypoxia R09.02    Idiopathic peripheral neuropathy G60.9    COVID-19 U07.1    A-fib (HCC) I48.91    Pneumonia due to COVID-19 virus U07.1, J12.82    Acute hypoxemic respiratory failure (HCC) J96.01       Isolation/Infection:   Isolation            Droplet Plus          Patient Infection Status       Infection Onset Added Last Indicated Last Indicated By Review Planned Expiration Resolved Resolved By    COVID-19 08/19/21 08/19/21 08/19/21 COVID-19 & Influenza Combo 08/26/21 09/02/21      Resolved    COVID-19 Rule Out 08/19/21 08/19/21 08/19/21 COVID-19 & Influenza Combo (Ordered)   08/19/21 Rule-Out Test Resulted    MDRO (multi-drug resistant organism)  10/20/16 10/20/16 Omkar Boss RN   01/02/18 Caitlin Brown RN            Nurse Assessment:  Last Vital Signs: /63   Pulse 68   Temp 97.4 °F (36.3 °C) (Oral)   Resp 18   Ht 5' 7\" (1.702 m)   Wt 160 lb (72.6 kg)   SpO2 96%   BMI 25.06 kg/m²     Last documented pain score (0-10 scale): Pain Level: 0  Last Weight:   Wt Readings from Last 1 Encounters:   08/19/21 160 lb (72.6 kg)     Mental Status:  oriented and alert    IV Access:  - None    Nursing Mobility/ADLs:  Walking   Assisted  Transfer  Assisted  Bathing  Assisted  Dressing  Assisted  Toileting  Assisted  Feeding  Assisted  Med Admin  Assisted  Med Delivery   whole    Wound Care Documentation and Therapy:  Incision 07/18/12 Elbow Posterior (Active)   Number of days: 6905 Elimination:  Continence:   · Bowel: Yes  · Bladder: Yes  Urinary Catheter: None   Colostomy/Ileostomy/Ileal Conduit: No       Date of Last BM: 8/22/21    Intake/Output Summary (Last 24 hours) at 8/20/2021 1204  Last data filed at 8/20/2021 0553  Gross per 24 hour   Intake 1328.49 ml   Output --   Net 1328.49 ml     I/O last 3 completed shifts: In: 1328.5 [P.O.:360; I.V.:968.5]  Out: -     Safety Concerns: At Risk for Falls    Impairments/Disabilities:      None    Nutrition Therapy:  Current Nutrition Therapy:   - Oral Diet:  Carb Control 4 carbs/meal (1800kcals/day)    Routes of Feeding: Oral  Liquids: No Restrictions  Daily Fluid Restriction: no  Last Modified Barium Swallow with Video (Video Swallowing Test): not done    Treatments at the Time of Hospital Discharge:   Respiratory Treatments: inhalers  Oxygen Therapy:  is on oxygen at 3 L/min per nasal cannula.   Ventilator:    - No ventilator support    Rehab Therapies: Physical Therapy and Occupational Therapy  Weight Bearing Status/Restrictions: No weight bearing restirctions  Other Medical Equipment (for information only, NOT a DME order):  walker  Other Treatments: na    Patient's personal belongings (please select all that are sent with patient):  clothing    RN SIGNATURE:  Electronically signed by Gumaro Gramajo RN on 8/23/21 at 1:33 PM EDT    CASE MANAGEMENT/SOCIAL WORK SECTION    Inpatient Status Date: 08/19/2021    Readmission Risk Assessment Score:  Readmission Risk              Risk of Unplanned Readmission:  29           Discharging to Facility/ [address] Name: [facility])   Address: [address] , [place], [place], [address] [place] [address]:[phone] or [phone]  ·     Dialysis Facility (if applicable)   · Name:  · Address:  · Dialysis Schedule:  · Phone:  · Fax:    / signature: Electronically signed by Adelaida Cunha RN on 8/23/21 at 9:03 AM EDT    PHYSICIAN SECTION    Prognosis: Good    Condition at Discharge: Stable    Rehab Potential (if transferring to Rehab): Good    Recommended Labs or Other Treatments After Discharge: PT and OT. Covid 19 isolation. Monitor oxygen. Physician Certification: I certify the above information and transfer of Jolie Ayala  is necessary for the continuing treatment of the diagnosis listed and that she requires [facility] for greater 30 days.      Update Admission H&P: No change in H&P    PHYSICIAN SIGNATURE:  Electronically signed by Toribio Gillis MD on 8/23/21 at 11:08 AM EDT

## 2021-08-20 NOTE — FLOWSHEET NOTE
08/19/21 2135   Vital Signs   Temp 97.4 °F (36.3 °C)   Temp Source Oral   Pulse 120   Heart Rate Source Monitor   Resp 16   BP (!) 109/59   BP Location Left upper arm   Patient Position Semi fowlers   Level of Consciousness Alert (0)   MEWS Score 3   Oxygen Therapy   SpO2 90 %   O2 Device Nasal cannula   O2 Flow Rate (L/min) 3 L/min   Tylenol 2 po given and ultram po given for c/o bilat leg/back pain. Pt took pills one at a times whole in pudding. Awake, alert and oriented times 4.  Prema Hurley RN

## 2021-08-20 NOTE — PROGRESS NOTES
Monitor tech asked for writer to try a nose pulse ox due to cont pulse ox has been working intermittently tonight. Called ICU for a nose pulse ox.  Mc Rivas RN

## 2021-08-20 NOTE — PROGRESS NOTES
Inpatient Occupational Therapy  Evaluation and Treatment    Unit: [address] [place]  Date:  8/20/2021  Patient Name:    Magali Simmons  Admitting diagnosis:  Hypoxia [R09.02]  COPD exacerbation ([address] [J44.1]  COVID-19 [U07.1]  Admit Date:  8/19/2021  Precautions/Restrictions/WB Status/ Lines/ Wounds/ Oxygen: Fall risk, Bed/chair alarm, Lines -IV and Supplemental O2 (3L), [place] (hard of hearing), Telemetry, Continuous pulse oximetry, Isolation Precautions: Droplet Plus - COVID and R elbow chronically dislocated    Treatment Time:  4949-2589  Treatment Number: 1   Timed code treatment minutes 31 minutes   Total Treatment minutes:   41   minutes    Patient Goals for Therapy:  \" go back to the facility \"      Discharge Recommendations: Return to [facility] needs for discharge: defer to facility       Therapy recommendations for staff:   Assist of 1 with use of rolling walker (RW) and gait belt for all transfers and ambulation to/from [facility]  to/from chair  to/from bathroom    [facility] of Present Illness: [facility]  \"The patient is a 68 y. o. female with atrial fib, alzheimer's dementia, COPD, HTN, HLD who presented to [facility] with complaint of illness.  Patient reports feeling unwell for the last 3 days. Doretha Acevedo has had NP cough, fever, nausea, general weakness.  She started to feel short of breath and was sent to ER last night where she was found to be COVID 19 + and hypoxic requiring supplemental O2.  She was vaccinated for COVID 19, completed series Jan 12, 2021.  She is on daily prednisone 2.5 mg\"    Home Health S4 Level Recommendation:  NA  AM-PAC Score: AM-PAC Inpatient Daily Activity Raw Score: 19    Preadmission Environment    Pt currently resides in LTC at [facility]. Pt states she has lived there for & years.      Preadmission Status:  Pt. Able to drive: No  Pt Fully independent with ADLs: No  Pt.  Required assistance from family for: Bathing, Cleaning, Cooking, Dressing and Laundry   Pt. independent for transfers and gait and walked with Pete Albarran (rollator)  History of falls Yes    Pain  No  Rating:NA  Location:  Pain Medicine Status: Denies need      Cognition    A&O Person, Place and Situation   Able to follow 2 step commands    Subjective  Patient lying supine in bed with no family present. Pt agreeable to this OT eval & tx. Upper Extremity ROM:    Impaired R chronic dislocation R elbow    Upper Extremity Strength:    BUE strength WFL within available range, but not formally assessed w/ MMT      Upper Extremity Sensation    WFL    Upper Extremity Proprioception:  WFL    Coordination and Tone  WFL    Balance  Functional Sitting Balance:  WFL  Functional Standing Balance:Impaired CGA    Bed mobility:    Supine to sit:   Mod A   Sit to supine:   Min A   Rolling:    Not Tested  Scooting in sitting:  CGA  Scooting to head of bed:   Total A and 2 persons    Bridging:   Not Tested    Transfers:    Sit to stand:  Min A   Stand to sit:  Min A   Bed to chair:   Not Tested  Standard toilet: Not Tested  Bed to [facility]:  Not Tested     Completed functional mobility with RW with CGA in room, tolerated well. Dressing:      UE:   Not Tested  LE:    SBA don socks at EOB    Bathing:    UE:  Not Tested  LE:  Not Tested    Eating:   Independent    Toileting:  Not Tested    Activity Tolerance   Pt completed therapy session with No adverse symptoms noted w/activity    Positioning Needs:   Pt in bed, alarm set, positioned in proper neutral alignment and pressure relief provided. Call light provided and all needs within reach   RN requested patient return to bed due to cognition. Exercise / Activities Initiated: NA  N/A    Patient/Family Education:   Role of OT  Recommendations for DC  Energy conservation techniques  Safe RW use/hand placement    Assessment of Deficits: Pt seen for Occupational therapy evaluation in acute care setting. Pt demonstrated decreased Activity tolerance, ADLs, IADLs and Transfers.  Pt functioning below baseline

## 2021-08-20 NOTE — PROGRESS NOTES
Progress Note    Admit Date:  8/19/2021    Nursing home patient from [facility]. Admitted with COVID-19 infection, she has been vaccinated. Currently on oxygen 3 L    Subjective:  Ms. Faisal Welch seen, she is on droplet precautions. She is pleasant and in no distress. no SOB     Objective:   /63   Pulse 68   Temp 97.4 °F (36.3 °C) (Oral)   Resp 18   Ht 5' 7\" (1.702 m)   Wt 160 lb (72.6 kg)   SpO2 96%   BMI 25.06 kg/m²     Intake/Output Summary (Last 24 hours) at 8/20/2021 1414  Last data filed at 8/20/2021 0553  Gross per 24 hour   Intake 1208.49 ml   Output --   Net 1208.49 ml         Physical Exam:  Gen: No distress. Alert. Eyes: PERRL. No sclera icterus. No conjunctival injection. ENT: No discharge. Pharynx clear. Neck: No JVD. No Carotid Bruit. Trachea midline. Resp: No accessory muscle use. Diminished breath sounds, mild rhonchi. CV: Regular rate. Irregularly irregular rhythm. No murmur. No rub. No edema. GI: Non-tender. Non-distended. No masses. No organomegaly. Normal bowel sounds. Ventral hernia, soft not tender   Skin: Warm and dry. No nodule on exposed extremities. No rash on exposed extremities. M/S: No cyanosis. No joint deformity. No clubbing. Broken right elbow- chronic deformity   Neuro: Awake. Grossly nonfocal    Psych: Oriented x 3. No anxiety or agitation.      Medications:   Scheduled Meds:   amLODIPine  5 mg Oral Daily    apixaban  5 mg Oral BID    aspirin  81 mg Oral Daily    calcium-vitamin D  1 tablet Oral BID    carvedilol  6.25 mg Oral BID WC    clonazePAM  1 mg Oral Nightly    DULoxetine  20 mg Oral Nightly    DULoxetine  60 mg Oral QAM    fenofibrate  160 mg Oral Daily    ferrous sulfate  325 mg Oral Daily    fluticasone  2 spray Nasal Daily    furosemide  20 mg Oral Daily    guaiFENesin  600 mg Oral BID    cetirizine  10 mg Oral Daily    pantoprazole  40 mg Oral QAM AC    potassium chloride  40 mEq Oral Daily    pregabalin  50 mg Oral TID   [facility] senna  1 tablet Oral BID    sodium chloride flush  5-40 mL Intravenous 2 times per day    insulin lispro  0-6 Units Subcutaneous TID WC    insulin lispro  0-3 Units Subcutaneous Nightly    dexamethasone  6 mg Intravenous Q24H    magnesium oxide  400 mg Oral Daily    atorvastatin  40 mg Oral Nightly    diclofenac sodium  2 g Topical Nightly    vitamin B-12  500 mcg Oral Daily    Vitamin D  1,000 Units Oral Daily    losartan  100 mg Oral Daily    And    hydroCHLOROthiazide  12.5 mg Oral Daily    insulin glargine  20 Units Subcutaneous BID    imipramine  50 mg Oral Nightly    remdesivir IVPB  100 mg Intravenous Q24H    budesonide-formoterol  2 puff Inhalation BID       Continuous Infusions:   sodium chloride      sodium chloride 75 mL/hr at 08/20/21 0553    dextrose         Data:  CBC:   Recent Labs     08/19/21  0650 08/20/21  0543   WBC 5.8 4.2   RBC 4.50 3.78*   HGB 13.3 11.2*   HCT 40.5 33.9*   MCV 89.9 89.7   RDW 14.2 13.7   * 129*     BMP:   Recent Labs     08/19/21  0752 08/20/21  0543   * 136   K 4.1 4.0   CL 94* 100   CO2 30 29   BUN 28* 27*   CREATININE 1.1 0.8     BNP: No results for input(s): BNP in the last 72 hours. PT/INR: No results for input(s): PROTIME, INR in the last 72 hours. APTT: No results for input(s): APTT in the last 72 hours. CARDIAC ENZYMES:   Recent Labs     08/19/21  0752   TROPONINI <0.01     FASTING LIPID PANEL:No results found for: CHOL, HDL, TRIG  LIVER PROFILE:   Recent Labs     08/19/21  0752 08/20/21  0543   AST 24 24   ALT 19 18   BILITOT <0.2 <0.2   ALKPHOS 63 51        CULTURES  COVID 19, PCR: detected      EKG:  I have reviewed the EKG with the following interpretation:   Atrial fib     RADIOLOGY  XR CHEST PORTABLE   Final Result   No radiographic evidence of acute pulmonary disease.                Assessment:  Principal Problem:    COVID-19  Active Problems:    HTN (hypertension), benign    DM (diabetes mellitus), type 2, uncontrolled ([facility] Alzheimer's disease ([facility]    Hypoxia    A-fib ([facility]    Pneumonia due to COVID-19 virus    Acute hypoxemic respiratory failure ([facility]  Resolved Problems:    * No resolved hospital problems. *      Plan:     #COVID 23  #Hypoxia  - patient completed vaccination on 1/12/2021. She is on low dose prednisone at 2.5 mg daily   -Presenting with COVID-19 infection and hypoxemia. - cont decadron D#2  - remdesivir D#2. Monitor LFTs and renal function  - she does not use continuous O2 at the NH. Currently on 2L-> 3L. cont supplemental O2 and wean as able     #Atrial fibrillation  - with CVR  - cont coreg and eliquis     #HTN  - BP stable cont meds losartan, HCTZ, coreg, norvasc     #COPD  - no AE, cont inhalers    #Alzheimer's dementia    - supportive care     #DM2  - cont Lantus, use SSI     #Chronic deformity right elbow  - supportive care      #GERD  - PPI     #Chronic pain  - cont Lyrica, cymbalta , tramadol PRN      #Thrombocytopenia  - could be related to acute viral infection. Monitor CBC    Consult PT, OT     DVT Prophylaxis: Eliquis   Diet: ADULT DIET;  Regular; 4 carb choices (60 gm/meal)  Code Status: DNR-CC         Ezequiel Solis MD, MD 8/20/2021 2:14 PM

## 2021-08-20 NOTE — PROGRESS NOTES
Inpatient Physical Therapy Evaluation and Treatment    Unit: [facility]  Date:  8/20/2021  Patient Name:    Bailey Potter \"Lis\"  Admitting diagnosis:  Hypoxia [R09.02]  COPD exacerbation ([facility] [J44.1]  COVID-19 [U07.1]  Admit Date:  8/19/2021  Precautions/Restrictions/WB Status/ Lines/ Wounds/ Oxygen: Fall risk, Bed/chair alarm, Lines -IV and Supplemental O2 (3L), [place] (hard of hearing), Telemetry, Continuous pulse oximetry and Isolation Precautions: Droplet Plus - COVID   R elbow chronically dislocated    Treatment Time:  11:19 - 11:58  Treatment Number:  1   Timed Code Treatment Minutes: 29 minutes  Total Treatment Minutes:  39  minutes    Patient Goals for Therapy: \" go home \"          Discharge Recommendations: Return to [facility] needs for discharge: defer to facility       Therapy recommendation for EMS Transport: can transport by wheelchair    Therapy recommendations for staff:   Assist of 1 with use of rolling walker (RW) and gait belt for all transfers and ambulation to/from [facility]  to/from chair  to/from bathroom    History of Present Illness: From Rios Morin's note 8/19: The patient is a 68 y.o. female with atrial fib, alzheimer's dementia, COPD, HTN, HLD who presented to [facility] ED with complaint of illness. Patient reports feeling unwell for the last 3 days. She has had NP cough, fever, nausea, general weakness. She started to feel short of breath and was sent to ER last night where she was found to be COVID 19 + and hypoxic requiring supplemental O2. She was vaccinated for COVID 19, completed series Jan 12, 2021. She is on daily prednisone 2.5 mg       Home Health S4 Level Recommendation:  NA  AM-PAC Mobility Score    AM-PAC Inpatient Mobility Raw Score : 17       Preadmission Environment    Pt currently resides in LTC at [facility]. Pt states she has lived there for & years. Preadmission Status:  Pt. Able to drive: No  Pt Fully independent with ADLs: No  Pt.  Required assistance from family for: continued activity, DC recommendations, safety awareness, transfer techniques, pacing activity and calling for assist with mobility. Assessment  Pt seen for Physical Therapy evaluation in acute care setting. Pt demonstrated decreased Activity tolerance, Balance, Safety and Strength as well as decreased independence with Ambulation, Bed Mobility  and Transfers. Recommending return to [facility] as patient functioning at or close to baseline level. Goals : To be met in 3 visits:  1). Independent with LE Ex x 10 reps    To be met in 6 visits:  1). Supine to/from sit: Supervision  2). Sit to/from stand: Supervision  3). Bed to chair: Supervision  4). Gait: Ambulate 50 ft.  with  SBA and use of gait belt and rolling walker (RW)  5). Tolerate B LE exercises 3 sets of 10-15 reps    Rehabilitation Potential: Good  Strengths for achieving goals include:   Pt motivated and Pt cooperative   Barriers to achieving goals include:    No Barriers    Plan    To be seen 3-5 x / week  while in acute care setting for therapeutic exercises, bed mobility, transfers, progressive gait training, balance training, and family/patient education. Signature: Jean Marie Kinsey, PT, DPT, OMT-C  #[id number]      If patient discharges from this facility prior to next visit, this note will serve as the Discharge Summary.

## 2021-08-20 NOTE — PLAN OF CARE
Problem: Falls - Risk of:  Goal: Will remain free from falls  Description: Will remain free from falls  Outcome: Ongoing     Problem: Airway Clearance - Ineffective  Goal: Achieve or maintain patent airway  Outcome: Ongoing     Problem: Gas Exchange - Impaired  Goal: Absence of hypoxia  Outcome: Ongoing   Patient bed alarm on for safety. 02 3l/nc on and 02 sat 100% at this time.

## 2021-08-20 NOTE — PROGRESS NOTES
Pulmonary Progress Note  CC:  COVID-19 pneumonia    Subjective:  Feels okay     IV line peripheral    EXAM:   /74   Pulse 80   Temp 97 °F (36.1 °C) (Oral)   Resp 18   Ht 5' 7\" (1.702 m)   Wt 160 lb (72.6 kg)   SpO2 97%   BMI 25.06 kg/m²  on 3 L  Constitutional:  No acute distress. HENT:  Oropharynx is clear and moist.   Neck: No tracheal deviation present. Pulmonary/Chest:   No accessory muscle usage or stridor. Musculoskeletal: No cyanosis. Skin: Skin is dry. Psychiatric: Normal mood and affect.   Neurologic: speech fluent, alert and oriented          Scheduled Meds:   amLODIPine  5 mg Oral Daily    apixaban  5 mg Oral BID    aspirin  81 mg Oral Daily    calcium-vitamin D  1 tablet Oral BID    carvedilol  6.25 mg Oral BID WC    clonazePAM  1 mg Oral Nightly    DULoxetine  20 mg Oral Nightly    DULoxetine  60 mg Oral QAM    fenofibrate  160 mg Oral Daily    ferrous sulfate  325 mg Oral Daily    fluticasone  2 spray Nasal Daily    furosemide  20 mg Oral Daily    guaiFENesin  600 mg Oral BID    cetirizine  10 mg Oral Daily    pantoprazole  40 mg Oral QAM AC    potassium chloride  40 mEq Oral Daily    pregabalin  50 mg Oral TID    senna  1 tablet Oral BID    sodium chloride flush  5-40 mL Intravenous 2 times per day    insulin lispro  0-6 Units Subcutaneous TID WC    insulin lispro  0-3 Units Subcutaneous Nightly    dexamethasone  6 mg Intravenous Q24H    magnesium oxide  400 mg Oral Daily    atorvastatin  40 mg Oral Nightly    diclofenac sodium  2 g Topical Nightly    vitamin B-12  500 mcg Oral Daily    Vitamin D  1,000 Units Oral Daily    losartan  100 mg Oral Daily    And    hydroCHLOROthiazide  12.5 mg Oral Daily    insulin glargine  20 Units Subcutaneous BID    imipramine  50 mg Oral Nightly    remdesivir IVPB  100 mg Intravenous Q24H    budesonide-formoterol  2 puff Inhalation BID     Continuous Infusions:   sodium chloride      sodium chloride 75 mL/hr at 08/20/21 1438    dextrose       PRN Meds:  albuterol sulfate HFA, guaiFENesin, traMADol, sodium chloride flush, sodium chloride, ondansetron **OR** ondansetron, polyethylene glycol, acetaminophen **OR** acetaminophen, glucose, dextrose, glucagon (rDNA), dextrose    Labs:  CBC:   Recent Labs     08/19/21  0650 08/20/21  0543   WBC 5.8 4.2   HGB 13.3 11.2*   HCT 40.5 33.9*   MCV 89.9 89.7   * 129*     BMP:   Recent Labs     08/19/21  0752 08/20/21  0543   * 136   K 4.1 4.0   CL 94* 100   CO2 30 29   BUN 28* 27*   CREATININE 1.1 0.8     Micro:  8/19/2021 SARS-CoV-2 positive    Imaging:  Chest imaging was reviewed by me and showed   CXR 8/19/2021 I suspect right lower lobe infiltrate, however the overlying external devices/wires make this final interpretation difficult    ASSESSMENT:  · COVID-19 pneumonia in a vaccinated patient  · Acute hypoxemic respiratory failure  · Atrial fibrillation on Eliquis  · COPD  · Dementia the Alzheimer's type  · Chronic pain syndrome  · Hyperglycemia    PLAN:   COVID-19 isolation, droplet plus   Supplemental oxygen to maintain SaO2 >92%; monitor saturations closely    Remdesevir D# 2, LFT monitoring for high risk medication   Decadron D#2, 6 mg daily    Check CRP   Inhaled bronchodilators only as needed, MDI preferred    On Eliquis

## 2021-08-20 NOTE — PROGRESS NOTES
Pt a/o. Am assessment completed see flow sheet. Pt denies any pain, All needs met this am, 1 hour and 10 minutes spent with patient this am with medications, toileting, and 50 minutes at 1345 with meds and toileting. Call light within reach. Will continue to monitor.

## 2021-08-21 LAB
A/G RATIO: 1.1 (ref 1.1–2.2)
ALBUMIN SERPL-MCNC: 3.5 G/DL (ref 3.4–5)
ALP BLD-CCNC: 51 U/L (ref 40–129)
ALT SERPL-CCNC: 21 U/L (ref 10–40)
ANION GAP SERPL CALCULATED.3IONS-SCNC: 12 MMOL/L (ref 3–16)
AST SERPL-CCNC: 28 U/L (ref 15–37)
BILIRUB SERPL-MCNC: <0.2 MG/DL (ref 0–1)
BUN BLDV-MCNC: 21 MG/DL (ref 7–20)
CALCIUM SERPL-MCNC: 9.3 MG/DL (ref 8.3–10.6)
CHLORIDE BLD-SCNC: 99 MMOL/L (ref 99–110)
CO2: 28 MMOL/L (ref 21–32)
CREAT SERPL-MCNC: 0.7 MG/DL (ref 0.6–1.2)
GFR AFRICAN AMERICAN: >60
GFR NON-AFRICAN AMERICAN: >60
GLOBULIN: 3.2 G/DL
GLUCOSE BLD-MCNC: 161 MG/DL (ref 70–99)
GLUCOSE BLD-MCNC: 170 MG/DL (ref 70–99)
GLUCOSE BLD-MCNC: 183 MG/DL (ref 70–99)
GLUCOSE BLD-MCNC: 215 MG/DL (ref 70–99)
GLUCOSE BLD-MCNC: 221 MG/DL (ref 70–99)
GLUCOSE BLD-MCNC: 311 MG/DL (ref 70–99)
PERFORMED ON: ABNORMAL
POTASSIUM SERPL-SCNC: 3.5 MMOL/L (ref 3.5–5.1)
SODIUM BLD-SCNC: 139 MMOL/L (ref 136–145)
TOTAL PROTEIN: 6.7 G/DL (ref 6.4–8.2)

## 2021-08-21 PROCEDURE — 94640 AIRWAY INHALATION TREATMENT: CPT

## 2021-08-21 PROCEDURE — 2700000000 HC OXYGEN THERAPY PER DAY

## 2021-08-21 PROCEDURE — 2580000003 HC RX 258: Performed by: NURSE PRACTITIONER

## 2021-08-21 PROCEDURE — 99233 SBSQ HOSP IP/OBS HIGH 50: CPT | Performed by: INTERNAL MEDICINE

## 2021-08-21 PROCEDURE — 2500000003 HC RX 250 WO HCPCS: Performed by: PHYSICIAN ASSISTANT

## 2021-08-21 PROCEDURE — 6370000000 HC RX 637 (ALT 250 FOR IP): Performed by: NURSE PRACTITIONER

## 2021-08-21 PROCEDURE — 80053 COMPREHEN METABOLIC PANEL: CPT

## 2021-08-21 PROCEDURE — 2580000003 HC RX 258: Performed by: PHYSICIAN ASSISTANT

## 2021-08-21 PROCEDURE — 97530 THERAPEUTIC ACTIVITIES: CPT

## 2021-08-21 PROCEDURE — 6370000000 HC RX 637 (ALT 250 FOR IP): Performed by: PHYSICIAN ASSISTANT

## 2021-08-21 PROCEDURE — 94761 N-INVAS EAR/PLS OXIMETRY MLT: CPT

## 2021-08-21 PROCEDURE — 6360000002 HC RX W HCPCS: Performed by: NURSE PRACTITIONER

## 2021-08-21 PROCEDURE — 97110 THERAPEUTIC EXERCISES: CPT

## 2021-08-21 PROCEDURE — 1200000000 HC SEMI PRIVATE

## 2021-08-21 PROCEDURE — 97535 SELF CARE MNGMENT TRAINING: CPT

## 2021-08-21 PROCEDURE — 36415 COLL VENOUS BLD VENIPUNCTURE: CPT

## 2021-08-21 RX ADMIN — DEXAMETHASONE SODIUM PHOSPHATE 6 MG: 10 INJECTION, SOLUTION INTRAMUSCULAR; INTRAVENOUS at 09:55

## 2021-08-21 RX ADMIN — PANTOPRAZOLE SODIUM 40 MG: 40 TABLET, DELAYED RELEASE ORAL at 05:18

## 2021-08-21 RX ADMIN — FERROUS SULFATE TAB 325 MG (65 MG ELEMENTAL FE) 325 MG: 325 (65 FE) TAB at 09:54

## 2021-08-21 RX ADMIN — INSULIN GLARGINE 20 UNITS: 100 INJECTION, SOLUTION SUBCUTANEOUS at 22:28

## 2021-08-21 RX ADMIN — TRAMADOL HYDROCHLORIDE 50 MG: 50 TABLET, FILM COATED ORAL at 05:17

## 2021-08-21 RX ADMIN — SENNOSIDES 8.6 MG: 8.6 TABLET, FILM COATED ORAL at 22:20

## 2021-08-21 RX ADMIN — APIXABAN 5 MG: 5 TABLET, FILM COATED ORAL at 22:21

## 2021-08-21 RX ADMIN — SODIUM CHLORIDE: 9 INJECTION, SOLUTION INTRAVENOUS at 22:28

## 2021-08-21 RX ADMIN — GUAIFENESIN 600 MG: 600 TABLET, EXTENDED RELEASE ORAL at 09:55

## 2021-08-21 RX ADMIN — Medication 500 MCG: at 09:55

## 2021-08-21 RX ADMIN — POTASSIUM CHLORIDE 40 MEQ: 750 TABLET, EXTENDED RELEASE ORAL at 09:54

## 2021-08-21 RX ADMIN — PREGABALIN 50 MG: 25 CAPSULE ORAL at 09:53

## 2021-08-21 RX ADMIN — LOSARTAN POTASSIUM 100 MG: 100 TABLET, FILM COATED ORAL at 09:54

## 2021-08-21 RX ADMIN — PREGABALIN 50 MG: 25 CAPSULE ORAL at 13:07

## 2021-08-21 RX ADMIN — SODIUM CHLORIDE, PRESERVATIVE FREE 10 ML: 5 INJECTION INTRAVENOUS at 23:16

## 2021-08-21 RX ADMIN — CARVEDILOL 6.25 MG: 6.25 TABLET, FILM COATED ORAL at 09:58

## 2021-08-21 RX ADMIN — ASPIRIN 81 MG: 81 TABLET, CHEWABLE ORAL at 09:55

## 2021-08-21 RX ADMIN — INSULIN LISPRO 2 UNITS: 100 INJECTION, SOLUTION INTRAVENOUS; SUBCUTANEOUS at 22:28

## 2021-08-21 RX ADMIN — REMDESIVIR 100 MG: 100 INJECTION, POWDER, LYOPHILIZED, FOR SOLUTION INTRAVENOUS at 17:30

## 2021-08-21 RX ADMIN — CLONAZEPAM 1 MG: 1 TABLET ORAL at 22:21

## 2021-08-21 RX ADMIN — PREGABALIN 50 MG: 25 CAPSULE ORAL at 22:20

## 2021-08-21 RX ADMIN — APIXABAN 5 MG: 5 TABLET, FILM COATED ORAL at 09:55

## 2021-08-21 RX ADMIN — Medication 2 PUFF: at 20:25

## 2021-08-21 RX ADMIN — INSULIN GLARGINE 20 UNITS: 100 INJECTION, SOLUTION SUBCUTANEOUS at 10:03

## 2021-08-21 RX ADMIN — GUAIFENESIN 600 MG: 600 TABLET, EXTENDED RELEASE ORAL at 22:20

## 2021-08-21 RX ADMIN — FUROSEMIDE 20 MG: 20 TABLET ORAL at 09:55

## 2021-08-21 RX ADMIN — SENNOSIDES 8.6 MG: 8.6 TABLET, FILM COATED ORAL at 09:54

## 2021-08-21 RX ADMIN — DULOXETINE HYDROCHLORIDE 60 MG: 60 CAPSULE, DELAYED RELEASE ORAL at 09:54

## 2021-08-21 RX ADMIN — AMLODIPINE BESYLATE 5 MG: 5 TABLET ORAL at 09:54

## 2021-08-21 RX ADMIN — CALCIUM CARBONATE-VITAMIN D TAB 500 MG-200 UNIT 1 TABLET: 500-200 TAB at 22:21

## 2021-08-21 RX ADMIN — TRAMADOL HYDROCHLORIDE 50 MG: 50 TABLET, FILM COATED ORAL at 22:20

## 2021-08-21 RX ADMIN — Medication 2 PUFF: at 07:47

## 2021-08-21 RX ADMIN — FENOFIBRATE 160 MG: 160 TABLET ORAL at 09:54

## 2021-08-21 RX ADMIN — HYDROCHLOROTHIAZIDE 12.5 MG: 25 TABLET ORAL at 09:55

## 2021-08-21 RX ADMIN — DULOXETINE 20 MG: 20 CAPSULE, DELAYED RELEASE ORAL at 22:20

## 2021-08-21 RX ADMIN — SODIUM CHLORIDE: 9 INJECTION, SOLUTION INTRAVENOUS at 05:16

## 2021-08-21 RX ADMIN — CARVEDILOL 6.25 MG: 6.25 TABLET, FILM COATED ORAL at 17:27

## 2021-08-21 RX ADMIN — SODIUM CHLORIDE, PRESERVATIVE FREE 5 ML: 5 INJECTION INTRAVENOUS at 00:02

## 2021-08-21 RX ADMIN — MAGNESIUM GLUCONATE 500 MG ORAL TABLET 400 MG: 500 TABLET ORAL at 09:54

## 2021-08-21 RX ADMIN — FLUTICASONE PROPIONATE 2 SPRAY: 50 SPRAY, METERED NASAL at 09:57

## 2021-08-21 RX ADMIN — IMIPRAMINE HYDROCHLORIDE 50 MG: 25 TABLET ORAL at 22:21

## 2021-08-21 RX ADMIN — CALCIUM CARBONATE-VITAMIN D TAB 500 MG-200 UNIT 1 TABLET: 500-200 TAB at 10:00

## 2021-08-21 RX ADMIN — Medication 1000 UNITS: at 09:54

## 2021-08-21 RX ADMIN — ATORVASTATIN CALCIUM 40 MG: 40 TABLET, FILM COATED ORAL at 22:21

## 2021-08-21 RX ADMIN — CETIRIZINE HYDROCHLORIDE 10 MG: 10 TABLET ORAL at 09:54

## 2021-08-21 ASSESSMENT — PAIN SCALES - GENERAL
PAINLEVEL_OUTOF10: 0
PAINLEVEL_OUTOF10: 5
PAINLEVEL_OUTOF10: 0
PAINLEVEL_OUTOF10: 6
PAINLEVEL_OUTOF10: 0

## 2021-08-21 NOTE — PROGRESS NOTES
Pulmonary Progress Note  CC:  COVID-19 pneumonia    Subjective:  Feels okay, oxygen saturations remained stable    IV line peripheral    EXAM:   BP (!) 143/79   Pulse 73   Temp 97.1 °F (36.2 °C) (Oral)   Resp 16   Ht 5' 7\" (1.702 m)   Wt 160 lb (72.6 kg)   SpO2 96%   BMI 25.06 kg/m²  on 3 L  General: NAD  Eyes: PERRL. No sclera icterus. No conjunctival injection. ENT: No discharge. Pharynx clear. Neck: Trachea midline. Normal thyroid. Resp: No accessory muscle use. No crackles. No wheezing. No rhonchi. No dullness on percussion. CV: Regular rate. Regular rhythm. No mumur or rub. No edema. Peripheral pulses are 2+. Capillary refill is less than 3 seconds. GI: Non-tender. Non-distended. No masses. No organomegaly. Normal bowel sounds. No hernia. Skin: Warm and dry. No nodule on exposed extremities. No rash on exposed extremities. Lymph: No cervical LAD. No supraclavicular LAD. M/S: No cyanosis. No joint deformity. No clubbing. Neuro: A&O to person and events. Patellar reflexes are symmetric. Psych: No agitation, no anxiety, affect is full.      Scheduled Meds:   amLODIPine  5 mg Oral Daily    apixaban  5 mg Oral BID    aspirin  81 mg Oral Daily    calcium-vitamin D  1 tablet Oral BID    carvedilol  6.25 mg Oral BID WC    clonazePAM  1 mg Oral Nightly    DULoxetine  20 mg Oral Nightly    DULoxetine  60 mg Oral QAM    fenofibrate  160 mg Oral Daily    ferrous sulfate  325 mg Oral Daily    fluticasone  2 spray Nasal Daily    furosemide  20 mg Oral Daily    guaiFENesin  600 mg Oral BID    cetirizine  10 mg Oral Daily    pantoprazole  40 mg Oral QAM AC    potassium chloride  40 mEq Oral Daily    pregabalin  50 mg Oral TID    senna  1 tablet Oral BID    sodium chloride flush  5-40 mL Intravenous 2 times per day    insulin lispro  0-6 Units Subcutaneous TID WC    insulin lispro  0-3 Units Subcutaneous Nightly    dexamethasone  6 mg Intravenous Q24H    magnesium oxide  400 mg Oral Daily    atorvastatin  40 mg Oral Nightly    diclofenac sodium  2 g Topical Nightly    vitamin B-12  500 mcg Oral Daily    Vitamin D  1,000 Units Oral Daily    losartan  100 mg Oral Daily    And    hydroCHLOROthiazide  12.5 mg Oral Daily    insulin glargine  20 Units Subcutaneous BID    imipramine  50 mg Oral Nightly    remdesivir IVPB  100 mg Intravenous Q24H    budesonide-formoterol  2 puff Inhalation BID     Continuous Infusions:   sodium chloride      sodium chloride 75 mL/hr at 08/21/21 0516    dextrose       PRN Meds:  albuterol sulfate HFA, guaiFENesin, traMADol, sodium chloride flush, sodium chloride, ondansetron **OR** ondansetron, polyethylene glycol, acetaminophen **OR** acetaminophen, glucose, dextrose, glucagon (rDNA), dextrose    Labs:  CBC:   Recent Labs     08/19/21  0650 08/20/21  0543   WBC 5.8 4.2   HGB 13.3 11.2*   HCT 40.5 33.9*   MCV 89.9 89.7   * 129*     BMP:   Recent Labs     08/19/21  0752 08/20/21  0543 08/21/21  0619   * 136 139   K 4.1 4.0 3.5   CL 94* 100 99   CO2 30 29 28   BUN 28* 27* 21*   CREATININE 1.1 0.8 0.7     CRP is 67    Micro:  8/19/2021 SARS-CoV-2 positive    Imaging:  Chest imaging was reviewed by me and showed   CXR 8/19/2021 I suspect right lower lobe infiltrate, however the overlying external devices/wires make this final interpretation difficult    ASSESSMENT:  · COVID-19 pneumonia in a vaccinated patient  · Acute hypoxemic respiratory failure  · Atrial fibrillation on Eliquis  · COPD  · Dementia the Alzheimer's type  · Chronic pain syndrome  · Hyperglycemia    PLAN:   COVID-19 isolation, droplet plus   Supplemental oxygen to maintain SaO2 >92%; monitor saturations closely    Remdesevir D#3, LFT monitoring for high risk medication   Decadron D#3, 6 mg daily    Inhaled bronchodilators only as needed, MDI preferred    On Eliquis

## 2021-08-21 NOTE — PROGRESS NOTES
Progress Note    Admit Date:  8/19/2021    Nursing home patient from [facility]. Admitted with COVID-19 infection, she has been vaccinated. Currently on oxygen 3 L    Subjective:  Ms. Jamie Junior seen, she is on droplet plus  precautions. She is pleasant and in no distress. no SOB     Oxygen saturations have remained stable on 3 L    Objective:   /68   Pulse 83   Temp 97 °F (36.1 °C) (Oral)   Resp 16   Ht 5' 7\" (1.702 m)   Wt 160 lb (72.6 kg)   SpO2 96%   BMI 25.06 kg/m²       Intake/Output Summary (Last 24 hours) at 8/21/2021 1428  Last data filed at 8/20/2021 1438  Gross per 24 hour   Intake 240 ml   Output --   Net 240 ml         Physical Exam:  Gen: No distress. Alert. Eyes: PERRL. No sclera icterus. No conjunctival injection. ENT: No discharge. Pharynx clear. Neck: No JVD. No Carotid Bruit. Trachea midline. Resp: No accessory muscle use. Diminished breath sounds, no rhonchi. CV: Regular rate. Irregularly irregular rhythm. No murmur. No rub. No edema. GI: Non-tender. Non-distended. No masses. No organomegaly. Normal bowel sounds. Ventral hernia, soft not tender   Skin: Warm and dry. No nodule on exposed extremities. No rash on exposed extremities. M/S: No cyanosis. No joint deformity. No clubbing. Broken right elbow- chronic deformity   Neuro: Awake. Grossly nonfocal    Psych: Oriented x 3. No anxiety or agitation.      Medications:   Scheduled Meds:   amLODIPine  5 mg Oral Daily    apixaban  5 mg Oral BID    aspirin  81 mg Oral Daily    calcium-vitamin D  1 tablet Oral BID    carvedilol  6.25 mg Oral BID WC    clonazePAM  1 mg Oral Nightly    DULoxetine  20 mg Oral Nightly    DULoxetine  60 mg Oral QAM    fenofibrate  160 mg Oral Daily    ferrous sulfate  325 mg Oral Daily    fluticasone  2 spray Nasal Daily    furosemide  20 mg Oral Daily    guaiFENesin  600 mg Oral BID    cetirizine  10 mg Oral Daily    pantoprazole  40 mg Oral QAM AC    potassium chloride  40 mEq Oral Daily    pregabalin  50 mg Oral TID    senna  1 tablet Oral BID    sodium chloride flush  5-40 mL Intravenous 2 times per day    insulin lispro  0-6 Units Subcutaneous TID WC    insulin lispro  0-3 Units Subcutaneous Nightly    dexamethasone  6 mg Intravenous Q24H    magnesium oxide  400 mg Oral Daily    atorvastatin  40 mg Oral Nightly    diclofenac sodium  2 g Topical Nightly    vitamin B-12  500 mcg Oral Daily    Vitamin D  1,000 Units Oral Daily    losartan  100 mg Oral Daily    And    hydroCHLOROthiazide  12.5 mg Oral Daily    insulin glargine  20 Units Subcutaneous BID    imipramine  50 mg Oral Nightly    remdesivir IVPB  100 mg Intravenous Q24H    budesonide-formoterol  2 puff Inhalation BID       Continuous Infusions:   sodium chloride      sodium chloride 75 mL/hr at 08/21/21 0516    dextrose         Data:  CBC:   Recent Labs     08/19/21  0650 08/20/21  0543   WBC 5.8 4.2   RBC 4.50 3.78*   HGB 13.3 11.2*   HCT 40.5 33.9*   MCV 89.9 89.7   RDW 14.2 13.7   * 129*     BMP:   Recent Labs     08/19/21  0752 08/20/21  0543 08/21/21  0619   * 136 139   K 4.1 4.0 3.5   CL 94* 100 99   CO2 30 29 28   BUN 28* 27* 21*   CREATININE 1.1 0.8 0.7     BNP: No results for input(s): BNP in the last 72 hours. PT/INR: No results for input(s): PROTIME, INR in the last 72 hours. APTT: No results for input(s): APTT in the last 72 hours. CARDIAC ENZYMES:   Recent Labs     08/19/21  0752   TROPONINI <0.01     FASTING LIPID PANEL:No results found for: CHOL, HDL, TRIG  LIVER PROFILE:   Recent Labs     08/19/21  0752 08/20/21  0543 08/21/21  0619   AST 24 24 28   ALT 19 18 21   BILITOT <0.2 <0.2 <0.2   ALKPHOS 63 51 51        CULTURES  COVID 19, PCR: detected      EKG:  I have reviewed the EKG with the following interpretation:   Atrial fib     RADIOLOGY  XR CHEST PORTABLE   Final Result   No radiographic evidence of acute pulmonary disease.                Assessment:  Principal Problem: COVID-19  Active Problems:    HTN (hypertension), benign    DM (diabetes mellitus), type 2, uncontrolled ([facility] [address]    Alzheimer's disease ([facility] [address]    Hypoxia    A-fib ([facility] [address]    Pneumonia due to COVID-19 virus    Acute hypoxemic respiratory failure ([facility] [address]  Resolved Problems:    * No resolved hospital problems. *      Plan:     #COVID 23  #Hypoxia  - patient completed vaccination on 1/12/2021. She is on low dose prednisone at 2.5 mg daily   -Presenting with COVID-19 infection and hypoxemia. - cont decadron D#3  - remdesivir D#3. Monitor LFTs and renal function  - she does not use continuous O2 at the NH. Currently on  3L. cont supplemental O2 and wean as able     #Atrial fibrillation  - with CVR  - cont coreg and eliquis     #HTN  - BP stable cont meds losartan, HCTZ, coreg, norvasc     #COPD  - no AE, cont inhalers    #Alzheimer's dementia    - noted on chart as memory issues . Appears Mild dementia. Pt is well oriented here. supportive care     #DM2  - cont Lantus, use SSI     #Chronic deformity right elbow  - supportive care      #GERD  - PPI     #Chronic pain  #Severe peripheral neuropathy, chronic leg pain  # Chronic back pain from spinal stenosis  - cont Lyrica, cymbalta , tramadol PRN      #Thrombocytopenia  - could be related to acute viral infection. Monitor CBC    # Chronic diastolic CHF  -Continue losartan/HCTZ, lasix . Compensated     # Hyperlipidemia   - on statins    Consult PT, OT     DVT Prophylaxis: Eliquis   Diet: ADULT DIET;  Regular; 4 carb choices (60 gm/meal)  Code Status: DNR-CC            Johny Brambila MD, MD 8/21/2021 2:28 PM

## 2021-08-21 NOTE — FLOWSHEET NOTE
08/21/21 0744   Vital Signs   Temp 97 °F (36.1 °C)   Temp Source Oral   Pulse 83   Heart Rate Source Monitor   Resp 16   /68   BP Location Right lower arm   Patient Position Semi fowlers   Level of Consciousness Alert (0)   MEWS Score 1   Patient Currently in Pain No   Pain Assessment   Pain Assessment 0-10   Pain Level 0   Oxygen Therapy   SpO2 95 %   O2 Device Nasal cannula   O2 Flow Rate (L/min) 3 L/min   AM assessment completed, see flow sheet. Pt is alert and oriented. Vital signs are WNL. Respirations are even & easy. No complaints voiced. Pt. covid positive requiring oxygen. Pt denies needs at this time. SR up x 2, and bed in low position. Call light is within reach. Bedside Mobility Assessment Tool (BMAT):     Assessment Level 1- Sit and Shake    1. From a semi-reclined position, ask patient to sit up and rotate to a seated position at the side of the bed. Can use the bedrail. 2. Ask patient to reach out and grab your hand and shake making sure patient reaches across his/her midline. Pass- Patient is able to come to a seated position, maintain core strength. Maintains seated balance while reaching across midline. Move on to Assessment Level 2. Assessment Level 2- Stretch and Point   1. With patient in seated position at the side of the bed, have patient place both feet on the floor (or stool) with knees no higher than hips. 2. Ask patient to stretch one leg and straighten the knee, then bend the ankle/flex and point the toes. If appropriate, repeat with the other leg. Pass- Patient is able to demonstrate appropriate quad strength on intended weight bearing limb(s). Move onto Assessment Level 3. Assessment Level 3- Stand   1. Ask patient to elevate off the bed or chair (seated to standing) using an assistive device (cane, bedrail). 2. Patient should be able to raise buttocks off be and hold for a count of five. May repeat once.    Pass- Patient maintains standing stability for at least 5 seconds, proceed to assessment level 4. Assessment Level 4- Walk   1. Ask patient to march in place at bedside. 2. Then ask patient to advance step and return each foot. Some medical conditions may render a patient from stepping backwards, use your best clinical judgement. Fail- Patient not able to complete tasks OR requires use of assistive device. Patient is MOBILITY LEVEL 3. Mobility Level- 3    Patient is not able to demonstrated the ability to move from a reclining position to an upright position within the recliner.  however patient is alert, oriented and able to provide informed consent

## 2021-08-21 NOTE — PLAN OF CARE
Problem: Falls - Risk of:  Goal: Will remain free from falls  Description: Will remain free from falls  8/21/2021 1016 by Meggan Bridges RN  Outcome: Ongoing  8/21/2021 0330 by Fabiano Crespo RN  Outcome: Ongoing     Problem: Airway Clearance - Ineffective  Goal: Achieve or maintain patent airway  8/21/2021 1016 by Meggna Bridges RN  Outcome: Ongoing  8/21/2021 0330 by Fabiano Crespo RN  Outcome: Ongoing     Problem: Gas Exchange - Impaired  Goal: Absence of hypoxia  8/21/2021 0330 by Fabiano Crespo RN  Outcome: Ongoing  Goal: Promote optimal lung function  8/21/2021 0330 by Fabiano Crespo RN  Outcome: Ongoing     Problem: Breathing Pattern - Ineffective  Goal: Ability to achieve and maintain a regular respiratory rate  8/21/2021 1016 by Meggan Bridges RN  Outcome: Ongoing  8/21/2021 0330 by Fabiano Crespo RN  Outcome: Ongoing     Problem:  Body Temperature -  Risk of, Imbalanced  Goal: Ability to maintain a body temperature within defined limits  8/21/2021 0330 by Fabiano Crespo RN  Outcome: Ongoing  Goal: Will regain or maintain usual level of consciousness  8/21/2021 0330 by Fabiano Crespo RN  Outcome: Ongoing  Goal: Complications related to the disease process, condition or treatment will be avoided or minimized  8/21/2021 0330 by Fabiano Crespo RN  Outcome: Ongoing     Problem: Isolation Precautions - Risk of Spread of Infection  Goal: Prevent transmission of infection  8/21/2021 0330 by Fabiano Crespo RN  Outcome: Ongoing     Problem: Nutrition Deficits  Goal: Optimize nutritional status  8/21/2021 0330 by Fabiano Crespo RN  Outcome: Ongoing     Problem: Risk for Fluid Volume Deficit  Goal: Maintain normal heart rhythm  8/21/2021 0330 by Fabiano Crespo RN  Outcome: Ongoing  Goal: Maintain absence of muscle cramping  8/21/2021 0330 by Fabiano Crespo RN  Outcome: Ongoing  Goal: Maintain normal serum potassium, sodium, calcium, phosphorus, and pH  8/21/2021 0330 by Fabiano Crespo RN  Outcome:

## 2021-08-21 NOTE — PROGRESS NOTES
Occupational Therapy Daily Treatment Note    Unit: 2 [place]  Date:  8/21/2021  Patient Name:    Damion Dale  Admitting diagnosis:  Hypoxia [R09.02]  COPD exacerbation ([facility] [address] [J44.1]  COVID-19 [U07.1]  Admit Date:  8/19/2021  Precautions/Restrictions:  fall risk, IV, bed/chair alarm, supplemental O2 (3L), telemetry, continuous pulse ox and Droplet Plus precautions (+ COVID 19)        Discharge Recommendations: LTC/ECF  DME needs for discharge: defer to facility       Therapy recommendations for staff:   Assist of 1 (contact guard assist) with use of rolling walker (RW) for all ambulation to/from BSC/chair    AM-PAC Score: AM-PAC Inpatient Daily Activity Raw Score: 19  Home Health S4 Level: NA       Treatment Time:  1229-6952  Treatment number:  2   Total Treatment Time:   42 minutes    History of Present Illness: From Yoel Morin's note 8/19:  \"The patient is K 27 y. o. female with atrial fib, alzheimer's dementia, COPD, HTN, HLD who presented to [facility] with complaint of illness.  Patient reports feeling unwell for the last 3 days. Cori Ennis has had NP cough, fever, nausea, general weakness.  She started to feel short of breath and was sent to ER last night where she was found to be COVID 19 + and hypoxic requiring supplemental O2.  She was vaccinated for COVID 19, completed series Jan 12, 2021.  She is on daily prednisone 2.5 mg\"    Subjective:  Pt agreeable to therapy supine in bed    Pain   No  Rating: NA  Location:  Pain Medicine Status: No request made      Bed Mobility:   Supine to Sit:  Min A  Sit to Supine:  Min A  Rolling:           Not Tested  Scooting:        Min A    Transfer Training:   Sit to stand:   Min A  Stand to sit:  Min A  Bed to Chair:  Min A  Bed to [facility]:   Min A  Standard toilet:   Not Tested    Activity Tolerance   Pt completed therapy session with Dizziness noted with initially during session  SpO2: O2 sats dropped initially from sup to sit 88% on 3 L O2, rest of session with activity remained 91% and above on 3 L O2, improved with purse lip breathing  HR:   BP:     ADL Training:   Upper body dressing:  Not Tested  Upper body bathing:  Not Tested  Lower body dressing:  Not Tested  Lower body bathing:  Not Tested  Toileting:   Min A  Grooming/Hygiene:  SBA    Therapeutic Exercise:  Standing exercise, AAROM shld ROM  Shoulder flex/ext:  x10  Shoulder abd/add:  x10  Shoulder horizontal abd/add:  x10   Elbow flex/ext x10    Patient Education:   Role of OT  Recommendations for DC  Safe RW use/hand placement    Positioning Needs: In bed, call light and needs in reach. Alarm Set    Family Present:  No    Assessment: Pt improved activity tolerance this session. Pt performed multiple transfers to chair, bed, BSC with RW with Min A and cues for safety. Pt performed standing exercise with arms with min A for occasional LOB. Recommend continued acute OT and therapy at LTC. GOALS  To be met in 3 Visits:  1). Bed to toilet/BSC: Supervision     To be met in 5 Visits:  1). Supine to/from Sit:             Supervision  2). Upper Body Bathing:         Supervision  3). Lower Body Bathing:         Min A   4). Upper Body Dressing:       Supervision  5). Lower Body Dressing:       Min A   6).  Pt to demonstrate UE exs x 15 reps with minimal cues         Plan: cont with POC      Jordana Cooper OTR/L [id number]      If patient discharges from this facility prior to next visit, this note will serve as the Discharge Summary

## 2021-08-22 LAB
A/G RATIO: 0.8 (ref 1.1–2.2)
ALBUMIN SERPL-MCNC: 3.1 G/DL (ref 3.4–5)
ALP BLD-CCNC: 52 U/L (ref 40–129)
ALT SERPL-CCNC: 19 U/L (ref 10–40)
ANION GAP SERPL CALCULATED.3IONS-SCNC: 12 MMOL/L (ref 3–16)
AST SERPL-CCNC: 29 U/L (ref 15–37)
BILIRUB SERPL-MCNC: 0.3 MG/DL (ref 0–1)
BUN BLDV-MCNC: 20 MG/DL (ref 7–20)
CALCIUM SERPL-MCNC: 9 MG/DL (ref 8.3–10.6)
CHLORIDE BLD-SCNC: 96 MMOL/L (ref 99–110)
CO2: 25 MMOL/L (ref 21–32)
CREAT SERPL-MCNC: 0.7 MG/DL (ref 0.6–1.2)
GFR AFRICAN AMERICAN: >60
GFR NON-AFRICAN AMERICAN: >60
GLOBULIN: 3.7 G/DL
GLUCOSE BLD-MCNC: 134 MG/DL (ref 70–99)
GLUCOSE BLD-MCNC: 160 MG/DL (ref 70–99)
GLUCOSE BLD-MCNC: 166 MG/DL (ref 70–99)
GLUCOSE BLD-MCNC: 196 MG/DL (ref 70–99)
GLUCOSE BLD-MCNC: 202 MG/DL (ref 70–99)
GLUCOSE BLD-MCNC: 219 MG/DL (ref 70–99)
PERFORMED ON: ABNORMAL
POTASSIUM SERPL-SCNC: 3.2 MMOL/L (ref 3.5–5.1)
SODIUM BLD-SCNC: 133 MMOL/L (ref 136–145)
TOTAL PROTEIN: 6.8 G/DL (ref 6.4–8.2)

## 2021-08-22 PROCEDURE — 97530 THERAPEUTIC ACTIVITIES: CPT

## 2021-08-22 PROCEDURE — 94761 N-INVAS EAR/PLS OXIMETRY MLT: CPT

## 2021-08-22 PROCEDURE — 2700000000 HC OXYGEN THERAPY PER DAY

## 2021-08-22 PROCEDURE — 80053 COMPREHEN METABOLIC PANEL: CPT

## 2021-08-22 PROCEDURE — 2580000003 HC RX 258: Performed by: PHYSICIAN ASSISTANT

## 2021-08-22 PROCEDURE — 6370000000 HC RX 637 (ALT 250 FOR IP): Performed by: PHYSICIAN ASSISTANT

## 2021-08-22 PROCEDURE — 97535 SELF CARE MNGMENT TRAINING: CPT

## 2021-08-22 PROCEDURE — 94640 AIRWAY INHALATION TREATMENT: CPT

## 2021-08-22 PROCEDURE — 2580000003 HC RX 258: Performed by: NURSE PRACTITIONER

## 2021-08-22 PROCEDURE — 6370000000 HC RX 637 (ALT 250 FOR IP): Performed by: NURSE PRACTITIONER

## 2021-08-22 PROCEDURE — 99233 SBSQ HOSP IP/OBS HIGH 50: CPT | Performed by: INTERNAL MEDICINE

## 2021-08-22 PROCEDURE — 2500000003 HC RX 250 WO HCPCS: Performed by: PHYSICIAN ASSISTANT

## 2021-08-22 PROCEDURE — 6360000002 HC RX W HCPCS: Performed by: NURSE PRACTITIONER

## 2021-08-22 PROCEDURE — 97116 GAIT TRAINING THERAPY: CPT

## 2021-08-22 PROCEDURE — 6370000000 HC RX 637 (ALT 250 FOR IP): Performed by: INTERNAL MEDICINE

## 2021-08-22 PROCEDURE — 36415 COLL VENOUS BLD VENIPUNCTURE: CPT

## 2021-08-22 PROCEDURE — 1200000000 HC SEMI PRIVATE

## 2021-08-22 PROCEDURE — 97110 THERAPEUTIC EXERCISES: CPT

## 2021-08-22 RX ORDER — POTASSIUM CHLORIDE 20 MEQ/1
40 TABLET, EXTENDED RELEASE ORAL ONCE
Status: COMPLETED | OUTPATIENT
Start: 2021-08-22 | End: 2021-08-22

## 2021-08-22 RX ORDER — LOPERAMIDE HYDROCHLORIDE 2 MG/1
2 CAPSULE ORAL 4 TIMES DAILY PRN
Status: DISCONTINUED | OUTPATIENT
Start: 2021-08-22 | End: 2021-08-23 | Stop reason: HOSPADM

## 2021-08-22 RX ADMIN — LOSARTAN POTASSIUM 100 MG: 100 TABLET, FILM COATED ORAL at 09:57

## 2021-08-22 RX ADMIN — Medication 2 PUFF: at 18:58

## 2021-08-22 RX ADMIN — DULOXETINE 20 MG: 20 CAPSULE, DELAYED RELEASE ORAL at 20:50

## 2021-08-22 RX ADMIN — DEXAMETHASONE SODIUM PHOSPHATE 6 MG: 10 INJECTION, SOLUTION INTRAMUSCULAR; INTRAVENOUS at 09:58

## 2021-08-22 RX ADMIN — SODIUM CHLORIDE, PRESERVATIVE FREE 10 ML: 5 INJECTION INTRAVENOUS at 20:51

## 2021-08-22 RX ADMIN — CARVEDILOL 6.25 MG: 6.25 TABLET, FILM COATED ORAL at 09:58

## 2021-08-22 RX ADMIN — SODIUM CHLORIDE 25 ML: 9 INJECTION, SOLUTION INTRAVENOUS at 17:27

## 2021-08-22 RX ADMIN — GUAIFENESIN 200 MG: 100 SOLUTION ORAL at 20:49

## 2021-08-22 RX ADMIN — PREGABALIN 50 MG: 25 CAPSULE ORAL at 09:57

## 2021-08-22 RX ADMIN — INSULIN GLARGINE 20 UNITS: 100 INJECTION, SOLUTION SUBCUTANEOUS at 20:51

## 2021-08-22 RX ADMIN — DULOXETINE HYDROCHLORIDE 60 MG: 60 CAPSULE, DELAYED RELEASE ORAL at 09:57

## 2021-08-22 RX ADMIN — POTASSIUM CHLORIDE 40 MEQ: 1500 TABLET, EXTENDED RELEASE ORAL at 17:30

## 2021-08-22 RX ADMIN — ASPIRIN 81 MG: 81 TABLET, CHEWABLE ORAL at 09:57

## 2021-08-22 RX ADMIN — Medication 2 PUFF: at 07:37

## 2021-08-22 RX ADMIN — SENNOSIDES 8.6 MG: 8.6 TABLET, FILM COATED ORAL at 09:58

## 2021-08-22 RX ADMIN — INSULIN GLARGINE 20 UNITS: 100 INJECTION, SOLUTION SUBCUTANEOUS at 10:01

## 2021-08-22 RX ADMIN — POTASSIUM CHLORIDE 40 MEQ: 750 TABLET, EXTENDED RELEASE ORAL at 09:58

## 2021-08-22 RX ADMIN — IMIPRAMINE HYDROCHLORIDE 50 MG: 25 TABLET ORAL at 20:50

## 2021-08-22 RX ADMIN — APIXABAN 5 MG: 5 TABLET, FILM COATED ORAL at 09:58

## 2021-08-22 RX ADMIN — FENOFIBRATE 160 MG: 160 TABLET ORAL at 09:58

## 2021-08-22 RX ADMIN — CALCIUM CARBONATE-VITAMIN D TAB 500 MG-200 UNIT 1 TABLET: 500-200 TAB at 20:50

## 2021-08-22 RX ADMIN — GUAIFENESIN 600 MG: 600 TABLET, EXTENDED RELEASE ORAL at 20:50

## 2021-08-22 RX ADMIN — GUAIFENESIN 600 MG: 600 TABLET, EXTENDED RELEASE ORAL at 09:57

## 2021-08-22 RX ADMIN — SODIUM CHLORIDE: 9 INJECTION, SOLUTION INTRAVENOUS at 10:00

## 2021-08-22 RX ADMIN — ATORVASTATIN CALCIUM 40 MG: 40 TABLET, FILM COATED ORAL at 20:50

## 2021-08-22 RX ADMIN — FLUTICASONE PROPIONATE 2 SPRAY: 50 SPRAY, METERED NASAL at 09:56

## 2021-08-22 RX ADMIN — PREGABALIN 50 MG: 25 CAPSULE ORAL at 20:50

## 2021-08-22 RX ADMIN — CETIRIZINE HYDROCHLORIDE 10 MG: 10 TABLET ORAL at 09:57

## 2021-08-22 RX ADMIN — AMLODIPINE BESYLATE 5 MG: 5 TABLET ORAL at 09:57

## 2021-08-22 RX ADMIN — FUROSEMIDE 20 MG: 20 TABLET ORAL at 09:58

## 2021-08-22 RX ADMIN — LOPERAMIDE HYDROCHLORIDE 2 MG: 2 CAPSULE ORAL at 17:30

## 2021-08-22 RX ADMIN — CARVEDILOL 6.25 MG: 6.25 TABLET, FILM COATED ORAL at 17:30

## 2021-08-22 RX ADMIN — Medication 500 MCG: at 09:58

## 2021-08-22 RX ADMIN — APIXABAN 5 MG: 5 TABLET, FILM COATED ORAL at 20:50

## 2021-08-22 RX ADMIN — INSULIN LISPRO 1 UNITS: 100 INJECTION, SOLUTION INTRAVENOUS; SUBCUTANEOUS at 21:01

## 2021-08-22 RX ADMIN — PANTOPRAZOLE SODIUM 40 MG: 40 TABLET, DELAYED RELEASE ORAL at 06:40

## 2021-08-22 RX ADMIN — TRAMADOL HYDROCHLORIDE 50 MG: 50 TABLET, FILM COATED ORAL at 20:50

## 2021-08-22 RX ADMIN — Medication 1000 UNITS: at 09:57

## 2021-08-22 RX ADMIN — CLONAZEPAM 1 MG: 1 TABLET ORAL at 20:50

## 2021-08-22 RX ADMIN — HYDROCHLOROTHIAZIDE 12.5 MG: 25 TABLET ORAL at 09:58

## 2021-08-22 RX ADMIN — CALCIUM CARBONATE-VITAMIN D TAB 500 MG-200 UNIT 1 TABLET: 500-200 TAB at 09:56

## 2021-08-22 RX ADMIN — MAGNESIUM GLUCONATE 500 MG ORAL TABLET 400 MG: 500 TABLET ORAL at 09:57

## 2021-08-22 RX ADMIN — REMDESIVIR 100 MG: 100 INJECTION, POWDER, LYOPHILIZED, FOR SOLUTION INTRAVENOUS at 17:28

## 2021-08-22 RX ADMIN — PREGABALIN 50 MG: 25 CAPSULE ORAL at 14:47

## 2021-08-22 RX ADMIN — FERROUS SULFATE TAB 325 MG (65 MG ELEMENTAL FE) 325 MG: 325 (65 FE) TAB at 09:57

## 2021-08-22 ASSESSMENT — PAIN DESCRIPTION - LOCATION: LOCATION: BACK

## 2021-08-22 ASSESSMENT — PAIN SCALES - GENERAL
PAINLEVEL_OUTOF10: 0
PAINLEVEL_OUTOF10: 4
PAINLEVEL_OUTOF10: 0
PAINLEVEL_OUTOF10: 4

## 2021-08-22 ASSESSMENT — PAIN DESCRIPTION - PAIN TYPE: TYPE: CHRONIC PAIN

## 2021-08-22 NOTE — PROGRESS NOTES
Occupational Therapy Daily Treatment Note    Unit: 2 [place]  Date:  8/22/2021  Patient Name:    Jolie Ayala  Admitting diagnosis:  Hypoxia [R09.02]  COPD exacerbation ([facility] [address] [J44.1]  COVID-19 [U07.1]  Admit Date:  8/19/2021  Precautions/Restrictions:  fall risk, IV, bed/chair alarm, supplemental O2 (3L), telemetry, continuous pulse ox and Droplet Plus precautions (+ COVID 19)    Treatment Time:  4690-0989  Treatment number:  3   Total Treatment Time:   40 minutes    Patient Goals for Session:  \" to get out of this bed \"      Discharge Recommendations: LTC/ECF and HHOT  DME needs for discharge: defer to facility       Therapy recommendations for staff:  Assist of 1 with use of rolling walker for all ambulation within room    History of Present Illness: From Mode Morin's note 8/19:  \"The patient is V 65 y. o. female with atrial fib, alzheimer's dementia, COPD, HTN, HLD who presented to [facility] with complaint of illness.  Patient reports feeling unwell for the last 3 days. Alex Morning has had NP cough, fever, nausea, general weakness.  She started to feel short of breath and was sent to ER last night where she was found to be COVID 19 + and hypoxic requiring supplemental O2.  She was vaccinated for COVID 19, completed series Jan 12, 2021.  She is on daily prednisone 2.5 mg\"    Home Health S4 Level Recommendation:  NA    AM-PAC Score: AM-PAC Inpatient Daily Activity Raw Score: 19    Subjective:  Pt supine in bed upon therapist arrival. Pt agreeable to work with therapy this date. Pt expressed feelings of loneliness. Supportive counseling provided. Pain   Yes  Rating: mild  Location: B feet, baseline, neuropathy related   Pain Medicine Status: Denies need      Cognition:    A&O Person and Place   Able to follow 2 step commands, consistently. Balance:  Functional Sitting Balance:   WNL   Comments: Good at EOB   Functional Standing Balance:Diminished   Comments: Good - with mobility     Bed mobility:    Supine to sit:   supervision  Sit to supine:   Not Tested  Rolling:    Not Tested  Scooting in sitting:  supervision  Scooting to head of bed:   Not Tested   Bridging:   Not Tested    Transfers:    Sit to stand:  supervision  Stand to sit:  supervision  Bed to chair:   supervision and with rolling walker   Standard toilet: Not Tested  Bed to [facility]:  supervision and with rolling walker     Activities of Daily Living:   UB Dressing:   Not Tested  LB Dressing:    minimal assistance (25%)  UB Bathing:  Not Tested  LB Bathing:  Not Tested  Feeding:  Independent  Grooming:   Not Tested  Toileting:  SBA    Activity Tolerance:   Pt completed therapy session with No adverse symptoms noted w/activity    Therapeutic Exercise:   N/A    Patient Education:   Role of OT  Recommendations for DC    Positioning Needs:   Up in chair, call light and needs in reach. Alarm Set    Family Present:  No    Assessment: Pt with good progress this date. Pt tolerated toileting and mobility. Pt may benefit from continued skilled occupational therapy while in the hospital in order to progress to a safe and more indpt level of functioning. GOALS  To be met in 3 Visits:  1). Bed to toilet/BSC: Supervision     To be met in 5 Visits:  1). Supine to/from Sit:             Supervision  2). Upper Body Bathing:         Supervision  3). Lower Body Bathing:         Min A   4). Upper Body Dressing:       Supervision  5). Lower Body Dressing:       Min A   6).  Pt to demonstrate UE exs x 15 reps with minimal cues      Plan: cont with POC      Davon Backer, MOT, OTR/L   [id number]       If patient discharges from this facility prior to next visit, this note will serve as the Discharge Summary

## 2021-08-22 NOTE — PROGRESS NOTES
Inpatient Physical Therapy Daily Treatment Note    Unit: [address]  Date:  8/22/2021  Patient Name:    Jessica Brown  Admitting diagnosis:  Hypoxia [R09.02]  COPD exacerbation ([address] [J44.1]  COVID-19 [U07.1]  Admit Date:  8/19/2021  Precautions/Restrictions:  Fall risk, Bed/chair alarm, Lines -IV and Supplemental O2 (3L), [place] (hard of hearing), Telemetry, Continuous pulse oximetry and Isolation Precautions: Droplet Plus - COVID, R elbow chronically dislocated      Discharge Recommendations: Return to [facility] needs for discharge: defer to facility       Therapy recommendation for EMS Transport: can transport by wheelchair    Therapy recommendations for staff:   Assist of 1 with use of rolling walker (RW) and gait belt for all transfers and ambulation to/from [facility]  to/from chair  to/from bathroom    History of Present Illness: From Greg Morin's note 8/19: The patient is a 68 y. o. female with atrial fib, alzheimer's dementia, COPD, HTN, HLD who presented to [facility] with complaint of illness.  Patient reports feeling unwell for the last 3 days. Jen Storey has had NP cough, fever, nausea, general weakness.  She started to feel short of breath and was sent to ER last night where she was found to be COVID 19 + and hypoxic requiring supplemental O2.  She was vaccinated for COVID 19, completed series Jan 12, 2021.  She is on daily prednisone 2.5 mg    Home Health S4 Level Recommendation: NA  AM-PAC Mobility Score   AM-PAC Inpatient Mobility Raw Score : 17       Treatment Time:  10:05 - 10:44  Treatment number: 2  Timed Code Treatment Minutes: 39 minutes  Total Treatment Minutes:  39  minutes    Cognition    A&O Person, Place and Situation   Able to follow 2 step commands    Subjective  Patient lying supine in bed with no family present   Pt agreeable to this PT tx.      Pain   No  Location:   Rating:    NA/10  Pain Medicine Status: Denies need     Bed Mobility   Supine to Sit:    Min A   Sit to Supine:   SBA  Rolling:   Min A Scooting:   SBA    Transfer Training     Sit to stand:   CGA  Stand to sit:   CGA  Bed to Chair:   Not Tested with use of N/A    Gait Training gait completed as indicated below  Distance:      45 ft  Deviations (firm surface/linoleum):  decreased lev, forward flexed posture, step through pattern and decreased step length bilaterally  Assistive Device Used:    gait belt and rolling walker (RW)  Level of Assist:    CGA  Comment:     Stair Training deferred, pt unsafe/not appropriate to complete stairs at this time  # of Steps:   N/A  Level of Assist:  Not Tested  UE Support:  NA  Assistive Device:  N/A  Pattern:   N/A  Comments:     Therapeutic Exercise all completed bilaterally unless indicated  Ankle Pumps x 20 reps  Heel slides x 20 reps  LAQ x 20 reps  SLR x 15 reps  marching x 20 reps  Hip abduction: x 15 reps    Balance  Sitting:  Good ; SBA  Comments: sat EOB ~ 10 min    Standing: Fair ; CGA  Comments: with RW    Patient Education      Role of PT, POC, Discharge recommendations, safety awareness, pursed lip breathing, pacing activity and calling for assist with mobility. Positioning Needs       Pt in bed, alarm set, positioned in proper neutral alignment and pressure relief provided. Call light provided and all needs within reach    Activity Tolerance   Pt completed therapy session with No adverse symptoms noted w/activity. Vitals stable. Pt declined SOB/MONK. Other    Assessment :  Patient demonstrated good participation in therapy session despite c/o feeling tire today. Frequent rest breaks required 2/2 bouts of coughing. Continue to progress gait distances and overall endurance. Pt planning to return to [facility] at D/C. Recommending return to [facility] as patient functioning at or close to baseline level. Goals (all goals ongoing unless otherwise indicated)  To be met in 3 visits:  1). Independent with LE Ex x 10 reps     To be met in 6 visits:  1). Supine to/from sit: Supervision  2).   Sit to/from

## 2021-08-22 NOTE — PROGRESS NOTES
2 spray Nasal Daily    furosemide  20 mg Oral Daily    guaiFENesin  600 mg Oral BID    cetirizine  10 mg Oral Daily    pantoprazole  40 mg Oral QAM AC    potassium chloride  40 mEq Oral Daily    pregabalin  50 mg Oral TID    senna  1 tablet Oral BID    sodium chloride flush  5-40 mL Intravenous 2 times per day    insulin lispro  0-6 Units Subcutaneous TID WC    insulin lispro  0-3 Units Subcutaneous Nightly    dexamethasone  6 mg Intravenous Q24H    magnesium oxide  400 mg Oral Daily    atorvastatin  40 mg Oral Nightly    diclofenac sodium  2 g Topical Nightly    vitamin B-12  500 mcg Oral Daily    Vitamin D  1,000 Units Oral Daily    losartan  100 mg Oral Daily    And    hydroCHLOROthiazide  12.5 mg Oral Daily    insulin glargine  20 Units Subcutaneous BID    imipramine  50 mg Oral Nightly    remdesivir IVPB  100 mg Intravenous Q24H    budesonide-formoterol  2 puff Inhalation BID       Continuous Infusions:   sodium chloride      dextrose         Data:  CBC:   Recent Labs     08/20/21  0543   WBC 4.2   RBC 3.78*   HGB 11.2*   HCT 33.9*   MCV 89.7   RDW 13.7   *     BMP:   Recent Labs     08/20/21  0543 08/21/21  0619 08/22/21  0652    139 133*   K 4.0 3.5 3.2*    99 96*   CO2 29 28 25   BUN 27* 21* 20   CREATININE 0.8 0.7 0.7     BNP: No results for input(s): BNP in the last 72 hours. PT/INR: No results for input(s): PROTIME, INR in the last 72 hours. APTT: No results for input(s): APTT in the last 72 hours. CARDIAC ENZYMES:   No results for input(s): CKMB, CKMBINDEX, TROPONINI in the last 72 hours.     Invalid input(s): CKTOTAL;3  FASTING LIPID PANEL:No results found for: CHOL, HDL, TRIG  LIVER PROFILE:   Recent Labs     08/20/21  0543 08/21/21  0619 08/22/21  0652   AST 24 28 29   ALT 18 21 19   BILITOT <0.2 <0.2 0.3   ALKPHOS 51 51 52        CULTURES  COVID 19, PCR: detected      EKG:  I have reviewed the EKG with the following interpretation:   Atrial

## 2021-08-22 NOTE — PROGRESS NOTES
Pulmonary Progress Note  CC:  COVID-19 pneumonia    Subjective:  Feels okay, oxygen saturations remained stable, wants to go home    IV line peripheral    EXAM:   BP (!) 142/81   Pulse 69   Temp 98.1 °F (36.7 °C) (Oral)   Resp 18   Ht 5' 7\" (1.702 m)   Wt 160 lb (72.6 kg)   SpO2 97%   BMI 25.06 kg/m²  on 3 L  General: NAD  Eyes: PERRL. No sclera icterus. No conjunctival injection. ENT: No discharge. Pharynx clear. Neck: Trachea midline. Normal thyroid. Resp: No accessory muscle use. No crackles. No wheezing. No rhonchi. No dullness on percussion. CV: Regular rate. Regular rhythm. No mumur or rub. No edema. Peripheral pulses are 2+. Capillary refill is less than 3 seconds. GI: Non-tender. Non-distended. No masses. No organomegaly. Normal bowel sounds. No hernia. Skin: Warm and dry. No nodule on exposed extremities. No rash on exposed extremities. Lymph: No cervical LAD. No supraclavicular LAD. M/S: No cyanosis. No joint deformity. No clubbing. Neuro: A&O to person and events. Patellar reflexes are symmetric. Psych: No agitation, no anxiety, affect is full.      Scheduled Meds:   amLODIPine  5 mg Oral Daily    apixaban  5 mg Oral BID    aspirin  81 mg Oral Daily    calcium-vitamin D  1 tablet Oral BID    carvedilol  6.25 mg Oral BID WC    clonazePAM  1 mg Oral Nightly    DULoxetine  20 mg Oral Nightly    DULoxetine  60 mg Oral QAM    fenofibrate  160 mg Oral Daily    ferrous sulfate  325 mg Oral Daily    fluticasone  2 spray Nasal Daily    furosemide  20 mg Oral Daily    guaiFENesin  600 mg Oral BID    cetirizine  10 mg Oral Daily    pantoprazole  40 mg Oral QAM AC    potassium chloride  40 mEq Oral Daily    pregabalin  50 mg Oral TID    senna  1 tablet Oral BID    sodium chloride flush  5-40 mL Intravenous 2 times per day    insulin lispro  0-6 Units Subcutaneous TID WC    insulin lispro  0-3 Units Subcutaneous Nightly    dexamethasone  6 mg Intravenous Q24H    magnesium oxide  400 mg Oral Daily    atorvastatin  40 mg Oral Nightly    diclofenac sodium  2 g Topical Nightly    vitamin B-12  500 mcg Oral Daily    Vitamin D  1,000 Units Oral Daily    losartan  100 mg Oral Daily    And    hydroCHLOROthiazide  12.5 mg Oral Daily    insulin glargine  20 Units Subcutaneous BID    imipramine  50 mg Oral Nightly    remdesivir IVPB  100 mg Intravenous Q24H    budesonide-formoterol  2 puff Inhalation BID     Continuous Infusions:   sodium chloride 25 mL (08/22/21 1727)    dextrose       PRN Meds:  loperamide, albuterol sulfate HFA, guaiFENesin, traMADol, sodium chloride flush, sodium chloride, ondansetron **OR** ondansetron, polyethylene glycol, acetaminophen **OR** acetaminophen, glucose, dextrose, glucagon (rDNA), dextrose    Labs:  CBC:   Recent Labs     08/20/21  0543   WBC 4.2   HGB 11.2*   HCT 33.9*   MCV 89.7   *     BMP:   Recent Labs     08/20/21  0543 08/21/21  0619 08/22/21  0652    139 133*   K 4.0 3.5 3.2*    99 96*   CO2 29 28 25   BUN 27* 21* 20   CREATININE 0.8 0.7 0.7     CRP is 67    Micro:  8/19/2021 SARS-CoV-2 positive    Imaging:  Chest imaging was reviewed by me and showed   CXR 8/19/2021 I suspect right lower lobe infiltrate, however the overlying external devices/wires make this final interpretation difficult    ASSESSMENT:  · COVID-19 pneumonia in a vaccinated patient  · Acute hypoxemic respiratory failure  · Atrial fibrillation on Eliquis  · COPD  · Dementia the Alzheimer's type  · Chronic pain syndrome  · Hyperglycemia    PLAN:   COVID-19 isolation, droplet plus   Supplemental oxygen to maintain SaO2 >92%; monitor saturations closely    Remdesevir D#4, LFT monitoring for high risk medication   Decadron D#4, 6 mg daily    Inhaled bronchodilators only as needed, MDI preferred    On Eliquis

## 2021-08-22 NOTE — FLOWSHEET NOTE
08/22/21 0752   Vital Signs   Temp 97 °F (36.1 °C)   Temp Source Oral   Pulse 75   Heart Rate Source Monitor   Resp 18   /67   BP Location Right lower arm   Patient Position Supine   Level of Consciousness Alert (0)   MEWS Score 1   Patient Currently in Pain No   Pain Assessment   Pain Assessment 0-10   Pain Level 0   Oxygen Therapy   SpO2 96 %   O2 Device Nasal cannula   O2 Flow Rate (L/min) 3 L/min   AM assessment completed, see flow sheet. Pt is alert and oriented. Vital signs are WNL. Respirations are even & easy. No complaints voiced. Pt denies needs at this time. SR up x 2, and bed in low position. Call light is within reach. Bedside Mobility Assessment Tool (BMAT):     Assessment Level 1- Sit and Shake    1. From a semi-reclined position, ask patient to sit up and rotate to a seated position at the side of the bed. Can use the bedrail. 2. Ask patient to reach out and grab your hand and shake making sure patient reaches across his/her midline. Pass- Patient is able to come to a seated position, maintain core strength. Maintains seated balance while reaching across midline. Move on to Assessment Level 2. Assessment Level 2- Stretch and Point   1. With patient in seated position at the side of the bed, have patient place both feet on the floor (or stool) with knees no higher than hips. 2. Ask patient to stretch one leg and straighten the knee, then bend the ankle/flex and point the toes. If appropriate, repeat with the other leg. Pass- Patient is able to demonstrate appropriate quad strength on intended weight bearing limb(s). Move onto Assessment Level 3. Assessment Level 3- Stand   1. Ask patient to elevate off the bed or chair (seated to standing) using an assistive device (cane, bedrail). 2. Patient should be able to raise buttocks off be and hold for a count of five. May repeat once.    Pass- Patient maintains standing stability for at least 5 seconds, proceed to assessment level 4. Assessment Level 4- Walk   1. Ask patient to march in place at bedside. 2. Then ask patient to advance step and return each foot. Some medical conditions may render a patient from stepping backwards, use your best clinical judgement. Fail- Patient not able to complete tasks OR requires use of assistive device. Patient is MOBILITY LEVEL 3. Mobility Level- 3    Patient is not able to demonstrated the ability to move from a reclining position to an upright position within the recliner.  however patient is alert, oriented and able to provide informed consent

## 2021-08-22 NOTE — PROGRESS NOTES
Remdesivir Monitoring   Day: __4__   Recent Labs     08/20/21  0543 08/21/21  9910 08/22/21  0652   WBC 4.2  --   --    BUN 27* 21* 20   CREATININE 0.8 0.7 0.7     Estimated Creatinine Clearance: 64 mL/min (based on SCr of 0.7 mg/dL). Recent Labs     08/20/21  0543 08/21/21  0619 08/22/21  0652   ALT 18 21 19   AST 24 28 29     Labs ok.    Continue with remdesivir 100mg daily

## 2021-08-23 VITALS
BODY MASS INDEX: 25.11 KG/M2 | OXYGEN SATURATION: 94 % | RESPIRATION RATE: 16 BRPM | DIASTOLIC BLOOD PRESSURE: 94 MMHG | HEIGHT: 67 IN | HEART RATE: 105 BPM | TEMPERATURE: 98.6 F | SYSTOLIC BLOOD PRESSURE: 143 MMHG | WEIGHT: 160 LBS

## 2021-08-23 LAB
A/G RATIO: 1.1 (ref 1.1–2.2)
ALBUMIN SERPL-MCNC: 3.8 G/DL (ref 3.4–5)
ALP BLD-CCNC: 56 U/L (ref 40–129)
ALT SERPL-CCNC: 21 U/L (ref 10–40)
ANION GAP SERPL CALCULATED.3IONS-SCNC: 10 MMOL/L (ref 3–16)
AST SERPL-CCNC: 28 U/L (ref 15–37)
BILIRUB SERPL-MCNC: 0.4 MG/DL (ref 0–1)
BUN BLDV-MCNC: 19 MG/DL (ref 7–20)
CALCIUM SERPL-MCNC: 9.6 MG/DL (ref 8.3–10.6)
CHLORIDE BLD-SCNC: 95 MMOL/L (ref 99–110)
CO2: 28 MMOL/L (ref 21–32)
CREAT SERPL-MCNC: 0.8 MG/DL (ref 0.6–1.2)
GFR AFRICAN AMERICAN: >60
GFR NON-AFRICAN AMERICAN: >60
GLOBULIN: 3.5 G/DL
GLUCOSE BLD-MCNC: 126 MG/DL (ref 70–99)
GLUCOSE BLD-MCNC: 133 MG/DL (ref 70–99)
GLUCOSE BLD-MCNC: 142 MG/DL (ref 70–99)
GLUCOSE BLD-MCNC: 154 MG/DL (ref 70–99)
PERFORMED ON: ABNORMAL
POTASSIUM SERPL-SCNC: 3.5 MMOL/L (ref 3.5–5.1)
SODIUM BLD-SCNC: 133 MMOL/L (ref 136–145)
TOTAL PROTEIN: 7.3 G/DL (ref 6.4–8.2)

## 2021-08-23 PROCEDURE — 6370000000 HC RX 637 (ALT 250 FOR IP): Performed by: PHYSICIAN ASSISTANT

## 2021-08-23 PROCEDURE — 2580000003 HC RX 258: Performed by: NURSE PRACTITIONER

## 2021-08-23 PROCEDURE — 80053 COMPREHEN METABOLIC PANEL: CPT

## 2021-08-23 PROCEDURE — 36415 COLL VENOUS BLD VENIPUNCTURE: CPT

## 2021-08-23 PROCEDURE — 99233 SBSQ HOSP IP/OBS HIGH 50: CPT | Performed by: INTERNAL MEDICINE

## 2021-08-23 PROCEDURE — 99239 HOSP IP/OBS DSCHRG MGMT >30: CPT | Performed by: INTERNAL MEDICINE

## 2021-08-23 PROCEDURE — 94640 AIRWAY INHALATION TREATMENT: CPT

## 2021-08-23 PROCEDURE — 6370000000 HC RX 637 (ALT 250 FOR IP): Performed by: NURSE PRACTITIONER

## 2021-08-23 PROCEDURE — 6360000002 HC RX W HCPCS: Performed by: NURSE PRACTITIONER

## 2021-08-23 RX ORDER — PREGABALIN 50 MG/1
50 CAPSULE ORAL 2 TIMES DAILY
Qty: 6 CAPSULE | Refills: 0 | Status: SHIPPED | OUTPATIENT
Start: 2021-08-23 | End: 2022-09-07

## 2021-08-23 RX ORDER — TRAMADOL HYDROCHLORIDE 50 MG/1
50 TABLET ORAL EVERY 6 HOURS PRN
Qty: 12 TABLET | Refills: 0 | Status: SHIPPED | OUTPATIENT
Start: 2021-08-23 | End: 2021-08-26

## 2021-08-23 RX ORDER — CLONAZEPAM 1 MG/1
1 TABLET ORAL NIGHTLY
Qty: 3 TABLET | Refills: 0 | Status: SHIPPED | OUTPATIENT
Start: 2021-08-23 | End: 2022-09-08

## 2021-08-23 RX ORDER — DEXAMETHASONE 6 MG/1
6 TABLET ORAL
Qty: 10 TABLET | Refills: 0
Start: 2021-08-23 | End: 2021-09-02

## 2021-08-23 RX ADMIN — DEXAMETHASONE SODIUM PHOSPHATE 6 MG: 10 INJECTION, SOLUTION INTRAMUSCULAR; INTRAVENOUS at 11:50

## 2021-08-23 RX ADMIN — FLUTICASONE PROPIONATE 2 SPRAY: 50 SPRAY, METERED NASAL at 11:51

## 2021-08-23 RX ADMIN — POTASSIUM CHLORIDE 40 MEQ: 750 TABLET, EXTENDED RELEASE ORAL at 11:49

## 2021-08-23 RX ADMIN — FENOFIBRATE 160 MG: 160 TABLET ORAL at 11:48

## 2021-08-23 RX ADMIN — MAGNESIUM GLUCONATE 500 MG ORAL TABLET 400 MG: 500 TABLET ORAL at 11:43

## 2021-08-23 RX ADMIN — CARVEDILOL 6.25 MG: 6.25 TABLET, FILM COATED ORAL at 11:45

## 2021-08-23 RX ADMIN — FERROUS SULFATE TAB 325 MG (65 MG ELEMENTAL FE) 325 MG: 325 (65 FE) TAB at 11:47

## 2021-08-23 RX ADMIN — GUAIFENESIN 600 MG: 600 TABLET, EXTENDED RELEASE ORAL at 11:46

## 2021-08-23 RX ADMIN — FUROSEMIDE 20 MG: 20 TABLET ORAL at 11:46

## 2021-08-23 RX ADMIN — AMLODIPINE BESYLATE 5 MG: 5 TABLET ORAL at 11:40

## 2021-08-23 RX ADMIN — CETIRIZINE HYDROCHLORIDE 10 MG: 10 TABLET ORAL at 11:46

## 2021-08-23 RX ADMIN — CALCIUM CARBONATE-VITAMIN D TAB 500 MG-200 UNIT 1 TABLET: 500-200 TAB at 11:44

## 2021-08-23 RX ADMIN — PREGABALIN 50 MG: 25 CAPSULE ORAL at 11:44

## 2021-08-23 RX ADMIN — Medication 2 PUFF: at 08:15

## 2021-08-23 RX ADMIN — DULOXETINE HYDROCHLORIDE 60 MG: 60 CAPSULE, DELAYED RELEASE ORAL at 11:40

## 2021-08-23 RX ADMIN — ASPIRIN 81 MG: 81 TABLET, CHEWABLE ORAL at 11:47

## 2021-08-23 RX ADMIN — SODIUM CHLORIDE, PRESERVATIVE FREE 10 ML: 5 INJECTION INTRAVENOUS at 11:52

## 2021-08-23 RX ADMIN — HYDROCHLOROTHIAZIDE 12.5 MG: 25 TABLET ORAL at 11:41

## 2021-08-23 RX ADMIN — APIXABAN 5 MG: 5 TABLET, FILM COATED ORAL at 11:44

## 2021-08-23 RX ADMIN — LOSARTAN POTASSIUM 100 MG: 100 TABLET, FILM COATED ORAL at 11:47

## 2021-08-23 RX ADMIN — Medication 500 MCG: at 11:43

## 2021-08-23 RX ADMIN — Medication 1000 UNITS: at 11:45

## 2021-08-23 ASSESSMENT — PAIN SCALES - GENERAL
PAINLEVEL_OUTOF10: 0

## 2021-08-23 NOTE — PROGRESS NOTES
Pt discharged with belongings via transport to [facility] at this time. Report given to transport as well as called to Almo at [facility]3].

## 2021-08-23 NOTE — PLAN OF CARE
Problem: Falls - Risk of:  Goal: Will remain free from falls  Description: Will remain free from falls  8/23/2021 1322 by Derian Mack RN  Outcome: Completed  8/23/2021 1322 by Derian Mack RN  Outcome: Ongoing  8/23/2021 0512 by Thais Zuniga RN  Outcome: Ongoing  Goal: Absence of physical injury  Description: Absence of physical injury  8/23/2021 1322 by Derian Mack RN  Outcome: Completed  8/23/2021 1322 by Derian Mack RN  Outcome: Ongoing  8/23/2021 0512 by Thais Zuniga RN  Outcome: Ongoing     Problem: Airway Clearance - Ineffective  Goal: Achieve or maintain patent airway  8/23/2021 1322 by Derian Mack RN  Outcome: Completed  8/23/2021 1322 by Derian Mack RN  Outcome: Ongoing  8/23/2021 0512 by Thais Zuniga RN  Outcome: Ongoing     Problem: Gas Exchange - Impaired  Goal: Absence of hypoxia  8/23/2021 1322 by Derian Mack RN  Outcome: Completed  8/23/2021 1322 by Derian Mack RN  Outcome: Ongoing  8/23/2021 0512 by Thais Zuniga RN  Outcome: Ongoing  Goal: Promote optimal lung function  8/23/2021 1322 by Derian Mack RN  Outcome: Completed  8/23/2021 1322 by Derian Mack RN  Outcome: Ongoing  8/23/2021 0512 by Thais Zuniga RN  Outcome: Ongoing     Problem: Breathing Pattern - Ineffective  Goal: Ability to achieve and maintain a regular respiratory rate  8/23/2021 1322 by Derian Mack RN  Outcome: Completed  8/23/2021 1322 by Derian Mack RN  Outcome: Ongoing  8/23/2021 0512 by Thais Zuniga RN  Outcome: Ongoing     Problem:  Body Temperature -  Risk of, Imbalanced  Goal: Ability to maintain a body temperature within defined limits  8/23/2021 1322 by Derian Mack RN  Outcome: Completed  8/23/2021 1322 by Derian Mack RN  Outcome: Ongoing  8/23/2021 0512 by Thais Zuniga RN  Outcome: Ongoing  Goal: Will regain or maintain usual level of consciousness  8/23/2021 1322 by Derian Mack RN  Outcome: Completed  8/23/2021 1322 by Derian Mack RN  Outcome: Ongoing  8/23/2021 0512 by Thais Zuniga RN  Outcome: Ongoing  Goal: Complications related to the disease process, condition or treatment will be avoided or minimized  8/23/2021 1322 by Gumaro Gramajo RN  Outcome: Completed  8/23/2021 1322 by Gumaro Gramajo RN  Outcome: Ongoing  8/23/2021 0512 by Tigre Mckenzie RN  Outcome: Ongoing     Problem: Isolation Precautions - Risk of Spread of Infection  Goal: Prevent transmission of infection  8/23/2021 1322 by Gumaro Gramajo RN  Outcome: Completed  8/23/2021 1322 by Gumaro Gramajo RN  Outcome: Ongoing  8/23/2021 0512 by Tigre Mckenzie RN  Outcome: Ongoing     Problem: Nutrition Deficits  Goal: Optimize nutritional status  8/23/2021 1322 by Gumaro Gramajo RN  Outcome: Completed  8/23/2021 1322 by Gumaro Gramajo RN  Outcome: Ongoing  8/23/2021 0512 by Tigre Mckenzie RN  Outcome: Ongoing     Problem: Risk for Fluid Volume Deficit  Goal: Maintain normal heart rhythm  8/23/2021 1322 by Gumaro Gramajo RN  Outcome: Completed  8/23/2021 1322 by Gumaro Gramajo RN  Outcome: Ongoing  8/23/2021 0512 by Tigre Mckenzie RN  Outcome: Ongoing  Goal: Maintain absence of muscle cramping  8/23/2021 1322 by Gumaro Gramajo RN  Outcome: Completed  8/23/2021 1322 by Gumaro Gramajo RN  Outcome: Ongoing  8/23/2021 0512 by Tigre Mckenzie RN  Outcome: Ongoing  Goal: Maintain normal serum potassium, sodium, calcium, phosphorus, and pH  8/23/2021 1322 by Gumaro Gramajo RN  Outcome: Completed  8/23/2021 1322 by Gumaro Gramajo RN  Outcome: Ongoing  8/23/2021 0512 by Tigre Mckenzie RN  Outcome: Ongoing     Problem: Loneliness or Risk for Loneliness  Goal: Demonstrate positive use of time alone when socialization is not possible  8/23/2021 1322 by Gumaro Gramajo RN  Outcome: Completed  8/23/2021 1322 by Gumaro Gramajo RN  Outcome: Ongoing  8/23/2021 0512 by Tigre Mckenzie RN  Outcome: Ongoing     Problem: Fatigue  Goal: Verbalize increase energy and improved vitality  8/23/2021 1322 by Gumaro Gramajo RN  Outcome: Completed  8/23/2021 1322 by Gumaro Gramajo RN  Outcome: Ongoing  8/23/2021 0512 by Junito Glaser RN  Outcome: Ongoing     Problem: Patient Education: Go to Patient Education Activity  Goal: Patient/Family Education  8/23/2021 1322 by Darlene Juares RN  Outcome: Completed  8/23/2021 1322 by Darlene Juares RN  Outcome: Ongoing  8/23/2021 0512 by Junito Glaser RN  Outcome: Ongoing     Problem: Skin Integrity:  Goal: Will show no infection signs and symptoms  Description: Will show no infection signs and symptoms  8/23/2021 1322 by Darlene Juares RN  Outcome: Completed  8/23/2021 1322 by Darlene Juares RN  Outcome: Ongoing  8/23/2021 0512 by Junito Glaser RN  Outcome: Ongoing  Goal: Absence of new skin breakdown  Description: Absence of new skin breakdown  8/23/2021 1322 by Darlene Juares RN  Outcome: Completed  8/23/2021 1322 by Darlene Juares RN  Outcome: Ongoing  8/23/2021 0512 by Junito Glaser RN  Outcome: Ongoing     Problem: Pain:  Goal: Pain level will decrease  Description: Pain level will decrease  8/23/2021 1322 by Darlene Juares RN  Outcome: Completed  8/23/2021 1322 by Darlene Juares RN  Outcome: Ongoing  8/23/2021 0512 by Junito Glaser RN  Outcome: Ongoing  Goal: Control of acute pain  Description: Control of acute pain  8/23/2021 1322 by Darlene Juares RN  Outcome: Completed  8/23/2021 1322 by Darlene Juares RN  Outcome: Ongoing  8/23/2021 0512 by Junito Glaser RN  Outcome: Ongoing  Goal: Control of chronic pain  Description: Control of chronic pain  8/23/2021 1322 by Darlene Juares RN  Outcome: Completed  8/23/2021 1322 by Darlene Juares RN  Outcome: Ongoing  8/23/2021 0512 by Junito Glaser RN  Outcome: Ongoing

## 2021-08-23 NOTE — CARE COORDINATION
DISCHARGE ORDER  Date/Time 2021 9:22 AM  Completed by: Lul Hobson, RN, Case Management    Patient Name: Magali Simmons    : 1940      Admit order Date and Status: IP 2021  Noted discharge order. (verify MD's last order for status of admission/Traditional Medicare 3 [place] Inpatient qualifying stay required for SNF)    Confirmed discharge plan with:              Patient:  Yes and son, Brian Holm                                 Discharge to Facility:   · Name: [facility])  · Address: [address] [address] [address] , [place], [place], [facility]  · Phone:[phone]  · .[phone] or [phone]    Facility phone number for staff giving report: see above   Pre-certification completed: [address] [place] [address] Dr Casey ([facility]) completed: NA  Discharge orders and Continuity of Care faxed to facility: NA     Transportation:               Medical Transport explained with choice list offered to pt/family. Choice: (no preference)  Agency used: [address] up time:  14:00   Pt/family/Nursing/Facility aware of  time:   Patient (alert and oriented) via Irina Pelayo ()  [facility] admissions)  Brian Holm (son)    Ambulance form completed:  NA     Comments:    Pt is being d/c'd to [facility] today. Pt's O2 sats are 90% on 3 liters. Discharge timeout done with Paris RN. All discharge needs and concerns addressed. Discharging nurse to complete BELINDA, reconcile AVS, and place final copy with patient's discharge packet. Discharging RN to ensure that written prescriptions for  Level II medications are sent with patient to the facility as per protocol.

## 2021-08-23 NOTE — PROGRESS NOTES
Pulmonary Progress Note  CC:  COVID-19 pneumonia    Subjective:  Feels okay, oxygen saturations remained stable, wants to go home    IV line peripheral    EXAM:   /69   Pulse 71   Temp 98.4 °F (36.9 °C) (Oral)   Resp 16   Ht 5' 7\" (1.702 m)   Wt 160 lb (72.6 kg)   SpO2 90%   BMI 25.06 kg/m²  on 3 L  General: NAD  Eyes: PERRL. No sclera icterus. No conjunctival injection. ENT: No discharge. Pharynx clear. Neck: Trachea midline. Normal thyroid. Resp: No accessory muscle use. No crackles. No wheezing. No rhonchi. No dullness on percussion. CV: Regular rate. Regular rhythm. No mumur or rub. No edema. Peripheral pulses are 2+. Capillary refill is less than 3 seconds. GI: Non-tender. Non-distended. No masses. No organomegaly. Normal bowel sounds. No hernia. Skin: Warm and dry. No nodule on exposed extremities. No rash on exposed extremities. Lymph: No cervical LAD. No supraclavicular LAD. M/S: No cyanosis. No joint deformity. No clubbing. Neuro: A&O to person and events. Patellar reflexes are symmetric. Psych: No agitation, no anxiety, affect is full.      Scheduled Meds:   amLODIPine  5 mg Oral Daily    apixaban  5 mg Oral BID    aspirin  81 mg Oral Daily    calcium-vitamin D  1 tablet Oral BID    carvedilol  6.25 mg Oral BID WC    clonazePAM  1 mg Oral Nightly    DULoxetine  20 mg Oral Nightly    DULoxetine  60 mg Oral QAM    fenofibrate  160 mg Oral Daily    ferrous sulfate  325 mg Oral Daily    fluticasone  2 spray Nasal Daily    furosemide  20 mg Oral Daily    guaiFENesin  600 mg Oral BID    cetirizine  10 mg Oral Daily    pantoprazole  40 mg Oral QAM AC    potassium chloride  40 mEq Oral Daily    pregabalin  50 mg Oral TID    senna  1 tablet Oral BID    sodium chloride flush  5-40 mL Intravenous 2 times per day    insulin lispro  0-6 Units Subcutaneous TID WC    insulin lispro  0-3 Units Subcutaneous Nightly    dexamethasone  6 mg Intravenous Q24H    magnesium oxide 400 mg Oral Daily    atorvastatin  40 mg Oral Nightly    diclofenac sodium  2 g Topical Nightly    vitamin B-12  500 mcg Oral Daily    Vitamin D  1,000 Units Oral Daily    losartan  100 mg Oral Daily    And    hydroCHLOROthiazide  12.5 mg Oral Daily    insulin glargine  20 Units Subcutaneous BID    imipramine  50 mg Oral Nightly    remdesivir IVPB  100 mg Intravenous Q24H    budesonide-formoterol  2 puff Inhalation BID     Continuous Infusions:   sodium chloride 25 mL (08/22/21 1727)    dextrose       PRN Meds:  loperamide, albuterol sulfate HFA, guaiFENesin, traMADol, sodium chloride flush, sodium chloride, ondansetron **OR** ondansetron, polyethylene glycol, acetaminophen **OR** acetaminophen, glucose, dextrose, glucagon (rDNA), dextrose    Labs:  CBC:   No results for input(s): WBC, HGB, HCT, MCV, PLT in the last 72 hours.   BMP:   Recent Labs     08/21/21  0619 08/22/21  0652 08/23/21  0556    133* 133*   K 3.5 3.2* 3.5   CL 99 96* 95*   CO2 28 25 28   BUN 21* 20 19   CREATININE 0.7 0.7 0.8     CRP is 67    Micro:  8/19/2021 SARS-CoV-2 positive    Imaging:  Chest imaging was reviewed by me and showed   CXR 8/19/2021 I suspect right lower lobe infiltrate, however the overlying external devices/wires make this final interpretation difficult    ASSESSMENT:  · COVID-19 pneumonia in a vaccinated patient  · Acute hypoxemic respiratory failure  · Atrial fibrillation on Eliquis  · COPD  · Dementia the Alzheimer's type  · Chronic pain syndrome  · Hyperglycemia    PLAN:   COVID-19 isolation, droplet plus   Supplemental oxygen to maintain SaO2 >92%; monitor saturations closely    Remdesevir D#5, LFT monitoring for high risk medication   Decadron D#5, 6 mg daily    Inhaled bronchodilators only as needed, MDI preferred    On Eliquis   D/W Dr. Femi Sage

## 2021-08-23 NOTE — PLAN OF CARE
Problem: Nutrition Deficits  Goal: Optimize nutritional status  8/23/2021 1322 by Figueroa Corona RN  Outcome: Ongoing  8/23/2021 0512 by Jeremiah Loya RN  Outcome: Ongoing     Problem: Risk for Fluid Volume Deficit  Goal: Maintain normal heart rhythm  8/23/2021 1322 by Figueroa Corona RN  Outcome: Ongoing  8/23/2021 0512 by Jeremiah Loya RN  Outcome: Ongoing  Goal: Maintain absence of muscle cramping  8/23/2021 1322 by Figueroa Corona RN  Outcome: Ongoing  8/23/2021 0512 by Jeremiah Loya RN  Outcome: Ongoing  Goal: Maintain normal serum potassium, sodium, calcium, phosphorus, and pH  8/23/2021 1322 by Figueroa Corona RN  Outcome: Ongoing  8/23/2021 0512 by Jeremiah Loya RN  Outcome: Ongoing     Problem: Loneliness or Risk for Loneliness  Goal: Demonstrate positive use of time alone when socialization is not possible  8/23/2021 1322 by Figueroa Corona RN  Outcome: Ongoing  8/23/2021 0512 by Jeremiah Loya RN  Outcome: Ongoing     Problem: Fatigue  Goal: Verbalize increase energy and improved vitality  8/23/2021 1322 by Figueroa Corona RN  Outcome: Ongoing  8/23/2021 0512 by Jeremiah Loya RN  Outcome: Ongoing     Problem: Patient Education: Go to Patient Education Activity  Goal: Patient/Family Education  8/23/2021 1322 by Figueroa Corona RN  Outcome: Ongoing  8/23/2021 0512 by Jeremiah Loya RN  Outcome: Ongoing     Problem: Skin Integrity:  Goal: Will show no infection signs and symptoms  Description: Will show no infection signs and symptoms  8/23/2021 1322 by Figueroa Corona RN  Outcome: Ongoing  8/23/2021 0512 by Jeremiah Loya RN  Outcome: Ongoing  Goal: Absence of new skin breakdown  Description: Absence of new skin breakdown  8/23/2021 1322 by Figueroa Corona RN  Outcome: Ongoing  8/23/2021 0512 by Jeremiah Loya RN  Outcome: Ongoing     Problem: Pain:  Goal: Pain level will decrease  Description: Pain level will decrease  8/23/2021 1322 by Figueroa Corona RN  Outcome: Ongoing  8/23/2021 0512 by Jeremiah Loya RN  Outcome: Ongoing  Goal: Control of acute pain  Description: Control of acute pain  8/23/2021 1322 by Jen Smallwood RN  Outcome: Ongoing  8/23/2021 0512 by Millie Aaron RN  Outcome: Ongoing  Goal: Control of chronic pain  Description: Control of chronic pain  8/23/2021 1322 by Jen Smallwood RN  Outcome: Ongoing  8/23/2021 0512 by Millie Aaron RN  Outcome: Ongoing

## 2021-08-23 NOTE — PLAN OF CARE
Problem: Falls - Risk of:  Goal: Will remain free from falls  Description: Will remain free from falls  Outcome: Ongoing  Goal: Absence of physical injury  Description: Absence of physical injury  Outcome: Ongoing     Problem: Airway Clearance - Ineffective  Goal: Achieve or maintain patent airway  Outcome: Ongoing     Problem: Gas Exchange - Impaired  Goal: Absence of hypoxia  Outcome: Ongoing  Goal: Promote optimal lung function  Outcome: Ongoing     Problem: Breathing Pattern - Ineffective  Goal: Ability to achieve and maintain a regular respiratory rate  Outcome: Ongoing     Problem:  Body Temperature -  Risk of, Imbalanced  Goal: Ability to maintain a body temperature within defined limits  Outcome: Ongoing  Goal: Will regain or maintain usual level of consciousness  Outcome: Ongoing  Goal: Complications related to the disease process, condition or treatment will be avoided or minimized  Outcome: Ongoing     Problem: Isolation Precautions - Risk of Spread of Infection  Goal: Prevent transmission of infection  Outcome: Ongoing     Problem: Nutrition Deficits  Goal: Optimize nutritional status  Outcome: Ongoing     Problem: Risk for Fluid Volume Deficit  Goal: Maintain normal heart rhythm  Outcome: Ongoing  Goal: Maintain absence of muscle cramping  Outcome: Ongoing  Goal: Maintain normal serum potassium, sodium, calcium, phosphorus, and pH  Outcome: Ongoing     Problem: Loneliness or Risk for Loneliness  Goal: Demonstrate positive use of time alone when socialization is not possible  Outcome: Ongoing     Problem: Fatigue  Goal: Verbalize increase energy and improved vitality  Outcome: Ongoing     Problem: Patient Education: Go to Patient Education Activity  Goal: Patient/Family Education  Outcome: Ongoing     Problem: Skin Integrity:  Goal: Will show no infection signs and symptoms  Description: Will show no infection signs and symptoms  Outcome: Ongoing  Goal: Absence of new skin breakdown  Description: Absence of new skin breakdown  Outcome: Ongoing     Problem: Pain:  Goal: Pain level will decrease  Description: Pain level will decrease  Outcome: Ongoing  Goal: Control of acute pain  Description: Control of acute pain  Outcome: Ongoing  Goal: Control of chronic pain  Description: Control of chronic pain  Outcome: Ongoing

## 2021-08-23 NOTE — FLOWSHEET NOTE
08/23/21 1003   Vital Signs   Temp 98.4 °F (36.9 °C)   Temp Source Oral   Pulse 71   Heart Rate Source Monitor   Resp 16   /69   BP Location Left lower arm   Patient Position Semi fowlers   Level of Consciousness Alert (0)   MEWS Score 1   Patient Currently in Pain Denies   Pain Assessment   Pain Assessment 0-10   Pain Level 0   Oxygen Therapy   SpO2 90 %   O2 Device Nasal cannula   O2 Flow Rate (L/min) 3 L/min   AM assessment completed, see flow sheet. Pt is alert and oriented. Vital signs are WNL. Respirations are even & easy. No complaints voiced. Pt denies needs at this time. SR up x 2, and bed in low position. Call light is within reach.

## 2021-08-23 NOTE — DISCHARGE SUMMARY
Name:  Leonarda Vuong  Room:  0206/0206-01  MRN:    [id number]    Discharge Summary      This discharge summary is in conjunction with a complete physical exam done on the day of discharge. Attending Physician: Lawrence Chandler MD      Discharging Physician: Lawrence Chandler MD       Admit: 8/19/2021  Discharge:  8/24/2021    Diagnoses this Admission    Principal Problem:    COVID-19  Active Problems:    HTN (hypertension), benign    DM (diabetes mellitus), type 2, uncontrolled ([address]    Alzheimer's disease ([address]    Hypoxia    A-fib ([facility]    Pneumonia due to COVID-19 virus    Acute hypoxemic respiratory failure ([address]    COPD exacerbation ([address]  Resolved Problems:    * No resolved hospital problems. *      Procedures (Please Review Full Report for Details)    Jeff Greer    HPI: The patient is a 68 y.o. female with atrial fib, alzheimer's dementia, COPD, HTN, HLD who presented to [facility] ED with complaint of illness. Patient reports feeling unwell for the last 3 days. She has had NP cough, fever, nausea, general weakness. She started to feel short of breath and was sent to ER last night where she was found to be COVID 19 + and hypoxic requiring supplemental O2. She was vaccinated for COVID 19, completed series Jan 12, 2021. She is on daily prednisone 2.5 mg     Physical Exam at Discharge:  /69   Pulse 71   Temp 98.4 °F (36.9 °C) (Oral)   Resp 16   Ht 5' 7\" (1.702 m)   Wt 160 lb (72.6 kg)   SpO2 90%   BMI 25.06 kg/m²   In droplet plus precautions  Gen: No distress. Alert. Awake, oriented x3. Some intermittent confusion noted  Eyes: PERRL. No sclera icterus. No conjunctival injection. ENT: No discharge. Pharynx clear. Neck: No JVD.  No Carotid Bruit. Trachea midline. Resp: No accessory muscle use. Diminished breath sounds, no rhonchi. CV: Irregular. no murmur.  No rub. No edema. GI: Non-tender. Non-distended. No masses. No organomegaly. Normal bowel sounds. Ventral hernia, soft not tender   Skin: Warm and dry. No nodule on exposed extremities. No rash on exposed extremities. M/S: No cyanosis. No joint deformity. No clubbing. Broken right elbow- chronic deformity   Neuro: Awake. Grossly nonfocal    Psych: Oriented x 3. No anxiety or agitation. Hospital Course    #COVID 23  #Hypoxia  - patient completed vaccination on 1/12/2021.  She is on low dose prednisone at 2.5 mg daily   -Presenting with COVID-19 infection and hypoxemia. - cont decadron D# 5. Discharge on Decadron.   - remdesivir D#5/5. Monitor LFTs and renal function  - she does not use continuous O2 at the NH.  Currently on  3L. cont supplemental O2 and wean as able     #Atrial fibrillation  - with CVR  - cont coreg and eliquis     # Hypokalemia   - replete     #Diarrhea  -Likely secondary to COVID-19 infection  -We will order Imodium PRN     #HTN  - BP stable cont meds losartan, HCTZ, coreg, norvasc     #COPD  - no AE, cont inhalers    #Alzheimer's dementia    - noted on chart as memory issues . Appears Mild dementia. Pt is well oriented here. Some intermittent confusion noted  Continue supportive care     #DM2  - cont Lantus, use SSI     #Chronic deformity right elbow  - supportive care      #GERD  - PPI     #Chronic pain  #Severe peripheral neuropathy, chronic leg pain  # Chronic back pain from spinal stenosis  - cont Lyrica, cymbalta , tramadol PRN      #Thrombocytopenia  - could be related to acute viral infection. Monitor CBC     # Chronic diastolic CHF  -Continue losartan/HCTZ, lasix .  Compensated      # Hyperlipidemia   - on statins     Consult PT, OT      BMP: Recent Labs     08/21/21  0619 08/22/21  0652 08/23/21  0556    133* 133*   K 3.5 3.2* 3.5   CL 99 96* 95*   CO2 28 25 28   BUN 21* 20 19   CREATININE 0.7 0.7 0.8     LIVER PROFILE:   Recent Labs     08/21/21  0619 08/22/21  0652 08/23/21  0556   AST 28 29 28   ALT 21 19 21   BILITOT <0.2 0.3 0.4   ALKPHOS 51 52 56 XR CHEST PORTABLE   Final Result   No radiographic evidence of acute pulmonary disease. COVID 19 detected    Rapid Influenza A/B: negative        Discharge Medications     Medication List      START taking these medications    dexamethasone 6 MG tablet  Commonly known as: Decadron  Take 1 tablet by mouth daily (with breakfast) for 10 days        CHANGE how you take these medications    pregabalin 50 MG capsule  Commonly known as: LYRICA  Take 1 capsule by mouth 2 times daily for 3 days. What changed: medication strength        CONTINUE taking these medications    acetaminophen 325 MG tablet  Commonly known as: TYLENOL     amLODIPine 5 MG tablet  Commonly known as: NORVASC  Take 1 tablet by mouth daily     apixaban 5 MG Tabs tablet  Commonly known as: ELIQUIS     ARTIFICIAL TEAR OP     ASPIRIN     atorvastatin 40 MG tablet  Commonly known as: LIPITOR     Basaglar KwikPen 100 UNIT/ML injection pen  Generic drug: insulin glargine     bisacodyl 5 MG EC tablet  Commonly known as: DULCOLAX     carvedilol 6.25 MG tablet  Commonly known as: COREG     clonazePAM 1 MG tablet  Commonly known as: KLONOPIN  Take 1 tablet by mouth nightly for 3 days.      D-Mannose 500 MG Caps     * DULoxetine 60 MG extended release capsule  Commonly known as: CYMBALTA     * DULoxetine 20 MG extended release capsule  Commonly known as: CYMBALTA     fenofibrate 54 MG tablet  Commonly known as: TRICOR     fluticasone 50 MCG/ACT nasal spray  Commonly known as: FLONASE     furosemide 20 MG tablet  Commonly known as: LASIX  Take 1 tablet by mouth daily     * guaiFENesin 600 MG extended release tablet  Commonly known as: MUCINEX     * guaiFENesin 100 MG/5ML syrup  Commonly known as: ROBITUSSIN     imipramine 50 MG tablet  Commonly known as: TOFRANIL     loperamide 2 MG capsule  Commonly known as: IMODIUM     loratadine 10 MG capsule  Commonly known as: CLARITIN     losartan 100 MG tablet  Commonly known as: COZAAR     magnesium hydroxide 400 MG/5ML suspension  Commonly known as: MILK OF MAGNESIA     magnesium oxide 400 MG tablet  Commonly known as: MAG-OX     methyl salicylate-menthol 27-96 % Crea     olopatadine 0.1 % ophthalmic solution  Commonly known as: PATANOL     omeprazole 20 MG delayed release capsule  Commonly known as: PRILOSEC     ondansetron 4 MG disintegrating tablet  Commonly known as: Zofran ODT  Take 1 tablet by mouth every 8 hours as needed for Nausea. Let dissolve in mouth. OXYGEN     potassium chloride 10 MEQ extended release capsule  Commonly known as: MICRO-K     predniSONE 2.5 MG tablet  Commonly known as: DELTASONE     PROAIR HFA IN     senna 8.6 MG tablet  Commonly known as: SENOKOT     SITagliptin 50 MG tablet  Commonly known as: JANUVIA     Symbicort 160-4.5 MCG/ACT Aero  Generic drug: budesonide-formoterol     traMADol 50 MG tablet  Commonly known as: ULTRAM  Take 1 tablet by mouth every 6 hours as needed for Pain for up to 3 days. vitamin B-12 500 MCG tablet  Commonly known as: CYANOCOBALAMIN     vitamin D 25 MCG (1000 UT) Caps     Voltaren 1 % Gel  Generic drug: diclofenac sodium         * This list has 4 medication(s) that are the same as other medications prescribed for you. Read the directions carefully, and ask your doctor or other care provider to review them with you.             STOP taking these medications    hydroCHLOROthiazide 12.5 MG capsule  Commonly known as: MICROZIDE     ipratropium-albuterol 0.5-2.5 (3) MG/3ML Soln nebulizer solution  Commonly known as: DUONEB           Where to Get Your Medications      You can get these medications from any pharmacy    Bring a paper prescription for each of these medications  · clonazePAM 1 MG tablet  · pregabalin 50 MG capsule  · traMADol 50 MG tablet     Information about where to get these medications is not yet available    Ask your nurse or doctor about these medications  · dexamethasone 6 MG tablet           Discharge Condition/Location: Stable    Follow Up: Follow up with PCP.     More than 30 mts spent    Pam Ayala MD 8/23/2021 11:08 AM

## 2021-09-27 ENCOUNTER — HOSPITAL ENCOUNTER (OUTPATIENT)
Age: 81
Setting detail: SPECIMEN
Discharge: HOME | End: 2021-09-27
Payer: COMMERCIAL

## 2021-09-27 PROCEDURE — 88305 TISSUE EXAM BY PATHOLOGIST: CPT

## 2022-02-10 NOTE — PLAN OF CARE
Problem: Falls - Risk of:  Goal: Will remain free from falls  Description: Will remain free from falls  8/21/2021 0330 by Keyur Adams RN  Outcome: Ongoing  8/20/2021 1551 by Darlene Lee RN  Outcome: Ongoing     Problem: Airway Clearance - Ineffective  Goal: Achieve or maintain patent airway  8/21/2021 0330 by Keyur Adams RN  Outcome: Ongoing  8/20/2021 1551 by Darlene Lee RN  Outcome: Ongoing     Problem: Gas Exchange - Impaired  Goal: Absence of hypoxia  8/21/2021 0330 by Keyur Adams RN  Outcome: Ongoing  8/20/2021 1551 by Darlene Lee RN  Outcome: Ongoing  Goal: Promote optimal lung function  Outcome: Ongoing     Problem: Breathing Pattern - Ineffective  Goal: Ability to achieve and maintain a regular respiratory rate  Outcome: Ongoing     Problem:  Body Temperature -  Risk of, Imbalanced  Goal: Ability to maintain a body temperature within defined limits  Outcome: Ongoing  Goal: Will regain or maintain usual level of consciousness  Outcome: Ongoing  Goal: Complications related to the disease process, condition or treatment will be avoided or minimized  Outcome: Ongoing     Problem: Isolation Precautions - Risk of Spread of Infection  Goal: Prevent transmission of infection  Outcome: Ongoing     Problem: Nutrition Deficits  Goal: Optimize nutritional status  Outcome: Ongoing     Problem: Risk for Fluid Volume Deficit  Goal: Maintain normal heart rhythm  Outcome: Ongoing  Goal: Maintain absence of muscle cramping  Outcome: Ongoing  Goal: Maintain normal serum potassium, sodium, calcium, phosphorus, and pH  Outcome: Ongoing     Problem: Loneliness or Risk for Loneliness  Goal: Demonstrate positive use of time alone when socialization is not possible  Outcome: Ongoing     Problem: Fatigue  Goal: Verbalize increase energy and improved vitality  Outcome: Ongoing     Problem: Patient Education: Go to Patient Education Activity  Goal: Patient/Family Education  Outcome: Ongoing     Problem: Skin Integrity:  Goal: Will show no infection signs and symptoms  Description: Will show no infection signs and symptoms  Outcome: Ongoing  Goal: Absence of new skin breakdown  Description: Absence of new skin breakdown  Outcome: Ongoing declines

## 2022-09-07 ENCOUNTER — APPOINTMENT (OUTPATIENT)
Dept: GENERAL RADIOLOGY | Age: 82
DRG: 378 | End: 2022-09-07
Payer: COMMERCIAL

## 2022-09-07 ENCOUNTER — HOSPITAL ENCOUNTER (INPATIENT)
Age: 82
LOS: 6 days | Discharge: NURSING, REHAB OR LONG-TERM CARE | DRG: 378 | End: 2022-09-13
Attending: STUDENT IN AN ORGANIZED HEALTH CARE EDUCATION/TRAINING PROGRAM | Admitting: INTERNAL MEDICINE
Payer: COMMERCIAL

## 2022-09-07 ENCOUNTER — APPOINTMENT (OUTPATIENT)
Dept: CT IMAGING | Age: 82
DRG: 378 | End: 2022-09-07
Payer: COMMERCIAL

## 2022-09-07 DIAGNOSIS — K43.9 VENTRAL HERNIA WITHOUT OBSTRUCTION OR GANGRENE: ICD-10-CM

## 2022-09-07 DIAGNOSIS — D64.9 SYMPTOMATIC ANEMIA: Primary | ICD-10-CM

## 2022-09-07 DIAGNOSIS — K92.1 MELENA: ICD-10-CM

## 2022-09-07 DIAGNOSIS — D64.9 ANEMIA, UNSPECIFIED TYPE: ICD-10-CM

## 2022-09-07 DIAGNOSIS — K57.90 DIVERTICULOSIS: ICD-10-CM

## 2022-09-07 DIAGNOSIS — Z79.01 ANTICOAGULATED: ICD-10-CM

## 2022-09-07 DIAGNOSIS — J81.1 CHRONIC PULMONARY EDEMA: ICD-10-CM

## 2022-09-07 DIAGNOSIS — N17.9 AKI (ACUTE KIDNEY INJURY) (HCC): ICD-10-CM

## 2022-09-07 DIAGNOSIS — I51.7 CARDIOMEGALY: ICD-10-CM

## 2022-09-07 DIAGNOSIS — J90 PLEURAL EFFUSION ON RIGHT: ICD-10-CM

## 2022-09-07 PROBLEM — J96.21 ACUTE ON CHRONIC RESPIRATORY FAILURE WITH HYPOXIA (HCC): Status: ACTIVE | Noted: 2022-09-07

## 2022-09-07 PROBLEM — G89.4 CHRONIC PAIN DISORDER: Status: ACTIVE | Noted: 2017-06-08

## 2022-09-07 PROBLEM — K92.2 GI BLEED: Status: ACTIVE | Noted: 2022-09-07

## 2022-09-07 PROBLEM — R00.1 BRADYCARDIA: Status: ACTIVE | Noted: 2022-09-07

## 2022-09-07 LAB
A/G RATIO: 1.4 (ref 1.1–2.2)
ABO/RH: NORMAL
ALBUMIN SERPL-MCNC: 3.8 G/DL (ref 3.4–5)
ALP BLD-CCNC: 64 U/L (ref 40–129)
ALT SERPL-CCNC: 12 U/L (ref 10–40)
AMORPHOUS: ABNORMAL /HPF
ANION GAP SERPL CALCULATED.3IONS-SCNC: 7 MMOL/L (ref 3–16)
ANISOCYTOSIS: ABNORMAL
ANTIBODY SCREEN: NORMAL
AST SERPL-CCNC: 15 U/L (ref 15–37)
BACTERIA: ABNORMAL /HPF
BASOPHILS ABSOLUTE: 0 K/UL (ref 0–0.2)
BASOPHILS RELATIVE PERCENT: 0.8 %
BILIRUB SERPL-MCNC: 0.3 MG/DL (ref 0–1)
BILIRUBIN URINE: NEGATIVE
BLOOD BANK DISPENSE STATUS: NORMAL
BLOOD BANK PRODUCT CODE: NORMAL
BLOOD, URINE: NEGATIVE
BPU ID: NORMAL
BUN BLDV-MCNC: 32 MG/DL (ref 7–20)
CALCIUM SERPL-MCNC: 9.6 MG/DL (ref 8.3–10.6)
CHLORIDE BLD-SCNC: 99 MMOL/L (ref 99–110)
CLARITY: CLEAR
CO2: 29 MMOL/L (ref 21–32)
COLOR: YELLOW
CREAT SERPL-MCNC: 1.1 MG/DL (ref 0.6–1.2)
DESCRIPTION BLOOD BANK: NORMAL
EKG ATRIAL RATE: 73 BPM
EKG DIAGNOSIS: NORMAL
EKG Q-T INTERVAL: 372 MS
EKG QRS DURATION: 82 MS
EKG QTC CALCULATION (BAZETT): 409 MS
EKG R AXIS: -9 DEGREES
EKG T AXIS: 97 DEGREES
EKG VENTRICULAR RATE: 73 BPM
EOSINOPHILS ABSOLUTE: 0.2 K/UL (ref 0–0.6)
EOSINOPHILS RELATIVE PERCENT: 3.1 %
EPITHELIAL CELLS, UA: ABNORMAL /HPF (ref 0–5)
GFR AFRICAN AMERICAN: 58
GFR NON-AFRICAN AMERICAN: 48
GLUCOSE BLD-MCNC: 202 MG/DL (ref 70–99)
GLUCOSE BLD-MCNC: 264 MG/DL (ref 70–99)
GLUCOSE URINE: 250 MG/DL
HCT VFR BLD CALC: 22.2 % (ref 36–48)
HCT VFR BLD CALC: 24.3 % (ref 36–48)
HEMATOLOGY PATH CONSULT: YES
HEMOGLOBIN: 6.5 G/DL (ref 12–16)
HEMOGLOBIN: 7.4 G/DL (ref 12–16)
HYPOCHROMIA: ABNORMAL
INFLUENZA A: NOT DETECTED
INFLUENZA B: NOT DETECTED
KETONES, URINE: NEGATIVE MG/DL
LEUKOCYTE ESTERASE, URINE: ABNORMAL
LYMPHOCYTES ABSOLUTE: 0.8 K/UL (ref 1–5.1)
LYMPHOCYTES RELATIVE PERCENT: 15 %
MCH RBC QN AUTO: 20.4 PG (ref 26–34)
MCHC RBC AUTO-ENTMCNC: 29.3 G/DL (ref 31–36)
MCV RBC AUTO: 69.6 FL (ref 80–100)
MICROCYTES: ABNORMAL
MICROSCOPIC EXAMINATION: YES
MONOCYTES ABSOLUTE: 0.5 K/UL (ref 0–1.3)
MONOCYTES RELATIVE PERCENT: 9.8 %
NEUTROPHILS ABSOLUTE: 3.7 K/UL (ref 1.7–7.7)
NEUTROPHILS RELATIVE PERCENT: 71.3 %
NITRITE, URINE: NEGATIVE
OCCULT BLOOD DIAGNOSTIC: NORMAL
PDW BLD-RTO: 20.8 % (ref 12.4–15.4)
PERFORMED ON: ABNORMAL
PH UA: 6 (ref 5–8)
PLATELET # BLD: 184 K/UL (ref 135–450)
PLATELET SLIDE REVIEW: ADEQUATE
PMV BLD AUTO: 8.2 FL (ref 5–10.5)
POLYCHROMASIA: ABNORMAL
POTASSIUM REFLEX MAGNESIUM: 4.9 MMOL/L (ref 3.5–5.1)
PRO-BNP: 1499 PG/ML (ref 0–449)
PROTEIN UA: 30 MG/DL
RBC # BLD: 3.19 M/UL (ref 4–5.2)
RBC UA: ABNORMAL /HPF (ref 0–4)
SARS-COV-2 RNA, RT PCR: NOT DETECTED
SLIDE REVIEW: ABNORMAL
SODIUM BLD-SCNC: 135 MMOL/L (ref 136–145)
SPECIFIC GRAVITY UA: 1.01 (ref 1–1.03)
TOTAL PROTEIN: 6.6 G/DL (ref 6.4–8.2)
TROPONIN: <0.01 NG/ML
URINE REFLEX TO CULTURE: ABNORMAL
URINE TYPE: ABNORMAL
UROBILINOGEN, URINE: 0.2 E.U./DL
WBC # BLD: 5.2 K/UL (ref 4–11)
WBC UA: ABNORMAL /HPF (ref 0–5)

## 2022-09-07 PROCEDURE — 85025 COMPLETE CBC W/AUTO DIFF WBC: CPT

## 2022-09-07 PROCEDURE — 6360000002 HC RX W HCPCS: Performed by: NURSE PRACTITIONER

## 2022-09-07 PROCEDURE — 80053 COMPREHEN METABOLIC PANEL: CPT

## 2022-09-07 PROCEDURE — 71045 X-RAY EXAM CHEST 1 VIEW: CPT

## 2022-09-07 PROCEDURE — C9113 INJ PANTOPRAZOLE SODIUM, VIA: HCPCS | Performed by: NURSE PRACTITIONER

## 2022-09-07 PROCEDURE — 86850 RBC ANTIBODY SCREEN: CPT

## 2022-09-07 PROCEDURE — 96374 THER/PROPH/DIAG INJ IV PUSH: CPT

## 2022-09-07 PROCEDURE — 6370000000 HC RX 637 (ALT 250 FOR IP): Performed by: NURSE PRACTITIONER

## 2022-09-07 PROCEDURE — 86923 COMPATIBILITY TEST ELECTRIC: CPT

## 2022-09-07 PROCEDURE — 2580000003 HC RX 258: Performed by: NURSE PRACTITIONER

## 2022-09-07 PROCEDURE — 74177 CT ABD & PELVIS W/CONTRAST: CPT

## 2022-09-07 PROCEDURE — 87636 SARSCOV2 & INF A&B AMP PRB: CPT

## 2022-09-07 PROCEDURE — 86901 BLOOD TYPING SEROLOGIC RH(D): CPT

## 2022-09-07 PROCEDURE — 94660 CPAP INITIATION&MGMT: CPT

## 2022-09-07 PROCEDURE — P9016 RBC LEUKOCYTES REDUCED: HCPCS

## 2022-09-07 PROCEDURE — 94761 N-INVAS EAR/PLS OXIMETRY MLT: CPT

## 2022-09-07 PROCEDURE — 36430 TRANSFUSION BLD/BLD COMPNT: CPT

## 2022-09-07 PROCEDURE — 2700000000 HC OXYGEN THERAPY PER DAY

## 2022-09-07 PROCEDURE — 99223 1ST HOSP IP/OBS HIGH 75: CPT | Performed by: NURSE PRACTITIONER

## 2022-09-07 PROCEDURE — 96375 TX/PRO/DX INJ NEW DRUG ADDON: CPT

## 2022-09-07 PROCEDURE — 86900 BLOOD TYPING SEROLOGIC ABO: CPT

## 2022-09-07 PROCEDURE — 85014 HEMATOCRIT: CPT

## 2022-09-07 PROCEDURE — 93005 ELECTROCARDIOGRAM TRACING: CPT | Performed by: NURSE PRACTITIONER

## 2022-09-07 PROCEDURE — 81001 URINALYSIS AUTO W/SCOPE: CPT

## 2022-09-07 PROCEDURE — 85018 HEMOGLOBIN: CPT

## 2022-09-07 PROCEDURE — 82270 OCCULT BLOOD FECES: CPT

## 2022-09-07 PROCEDURE — 83036 HEMOGLOBIN GLYCOSYLATED A1C: CPT

## 2022-09-07 PROCEDURE — 36415 COLL VENOUS BLD VENIPUNCTURE: CPT

## 2022-09-07 PROCEDURE — 96376 TX/PRO/DX INJ SAME DRUG ADON: CPT

## 2022-09-07 PROCEDURE — 94640 AIRWAY INHALATION TREATMENT: CPT

## 2022-09-07 PROCEDURE — 99285 EMERGENCY DEPT VISIT HI MDM: CPT

## 2022-09-07 PROCEDURE — 2060000000 HC ICU INTERMEDIATE R&B

## 2022-09-07 PROCEDURE — 84484 ASSAY OF TROPONIN QUANT: CPT

## 2022-09-07 PROCEDURE — 6360000004 HC RX CONTRAST MEDICATION: Performed by: NURSE PRACTITIONER

## 2022-09-07 PROCEDURE — 93010 ELECTROCARDIOGRAM REPORT: CPT | Performed by: INTERNAL MEDICINE

## 2022-09-07 PROCEDURE — 83880 ASSAY OF NATRIURETIC PEPTIDE: CPT

## 2022-09-07 RX ORDER — PANTOPRAZOLE SODIUM 40 MG/10ML
40 INJECTION, POWDER, LYOPHILIZED, FOR SOLUTION INTRAVENOUS ONCE
Status: COMPLETED | OUTPATIENT
Start: 2022-09-07 | End: 2022-09-07

## 2022-09-07 RX ORDER — TROSPIUM CHLORIDE 20 MG/1
20 TABLET, FILM COATED ORAL DAILY
Status: DISCONTINUED | OUTPATIENT
Start: 2022-09-08 | End: 2022-09-13 | Stop reason: HOSPADM

## 2022-09-07 RX ORDER — DULOXETIN HYDROCHLORIDE 60 MG/1
60 CAPSULE, DELAYED RELEASE ORAL DAILY
Status: DISCONTINUED | OUTPATIENT
Start: 2022-09-08 | End: 2022-09-13 | Stop reason: HOSPADM

## 2022-09-07 RX ORDER — ONDANSETRON 4 MG/1
4 TABLET, ORALLY DISINTEGRATING ORAL EVERY 8 HOURS PRN
Status: DISCONTINUED | OUTPATIENT
Start: 2022-09-07 | End: 2022-09-13 | Stop reason: HOSPADM

## 2022-09-07 RX ORDER — CLONAZEPAM 1 MG/1
1 TABLET ORAL NIGHTLY
Status: DISCONTINUED | OUTPATIENT
Start: 2022-09-07 | End: 2022-09-13 | Stop reason: HOSPADM

## 2022-09-07 RX ORDER — BUDESONIDE AND FORMOTEROL FUMARATE DIHYDRATE 160; 4.5 UG/1; UG/1
2 AEROSOL RESPIRATORY (INHALATION) 2 TIMES DAILY
Status: DISCONTINUED | OUTPATIENT
Start: 2022-09-07 | End: 2022-09-13 | Stop reason: HOSPADM

## 2022-09-07 RX ORDER — SODIUM CHLORIDE 9 MG/ML
INJECTION, SOLUTION INTRAVENOUS PRN
Status: COMPLETED | OUTPATIENT
Start: 2022-09-07 | End: 2022-09-07

## 2022-09-07 RX ORDER — PANTOPRAZOLE SODIUM 40 MG/10ML
40 INJECTION, POWDER, LYOPHILIZED, FOR SOLUTION INTRAVENOUS DAILY
Status: DISCONTINUED | OUTPATIENT
Start: 2022-09-08 | End: 2022-09-13 | Stop reason: HOSPADM

## 2022-09-07 RX ORDER — LOSARTAN POTASSIUM 25 MG/1
50 TABLET ORAL DAILY
Status: DISCONTINUED | OUTPATIENT
Start: 2022-09-08 | End: 2022-09-08

## 2022-09-07 RX ORDER — PREGABALIN 25 MG/1
75 CAPSULE ORAL NIGHTLY
Status: DISCONTINUED | OUTPATIENT
Start: 2022-09-07 | End: 2022-09-13 | Stop reason: HOSPADM

## 2022-09-07 RX ORDER — FLUTICASONE PROPIONATE 50 MCG
2 SPRAY, SUSPENSION (ML) NASAL DAILY
Status: DISCONTINUED | OUTPATIENT
Start: 2022-09-08 | End: 2022-09-13 | Stop reason: HOSPADM

## 2022-09-07 RX ORDER — ATORVASTATIN CALCIUM 40 MG/1
40 TABLET, FILM COATED ORAL NIGHTLY
Status: DISCONTINUED | OUTPATIENT
Start: 2022-09-07 | End: 2022-09-13 | Stop reason: HOSPADM

## 2022-09-07 RX ORDER — OMEPRAZOLE 20 MG/1
40 CAPSULE, DELAYED RELEASE ORAL NIGHTLY
COMMUNITY

## 2022-09-07 RX ORDER — ONDANSETRON 2 MG/ML
4 INJECTION INTRAMUSCULAR; INTRAVENOUS EVERY 6 HOURS PRN
Status: DISCONTINUED | OUTPATIENT
Start: 2022-09-07 | End: 2022-09-13 | Stop reason: HOSPADM

## 2022-09-07 RX ORDER — SODIUM CHLORIDE 9 MG/ML
INJECTION, SOLUTION INTRAVENOUS CONTINUOUS
Status: CANCELLED | OUTPATIENT
Start: 2022-09-07

## 2022-09-07 RX ORDER — GUAIFENESIN 600 MG/1
600 TABLET, EXTENDED RELEASE ORAL 2 TIMES DAILY
Status: DISCONTINUED | OUTPATIENT
Start: 2022-09-07 | End: 2022-09-13 | Stop reason: HOSPADM

## 2022-09-07 RX ORDER — PREGABALIN 25 MG/1
50 CAPSULE ORAL 2 TIMES DAILY
Status: DISCONTINUED | OUTPATIENT
Start: 2022-09-08 | End: 2022-09-13 | Stop reason: HOSPADM

## 2022-09-07 RX ORDER — FUROSEMIDE 10 MG/ML
20 INJECTION INTRAMUSCULAR; INTRAVENOUS ONCE
Status: COMPLETED | OUTPATIENT
Start: 2022-09-07 | End: 2022-09-07

## 2022-09-07 RX ORDER — IMIPRAMINE HCL 25 MG
50 TABLET ORAL NIGHTLY
Status: DISCONTINUED | OUTPATIENT
Start: 2022-09-07 | End: 2022-09-07

## 2022-09-07 RX ORDER — FENOFIBRATE 54 MG/1
54 TABLET ORAL DAILY
Status: DISCONTINUED | OUTPATIENT
Start: 2022-09-08 | End: 2022-09-13 | Stop reason: HOSPADM

## 2022-09-07 RX ORDER — DULOXETIN HYDROCHLORIDE 20 MG/1
20 CAPSULE, DELAYED RELEASE ORAL NIGHTLY
Status: DISCONTINUED | OUTPATIENT
Start: 2022-09-07 | End: 2022-09-13 | Stop reason: HOSPADM

## 2022-09-07 RX ORDER — SODIUM CHLORIDE 9 MG/ML
INJECTION, SOLUTION INTRAVENOUS PRN
Status: DISCONTINUED | OUTPATIENT
Start: 2022-09-07 | End: 2022-09-13 | Stop reason: HOSPADM

## 2022-09-07 RX ORDER — POTASSIUM CHLORIDE 20 MEQ/1
20 TABLET, EXTENDED RELEASE ORAL 2 TIMES DAILY WITH MEALS
Status: DISCONTINUED | OUTPATIENT
Start: 2022-09-07 | End: 2022-09-13 | Stop reason: HOSPADM

## 2022-09-07 RX ORDER — MENTHOL 1.4 %
ADHESIVE PATCH, MEDICATED TOPICAL EVERY 8 HOURS PRN
Status: DISCONTINUED | OUTPATIENT
Start: 2022-09-07 | End: 2022-09-13 | Stop reason: HOSPADM

## 2022-09-07 RX ORDER — PREGABALIN 25 MG/1
75 CAPSULE ORAL 2 TIMES DAILY
Status: CANCELLED | OUTPATIENT
Start: 2022-09-07

## 2022-09-07 RX ORDER — AMLODIPINE BESYLATE 5 MG/1
5 TABLET ORAL DAILY
Status: DISCONTINUED | OUTPATIENT
Start: 2022-09-08 | End: 2022-09-13 | Stop reason: HOSPADM

## 2022-09-07 RX ORDER — DULOXETIN HYDROCHLORIDE 20 MG/1
20 CAPSULE, DELAYED RELEASE ORAL NIGHTLY
Status: DISCONTINUED | OUTPATIENT
Start: 2022-09-07 | End: 2022-09-07 | Stop reason: SDUPTHER

## 2022-09-07 RX ORDER — DULOXETIN HYDROCHLORIDE 60 MG/1
60 CAPSULE, DELAYED RELEASE ORAL DAILY
Status: CANCELLED | OUTPATIENT
Start: 2022-09-07

## 2022-09-07 RX ORDER — OXYBUTYNIN CHLORIDE 5 MG/1
5 TABLET, EXTENDED RELEASE ORAL EVERY EVENING
COMMUNITY

## 2022-09-07 RX ORDER — DEXTROSE MONOHYDRATE 100 MG/ML
INJECTION, SOLUTION INTRAVENOUS CONTINUOUS PRN
Status: DISCONTINUED | OUTPATIENT
Start: 2022-09-07 | End: 2022-09-13 | Stop reason: HOSPADM

## 2022-09-07 RX ORDER — SODIUM CHLORIDE 0.9 % (FLUSH) 0.9 %
5-40 SYRINGE (ML) INJECTION PRN
Status: DISCONTINUED | OUTPATIENT
Start: 2022-09-07 | End: 2022-09-13 | Stop reason: HOSPADM

## 2022-09-07 RX ORDER — INSULIN LISPRO 100 [IU]/ML
0-4 INJECTION, SOLUTION INTRAVENOUS; SUBCUTANEOUS
Status: DISCONTINUED | OUTPATIENT
Start: 2022-09-08 | End: 2022-09-11

## 2022-09-07 RX ORDER — INSULIN GLARGINE 100 [IU]/ML
20 INJECTION, SOLUTION SUBCUTANEOUS 2 TIMES DAILY
Status: DISCONTINUED | OUTPATIENT
Start: 2022-09-07 | End: 2022-09-09

## 2022-09-07 RX ORDER — FUROSEMIDE 20 MG/1
20 TABLET ORAL DAILY
Status: DISCONTINUED | OUTPATIENT
Start: 2022-09-08 | End: 2022-09-08

## 2022-09-07 RX ORDER — ALBUTEROL SULFATE 90 UG/1
2 AEROSOL, METERED RESPIRATORY (INHALATION) EVERY 4 HOURS PRN
Status: DISCONTINUED | OUTPATIENT
Start: 2022-09-07 | End: 2022-09-13 | Stop reason: HOSPADM

## 2022-09-07 RX ORDER — LOSARTAN POTASSIUM 100 MG/1
100 TABLET ORAL DAILY
Status: CANCELLED | OUTPATIENT
Start: 2022-09-08

## 2022-09-07 RX ORDER — INSULIN LISPRO 100 [IU]/ML
0-4 INJECTION, SOLUTION INTRAVENOUS; SUBCUTANEOUS NIGHTLY
Status: DISCONTINUED | OUTPATIENT
Start: 2022-09-07 | End: 2022-09-11

## 2022-09-07 RX ORDER — LOSARTAN POTASSIUM 25 MG/1
50 TABLET ORAL DAILY
Status: DISCONTINUED | OUTPATIENT
Start: 2022-09-07 | End: 2022-09-07

## 2022-09-07 RX ORDER — PREGABALIN 75 MG/1
75 CAPSULE ORAL NIGHTLY
COMMUNITY

## 2022-09-07 RX ORDER — CETIRIZINE HYDROCHLORIDE 10 MG/1
5 TABLET ORAL DAILY
Status: DISCONTINUED | OUTPATIENT
Start: 2022-09-07 | End: 2022-09-13 | Stop reason: HOSPADM

## 2022-09-07 RX ORDER — PREDNISONE 1 MG/1
2.5 TABLET ORAL DAILY
Status: DISCONTINUED | OUTPATIENT
Start: 2022-09-08 | End: 2022-09-13 | Stop reason: HOSPADM

## 2022-09-07 RX ORDER — OXYBUTYNIN CHLORIDE 5 MG/1
5 TABLET, EXTENDED RELEASE ORAL NIGHTLY
Status: DISCONTINUED | OUTPATIENT
Start: 2022-09-07 | End: 2022-09-13 | Stop reason: HOSPADM

## 2022-09-07 RX ORDER — LORATADINE 10 MG/1
10 CAPSULE, LIQUID FILLED ORAL DAILY
COMMUNITY

## 2022-09-07 RX ORDER — POTASSIUM CHLORIDE 1.5 G/1.77G
40 POWDER, FOR SOLUTION ORAL 2 TIMES DAILY
Status: ON HOLD | COMMUNITY
End: 2022-09-13 | Stop reason: SDUPTHER

## 2022-09-07 RX ORDER — ACETAMINOPHEN 325 MG/1
650 TABLET ORAL EVERY 6 HOURS PRN
Status: DISCONTINUED | OUTPATIENT
Start: 2022-09-07 | End: 2022-09-13 | Stop reason: HOSPADM

## 2022-09-07 RX ORDER — ACETAMINOPHEN 650 MG/1
650 SUPPOSITORY RECTAL EVERY 6 HOURS PRN
Status: DISCONTINUED | OUTPATIENT
Start: 2022-09-07 | End: 2022-09-13 | Stop reason: HOSPADM

## 2022-09-07 RX ORDER — SODIUM CHLORIDE 0.9 % (FLUSH) 0.9 %
5-40 SYRINGE (ML) INJECTION EVERY 12 HOURS SCHEDULED
Status: DISCONTINUED | OUTPATIENT
Start: 2022-09-07 | End: 2022-09-13 | Stop reason: HOSPADM

## 2022-09-07 RX ORDER — CHOLECALCIFEROL (VITAMIN D3) 125 MCG
500 CAPSULE ORAL DAILY
Status: DISCONTINUED | OUTPATIENT
Start: 2022-09-08 | End: 2022-09-13 | Stop reason: HOSPADM

## 2022-09-07 RX ADMIN — PANTOPRAZOLE SODIUM 40 MG: 40 INJECTION, POWDER, FOR SOLUTION INTRAVENOUS at 14:31

## 2022-09-07 RX ADMIN — GUAIFENESIN 600 MG: 600 TABLET, EXTENDED RELEASE ORAL at 23:12

## 2022-09-07 RX ADMIN — POTASSIUM CHLORIDE 20 MEQ: 1500 TABLET, EXTENDED RELEASE ORAL at 23:19

## 2022-09-07 RX ADMIN — Medication 10 ML: at 23:13

## 2022-09-07 RX ADMIN — SODIUM CHLORIDE: 9 INJECTION, SOLUTION INTRAVENOUS at 17:39

## 2022-09-07 RX ADMIN — IOPAMIDOL 75 ML: 755 INJECTION, SOLUTION INTRAVENOUS at 18:19

## 2022-09-07 RX ADMIN — FUROSEMIDE 20 MG: 10 INJECTION, SOLUTION INTRAMUSCULAR; INTRAVENOUS at 18:12

## 2022-09-07 RX ADMIN — CLONAZEPAM 1 MG: 1 TABLET ORAL at 23:12

## 2022-09-07 RX ADMIN — Medication 2 PUFF: at 22:58

## 2022-09-07 RX ADMIN — OXYBUTYNIN CHLORIDE 5 MG: 5 TABLET, EXTENDED RELEASE ORAL at 23:12

## 2022-09-07 RX ADMIN — PREGABALIN 75 MG: 25 CAPSULE ORAL at 23:12

## 2022-09-07 ASSESSMENT — ENCOUNTER SYMPTOMS
SHORTNESS OF BREATH: 0
COLOR CHANGE: 0
FACIAL SWELLING: 0
ABDOMINAL PAIN: 0
SORE THROAT: 0
RHINORRHEA: 0

## 2022-09-07 ASSESSMENT — PAIN - FUNCTIONAL ASSESSMENT
PAIN_FUNCTIONAL_ASSESSMENT: NONE - DENIES PAIN
PAIN_FUNCTIONAL_ASSESSMENT: NONE - DENIES PAIN

## 2022-09-07 NOTE — ED NOTES
Danae Peters notifed and aware of pt having intermittent bradycardic episodes.       Randi To RN  09/07/22 [id number]

## 2022-09-07 NOTE — H&P
Hospital Medicine History & Physical      PCP: Linda Bird MD    Date of Admission: 9/7/2022    Date of Service: Pt seen/examined on 09/07/22     Chief Complaint:    Chief Complaint   Patient presents with    Abnormal Lab     Patient sent over from [facility] with reports of low Hgb 6.2. Patient states she is taking eliquis         History Of Present Illness: The patient is a 80 y.o. female with PMH of atrial fib, alzheimer's dementia, COPD, HTN, HLD, s/d CHF, chronic pain, depression, anxiety, KATHY on AVAPS, GERD who presents to [facility] with from [facility] with abnormal labs. History obtained from the patient and review of EMR. Patient is awake and alert lying in bed she is currently on 3 L of oxygen. She is post 1 unit packed red blood cells. She stated that she had a black bowel movement x1 this week approximately 3 days ago. Since then her bowel movements have been normal.  She did have an episode of diarrhea last week. She stated overall she feels well other than some generalized fatigue and weakness. Stated she slipped out of a chair last week onto her bottom with no injuries. Her shortness of breath is at baseline other than with exertion. She denies any chest pain, fever, chills, palpitations, lightheaded, dizziness, or orthopnea. She does endorse occasional abdominal pain, mostly at the mid epigastric region. Also stated she has had an umbilical hernia for quite some time and it can be tender at times currently denies any pain to this area. In ED patient's hemoglobin noted to be 6.5 she was transfused 1 unit of packed red blood cells. BNP noted to be elevated I ordered 20 mg IV Lasix x1. Stool occult was negative in ED. Patient noted to be in A. fib on the monitor with occasional bradycardic episodes in the low 40s. CT of the abdomen was ordered and pending at time of admission. GI consulted patient admitted to PCU for further care.     Past Medical History:        Diagnosis Date A-fib ([facility]     Alzheimer's dementia ([facility]     Anxiety     Arthritis     Back pain     COPD (chronic obstructive pulmonary disease) ([facility]     COVID-19 08/19/2021    Depression     Diabetes mellitus ([facility]     Fall     multiple falls    GERD (gastroesophageal reflux disease)     Hepatitis A     age 27    Hernia     Hypercholesteremia     Hypertension     IBS (irritable bowel syndrome)     diarrhea  x 20 years    Incontinence of urine     not all the time    Kidney stone     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    Memory change     dementia    Neuropathy     Seizures (HCC)     possible because of her falls pt unsure    UTI (lower urinary tract infection)        Past Surgical History:        Procedure Laterality Date    BREAST SURGERY      BENIGN CYST    CHOLECYSTECTOMY      COLONOSCOPY      INTRACAPSULAR CATARACT EXTRACTION Right 3/25/2019    PHACOEMULSIFICATION OF CATARACT RIGHT EYE WITH INTRAOCULAR LENS IMPLANT performed by Carlos Carter MD at [place] [id number] Left 4/8/2019    PHACOEMULSIFICATION OF CATARACT LEFT EYE WITH INTRAOCULAR LENS IMPLANT performed by Carlos [facility] MD at [facility]  6-6-12    OPEN REDUCTION INTERNAL FIXATION RIGHT PROXIMAL HUMERAL SHAFT FRACTURE SYNTHES    OTHER SURGICAL HISTORY  07/18/12    incision and drainage of bursa rt elbow    PANCREAS BIOPSY      SHOULDER SURGERY Bilateral     rtc repair       Medications Prior to Admission:    Prior to Admission medications    Medication Sig Start Date End Date Taking? Authorizing Provider   pregabalin (LYRICA) 75 MG capsule Take 75 mg by mouth 2 times daily. Yes Historical Provider, MD   clonazePAM (KLONOPIN) 1 MG tablet Take 1 tablet by mouth nightly for 3 days.  8/23/21 9/7/22  Greta Madrid MD   insulin glargine (BASAGLAR KWIKPEN) 100 UNIT/ML injection pen Inject 20 Units into the skin 2 times daily    Historical Provider, MD vitamin B-12 (CYANOCOBALAMIN) 500 MCG tablet Take 500 mcg by mouth daily    Historical Provider, MD   DULoxetine (CYMBALTA) 60 MG extended release capsule Take 60 mg by mouth daily    Historical Provider, MD   losartan (COZAAR) 100 MG tablet Take 100 mg by mouth daily    Historical Provider, MD   Albuterol Sulfate (PROAIR HFA IN) Inhale into the lungs    Historical Provider, MD   budesonide-formoterol (SYMBICORT) 160-4.5 MCG/ACT AERO Inhale 2 puffs into the lungs 2 times daily    Historical Provider, MD   diclofenac sodium (VOLTAREN) 1 % GEL Apply topically nightly    Historical Provider, MD   OXYGEN Inhale into the lungs 2 liters at night    Historical Provider, MD   ARTIFICIAL TEAR OP Apply 2 drops to eye 4 times daily    Historical Provider, MD   methyl salicylate-menthol (LYNNETTE NEGRO GREASELESS) 10-15 % CREA Apply topically every 8 hours as needed for Pain    Historical Provider, MD   guaiFENesin (ROBITUSSIN) 100 MG/5ML syrup Take 10 mLs by mouth every 4 hours as needed for Cough    Historical Provider, MD   acetaminophen (TYLENOL) 325 MG tablet Take 650 mg by mouth every 4 hours as needed for Pain    Historical Provider, MD   bisacodyl (DULCOLAX) 5 MG EC tablet Take 10 mg by mouth daily as needed for Constipation     Historical Provider, MD   DULoxetine (CYMBALTA) 20 MG extended release capsule Take 20 mg by mouth nightly    Historical Provider, MD   apixaban (ELIQUIS) 5 MG TABS tablet Take 5 mg by mouth 2 times daily    Historical Provider, MD   carvedilol (COREG) 6.25 MG tablet Take 6.25 mg by mouth 2 times daily (with meals)    Historical Provider, MD olivarezaiFENesin (MUCINEX) 600 MG extended release tablet Take 600 mg by mouth 2 times daily     Historical Provider, MD   predniSONE (DELTASONE) 2.5 MG tablet Take 2.5 mg by mouth daily    Historical Provider, MD   amLODIPine (NORVASC) 5 MG tablet Take 1 tablet by mouth daily 10/18/16   Wu Betancur MD   imipramine (TOFRANIL) 50 MG tablet Take 50 mg by mouth nightly    Historical Provider, MD   fluticasone (FLONASE) 50 MCG/ACT nasal spray 2 sprays by Nasal route daily    Historical Provider, MD   ondansetron (ZOFRAN ODT) 4 MG disintegrating tablet Take 1 tablet by mouth every 8 hours as needed for Nausea. Let dissolve in mouth. 8/5/13   Lucia Estrella,    ASPIRIN Take 81 mg by mouth daily. 1/15/11   Historical Provider, MD       Allergies:  Codeine and Morphine and related    Social History:  The patient currently lives [facility]:   reports that she quit smoking about 24 years ago. Her smoking use included cigarettes. She has a 15.00 pack-year smoking history. She has never used smokeless tobacco.  ETOH:   reports no history of alcohol use. Family History:   Positive as follows:        Problem Relation Age of Onset    Heart Disease Mother        REVIEW OF SYSTEMS:       Constitutional: Negative for fever +generalized weakness  HENT: Negative for sore throat   Eyes: Negative for redness   Respiratory: +dyspnea, cough   Cardiovascular: Negative for chest pain   Gastrointestinal: Negative for vomiting, diarrhea +abdominal pain, black stools  Genitourinary: Negative for hematuria   Musculoskeletal: +arthralgias   Skin: Negative for rash   Neurological: Negative for syncope   Hematological: Negative for adenopathy   Psychiatric/Behavorial: Negative for anxiety    PHYSICAL EXAM:    BP (!) 155/79   Pulse 83   Temp 97.9 °F (36.6 °C) (Oral)   Resp 16   Ht 5' 7\" (1.702 m)   Wt 162 lb (73.5 kg)   SpO2 96%   BMI 25.37 kg/m²     Gen: No distress. Alert. Very pleasant elderly lady, chronically ill appearing, pale   Eyes: PERRL. No sclera icterus. No conjunctival injection. ENT: No discharge. Pharynx clear. Neck: No JVD. Trachea midline. Resp: No accessory muscle use. No crackles. No wheezes. No rhonchi. CV: irregularly irregular. No murmur. No rub. 1+ BLE edema. GI: mild tenderness to mid epigastric and umbilical hernia location.  Round, Non-distended. Normal bowel sounds. +umbilical hernia soft, nontender at this time  Skin: Warm and dry. No nodule on exposed extremities. No rash on exposed extremities. Scattered ecchymosis on BUE  M/S: No cyanosis. No joint deformity. No clubbing. Chronic elbow deformity. Neuro: Awake. Grossly nonfocal    Psych: Oriented x 3. No anxiety or agitation. CBC:   Recent Labs     09/07/22  1240   WBC 5.2   HGB 6.5*   HCT 22.2*   MCV 69.6*        BMP:   Recent Labs     09/07/22  1240   *   K 4.9   CL 99   CO2 29   BUN 32*   CREATININE 1.1     LIVER PROFILE:   Recent Labs     09/07/22  1240   AST 15   ALT 12   BILITOT 0.3   ALKPHOS 64     PT/INR: No results for input(s): PROTIME, INR in the last 72 hours. APTT: No results for input(s): APTT in the last 72 hours.   UA:  Recent Labs     09/07/22  1439   COLORU Yellow   PHUR 6.0   WBCUA 6-9*   RBCUA 3-4   BACTERIA 1+*   CLARITYU Clear   SPECGRAV 1.015   LEUKOCYTESUR SMALL*   UROBILINOGEN 0.2   BILIRUBINUR Negative   BLOODU Negative   GLUCOSEU 250*   AMORPHOUS 1+            CARDIAC ENZYMES  Recent Labs     09/07/22  1240   TROPONINI <0.01       U/A:    Lab Results   Component Value Date/Time    COLORU Yellow 09/07/2022 02:39 PM    WBCUA 6-9 09/07/2022 02:39 PM    RBCUA 3-4 09/07/2022 02:39 PM    MUCUS 1+ 08/04/2013 10:45 PM    BACTERIA 1+ 09/07/2022 02:39 PM    CLARITYU Clear 09/07/2022 02:39 PM    SPECGRAV 1.015 09/07/2022 02:39 PM    LEUKOCYTESUR SMALL 09/07/2022 02:39 PM    BLOODU Negative 09/07/2022 02:39 PM    GLUCOSEU 250 09/07/2022 02:39 PM    GLUCOSEU NEGATIVE 04/21/2012 04:15 AM    AMORPHOUS 1+ 09/07/2022 02:39 PM       ABG    Lab Results   Component Value Date/Time    PBB5JTF 34.2 12/21/2018 04:01 PM    BEART 7.8 12/21/2018 04:01 PM    C6FOIHPS 98.4 12/21/2018 04:01 PM    PHART 7.395 12/21/2018 04:01 PM    AAR2DHB 57.2 12/21/2018 04:01 PM    PO2ART 125.7 12/21/2018 04:01 PM    WGB0PHW 36.0 12/21/2018 04:01 PM       CULTURES  COVID/Influenza: not detected    EKG:  I have reviewed the EKG with the following interpretation:   Atrial fibrillationLow voltage QRSSeptal infarct (cited on or before 05-JAN-2018)Nonspecific ST and T wave abnormality ; consider electrolyte abnormality or artifactConfirmed by Nora Serna MD, Pavel Bhardwaj ([id number]) on 9/7/2022 5:58:05 PM    RADIOLOGY  XR CHEST PORTABLE   Final Result   Cardiomegaly with findings of pulmonary edema and a trace right pleural   effusion. CT ABDOMEN PELVIS W IV CONTRAST Additional Contrast? None    (Results Pending)     Echo 1/6/2018  There is moderate concentric left ventricular hypertrophy. The diastolic function is impaired and classified as Grade 2   (psuedonormalization). Poor endocardial border visualization   Overall left ventricular ejection fraction is estimated to be 45-50%. There is mild to moderate global hypokinesis of the left ventricle. The study was technically limited. The study was technically difficult. Principal Problem:    GI bleed  Resolved Problems:    * No resolved hospital problems.  *        ASSESSMENT/PLAN:    Anemia  Possible GI bleed  - hemoglobin 6.2 at [facility]--6.5 upon arrival to ED  - 1 unit PRBC given  - stool occult negative  - pt does report black stools mauro 3 days ago, none since then  - clear liquids tonight and NPO after midnight for GI consult  - holding ASA and Eliquis  - cont to monitor h/h, transfuse for Hb<7   - monitor for any s/sx of bleeding  - CT of abdomen pending on admission   - BUN elevated at 32      Acute on Chronic respiratory failure  - currently on 3 liters n/c  - given IV lasix  - monitor on continuous pulse ox  - wean as tolerated    Afib with bradycardia  - noted multiple episodes of bradycardia while in ED  - holding Coreg at this time  - monitor on telemetry     Combined CHF with fluid overload  - BNP elevated, likely fluid overload in the setting of anemia  - cxr showed cardiomegaly, pulmonary edema and trace right pleural effusion   - x1 IV lasix given after blood transfusion  - 1+ BLE  - resume home lasix tomorrow and monitor   - daily weights, low sodium diet, fluid restrictions, strict I/O  - last echo in 2018 showed EF 45-50%, consider repeat echo  - continue losartan, holding coreg at this time due to episodes of bradycardia      COPD without AE  - no AE  - continue home inhalers and daily prednisone  - monitor     KATHY  - on AVAPS at [facility]  - reordered    HTN  - stable, continue home regimen  - monitor    DM type 2  - continue Lantus and SSI   - blood glucose elevated on admission  - monitor     Anxiety  Depression  - continue home regimen    Chronic Pain  - patient is on Lyrica 50 mg at 5 am and 1 pm and 75 mg at night  - also takes Cymbalta BID    GERD  - Continue PPI        Alzheimer's Disease   - pt is alert and oriented x3 today  - Continue supportive care      DVT Prophylaxis: scd  Diet: No diet orders on file  Code Status: DNR-CC, I discussed this with patient and it is noted on ECF paperwork as well    STEPHANIE Lee - CNP

## 2022-09-07 NOTE — ED NOTES
Pt ambulated to bathroom with assist x1, pt in no acute distress while ambulating.       Maria D Tate RN  09/07/22 [id number]

## 2022-09-07 NOTE — PLAN OF CARE
Admit to PCU    Anemia- Hemoglobin 6.5  Given x1 unit PRBC in ED  Hypoxia - on 3 liters wears AVAPS at night  IV lasix x1 post transfusion as CXR with pulmonary edema  CT of abdomen pending on admission  GI consulted    Pt DNR-CC    Home medications needs to be updated, I updated admission orders from the [facility] paperwork.     Dennis Zhao, APRN - CNP

## 2022-09-07 NOTE — ED PROVIDER NOTES
[address] ED  EMERGENCY DEPARTMENT ENCOUNTER        Pt Name: Nancy Moreno  MRN: [id number]  [place] 1940  Dateof evaluation: 9/7/2022  Provider: STEPHANIE Cramer - ABIMAEL  PCP: Carlton Cm MD  ED Attending: No att. providers found    [facility]       Chief Complaint   Patient presents with    Abnormal Lab     Patient sent over from [facility] with reports of low Hgb 6.2. Patient states she is taking eliquis       HISTORY OF PRESENTILLNESS   (Location/Symptom, Timing/Onset, Context/Setting, Quality, Duration, Modifying Factors, Severity)  Note limiting factors. Nancy Moreno is a 80 y.o. female for low blood counts. Onset was today. Context includes patient resides at the nursing home and was sent in for a low hemoglobin. Patient reports that she has been fatigued and short of breath however attributed that to her COPD. She denies any active bleeding patient does take Eliquis. Alleviating factors include nothing. Aggravating factors include nothing. Pain is 0/10. Nothing has been used for pain today. Nursing Notes were all reviewed and agreed with or any disagreements were addressed  in the HPI. REVIEW OF SYSTEMS    (2-9 systems for level 4, 10 or more for level 5)     Review of Systems   Constitutional:  Negative for activity change, appetite change and fever. Low blood counts   HENT:  Negative for congestion, facial swelling, rhinorrhea and sore throat. Eyes:  Negative for visual disturbance. Respiratory:  Negative for shortness of breath. Cardiovascular:  Negative for chest pain. Gastrointestinal:  Negative for abdominal pain. Genitourinary:  Negative for difficulty urinating. Musculoskeletal:  Negative for arthralgias and myalgias. Skin:  Negative for color change and rash. Neurological:  Negative for dizziness and light-headedness. Psychiatric/Behavioral:  Negative for agitation.     All other systems reviewed and are negative. Positives and Pertinent negatives as per HPI. Except as noted above in the ROS, all other systems were reviewed and negative.        PAST MEDICAL HISTORY     Past Medical History:   Diagnosis Date    A-fib [facility])     Alzheimer's dementia ([facility] [address]     Anxiety     Arthritis     Back pain     COPD (chronic obstructive pulmonary disease) ([facility] [address]     COVID-19 08/19/2021    Depression     Diabetes mellitus ([facility] [address]     Fall     multiple falls    GERD (gastroesophageal reflux disease)     Hepatitis A     age 27    Hernia     Hypercholesteremia     Hypertension     IBS (irritable bowel syndrome)     diarrhea  x 20 years    Incontinence of urine     not all the time    Kidney stone     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    Memory change     dementia    Neuropathy     Seizures (HCC)     possible because of her falls pt unsure    UTI (lower urinary tract infection)          SURGICAL HISTORY       Past Surgical History:   Procedure Laterality Date    BREAST SURGERY      BENIGN CYST    CHOLECYSTECTOMY      COLONOSCOPY      INTRACAPSULAR CATARACT EXTRACTION Right 3/25/2019    PHACOEMULSIFICATION OF CATARACT RIGHT EYE WITH INTRAOCULAR LENS IMPLANT performed by Shakira Sanchez MD at [place] [id number] Left 4/8/2019    PHACOEMULSIFICATION OF CATARACT LEFT EYE WITH INTRAOCULAR LENS IMPLANT performed by Shakira Sanchez MD at [facility]  6-6-12    OPEN REDUCTION INTERNAL FIXATION RIGHT PROXIMAL HUMERAL SHAFT FRACTURE SYNTHES    OTHER SURGICAL HISTORY  07/18/12    incision and drainage of bursa rt elbow    PANCREAS BIOPSY      SHOULDER SURGERY Bilateral     rtc repair         CURRENT MEDICATIONS       Previous Medications    ACETAMINOPHEN (TYLENOL) 325 MG TABLET    Take 650 mg by mouth every 4 hours as needed for Pain    ALBUTEROL SULFATE (PROAIR HFA IN)    Inhale into the lungs    AMLODIPINE (NORVASC) 5 MG TABLET    Take 1 tablet by mouth daily    APIXABAN (ELIQUIS) 5 MG TABS TABLET    Take 5 mg by mouth 2 times daily    ARTIFICIAL TEAR OP    Apply 2 drops to eye 4 times daily    ASPIRIN    Take 81 mg by mouth daily. BISACODYL (DULCOLAX) 5 MG EC TABLET    Take 10 mg by mouth daily as needed for Constipation     BUDESONIDE-FORMOTEROL (SYMBICORT) 160-4.5 MCG/ACT AERO    Inhale 2 puffs into the lungs 2 times daily    CARVEDILOL (COREG) 6.25 MG TABLET    Take 6.25 mg by mouth 2 times daily (with meals)    CLONAZEPAM (KLONOPIN) 1 MG TABLET    Take 1 tablet by mouth nightly for 3 days. DICLOFENAC SODIUM (VOLTAREN) 1 % GEL    Apply topically nightly    DULOXETINE (CYMBALTA) 20 MG EXTENDED RELEASE CAPSULE    Take 20 mg by mouth nightly    DULOXETINE (CYMBALTA) 60 MG EXTENDED RELEASE CAPSULE    Take 60 mg by mouth daily    FLUTICASONE (FLONASE) 50 MCG/ACT NASAL SPRAY    2 sprays by Nasal route daily    GUAIFENESIN (MUCINEX) 600 MG EXTENDED RELEASE TABLET    Take 600 mg by mouth 2 times daily     GUAIFENESIN (ROBITUSSIN) 100 MG/5ML SYRUP    Take 10 mLs by mouth every 4 hours as needed for Cough    IMIPRAMINE (TOFRANIL) 50 MG TABLET    Take 50 mg by mouth nightly    INSULIN GLARGINE (BASAGLAR KWIKPEN) 100 UNIT/ML INJECTION PEN    Inject 20 Units into the skin 2 times daily    LOSARTAN (COZAAR) 100 MG TABLET    Take 100 mg by mouth daily    METHYL SALICYLATE-MENTHOL (LYNNETTE NEGRO GREASELESS) 10-15 % CREA    Apply topically every 8 hours as needed for Pain    ONDANSETRON (ZOFRAN ODT) 4 MG DISINTEGRATING TABLET    Take 1 tablet by mouth every 8 hours as needed for Nausea. Let dissolve in mouth. OXYGEN    Inhale into the lungs 2 liters at night    PREDNISONE (DELTASONE) 2.5 MG TABLET    Take 2.5 mg by mouth daily    PREGABALIN (LYRICA) 75 MG CAPSULE    Take 75 mg by mouth 2 times daily.     VITAMIN B-12 (CYANOCOBALAMIN) 500 MCG TABLET    Take 500 mcg by mouth daily         ALLERGIES     Codeine and Morphine and related    FAMILY HISTORY       Family History   Problem Relation Age of Onset    Heart Disease Mother           SOCIAL HISTORY       Social History     Socioeconomic History    Marital status:      Spouse name: None    Number of children: None    Years of education: None    Highest education level: None   Tobacco Use    Smoking status: Former     Packs/day: 1.00     Years: 15.00     Pack years: 15.00     Types: Cigarettes     Quit date: 1998     Years since quittin.3    Smokeless tobacco: Never   Vaping Use    Vaping Use: Never used   Substance and Sexual Activity    Alcohol use: No    Drug use: No    Sexual activity: Not Currently       SCREENINGS    Emigdio Coma Scale  Eye Opening: Spontaneous  Best Verbal Response: Oriented  Best Motor Response: Obeys commands  Emigdio Coma Scale Score: 15        PHYSICAL EXAM  (up to 7 for level 4, 8 or more for level 5)     ED Triage Vitals   BP Temp Temp Source Heart Rate Resp SpO2 Height Weight   22 1233 22 1237 22 1237 22 1233 22 1233 22 1233 22 1233 22 1233   (!) 147/69 98.2 °F (36.8 °C) Oral 89 18 100 % 5' 7\" (1.702 m) 162 lb (73.5 kg)       Physical Exam  Constitutional:       Appearance: Normal appearance. She is well-developed. HENT:      Head: Normocephalic and atraumatic. Cardiovascular:      Rate and Rhythm: Normal rate. Pulmonary:      Effort: Pulmonary effort is normal. No respiratory distress. Abdominal:      General: There is no distension. Palpations: Abdomen is soft. Tenderness: no abdominal tenderness   Musculoskeletal:         General: Normal range of motion. Cervical back: Normal range of motion. Skin:     General: Skin is warm and dry. Coloration: Skin is pale. Neurological:      Mental Status: She is alert and oriented to person, place, and time.        DIAGNOSTIC RESULTS   LABS:    Labs Reviewed   CBC WITH AUTO DIFFERENTIAL - Abnormal; Notable for the following components:       Result Value RBC 3.19 (*)     Hemoglobin 6.5 (*)     Hematocrit 22.2 (*)     MCV 69.6 (*)     MCH 20.4 (*)     MCHC 29.3 (*)     RDW 20.8 (*)     Lymphocytes Absolute 0.8 (*)     Anisocytosis 2+ (*)     Microcytes 1+ (*)     Polychromasia Occasional (*)     Hypochromia Occasional (*)     All other components within normal limits    Narrative:     Coretta Patrick tel. [phone],  Hematology results called to and read back by Edyta Isbell RN, 09/07/2022  13:34, by Tanya Foss   COMPREHENSIVE METABOLIC PANEL W/ REFLEX TO MG FOR LOW K - Abnormal; Notable for the following components:    Sodium 135 (*)     Glucose 264 (*)     BUN 32 (*)     GFR Non- 48 (*)     GFR  58 (*)     All other components within normal limits   HEMOGLOBIN AND HEMATOCRIT - Abnormal; Notable for the following components:    Hemoglobin 7.4 (*)     Hematocrit 24.3 (*)     All other components within normal limits   URINALYSIS WITH REFLEX TO CULTURE - Abnormal; Notable for the following components:    Glucose, Ur 250 (*)     Protein, UA 30 (*)     Leukocyte Esterase, Urine SMALL (*)     All other components within normal limits   MICROSCOPIC URINALYSIS - Abnormal; Notable for the following components:    WBC, UA 6-9 (*)     Epithelial Cells, UA 21-50 (*)     Bacteria, UA 1+ (*)     All other components within normal limits   BRAIN NATRIURETIC PEPTIDE - Abnormal; Notable for the following components:    Pro-BNP 1,499 (*)     All other components within normal limits   COVID-19 & INFLUENZA COMBO   BLOOD OCCULT STOOL DIAGNOSTIC    Narrative:     ORDER#: [id number]                          ORDERED BY: CHRISTINE SAINZ  SOURCE: Stool                              COLLECTED:  09/07/22 14:07  ANTIBIOTICS AT AV.:                      RECEIVED :  09/07/22 14:44   TROPONIN   TYPE AND SCREEN   PREPARE RBC (CROSSMATCH)       All other labs werewithin normal range or not returned as of this dictation. EKG:  All EKG's are interpreted by the Emergency Department Physician who either signs or Co-signs this chart in the absence of a cardiologist.  Please see their note for interpretation of EKG. RADIOLOGY:     Abdominal CT with IV contrast interpreted by radiologist for  Impression:    1. Umbilicus skin thickening present with surrounding fat stranding. Finding   may be related to an inflammatory/infectious process. Can correlate with   direct inspection. 2. Pelvic skin thickening and subcutaneous fat stranding may be related to   edema, possibly dependent edema versus fluid overload. 3. Again seen left paraumbilical fat-containing ventral hernia and bilateral   fat-containing inguinal hernias. 4. Sigmoid colon diverticulosis without evidence of acute diverticulitis. 5. Questionable punctate left nonobstructing nephrolithiasis. 6. Partially visualized moderate right pleural effusion with associated   dependent atelectasis. Portable chest x-ray interpreted by radiologist for cardiomegaly with findings of pulmonary edema and a trace right pleural effusion. Interpretation per the Radiologist below, if available at the time of this note:    CT ABDOMEN PELVIS W IV CONTRAST Additional Contrast? None   Final Result   1. Umbilicus skin thickening present with surrounding fat stranding. Finding   may be related to an inflammatory/infectious process. Can correlate with   direct inspection. 2. Pelvic skin thickening and subcutaneous fat stranding may be related to   edema, possibly dependent edema versus fluid overload. 3. Again seen left paraumbilical fat-containing ventral hernia and bilateral   fat-containing inguinal hernias. 4. Sigmoid colon diverticulosis without evidence of acute diverticulitis. 5. Questionable punctate left nonobstructing nephrolithiasis. 6. Partially visualized moderate right pleural effusion with associated   dependent atelectasis.          XR CHEST PORTABLE   Final Result   Cardiomegaly with findings of pulmonary edema and a trace right pleural   effusion. No results found. PROCEDURES   Unless otherwise noted below, none     Procedures     CRITICAL CARE TIME   N/A    CONSULTS:  IP CONSULT TO HOSPITALIST  IP CONSULT TO GI      EMERGENCYDEPARTMENT COURSE and DIFFERENTIAL DIAGNOSIS/MDM:   Vitals:    Vitals:    09/07/22 1814 09/07/22 1815 09/07/22 1816 09/07/22 1943   BP:    (!) 150/86   Pulse: 80 80 83 78   Resp: 18 18 16 17   Temp:       TempSrc:       SpO2:   96% (!) 79%   Weight:       Height:           Patient was given the following medications:  Medications   oxybutynin (DITROPAN-XL) extended release tablet 5 mg (has no administration in time range)   DULoxetine (CYMBALTA) extended release capsule 60 mg (has no administration in time range)   fenofibrate (TRICOR) tablet 54 mg (has no administration in time range)   furosemide (LASIX) tablet 20 mg (has no administration in time range)   atorvastatin (LIPITOR) tablet 40 mg (has no administration in time range)   cetirizine (ZYRTEC) tablet 5 mg (has no administration in time range)   potassium chloride (KLOR-CON M) extended release tablet 20 mEq (has no administration in time range)   pregabalin (LYRICA) capsule 50 mg (has no administration in time range)   pregabalin (LYRICA) capsule 75 mg (has no administration in time range)   trospium (SANCTURA) tablet 20 mg (has no administration in time range)   losartan (COZAAR) tablet 50 mg (has no administration in time range)   DULoxetine (CYMBALTA) extended release capsule 20 mg (has no administration in time range)   0.9 % sodium chloride infusion (0 mL/hr IntraVENous Stopped 9/7/22 1811)   pantoprazole (PROTONIX) injection 40 mg (40 mg IntraVENous Given 9/7/22 1431)   furosemide (LASIX) injection 20 mg (20 mg IntraVENous Given 9/7/22 1812)   iopamidol (ISOVUE-370) 76 % injection 75 mL (75 mLs IntraVENous Given 9/7/22 1819)       Patient was seen and evaluated by myself and Dr. Rajan Pérez. patient here for concerns for low blood counts. Patient currently is at the nursing home and was sent in for hemoglobin that was low. On exam the patient is awake and alert she is hemodynamically stable nontoxic in appearance. Patient does appear pale. Lab values have been reviewed and interpreted. Patient denies any complaints of pain. She does report that she has had some fatigue and shortness of breath but attributed that to her COPD. Patient does use oxygen at baseline. Rectal was tested for occult. Patient was given IV fluids and Protonix. Patient did have a hemoglobin of 6.5 so she was transfused with 1 unit of blood in the ED. Patient was transfused with 1 unit of packed red blood cells in the ED. Her hemoglobin has improved. Abdominal CT was obtained and reviewed. Consult was placed to the hospitalist for admission. Hospitalist is excepted. Patient's care was transferred to the inpatient unit. CRITICAL CARE NOTE:  There was a high probability of clinically significant life-threatening deterioration of the patient's condition requiring my urgent intervention. Total critical care time is 40 minutes. This includes vital sign monitoring, pulse oximetry monitoring, telemetry monitoring, clinical response to the IV medications, reviewing the nursing notes, consultation time, dictation/documentation time, and interpretation of the labwork. Is this patient to be included in the SEP-1 Core Measure due to severe sepsis or septic shock? No   Exclusion criteria - the patient is NOT to be included for SEP-1 Core Measure due to: Infection is not suspected       The patient tolerated their visit well. I have evaluated this patient. My supervising physician was available for consultation. The patient and / or the family were informed of the results of any tests, a time was given to answer questions, a plan was proposed and they agreed with plan. FINAL IMPRESSION      1.  Symptomatic anemia    2. Pleural effusion on right    3. Cardiomegaly    4. Chronic pulmonary edema    5. Ventral hernia without obstruction or gangrene    6. Diverticulosis    7. KAMRAN (acute kidney injury) ([facility] [address]    8. Anticoagulated          DISPOSITION/PLAN   DISPOSITION Admitted 09/07/2022 06:15:52 PM      PATIENT REFERRED TO:  No follow-up provider specified. DISCHARGE MEDICATIONS:  New Prescriptions    No medications on file       DISCONTINUED MEDICATIONS:  Discontinued Medications    ATORVASTATIN (LIPITOR) 40 MG TABLET    Take 40 mg by mouth nightly    D-MANNOSE 500 MG CAPS    Take 1 capsule by mouth daily    FENOFIBRATE (TRICOR) 54 MG TABLET    Take 54 mg by mouth daily     FUROSEMIDE (LASIX) 20 MG TABLET    Take 1 tablet by mouth daily    LOPERAMIDE (IMODIUM) 2 MG CAPSULE    Take 2 mg by mouth 4 times daily as needed for Diarrhea    LORATADINE (CLARITIN) 10 MG CAPSULE    Take 10 mg by mouth daily    MAGNESIUM HYDROXIDE (MILK OF MAGNESIA) 400 MG/5ML SUSPENSION    Take 30 mLs by mouth daily as needed for Constipation    MAGNESIUM OXIDE (MAG-OX) 400 MG TABLET    Take 400 mg by mouth daily    OLOPATADINE (PATANOL) 0.1 % OPHTHALMIC SOLUTION    Place 1 drop into both eyes daily    OMEPRAZOLE (PRILOSEC) 20 MG CAPSULE    Take 40 mg by mouth nightly     POTASSIUM CHLORIDE (MICRO-K) 10 MEQ EXTENDED RELEASE CAPSULE    Take 40 mEq by mouth 2 times daily     PREGABALIN (LYRICA) 50 MG CAPSULE    Take 1 capsule by mouth 2 times daily for 3 days.     SENNA (SENOKOT) 8.6 MG TABLET    Take 1 tablet by mouth 2 times daily    SITAGLIPTIN (JANUVIA) 50 MG TABLET    Take 50 mg by mouth daily    VITAMIN D 25 MCG (1000 UT) CAPS    Take 1,000 Units by mouth daily              (Please note that portions of this note were completed with a voice recognition program.  Efforts were made to edit the dictations but occasionally words are mis-transcribed.)    STEPHANIE Szymanski - CNP (electronically signed)  '     STEPHANIE Szymanski - CNP  09/07/22 8512

## 2022-09-08 ENCOUNTER — ANESTHESIA (OUTPATIENT)
Dept: ENDOSCOPY | Age: 82
DRG: 378 | End: 2022-09-08
Payer: COMMERCIAL

## 2022-09-08 ENCOUNTER — ANESTHESIA EVENT (OUTPATIENT)
Dept: ENDOSCOPY | Age: 82
DRG: 378 | End: 2022-09-08
Payer: COMMERCIAL

## 2022-09-08 LAB
ANION GAP SERPL CALCULATED.3IONS-SCNC: 10 MMOL/L (ref 3–16)
APTT: 34.2 SEC (ref 23–34.3)
BASOPHILS ABSOLUTE: 0 K/UL (ref 0–0.2)
BASOPHILS RELATIVE PERCENT: 0.9 %
BUN BLDV-MCNC: 26 MG/DL (ref 7–20)
CALCIUM SERPL-MCNC: 9.2 MG/DL (ref 8.3–10.6)
CHLORIDE BLD-SCNC: 98 MMOL/L (ref 99–110)
CO2: 30 MMOL/L (ref 21–32)
CREAT SERPL-MCNC: 0.9 MG/DL (ref 0.6–1.2)
EOSINOPHILS ABSOLUTE: 0.2 K/UL (ref 0–0.6)
EOSINOPHILS RELATIVE PERCENT: 3.9 %
ESTIMATED AVERAGE GLUCOSE: 185.8 MG/DL
GFR AFRICAN AMERICAN: >60
GFR NON-AFRICAN AMERICAN: >60
GLUCOSE BLD-MCNC: 141 MG/DL (ref 70–99)
GLUCOSE BLD-MCNC: 143 MG/DL (ref 70–99)
GLUCOSE BLD-MCNC: 152 MG/DL (ref 70–99)
GLUCOSE BLD-MCNC: 160 MG/DL (ref 70–99)
GLUCOSE BLD-MCNC: 185 MG/DL (ref 70–99)
GLUCOSE BLD-MCNC: 281 MG/DL (ref 70–99)
HBA1C MFR BLD: 8.1 %
HCT VFR BLD CALC: 25.6 % (ref 36–48)
HCT VFR BLD CALC: 26 % (ref 36–48)
HCT VFR BLD CALC: 26.4 % (ref 36–48)
HCT VFR BLD CALC: 27 % (ref 36–48)
HEMATOLOGY PATH CONSULT: NORMAL
HEMOGLOBIN: 7.6 G/DL (ref 12–16)
HEMOGLOBIN: 7.7 G/DL (ref 12–16)
HEMOGLOBIN: 7.8 G/DL (ref 12–16)
HEMOGLOBIN: 8 G/DL (ref 12–16)
INR BLD: 1.62 (ref 0.87–1.14)
IRON SATURATION: 4 % (ref 15–50)
IRON: 16 UG/DL (ref 37–145)
LYMPHOCYTES ABSOLUTE: 0.8 K/UL (ref 1–5.1)
LYMPHOCYTES RELATIVE PERCENT: 14.9 %
MCH RBC QN AUTO: 20.9 PG (ref 26–34)
MCHC RBC AUTO-ENTMCNC: 29.4 G/DL (ref 31–36)
MCV RBC AUTO: 70.9 FL (ref 80–100)
MONOCYTES ABSOLUTE: 0.6 K/UL (ref 0–1.3)
MONOCYTES RELATIVE PERCENT: 10.7 %
NEUTROPHILS ABSOLUTE: 3.8 K/UL (ref 1.7–7.7)
NEUTROPHILS RELATIVE PERCENT: 69.6 %
PDW BLD-RTO: 23.1 % (ref 12.4–15.4)
PERFORMED ON: ABNORMAL
PLATELET # BLD: 150 K/UL (ref 135–450)
PMV BLD AUTO: 9.1 FL (ref 5–10.5)
POTASSIUM REFLEX MAGNESIUM: 3.9 MMOL/L (ref 3.5–5.1)
PROTHROMBIN TIME: 19.1 SEC (ref 11.7–14.5)
RBC # BLD: 3.66 M/UL (ref 4–5.2)
SODIUM BLD-SCNC: 138 MMOL/L (ref 136–145)
TOTAL IRON BINDING CAPACITY: 398 UG/DL (ref 260–445)
TROPONIN: <0.01 NG/ML
WBC # BLD: 5.4 K/UL (ref 4–11)

## 2022-09-08 PROCEDURE — 2580000003 HC RX 258: Performed by: NURSE ANESTHETIST, CERTIFIED REGISTERED

## 2022-09-08 PROCEDURE — 85610 PROTHROMBIN TIME: CPT

## 2022-09-08 PROCEDURE — 7100000010 HC PHASE II RECOVERY - FIRST 15 MIN: Performed by: INTERNAL MEDICINE

## 2022-09-08 PROCEDURE — 7100000011 HC PHASE II RECOVERY - ADDTL 15 MIN: Performed by: INTERNAL MEDICINE

## 2022-09-08 PROCEDURE — 6360000002 HC RX W HCPCS: Performed by: NURSE ANESTHETIST, CERTIFIED REGISTERED

## 2022-09-08 PROCEDURE — 6370000000 HC RX 637 (ALT 250 FOR IP): Performed by: NURSE PRACTITIONER

## 2022-09-08 PROCEDURE — 0DB98ZX EXCISION OF DUODENUM, VIA NATURAL OR ARTIFICIAL OPENING ENDOSCOPIC, DIAGNOSTIC: ICD-10-PCS | Performed by: INTERNAL MEDICINE

## 2022-09-08 PROCEDURE — 6370000000 HC RX 637 (ALT 250 FOR IP): Performed by: INTERNAL MEDICINE

## 2022-09-08 PROCEDURE — 99233 SBSQ HOSP IP/OBS HIGH 50: CPT | Performed by: INTERNAL MEDICINE

## 2022-09-08 PROCEDURE — 2580000003 HC RX 258: Performed by: NURSE PRACTITIONER

## 2022-09-08 PROCEDURE — C9113 INJ PANTOPRAZOLE SODIUM, VIA: HCPCS | Performed by: NURSE PRACTITIONER

## 2022-09-08 PROCEDURE — 3700000000 HC ANESTHESIA ATTENDED CARE: Performed by: INTERNAL MEDICINE

## 2022-09-08 PROCEDURE — 2709999900 HC NON-CHARGEABLE SUPPLY: Performed by: INTERNAL MEDICINE

## 2022-09-08 PROCEDURE — 85018 HEMOGLOBIN: CPT

## 2022-09-08 PROCEDURE — 88305 TISSUE EXAM BY PATHOLOGIST: CPT

## 2022-09-08 PROCEDURE — 3700000001 HC ADD 15 MINUTES (ANESTHESIA): Performed by: INTERNAL MEDICINE

## 2022-09-08 PROCEDURE — 83540 ASSAY OF IRON: CPT

## 2022-09-08 PROCEDURE — 94660 CPAP INITIATION&MGMT: CPT

## 2022-09-08 PROCEDURE — 80048 BASIC METABOLIC PNL TOTAL CA: CPT

## 2022-09-08 PROCEDURE — 2580000003 HC RX 258: Performed by: ANESTHESIOLOGY

## 2022-09-08 PROCEDURE — 6360000002 HC RX W HCPCS: Performed by: NURSE PRACTITIONER

## 2022-09-08 PROCEDURE — 94761 N-INVAS EAR/PLS OXIMETRY MLT: CPT

## 2022-09-08 PROCEDURE — 85730 THROMBOPLASTIN TIME PARTIAL: CPT

## 2022-09-08 PROCEDURE — 2700000000 HC OXYGEN THERAPY PER DAY

## 2022-09-08 PROCEDURE — 83550 IRON BINDING TEST: CPT

## 2022-09-08 PROCEDURE — 2500000003 HC RX 250 WO HCPCS: Performed by: NURSE ANESTHETIST, CERTIFIED REGISTERED

## 2022-09-08 PROCEDURE — 85014 HEMATOCRIT: CPT

## 2022-09-08 PROCEDURE — 3609012400 HC EGD TRANSORAL BIOPSY SINGLE/MULTIPLE: Performed by: INTERNAL MEDICINE

## 2022-09-08 PROCEDURE — 84484 ASSAY OF TROPONIN QUANT: CPT

## 2022-09-08 PROCEDURE — 85025 COMPLETE CBC W/AUTO DIFF WBC: CPT

## 2022-09-08 PROCEDURE — 6370000000 HC RX 637 (ALT 250 FOR IP): Performed by: REGISTERED NURSE

## 2022-09-08 PROCEDURE — 36415 COLL VENOUS BLD VENIPUNCTURE: CPT

## 2022-09-08 PROCEDURE — 2060000000 HC ICU INTERMEDIATE R&B

## 2022-09-08 PROCEDURE — 94640 AIRWAY INHALATION TREATMENT: CPT

## 2022-09-08 RX ORDER — MAGNESIUM OXIDE 400 MG/1
400 TABLET ORAL DAILY
COMMUNITY
End: 2022-12-22

## 2022-09-08 RX ORDER — MEPERIDINE HYDROCHLORIDE 25 MG/ML
12.5 INJECTION INTRAMUSCULAR; INTRAVENOUS; SUBCUTANEOUS EVERY 5 MIN PRN
Status: DISCONTINUED | OUTPATIENT
Start: 2022-09-08 | End: 2022-09-09

## 2022-09-08 RX ORDER — FUROSEMIDE 20 MG/1
20 TABLET ORAL DAILY
Status: ON HOLD | COMMUNITY
End: 2022-09-13 | Stop reason: HOSPADM

## 2022-09-08 RX ORDER — SODIUM CHLORIDE 0.9 % (FLUSH) 0.9 %
5-40 SYRINGE (ML) INJECTION EVERY 12 HOURS SCHEDULED
Status: DISCONTINUED | OUTPATIENT
Start: 2022-09-08 | End: 2022-09-13 | Stop reason: HOSPADM

## 2022-09-08 RX ORDER — IMIPRAMINE HCL 25 MG
50 TABLET ORAL NIGHTLY
Status: DISCONTINUED | OUTPATIENT
Start: 2022-09-08 | End: 2022-09-13 | Stop reason: HOSPADM

## 2022-09-08 RX ORDER — PROPOFOL 10 MG/ML
INJECTION, EMULSION INTRAVENOUS PRN
Status: DISCONTINUED | OUTPATIENT
Start: 2022-09-08 | End: 2022-09-08 | Stop reason: SDUPTHER

## 2022-09-08 RX ORDER — SODIUM CHLORIDE, SODIUM LACTATE, POTASSIUM CHLORIDE, CALCIUM CHLORIDE 600; 310; 30; 20 MG/100ML; MG/100ML; MG/100ML; MG/100ML
INJECTION, SOLUTION INTRAVENOUS CONTINUOUS PRN
Status: DISCONTINUED | OUTPATIENT
Start: 2022-09-08 | End: 2022-09-08 | Stop reason: SDUPTHER

## 2022-09-08 RX ORDER — LOSARTAN POTASSIUM 25 MG/1
25 TABLET ORAL DAILY
Status: DISCONTINUED | OUTPATIENT
Start: 2022-09-09 | End: 2022-09-13 | Stop reason: HOSPADM

## 2022-09-08 RX ORDER — SODIUM CHLORIDE 0.9 % (FLUSH) 0.9 %
5-40 SYRINGE (ML) INJECTION PRN
Status: DISCONTINUED | OUTPATIENT
Start: 2022-09-08 | End: 2022-09-13 | Stop reason: HOSPADM

## 2022-09-08 RX ORDER — PREGABALIN 50 MG/1
50 CAPSULE ORAL 2 TIMES DAILY
COMMUNITY

## 2022-09-08 RX ORDER — OXYCODONE HYDROCHLORIDE 5 MG/1
5 TABLET ORAL PRN
Status: ACTIVE | OUTPATIENT
Start: 2022-09-08 | End: 2022-09-08

## 2022-09-08 RX ORDER — FENOFIBRATE 54 MG/1
54 TABLET ORAL DAILY
COMMUNITY

## 2022-09-08 RX ORDER — SENNA PLUS 8.6 MG/1
1 TABLET ORAL 2 TIMES DAILY
COMMUNITY

## 2022-09-08 RX ORDER — OLOPATADINE HYDROCHLORIDE 1 MG/ML
1 SOLUTION/ DROPS OPHTHALMIC NIGHTLY
COMMUNITY

## 2022-09-08 RX ORDER — SODIUM CHLORIDE 9 MG/ML
25 INJECTION, SOLUTION INTRAVENOUS PRN
Status: DISCONTINUED | OUTPATIENT
Start: 2022-09-08 | End: 2022-09-13 | Stop reason: HOSPADM

## 2022-09-08 RX ORDER — ONDANSETRON 2 MG/ML
4 INJECTION INTRAMUSCULAR; INTRAVENOUS
Status: ACTIVE | OUTPATIENT
Start: 2022-09-08 | End: 2022-09-08

## 2022-09-08 RX ORDER — OXYCODONE HYDROCHLORIDE 5 MG/1
10 TABLET ORAL PRN
Status: ACTIVE | OUTPATIENT
Start: 2022-09-08 | End: 2022-09-08

## 2022-09-08 RX ORDER — BUDESONIDE AND FORMOTEROL FUMARATE DIHYDRATE 160; 4.5 UG/1; UG/1
2 AEROSOL RESPIRATORY (INHALATION) 2 TIMES DAILY
Status: ON HOLD | COMMUNITY
End: 2022-09-13 | Stop reason: HOSPADM

## 2022-09-08 RX ORDER — LOPERAMIDE HYDROCHLORIDE 2 MG/1
2 CAPSULE ORAL 4 TIMES DAILY PRN
COMMUNITY

## 2022-09-08 RX ORDER — ATORVASTATIN CALCIUM 40 MG/1
40 TABLET, FILM COATED ORAL NIGHTLY
COMMUNITY
End: 2022-12-22

## 2022-09-08 RX ORDER — FUROSEMIDE 20 MG/1
20 TABLET ORAL 2 TIMES DAILY
Status: DISCONTINUED | OUTPATIENT
Start: 2022-09-08 | End: 2022-09-13 | Stop reason: HOSPADM

## 2022-09-08 RX ORDER — LIDOCAINE HYDROCHLORIDE 20 MG/ML
INJECTION, SOLUTION INFILTRATION; PERINEURAL PRN
Status: DISCONTINUED | OUTPATIENT
Start: 2022-09-08 | End: 2022-09-08 | Stop reason: SDUPTHER

## 2022-09-08 RX ADMIN — SODIUM CHLORIDE, PRESERVATIVE FREE 10 ML: 5 INJECTION INTRAVENOUS at 21:56

## 2022-09-08 RX ADMIN — PROPOFOL 100 MG: 10 INJECTION, EMULSION INTRAVENOUS at 14:55

## 2022-09-08 RX ADMIN — POLYETHYLENE GLYCOL 3350, SODIUM SULFATE ANHYDROUS, SODIUM BICARBONATE, SODIUM CHLORIDE, POTASSIUM CHLORIDE 4000 ML: 236; 22.74; 6.74; 5.86; 2.97 POWDER, FOR SOLUTION ORAL at 21:53

## 2022-09-08 RX ADMIN — LIDOCAINE HYDROCHLORIDE 50 MG: 20 INJECTION, SOLUTION INFILTRATION; PERINEURAL at 14:53

## 2022-09-08 RX ADMIN — PREGABALIN 75 MG: 25 CAPSULE ORAL at 21:50

## 2022-09-08 RX ADMIN — OXYBUTYNIN CHLORIDE 5 MG: 5 TABLET, EXTENDED RELEASE ORAL at 21:44

## 2022-09-08 RX ADMIN — Medication 10 ML: at 21:45

## 2022-09-08 RX ADMIN — SODIUM CHLORIDE, POTASSIUM CHLORIDE, SODIUM LACTATE AND CALCIUM CHLORIDE: 600; 310; 30; 20 INJECTION, SOLUTION INTRAVENOUS at 14:53

## 2022-09-08 RX ADMIN — POTASSIUM CHLORIDE 20 MEQ: 1500 TABLET, EXTENDED RELEASE ORAL at 16:29

## 2022-09-08 RX ADMIN — IMIPRAMINE HYDROCHLORIDE 50 MG: 25 TABLET ORAL at 22:04

## 2022-09-08 RX ADMIN — PHENYLEPHRINE HYDROCHLORIDE 40 MCG: 10 INJECTION INTRAVENOUS at 14:52

## 2022-09-08 RX ADMIN — GUAIFENESIN 600 MG: 600 TABLET, EXTENDED RELEASE ORAL at 21:44

## 2022-09-08 RX ADMIN — ATORVASTATIN CALCIUM 40 MG: 40 TABLET, FILM COATED ORAL at 21:44

## 2022-09-08 RX ADMIN — PHENYLEPHRINE HYDROCHLORIDE 40 MCG: 10 INJECTION INTRAVENOUS at 14:49

## 2022-09-08 RX ADMIN — CLONAZEPAM 1 MG: 1 TABLET ORAL at 21:44

## 2022-09-08 RX ADMIN — FUROSEMIDE 20 MG: 20 TABLET ORAL at 16:28

## 2022-09-08 RX ADMIN — PANTOPRAZOLE SODIUM 40 MG: 40 INJECTION, POWDER, FOR SOLUTION INTRAVENOUS at 08:52

## 2022-09-08 RX ADMIN — Medication 2 PUFF: at 07:47

## 2022-09-08 RX ADMIN — PHENYLEPHRINE HYDROCHLORIDE 40 MCG: 10 INJECTION INTRAVENOUS at 14:45

## 2022-09-08 RX ADMIN — DULOXETINE 20 MG: 20 CAPSULE, DELAYED RELEASE ORAL at 21:44

## 2022-09-08 RX ADMIN — Medication 2 PUFF: at 19:34

## 2022-09-08 ASSESSMENT — ENCOUNTER SYMPTOMS: SHORTNESS OF BREATH: 1

## 2022-09-08 ASSESSMENT — PAIN - FUNCTIONAL ASSESSMENT: PAIN_FUNCTIONAL_ASSESSMENT: 0-10

## 2022-09-08 ASSESSMENT — LIFESTYLE VARIABLES: SMOKING_STATUS: 0

## 2022-09-08 ASSESSMENT — PAIN SCALES - GENERAL
PAINLEVEL_OUTOF10: 0

## 2022-09-08 NOTE — PROGRESS NOTES
Upon handoff to recovery nurse, there was a question raised as to whether a hard lump on patient's right arm was present prior to the procedure. Our pre-procedure nurse Marquita Russell RN reports that the lump was present and she placed the blood pressure cuff on the lower part of the arm due to this. I called patient's primary nurse to give report regarding the EGD to [facility], RN and confirmed with her that this was present upon her assessment.

## 2022-09-08 NOTE — FLOWSHEET NOTE
09/08/22 0800   Vital Signs   Temp 98 °F (36.7 °C)   Temp Source Oral   Heart Rate 75   Heart Rate Source Monitor   Resp 16   /75   MAP (Calculated) 94.67   Patient Position Turns self   Pain Assessment   Pain Assessment None - Denies Pain   Oxygen Therapy   SpO2 98 %   O2 Device Nasal cannula   O2 Flow Rate (L/min) 2 L/min   Pt is caox3 resp even denies pain or needs at this time .  Pt currently NPO and takes pills in pudding will hold meds until cleared to eat after GI consult , call light within reach, bed in low position

## 2022-09-08 NOTE — PROGRESS NOTES
Hospitalist Progress Note      PCP: Abhijit Crenshaw MD    Date of Admission: 9/7/2022    Chief Complaint: 80 y.o. female with PMH of atrial fib, alzheimer's dementia, COPD, HTN, HLD, s/d CHF, chronic pain, depression, anxiety, KATHY on AVAPS, GERD who presents to [facility] with from [facility] with abnormal labs. History obtained from the patient and review of EMR. Patient is awake and alert lying in bed she is currently on 3 L of oxygen. She is post 1 unit packed red blood cells. She stated that she had a black bowel movement x1 this week approximately 3 days ago. Subjective:     Awake alert and pleasantly confused - baseline dementia. Denies nausea or emesis. No reports of BM overnight.            Medications:  Reviewed    Infusion Medications    dextrose      sodium chloride       Scheduled Medications    [START ON 9/9/2022] losartan  25 mg Oral Daily    furosemide  20 mg Oral BID    imipramine  50 mg Oral Nightly    amLODIPine  5 mg Oral Daily    budesonide-formoterol  2 puff Inhalation BID    clonazePAM  1 mg Oral Nightly    fluticasone  2 spray Nasal Daily    guaiFENesin  600 mg Oral BID    insulin glargine  20 Units SubCUTAneous BID    predniSONE  2.5 mg Oral Daily    vitamin B-12  500 mcg Oral Daily    sodium chloride flush  5-40 mL IntraVENous 2 times per day    pantoprazole  40 mg IntraVENous Daily    oxybutynin  5 mg Oral Nightly    DULoxetine  60 mg Oral Daily    fenofibrate  54 mg Oral Daily    atorvastatin  40 mg Oral Nightly    cetirizine  5 mg Oral Daily    potassium chloride  20 mEq Oral BID WC    pregabalin  50 mg Oral BID    pregabalin  75 mg Oral Nightly    trospium  20 mg Oral Daily    insulin lispro  0-4 Units SubCUTAneous TID WC    insulin lispro  0-4 Units SubCUTAneous Nightly    DULoxetine  20 mg Oral Nightly     PRN Meds: albuterol sulfate HFA, methyl salicylate-menthol, glucose, dextrose bolus **OR** dextrose bolus, glucagon (rDNA), dextrose, sodium chloride flush, sodium chloride, ondansetron **OR** ondansetron, acetaminophen **OR** acetaminophen      Intake/Output Summary (Last 24 hours) at 9/8/2022 1212  Last data filed at 9/8/2022 0845  Gross per 24 hour   Intake 438 ml   Output 400 ml   Net 38 ml       Physical Exam Performed:    /75   Pulse 75   Temp 98 °F (36.7 °C) (Oral)   Resp 16   Ht 5' 7\" (1.702 m)   Wt 170 lb 12.8 oz (77.5 kg)   SpO2 98%   BMI 26.75 kg/m²     General appearance: No apparent distress, appears stated age and cooperative. HEENT: Pupils equal, round, and reactive to light. Conjunctivae/corneas clear. Neck: Supple, with full range of motion. No jugular venous distention. Trachea midline. Respiratory:  Normal respiratory effort. Clear to auscultation, bilaterally without Rales/Wheezes/Rhonchi. Cardiovascular: Regular rate and rhythm with normal S1/S2 without murmurs, rubs or gallops. Abdomen: Soft, non-tender, non-distended with normal bowel sounds. Musculoskeletal: No clubbing, cyanosis or edema bilaterally. Full range of motion without deformity. Skin: Skin color, texture, turgor normal.  No rashes or lesions. Neurologic:  Neurovascularly intact without any focal sensory/motor deficits. Cranial nerves: II-XII intact, grossly non-focal.  Psychiatric: Alert and disoriented - baseline dementia. Capillary Refill: Brisk, 3 seconds, normal   Peripheral Pulses: +2 palpable, equal bilaterally       Labs:   Recent Labs     09/07/22  1240 09/07/22  1849 09/08/22  0052 09/08/22  0712 09/08/22  1151   WBC 5.2  --   --  5.4  --    HGB 6.5*   < > 7.8* 7.6* 8.0*   HCT 22.2*   < > 25.6* 26.0* 27.0*     --   --  150  --     < > = values in this interval not displayed.      Recent Labs     09/07/22  1240 09/08/22  0712   * 138   K 4.9 3.9   CL 99 98*   CO2 29 30   BUN 32* 26*   CREATININE 1.1 0.9   CALCIUM 9.6 9.2     Recent Labs     09/07/22  1240   AST 15   ALT 12   BILITOT 0.3   ALKPHOS 64     Recent Labs     09/08/22  0712   INR 1.62*     Recent Labs     09/07/22  1240 09/08/22  0052 09/08/22  0712   TROPONINI <0.01 <0.01 <0.01       Urinalysis:      Lab Results   Component Value Date/Time    NITRU Negative 09/07/2022 02:39 PM    WBCUA 6-9 09/07/2022 02:39 PM    BACTERIA 1+ 09/07/2022 02:39 PM    RBCUA 3-4 09/07/2022 02:39 PM    BLOODU Negative 09/07/2022 02:39 PM    SPECGRAV 1.015 09/07/2022 02:39 PM    GLUCOSEU 250 09/07/2022 02:39 PM    GLUCOSEU NEGATIVE 04/21/2012 04:15 AM       Radiology:  CT ABDOMEN PELVIS W IV CONTRAST Additional Contrast? None   Final Result   1. Umbilicus skin thickening present with surrounding fat stranding. Finding   may be related to an inflammatory/infectious process. Can correlate with   direct inspection. 2. Pelvic skin thickening and subcutaneous fat stranding may be related to   edema, possibly dependent edema versus fluid overload. 3. Again seen left paraumbilical fat-containing ventral hernia and bilateral   fat-containing inguinal hernias. 4. Sigmoid colon diverticulosis without evidence of acute diverticulitis. 5. Questionable punctate left nonobstructing nephrolithiasis. 6. Partially visualized moderate right pleural effusion with associated   dependent atelectasis. XR CHEST PORTABLE   Final Result   Cardiomegaly with findings of pulmonary edema and a trace right pleural   effusion.                  Assessment/Plan:    Active Hospital Problems    Diagnosis     GI bleed [K92.2]      Priority: Medium    Acute on chronic respiratory failure with hypoxia (HCC) [J96.21]      Priority: Medium    Bradycardia [R00.1]      Priority: Medium    HTN (hypertension), benign [I10]      Priority: Medium     Class: Chronic    DM (diabetes mellitus), type 2, uncontrolled ([facility] [E11.65]      Priority: Medium    COPD without exacerbation ([address] [J44.9]     A-fib ([facility] [I48.91]     Acute congestive heart failure ([facility] [I50.9]     Alzheimer's disease ([facility] [G30.9, F02.80]        Anemia of acute to subacute blood loss. - hemoglobin 6.2 at [facility]--6.5 upon arrival to ED  - 1 unit PRBC given  - stool occult negative  - pt does report black stools mauro 3 days ago, none since then  - holding ASA and Eliquis  - CT abdomen does not suggest possible source of blood loss. - PPI IV   - GI consult. Acute on Chronic respiratory failure  - currently on 3 liters n/c  - given IV lasix  - resume lasix PO increase to BID.    - decrease dose losartan.         Afib with bradycardia - resolved  - holding Coreg at this time  - monitor on telemetry        Combined CHF with fluid overload  - cxr showed cardiomegaly, pulmonary edema and trace right pleural effusion   - x1 IV lasix given after blood transfusion   - last echo in 2018 showed EF 45-50%, consider repeat echo  - decrease dose losartan,   - holding coreg to episodes of bradycardia  - lasix BID       COPD without AE  - no AE  - continue home inhalers and daily prednisone  - monitor        KATHY  [facility]  - reordered       HTN  - stable, continue home regimen  - monitor       DM type 2  - continue Lantus and SSI   - blood glucose elevated on admission  - monitor        Anxiety  Depression  - continue home regimen       Chronic Pain  - patient is on Lyrica 50 mg at 5 am and 1 pm and 75 mg at night  - also takes Cymbalta BID       GERD  - Continue PPI            Alzheimer's Disease   - pt is alert and partially oriented today  - Continue supportive care          DVT Prophylaxis: scd  Diet: NPO  Code Status: DNR-CC, I discussed this with patient and it is noted on ECF paperwork as well         Subhash Busch MD

## 2022-09-08 NOTE — PROGRESS NOTES
Updated pt's son, Shiloh Britt, on scheduled EGD procedure per pt request. Pt able to talk to son. Also discussed DNR suspension with pt and her son over phone. Dr. Purnima Jain aware that son would like update after procedure.

## 2022-09-08 NOTE — PROGRESS NOTES
Pt alert and oriented . Monitor picking up in [address]. Pt transported on 2 L NC in stable conditon. VSS.

## 2022-09-08 NOTE — CARE COORDINATION
Case Management Assessment  Initial Evaluation      Patient Name: Wally Michelle  YOB: 1940  Diagnosis: Cardiomegaly [I51.7]  Chronic pulmonary edema [J81.1]  Ventral hernia without obstruction or gangrene [K43.9]  Diverticulosis [K57.90]  GI bleed [K92.2]  Anticoagulated [Z79.01]  Pleural effusion on right [J90]  KAMRAN (acute kidney injury) ([address] [N17.9]  Symptomatic anemia [D64.9]  Date / Time: 9/7/2022 12:23 PM    Admission status/Date:09/07/2022 Inpatient   Chart Reviewed: Yes      Patient Interviewed: Yes   Family Interviewed:  No      Hospitalization in the last 30 days:  No    Health Care Decision Maker : pt declined    Met with:  pt   Interview conducted  (bedside/phone): bedside    Current PCP: MD at [address] [place] [address]    [address] [place] required for SNF : Y          3 night stay required -  N    ADLS  Support Systems/Care Needs:    Transportation: EMS transportation    Meal Preparation: staff at [facility]: pt lives at [facility]  Steps: 0  Intent for return to present living arrangements: Yes  Identified Issues: [id number] [facility] with [address] : No Agency:(Services)     Passport/Waiver : No  :                      Phone Number:    Passport/Waiver Services: n/a          Durable Medical Equiptment   DME Provider: currently provided by [facility]  Equipment:   Walker___Cane___RTS___ BSC___Shower Chair___Hospital Bed___W/C____Other________  02 at ____Liter(s)---wears(frequency)_______ [facility] ___ CPAP___ BiPap___   N/A____    Home O2 Use :  per facility       Community Service Affiliation  Dialysis:  No    Agency:  Location:  Dialysis Schedule:  Phone:   Fax: Other Community Services: n/a    DISCHARGE PLAN: Explained Case Management role/services. Chart review completed. Met with pt at bedside with lab present. It took some time for pt to answer the questions.  Pt stated she will return to [facility] on discharge. She stated she wears o2 and has either a bipap or cpap at the facility. She is unsure what transport company she wants to use when discharged. RN aware of the above that it took some time for pt to get the questions out and that her o2 was off     Message for [facility] requesting call back to confirm bed hold    Nataliya PECK, BAO-S    Addendum at 12:51pm: Received voicemail from [facility] at [facility] stating pt is a bed hold and will need precert if returning skilled.

## 2022-09-08 NOTE — PROCEDURES
EGD PROCEDURE NOTE         Esophagogastroduodenoscopy Procedure Note     Patient: Virginia Bermudez MRN: [id number]   YOB: 1940 Age: 80 y.o. Sex: female   Unit: [address] [facility] ENDOSCOPY Room/Bed: ENDO/NONE Location: [address]    Admitting Physician: Cynthia Lorenzana     Primary Care Physician: Lo Karimi MD      DATE OF PROCEDURE: 9/8/2022  PROCEDURE: Esophagogastroduodenoscopy  INDICATION: This is a 80y.o. 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 The benefits and risks including adverse medicine reaction have been explained. The patient's questions were answered and the patient agreed to proceed. ASA:  ASA 2 - Patient with mild systemic disease with no functional limitations    SEDATION: MAC     The patient's vital signs, cardiac status, pulmonary status, abdominal status and mental status were stable for the procedure. The patient's vitals signs and respiratory function as monitored by oxygen saturation were stable throughout    Procedure Details: The Olympus videoendoscope was inserted into the mouth and carefully passed into the esophagus, through the stomach and to the distal duodenum. Antegrade and retrograde examination of the upper gi tract was carefully performed. Findings: The esophagus is normal.  The z-line is distinct without lesions or irregularities. There is noted to be a 3 cm tongue of columnar mucosa extending into the esophageal squamous mucosa it was broken up and a little islands. This was biopsied with cold biopsy forceps to rule out Roberts's. The scope easily passed into the stomach. The mucosa of the cardia, fundus, body and antrum of the stomach is normal.  The pylorus is patent and of normal contour.   The scope easily advanced into the duodenal bulb and down to the distal duodenum. Biopsies of the duodenum were obtained with a cold biopsy forceps, to rule out celiac sprue.   Ante-and retrograde exam of the duodenum is normal.    Gastric or Duodenal ulcer present: No    Estimated Blood Loss: None      Signed By: Eric Mathews DO

## 2022-09-08 NOTE — ANESTHESIA PRE PROCEDURE
Department of Anesthesiology  Preprocedure Note       Name:  Racheal Simms   Age:  80 y.o.  :  1940                                          MRN:  [id number]         Date:  2022      Surgeon: Marcello Soriano):  Nataliia Sánchez DO    Procedure: Procedure(s):  EGD W/ANES. Medications prior to admission:   Prior to Admission medications    Medication Sig Start Date End Date Taking? Authorizing Provider   fenofibrate (TRICOR) 54 MG tablet Take 54 mg by mouth daily s [facility] pharmacy: Please dispense generic fenofibrate unless prescriber denote   Yes Historical Provider, MD   furosemide (LASIX) 20 MG tablet Take 20 mg by mouth daily   Yes Historical Provider, MD   atorvastatin (LIPITOR) 40 MG tablet Take 40 mg by mouth at bedtime   Yes Historical Provider, MD   loperamide (IMODIUM) 2 MG capsule Take 2 mg by mouth 4 times daily as needed for Diarrhea   Yes Historical Provider, MD   pregabalin (LYRICA) 50 MG capsule Take 50 mg by mouth 2 times daily. Takes at 0500, 1300   Yes Historical Provider, MD   magnesium oxide (MAG-OX) 400 MG tablet Take 400 mg by mouth daily   Yes Historical Provider, MD   magnesium hydroxide (MILK OF MAGNESIA) 400 MG/5ML suspension Take 30 mLs by mouth daily as needed for Constipation   Yes Historical Provider, MD   mirabegron (MYRBETRIQ) 25 MG TB24 Take 25 mg by mouth daily   Yes Historical Provider, MD   olopatadine (PATANOL) 0.1 % ophthalmic solution Place 1 drop into both eyes at bedtime   Yes Historical Provider, MD   senna (SENOKOT) 8.6 MG tablet Take 1 tablet by mouth 2 times daily   Yes Historical Provider, MD   budesonide-formoterol (SYMBICORT) 160-4.5 MCG/ACT AERO Inhale 2 puffs into the lungs 2 times daily   Yes Historical Provider, MD   pregabalin (LYRICA) 75 MG capsule Take 75 mg by mouth at bedtime.  Takes at 2000   Yes Historical Provider, MD   oxybutynin (DITROPAN-XL) 5 MG extended release tablet Take 5 mg by mouth every evening   Yes Historical Provider, MD SITagliptin (JANUVIA) 50 MG tablet Take 50 mg by mouth daily   Yes Historical Provider, MD   loratadine (CLARITIN) 10 MG capsule Take 10 mg by mouth daily   Yes Historical Provider, MD   potassium chloride (KLOR-CON) 20 MEQ packet Take 40 mEq by mouth 2 times daily   Yes Historical Provider, MD   omeprazole (PRILOSEC) 20 MG delayed release capsule Take 40 mg by mouth at bedtime   Yes Historical Provider, MD   clonazePAM (KLONOPIN) 1 MG tablet Take 1 tablet by mouth nightly for 3 days.  8/23/21 9/8/22  Jim Zhang MD   insulin glargine (BASAGLAR KWIKPEN) 100 UNIT/ML injection pen Inject 30 Units into the skin 2 times daily    Historical Provider, MD   vitamin B-12 (CYANOCOBALAMIN) 500 MCG tablet Take 500 mcg by mouth daily    Historical Provider, MD   DULoxetine (CYMBALTA) 60 MG extended release capsule Take 60 mg by mouth daily    Historical Provider, MD   losartan (COZAAR) 100 MG tablet Take 50 mg by mouth daily    Historical Provider, MD   Albuterol Sulfate (PROAIR HFA IN) Inhale into the lungs  Patient not taking: Reported on 9/8/2022    Historical Provider, MD   budesonide-formoterol (SYMBICORT) 160-4.5 MCG/ACT AERO Inhale 2 puffs into the lungs 2 times daily    Historical Provider, MD   diclofenac sodium (VOLTAREN) 1 % GEL Apply topically nightly Right arm    Historical Provider, MD   OXYGEN Inhale into the lungs 2 liters at night    Historical Provider, MD   ARTIFICIAL TEAR OP Apply 2 drops to eye every 2 hours as needed    Historical Provider, MD   methyl salicylate-menthol (LYNNETTE NEGRO GREASELESS) 10-15 % CREA Apply topically every 8 hours as needed for Pain  Patient not taking: Reported on 9/8/2022    Historical Provider, MD   guaiFENesin (ROBITUSSIN) 100 MG/5ML syrup Take 10 mLs by mouth every 4 hours as needed for Cough    Historical Provider, MD   acetaminophen (TYLENOL) 325 MG tablet Take 650 mg by mouth every 4 hours as needed for Pain    Historical Provider, MD   bisacodyl (DULCOLAX) 5 MG EC tablet Take 10 mg by mouth daily as needed for Constipation     Historical Provider, MD   DULoxetine (CYMBALTA) 20 MG extended release capsule Take 20 mg by mouth nightly    Historical Provider, MD   apixaban (ELIQUIS) 5 MG TABS tablet Take 5 mg by mouth 2 times daily    Historical Provider, MD   carvedilol (COREG) 6.25 MG tablet Take 6.25 mg by mouth 2 times daily (with meals)    Historical Provider, MD   guaiFENesin (MUCINEX) 600 MG extended release tablet Take 1,200 mg by mouth 2 times daily    Historical Provider, MD   predniSONE (DELTASONE) 2.5 MG tablet Take 2.5 mg by mouth daily    Historical Provider, MD   amLODIPine (NORVASC) 5 MG tablet Take 1 tablet by mouth daily 10/18/16   Mica Hennessy MD   imipramine (TOFRANIL) 50 MG tablet Take 50 mg by mouth nightly    Historical Provider, MD   fluticasone (FLONASE) 50 MCG/ACT nasal spray 2 sprays by Nasal route daily    Historical Provider, MD   ondansetron (ZOFRAN ODT) 4 MG disintegrating tablet Take 1 tablet by mouth every 8 hours as needed for Nausea. Let dissolve in mouth. 8/5/13   Verito Estrella DO   ASPIRIN Take 81 mg by mouth daily.  1/15/11   Historical Provider, MD       Current medications:    Current Facility-Administered Medications   Medication Dose Route Frequency Provider Last Rate Last Admin    [START ON 9/9/2022] losartan (COZAAR) tablet 25 mg  25 mg Oral Daily Levon Browning MD        furosemide (LASIX) tablet 20 mg  20 mg Oral BID Levon Browning MD        imipramine (TOFRANIL) tablet 50 mg  50 mg Oral Nightly STEPHANIE Kim CNP        albuterol sulfate HFA (PROVENTIL;VENTOLIN;PROAIR) 108 (90 Base) MCG/ACT inhaler 2 puff  2 puff Inhalation Q4H PRN STEPHANIE Kim CNP        amLODIPine (NORVASC) tablet 5 mg  5 mg Oral Daily STEPHANIE Kim CNP        budesonide-formoterol (SYMBICORT) 160-4.5 MCG/ACT inhaler 2 puff  2 puff Inhalation BID STEPHANIE iKm CNP   2 puff at 09/08/22 0747    clonazePAM (KLONOPIN) tablet 1 mg  1 mg Oral Nightly Iver Favre, APRN - CNP   1 mg at 09/07/22 2312    fluticasone (FLONASE) 50 MCG/ACT nasal spray 2 spray  2 spray Nasal Daily Iver Favre, APRN - ABIMAEL        guaiFENesin [facility]) extended release tablet 600 mg  600 mg Oral BID Iver Favre, APRN - CNP   600 mg at 09/07/22 2312    insulin glargine (LANTUS) injection vial 20 Units  20 Units SubCUTAneous BID Iver Favre, APRN - CNP        methyl salicylate-menthol (LYNNETTE NEGRO GREASELESS) 10-15 % cream CREA   Apply externally Q8H PRN Iver Favre, APRN - ABIMAEL        predniSONE (DELTASONE) tablet 2.5 mg  2.5 mg Oral Daily Iver Favre, APRN - CNP        vitamin B-12 (CYANOCOBALAMIN) tablet 500 mcg  500 mcg Oral Daily Iver Favre, APRN - CNP        glucose chewable tablet 16 g  4 tablet Oral PRN Iver Favre, APRN - CNP        dextrose bolus 10% 125 mL  125 mL IntraVENous PRN Iver Favre, APRN - CNP        Or    dextrose bolus 10% 250 mL  250 mL IntraVENous PRN Iver Favre, APRN - CNP        glucagon (rDNA) injection 1 mg  1 mg SubCUTAneous PRN Iver Favre, APRN - CNP        dextrose 10 % infusion   IntraVENous Continuous PRN Iver Favre, APRN - CNP        sodium chloride flush 0.9 % injection 5-40 mL  5-40 mL IntraVENous 2 times per day Iver Favre, APRN - CNP   10 mL at 09/07/22 2313    sodium chloride flush 0.9 % injection 5-40 mL  5-40 mL IntraVENous PRN Iver Favre, APRN - CNP        0.9 % sodium chloride infusion   IntraVENous PRN Iver Favre, APRN - CNP        ondansetron (ZOFRAN-ODT) disintegrating tablet 4 mg  4 mg Oral Q8H PRN Iver Favre, APRN - CNP        Or    ondansetron [facility]) injection 4 mg  4 mg IntraVENous Q6H PRN Iver Favre, APRN - CNP        acetaminophen (TYLENOL) tablet 650 mg  650 mg Oral Q6H PRN Iver Favre, APRN - CNP        Or    acetaminophen (TYLENOL) suppository 650 mg  650 mg Rectal Q6H PRN Iver Favre, APRN - ABIMAEL        pantoprazole (PROTONIX) injection 40 mg  40 mg IntraVENous Daily Iver Favre, APRN - CNP SURGERY Bilateral     rtc repair       Social History:    Social History     Tobacco Use    Smoking status: Former     Packs/day: 1.00     Years: 15.00     Pack years: 15.00     Types: Cigarettes     Quit date: 1998     Years since quittin.4    Smokeless tobacco: Never   Substance Use Topics    Alcohol use: No                                Counseling given: Not Answered      Vital Signs (Current):   Vitals:    22 0230 22 0749 22 0800 22 1323   BP:   134/75 (!) 159/67   Pulse: 65 77 75 87   Resp:    Temp:   98 °F (36.7 °C) 97.1 °F (36.2 °C)   TempSrc:   Oral Tympanic   SpO2: 97% 97% 98% 97%   Weight:       Height:                                                  BP Readings from Last 3 Encounters:   22 (!) 159/67   21 (!) 143/94   19 122/70       NPO Status: Time of last liquid consumption:                         Time of last solid consumption:                         Date of last liquid consumption: 22                        Date of last solid food consumption: 22    BMI:   Wt Readings from Last 3 Encounters:   22 170 lb 12.8 oz (77.5 kg)   21 160 lb (72.6 kg)   19 151 lb (68.5 kg)     Body mass index is 26.75 kg/m².     CBC:   Lab Results   Component Value Date/Time    WBC 5.4 2022 07:12 AM    RBC 3.66 2022 07:12 AM    HGB 8.0 2022 11:51 AM    HCT 27.0 2022 11:51 AM    MCV 70.9 2022 07:12 AM    RDW 23.1 2022 07:12 AM     2022 07:12 AM       CMP:   Lab Results   Component Value Date/Time     2022 07:12 AM    K 3.9 2022 07:12 AM    CL 98 2022 07:12 AM    CO2 30 2022 07:12 AM    BUN 26 2022 07:12 AM    CREATININE 0.9 2022 07:12 AM    GFRAA >60 2022 07:12 AM    GFRAA >60 2012 02:55 PM    AGRATIO 1.4 2022 12:40 PM    LABGLOM >60 2022 07:12 AM    GLUCOSE 152 2022 07:12 AM    PROT 6.6 2022 12:40 PM    PROT 7.9 04/24/2012 01:10 PM    CALCIUM 9.2 09/08/2022 07:12 AM    BILITOT 0.3 09/07/2022 12:40 PM    ALKPHOS 64 09/07/2022 12:40 PM    AST 15 09/07/2022 12:40 PM    ALT 12 09/07/2022 12:40 PM       POC Tests:   Recent Labs     09/08/22  1119   POCGLU 143*       Coags:   Lab Results   Component Value Date/Time    PROTIME 19.1 09/08/2022 07:12 AM    INR 1.62 09/08/2022 07:12 AM    APTT 34.2 09/08/2022 07:12 AM       HCG (If Applicable): No results found for: PREGTESTUR, PREGSERUM, HCG, HCGQUANT     ABGs:   Lab Results   Component Value Date/Time    PHART 7.395 12/21/2018 04:01 PM    PO2ART 125.7 12/21/2018 04:01 PM    AFA3AZK 57.2 12/21/2018 04:01 PM    RQO4HML 34.2 12/21/2018 04:01 PM    BEART 7.8 12/21/2018 04:01 PM    W9GKRZBT 98.4 12/21/2018 04:01 PM        Type & Screen (If Applicable):  No results found for: LABABO, LABRH    Drug/Infectious Status (If Applicable):  No results found for: HIV, HEPCAB    COVID-19 Screening (If Applicable):   Lab Results   Component Value Date/Time    COVID19 NOT DETECTED 09/07/2022 06:11 PM           Anesthesia Evaluation  Patient summary reviewed and Nursing notes reviewed no history of anesthetic complications:   Airway: Mallampati: III  TM distance: <3 FB   Neck ROM: limited  Mouth opening: < 3 FB   Dental:    (+) upper dentures and lower dentures      Pulmonary: breath sounds clear to auscultation  (+) COPD (inhaler daily , night o2, 3L):  shortness of breath:      (-) asthma, recent URI, sleep apnea and not a current smoker          Patient did not smoke on day of surgery.                  Cardiovascular:    (+) hypertension:, angina:, dysrhythmias: atrial fibrillation, CHF: diastolic, MONK:, hyperlipidemia    (-) pacemaker, past MI, CAD and CABG/stent    ECG reviewed  Rhythm: irregular  Rate: normal  Echocardiogram reviewed         Beta Blocker:  Not on Beta Blocker         Neuro/Psych:   (+) neuromuscular disease (lbp / neuropathy / chronic pain):, psychiatric

## 2022-09-08 NOTE — PROGRESS NOTES
Pt returned from EGD this RN handed the pt her upper and lower dentures and pt them in her mouth, pt ate jello and was sleeping, py lower dentures will protrude from her mouth while sleeping . Pt called this RN into room and said her lower dentures were missing, I had pt stand from bed searched under bed all sheet and covers and looked through trash saw empty jello cups and napkins no teeth , I called dietary to see if they picked them up on a dinner tray,they denied  Lower dentures  were no where to be found , Omayra charge nurse called to room searched bed and linens again , no lower dentures in pt mouth or in the room that we could see . Nba Cheng  Night shift notified to continue search if overlooked

## 2022-09-08 NOTE — PROGRESS NOTES
Pt 2 person assisted to wheelchair for EGD placed on 2LNC trasnferred without incident , dentures removed and at [facility] in room 303

## 2022-09-08 NOTE — ACP (ADVANCE CARE PLANNING)
Advance Care Planning     General Advance Care Planning (ACP) Conversation    Date of Conversation: 9/7/2022  Conducted with: Patient with Decision Making Capacity    Healthcare Decision Maker:  Pt declined to designate anyone    Content/Action Overview:    Johanny MORROW addressed code status with pt; see H&P    Length of Voluntary ACP Conversation in minutes:  <16 minutes (Non-Billable)    Blake Stuart MSW, BRENNAS

## 2022-09-08 NOTE — PROGRESS NOTES
4 Eyes Skin Assessment     The patient is being assess for   Admission    I agree that 2 RN's have performed a thorough Head to Toe Skin Assessment on the patient. ALL assessment sites listed below have been assessed. Areas assessed for pressure by both nurses:   [x]   Head, Face, and Ears   [x]   Shoulders, Back, and Chest, Abdomen  [x]   Arms, Elbows, and Hands   [x]   Coccyx, Sacrum, and Ischium  [x]   Legs, Feet, and Heels        Skin Assessed Under all Medical Devices by both nurses:  O2 device tubing              All Mepilex Borders were peeled back and area peeked at by both nurses:  No: None present on admit. Please list where Mepilex Borders are located:  N/A             **SHARE this note so that the co-signing nurse is able to place an eSignature**    Co-signer eSignature: {Esignature:[id number]}    Does the Patient have Skin Breakdown related to pressure? No. Pt has blanchable redness to left foot.             Jimmy Prevention initiated:  Yes   Wound Care Orders initiated:  No      [facility] nurse consulted for Pressure Injury (Stage 3,4, Unstageable, DTI, NWPT, Complex wounds)and New or Established Ostomies:  No      Primary Nurse eSignature: Electronically signed by Julien Salomon RN on 9/8/22 at 3:45 AM EDT

## 2022-09-08 NOTE — PROGRESS NOTES
09/08/22 0230   NIV Type   NIV Started/Stopped On   Equipment Type V60   Mode AVAPS   Settings/Measurements   PIP Observed 8 cm H20   CPAP/EPAP 4 cmH2O   IPAP Min 8 cmH2O   IPAP Max 30 cmH2O   Vt (Set, mL) 400 mL   Vt (Measured) 843 mL   Resp 20   Insp Rise Time (%) 3 %   FiO2  40 %   I Time/ I Time % 1 s   Minute Volume (L/min) 13.8 Liters   Mask Leak (lpm) 16 lpm   Comfort Level Good   Using Accessory Muscles No   SpO2 97   Patient's Home Machine No   Oxygen Therapy/Pulse Ox   O2 Therapy Oxygen   O2 Device PAP (positive airway pressure)   Heart Rate 65   SpO2 97 %

## 2022-09-08 NOTE — PROGRESS NOTES
RT Inhaler-Nebulizer Bronchodilator Protocol Note    There is a bronchodilator order in the chart from a provider indicating to follow the RT Bronchodilator Protocol and there is an Initiate RT Inhaler-Nebulizer Bronchodilator Protocol order as well (see protocol at bottom of note). CXR Findings:  XR CHEST PORTABLE    Result Date: 9/7/2022  Cardiomegaly with findings of pulmonary edema and a trace right pleural effusion. The findings from the last RT Protocol Assessment were as follows:   History Pulmonary Disease: Chronic pulmonary disease  Respiratory Pattern: Regular pattern and RR 12-20 bpm  Breath Sounds: Clear breath sounds  Cough: Strong, spontaneous, non-productive  Indication for Bronchodilator Therapy: None  Bronchodilator Assessment Score: 2    Aerosolized bronchodilator medication orders have been revised according to the RT Inhaler-Nebulizer Bronchodilator Protocol below. Respiratory Therapist to perform RT Therapy Protocol Assessment initially then follow the protocol. Repeat RT Therapy Protocol Assessment PRN for score 0-3 or on second treatment, BID, and PRN for scores above 3. No Indications - adjust the frequency to every 6 hours PRN wheezing or bronchospasm, if no treatments needed after 48 hours then discontinue using Per Protocol order mode. If indication present, adjust the RT bronchodilator orders based on the Bronchodilator Assessment Score as indicated below. Use Inhaler orders unless patient has one or more of the following: on home nebulizer, not able to hold breath for 10 seconds, is not alert and oriented, cannot activate and use MDI correctly, or respiratory rate 25 breaths per minute or more, then use the equivalent nebulizer order(s) with same Frequency and PRN reasons based on the score. If a patient is on this medication at home then do not decrease Frequency below that used at home.     0-3 - enter or revise RT bronchodilator order(s) to equivalent RT Bronchodilator order with Frequency of every 4 hours PRN for wheezing or increased work of breathing using Per Protocol order mode. 4-6 - enter or revise RT Bronchodilator order(s) to two equivalent RT bronchodilator orders with one order with BID Frequency and one order with Frequency of every 4 hours PRN wheezing or increased work of breathing using Per Protocol order mode. 7-10 - enter or revise RT Bronchodilator order(s) to two equivalent RT bronchodilator orders with one order with TID Frequency and one order with Frequency of every 4 hours PRN wheezing or increased work of breathing using Per Protocol order mode. 11-13 - enter or revise RT Bronchodilator order(s) to one equivalent RT bronchodilator order with QID Frequency and an Albuterol order with Frequency of every 4 hours PRN wheezing or increased work of breathing using Per Protocol order mode. Greater than 13 - enter or revise RT Bronchodilator order(s) to one equivalent RT bronchodilator order with every 4 hours Frequency and an Albuterol order with Frequency of every 2 hours PRN wheezing or increased work of breathing using Per Protocol order mode.        Electronically signed by Mony Owens RCP on 9/7/2022 at 11:04 PM

## 2022-09-08 NOTE — H&P
[facility] END[facility]COPY  Procedure H&P    Patient: Ashley Acevedo MRN: [id number]     YOB: 1940  Age: 80 y.o. Sex: female    Unit: [facility] Room/Bed: ENDO/NONE Location: [address]     Procedure: Procedure(s):  EGD W/ANES. Indication:Fe def Anemia  melena  Referring  Physician:        Brief history: GI bleed    Nurses past medical history notes reviewed and agreed. Medications reviewed.     Allergies: Codeine and Morphine and related     Allergies noted: Yes     Past Medical History:   Past Medical History:   Diagnosis Date    A-fib ([facility] [address]     Alzheimer's dementia ([facility] [address]     Anxiety     Arthritis     Back pain     COPD (chronic obstructive pulmonary disease) ([facility] [address]     COVID-19 08/19/2021    Depression     Diabetes mellitus ([facility] [address]     Fall     multiple falls    GERD (gastroesophageal reflux disease)     Hepatitis A     age 27    Hernia     Hypercholesteremia     Hypertension     IBS (irritable bowel syndrome)     diarrhea  x 20 years    Incontinence of urine     not all the time    Kidney stone     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    Memory change     dementia    Neuropathy     Seizures (HCC)     possible because of her falls pt unsure    UTI (lower urinary tract infection)        Past Surgical History:   Past Surgical History:   Procedure Laterality Date    BREAST SURGERY      BENIGN CYST    CHOLECYSTECTOMY      COLONOSCOPY      INTRACAPSULAR CATARACT EXTRACTION Right 3/25/2019    PHACOEMULSIFICATION OF CATARACT RIGHT EYE WITH INTRAOCULAR LENS IMPLANT performed by Jose Juan MD at [place] [address] Left 4/8/2019    PHACOEMULSIFICATION OF CATARACT LEFT EYE WITH INTRAOCULAR LENS IMPLANT performed by Jose Juan MD at [facility]  6-6-12    OPEN REDUCTION INTERNAL FIXATION RIGHT PROXIMAL HUMERAL SHAFT FRACTURE SYNTHES    OTHER SURGICAL HISTORY  07/18/12    incision and drainage of bursa rt elbow    PANCREAS BIOPSY      SHOULDER SURGERY Bilateral     rtc repair       Social History:   Social History     Socioeconomic History    Marital status:      Spouse name: Not on file    Number of children: Not on file    Years of education: Not on file    Highest education level: Not on file   Occupational History    Not on file   Tobacco Use    Smoking status: Former     Packs/day: 1.00     Years: 15.00     Pack years: 15.00     Types: Cigarettes     Quit date: 1998     Years since quittin.4    Smokeless tobacco: Never   Vaping Use    Vaping Use: Never used   Substance and Sexual Activity    Alcohol use: No    Drug use: No    Sexual activity: Not Currently   Other Topics Concern    Not on file   Social History Narrative    Not on file     Social Determinants of Health     Financial Resource Strain: Not on file   Food Insecurity: Not on file   Transportation Needs: Not on file   Physical Activity: Not on file   Stress: Not on file   Social Connections: Not on file   Intimate Partner Violence: Not on file   Housing Stability: Not on file       Family History:   Family History   Problem Relation Age of Onset    Heart Disease Mother        Home Medications:   Prior to Admission medications    Medication Sig Start Date End Date Taking? Authorizing Provider   fenofibrate (TRICOR) 54 MG tablet Take 54 mg by mouth daily sandra WILEY pharmacy: Please dispense generic fenofibrate unless prescriber denote   Yes Historical Provider, MD   furosemide (LASIX) 20 MG tablet Take 20 mg by mouth daily   Yes Historical Provider, MD   atorvastatin (LIPITOR) 40 MG tablet Take 40 mg by mouth at bedtime   Yes Historical Provider, MD   loperamide (IMODIUM) 2 MG capsule Take 2 mg by mouth 4 times daily as needed for Diarrhea   Yes Historical Provider, MD   pregabalin (LYRICA) 50 MG capsule Take 50 mg by mouth 2 times daily.  Takes at 0500, [address] Provider, MD   magnesium oxide (MAG-OX) 400 MG tablet Take 400 mg by mouth daily   Yes Historical Provider, MD   magnesium hydroxide (MILK OF MAGNESIA) 400 MG/5ML suspension Take 30 mLs by mouth daily as needed for Constipation   Yes Historical Provider, MD   mirabegron (MYRBETRIQ) 25 MG TB24 Take 25 mg by mouth daily   Yes Historical Provider, MD   olopatadine (PATANOL) 0.1 % ophthalmic solution Place 1 drop into both eyes at bedtime   Yes Historical Provider, MD   senna (SENOKOT) 8.6 MG tablet Take 1 tablet by mouth 2 times daily   Yes Historical Provider, MD   budesonide-formoterol (SYMBICORT) 160-4.5 MCG/ACT AERO Inhale 2 puffs into the lungs 2 times daily   Yes Historical Provider, MD   pregabalin (LYRICA) 75 MG capsule Take 75 mg by mouth at bedtime. Takes at 2000   Yes Historical Provider, MD   oxybutynin (DITROPAN-XL) 5 MG extended release tablet Take 5 mg by mouth every evening   Yes Historical Provider, MD   SITagliptin (JANUVIA) 50 MG tablet Take 50 mg by mouth daily   Yes Historical Provider, MD   loratadine (CLARITIN) 10 MG capsule Take 10 mg by mouth daily   Yes Historical Provider, MD   potassium chloride (KLOR-CON) 20 MEQ packet Take 40 mEq by mouth 2 times daily   Yes Historical Provider, MD   omeprazole (PRILOSEC) 20 MG delayed release capsule Take 40 mg by mouth at bedtime   Yes Historical Provider, MD   clonazePAM (KLONOPIN) 1 MG tablet Take 1 tablet by mouth nightly for 3 days.  8/23/21 9/8/22  Howard Tinsley MD   insulin glargine (BASAGLAR KWIKPEN) 100 UNIT/ML injection pen Inject 30 Units into the skin 2 times daily    Historical Provider, MD   vitamin B-12 (CYANOCOBALAMIN) 500 MCG tablet Take 500 mcg by mouth daily    Historical Provider, MD   DULoxetine (CYMBALTA) 60 MG extended release capsule Take 60 mg by mouth daily    Historical Provider, MD   losartan (COZAAR) 100 MG tablet Take 50 mg by mouth daily    Historical Provider, MD   Albuterol Sulfate (PROAIR HFA IN) Inhale into the lungs  Patient not taking: Reported on 9/8/2022    Historical Provider, MD   budesonide-formoterol (SYMBICORT) 160-4.5 MCG/ACT AERO Inhale 2 puffs into the lungs 2 times daily    Historical Provider, MD   diclofenac sodium (VOLTAREN) 1 % GEL Apply topically nightly Right arm    Historical Provider, MD   OXYGEN Inhale into the lungs 2 liters at night    Historical Provider, MD   ARTIFICIAL TEAR OP Apply 2 drops to eye every 2 hours as needed    Historical Provider, MD   methyl salicylate-menthol (LYNNETTE NEGRO GREASELESS) 10-15 % CREA Apply topically every 8 hours as needed for Pain  Patient not taking: Reported on 9/8/2022    Historical Provider, MD   guaiFENesin (ROBITUSSIN) 100 MG/5ML syrup Take 10 mLs by mouth every 4 hours as needed for Cough    Historical Provider, MD   acetaminophen (TYLENOL) 325 MG tablet Take 650 mg by mouth every 4 hours as needed for Pain    Historical Provider, MD   bisacodyl (DULCOLAX) 5 MG EC tablet Take 10 mg by mouth daily as needed for Constipation     Historical Provider, MD   DULoxetine (CYMBALTA) 20 MG extended release capsule Take 20 mg by mouth nightly    Historical Provider, MD   apixaban (ELIQUIS) 5 MG TABS tablet Take 5 mg by mouth 2 times daily    Historical Provider, MD   carvedilol (COREG) 6.25 MG tablet Take 6.25 mg by mouth 2 times daily (with meals)    Historical Provider, MD olivarezaiFENesin (MUCINEX) 600 MG extended release tablet Take 1,200 mg by mouth 2 times daily    Historical Provider, MD   predniSONE (DELTASONE) 2.5 MG tablet Take 2.5 mg by mouth daily    Historical Provider, MD   amLODIPine (NORVASC) 5 MG tablet Take 1 tablet by mouth daily 10/18/16   Amado Velazquez MD   imipramine (TOFRANIL) 50 MG tablet Take 50 mg by mouth nightly    Historical Provider, MD   fluticasone (FLONASE) 50 MCG/ACT nasal spray 2 sprays by Nasal route daily    Historical Provider, MD   ondansetron (ZOFRAN ODT) 4 MG disintegrating tablet Take 1 tablet by mouth every 8 hours as needed for Nausea. Let dissolve in mouth.  8/5/13 Mandy Bound Rosaruelvincent, DO   ASPIRIN Take 81 mg by mouth daily.  1/15/11   Historical Provider, MD       Review of Systems:  Weight Loss: No  Dysphagia: No  Dyspepsia: No    Physical Exam:   Vital Signs: BP (!) 159/67   Pulse 87   Temp 97.1 °F (36.2 °C) (Tympanic)   Resp 18   Ht 5' 7\" (1.702 m)   Wt 170 lb 12.8 oz (77.5 kg)   SpO2 97%   BMI 26.75 kg/m²   Vital signs reviewed:Yes    HEENT:Normal  Cardiac:Normal  Chest:Normal  Abdomen:Normal  Exts: Normal  Neuro:Normal    Labs:  Admission on 09/07/2022   Component Date Value Ref Range Status    WBC 09/07/2022 5.2  4.0 - 11.0 K/uL Final    RBC 09/07/2022 3.19 (A) 4.00 - 5.20 M/uL Final    Hemoglobin 09/07/2022 6.5 (A) 12.0 - 16.0 g/dL Final    Hematocrit 09/07/2022 22.2 (A) 36.0 - 48.0 % Final    MCV 09/07/2022 69.6 (A) 80.0 - 100.0 fL Final    MCH 09/07/2022 20.4 (A) 26.0 - 34.0 pg Final    MCHC 09/07/2022 29.3 (A) 31.0 - 36.0 g/dL Final    RDW 09/07/2022 20.8 (A) 12.4 - 15.4 % Final    Platelets 09/95/3497 184  135 - 450 K/uL Final    MPV 09/07/2022 8.2  5.0 - 10.5 fL Final    PLATELET SLIDE REVIEW 09/07/2022 Adequate   Final    SLIDE REVIEW 09/07/2022 see below   Final    Path Consult 09/07/2022 Yes   Final    Neutrophils % 09/07/2022 71.3  % Final    Lymphocytes % 09/07/2022 15.0  % Final    Monocytes % 09/07/2022 9.8  % Final    Eosinophils % 09/07/2022 3.1  % Final    Basophils % 09/07/2022 0.8  % Final    Neutrophils Absolute 09/07/2022 3.7  1.7 - 7.7 K/uL Final    Lymphocytes Absolute 09/07/2022 0.8 (A) 1.0 - 5.1 K/uL Final    Monocytes Absolute 09/07/2022 0.5  0.0 - 1.3 K/uL Final    Eosinophils Absolute 09/07/2022 0.2  0.0 - 0.6 K/uL Final    Basophils Absolute 09/07/2022 0.0  0.0 - 0.2 K/uL Final    Anisocytosis 09/07/2022 2+ (A)  Final    Microcytes 09/07/2022 1+ (A)  Final    Polychromasia 09/07/2022 Occasional (A)  Final    Hypochromia 09/07/2022 Occasional (A)  Final    Sodium 09/07/2022 135 (A) 136 - 145 mmol/L Final    Potassium reflex Magnesium 09/07/2022 4.9  3.5 - 5.1 mmol/L Final    Chloride 09/07/2022 99  99 - 110 mmol/L Final    CO2 09/07/2022 29  21 - 32 mmol/L Final    Anion Gap 09/07/2022 7  3 - 16 Final    Glucose 09/07/2022 264 (A) 70 - 99 mg/dL Final    BUN 09/07/2022 32 (A) 7 - 20 mg/dL Final    Creatinine 09/07/2022 1.1  0.6 - 1.2 mg/dL Final    GFR Non- 09/07/2022 48 (A) >60 Final    GFR  09/07/2022 58 (A) >60 Final    Calcium 09/07/2022 9.6  8.3 - 10.6 mg/dL Final    Total Protein 09/07/2022 6.6  6.4 - 8.2 g/dL Final    Albumin 09/07/2022 3.8  3.4 - 5.0 g/dL Final    Albumin/Globulin Ratio 09/07/2022 1.4  1.1 - 2.2 Final    Total Bilirubin 09/07/2022 0.3  0.0 - 1.0 mg/dL Final    Alkaline Phosphatase 09/07/2022 64  40 - 129 U/L Final    ALT 09/07/2022 12  10 - 40 U/L Final    AST 09/07/2022 15  15 - 37 U/L Final    ABO/Rh 09/07/2022 O POS   Final    Antibody Screen 09/07/2022 NEG   Final    Product Code Blood Bank 09/07/2022 [id number]   Final    Description Blood Bank 09/07/2022 Red Blood Cells, Apheresis, Leuko-reduced   Final    Unit Number 09/07/2022 [id number]   Final    Dispense Status Blood Bank 09/07/2022 transfused   Final    Hemoglobin 09/07/2022 7.4 (A) 12.0 - 16.0 g/dL Final    Hematocrit 09/07/2022 24.3 (A) 36.0 - 48.0 % Final    Color, UA 09/07/2022 Yellow  Straw/Yellow Final    Clarity, UA 09/07/2022 Clear  Clear Final    Glucose, Ur 09/07/2022 250 (A) Negative mg/dL Final    Bilirubin Urine 09/07/2022 Negative  Negative Final    Ketones, Urine 09/07/2022 Negative  Negative mg/dL Final    Specific Gravity, UA 09/07/2022 1.015  1.005 - 1.030 Final    Blood, Urine 09/07/2022 Negative  Negative Final    pH, UA 09/07/2022 6.0  5.0 - 8.0 Final    Protein, UA 09/07/2022 30 (A) Negative mg/dL Final    Urobilinogen, Urine 09/07/2022 0.2  <2.0 E.U./dL Final    Nitrite, Urine 09/07/2022 Negative  Negative Final    Leukocyte Esterase, Urine 09/07/2022 SMALL (A) Negative Final    Microscopic Examination 09/07/2022 YES   Final    Urine Type 09/07/2022 NotGiven   Final    Urine Reflex to Culture 09/07/2022 Not Indicated   Final    Occult Blood Diagnostic 09/07/2022    Final                    Value:Result: Negative  Normal range: Negative      Troponin 09/07/2022 <0.01  <0.01 ng/mL Final    Ventricular Rate 09/07/2022 73  BPM Final    Atrial Rate 09/07/2022 73  BPM Final    QRS Duration 09/07/2022 82  ms Final    Q-T Interval 09/07/2022 372  ms Final    QTc Calculation (Bazett) 09/07/2022 409  ms Final    R Axis 09/07/2022 -9  degrees Final    T Axis 09/07/2022 97  degrees Final    Diagnosis 09/07/2022 Atrial fibrillationLow voltage QRSSeptal infarct (cited on or before 05-JAN-2018)Nonspecific ST and T wave abnormality ; consider electrolyte abnormality or artifactConfirmed by Meri Motley MD, MARISA ([id number]) on 9/7/2022 5:58:05 PM   Final    WBC, UA 09/07/2022 6-9 (A) 0 - 5 /HPF Final    RBC, UA 09/07/2022 3-4  0 - 4 /HPF Final    Epithelial Cells, UA 09/07/2022 21-50 (A) 0 - 5 /HPF Final    Bacteria, UA 09/07/2022 1+ (A) None Seen /HPF Final    Amorphous, UA 09/07/2022 1+  /HPF Final    Pro-BNP 09/07/2022 1,499 (A) 0 - 449 pg/mL Final    SARS-CoV-2 RNA, RT PCR 09/07/2022 NOT DETECTED  NOT DETECTED Final    INFLUENZA A 09/07/2022 NOT DETECTED  NOT DETECTED Final    INFLUENZA B 09/07/2022 NOT DETECTED  NOT DETECTED Final    Hemoglobin 09/08/2022 7.8 (A) 12.0 - 16.0 g/dL Final    Hematocrit 09/08/2022 25.6 (A) 36.0 - 48.0 % Final    Protime 09/08/2022 19.1 (A) 11.7 - 14.5 sec Final    INR 09/08/2022 1.62 (A) 0.87 - 1.14 Final    aPTT 09/08/2022 34.2  23.0 - 34.3 sec Final    Hemoglobin 09/08/2022 7.6 (A) 12.0 - 16.0 g/dL Final    Hematocrit 09/08/2022 26.0 (A) 36.0 - 48.0 % Final    WBC 09/08/2022 5.4  4.0 - 11.0 K/uL Final    RBC 09/08/2022 3.66 (A) 4.00 - 5.20 M/uL Final    MCV 09/08/2022 70.9 (A) 80.0 - 100.0 fL Final    MCH 09/08/2022 20.9 (A) 26.0 - 34.0 pg Final    MCHC 09/08/2022 29.4 (A) 31.0 - 36.0 g/dL Final    RDW 09/08/2022 23.1 (A) 12.4 - 15.4 % Final    Platelets 90/66/7859 150  135 - 450 K/uL Final    MPV 09/08/2022 9.1  5.0 - 10.5 fL Final    Neutrophils % 09/08/2022 69.6  % Final    Lymphocytes % 09/08/2022 14.9  % Final    Monocytes % 09/08/2022 10.7  % Final    Eosinophils % 09/08/2022 3.9  % Final    Basophils % 09/08/2022 0.9  % Final    Neutrophils Absolute 09/08/2022 3.8  1.7 - 7.7 K/uL Final    Lymphocytes Absolute 09/08/2022 0.8 (A) 1.0 - 5.1 K/uL Final    Monocytes Absolute 09/08/2022 0.6  0.0 - 1.3 K/uL Final    Eosinophils Absolute 09/08/2022 0.2  0.0 - 0.6 K/uL Final    Basophils Absolute 09/08/2022 0.0  0.0 - 0.2 K/uL Final    Sodium 09/08/2022 138  136 - 145 mmol/L Final    Potassium reflex Magnesium 09/08/2022 3.9  3.5 - 5.1 mmol/L Final    Chloride 09/08/2022 98 (A) 99 - 110 mmol/L Final    CO2 09/08/2022 30  21 - 32 mmol/L Final    Anion Gap 09/08/2022 10  3 - 16 Final    Glucose 09/08/2022 152 (A) 70 - 99 mg/dL Final    BUN 09/08/2022 26 (A) 7 - 20 mg/dL Final    Creatinine 09/08/2022 0.9  0.6 - 1.2 mg/dL Final    GFR Non- 09/08/2022 >60  >60 Final    GFR  09/08/2022 >60  >60 Final    Calcium 09/08/2022 9.2  8.3 - 10.6 mg/dL Final    Troponin 09/08/2022 <0.01  <0.01 ng/mL Final    Troponin 09/08/2022 <0.01  <0.01 ng/mL Final    POC Glucose 09/07/2022 202 (A) 70 - 99 mg/dl Final    Performed on 09/07/2022 ACCU-CHEK   Final    Hemoglobin 09/08/2022 8.0 (A) 12.0 - 16.0 g/dL Final    Hematocrit 09/08/2022 27.0 (A) 36.0 - 48.0 % Final    Troponin 09/08/2022 <0.01  <0.01 ng/mL Final    POC Glucose 09/08/2022 185 (A) 70 - 99 mg/dl Final    Performed on 09/08/2022 ACCU-CHEK   Final    Path Consult 09/07/2022 Reviewed   Final    POC Glucose 09/08/2022 143 (A) 70 - 99 mg/dl Final    Performed on 09/08/2022 ACCU-CHEK   Final        Imaging:  CT ABDOMEN PELVIS W IV CONTRAST Additional Contrast? None   Final Result   1.  Umbilicus skin thickening present with surrounding fat stranding. Finding   may be related to an inflammatory/infectious process. Can correlate with   direct inspection. 2. Pelvic skin thickening and subcutaneous fat stranding may be related to   edema, possibly dependent edema versus fluid overload. 3. Again seen left paraumbilical fat-containing ventral hernia and bilateral   fat-containing inguinal hernias. 4. Sigmoid colon diverticulosis without evidence of acute diverticulitis. 5. Questionable punctate left nonobstructing nephrolithiasis. 6. Partially visualized moderate right pleural effusion with associated   dependent atelectasis. XR CHEST PORTABLE   Final Result   Cardiomegaly with findings of pulmonary edema and a trace right pleural   effusion. ASA:2    Mallampati Score: II    Sedation planned:MAC    Patient in acceptable condition for procedure:Yes    1:52 PM 9/8/2022    Zoe Bella, DO      Please note that some or all of this record was generated using voice recognition software. If there are any questions about the content of this document, please contact the author as some errors in transcription may have occurred. The patient and I discussed that this is an elective procedure/surgery. We discussed the risks of the procedure/surgery, including but not limited to what is outlined in the signed informed consent. We also discussed the risk of dale COVID 19 while in the facility. We discussed the increased risk of a bad outcome should the patient contract COVID 19 during the post-procedural/post-operative period, given the patients current health condition, chronic conditions, and the added risk of COVID 19 in light of these conditions. The patient and I also discussed the risk of further postponing the procedure/surgery and other treatment alternatives, including non-procedural/surgical treatments.  Understanding all of the risks, benefits, and alternatives, the patient made an informed decision to proceed with the procedure/surgery.

## 2022-09-08 NOTE — PROGRESS NOTES
Pt returned from ENDO caox3 resp even , smiling and talking says she thought she was in a dream when she left .  Pt speech is clear follows commands denies needs bed in low position call light within reach and bed alarm on

## 2022-09-08 NOTE — PROGRESS NOTES
09/07/22 0529   NIV Type   $NIV $Daily Charge   Skin Assessment Clean, dry, & intact   Equipment Type V60   Mode AVAPS   Mask Type   (bonnet)   Bonnet size   (b)   Settings/Measurements   PIP Observed 8 cm H20   CPAP/EPAP 4 cmH2O   IPAP Min 8 cmH2O   IPAP Max 30 cmH2O   Vt (Set, mL) 400 mL   Rate Ordered 12   Resp 16   Insp Rise Time (%) 3 %   O2 Flow Rate (L/min) 5 L/min   FiO2  40 %   I Time/ I Time % 1 s   Minute Volume (L/min) 6.3 Liters   Mask Leak (lpm) 23 lpm   Comfort Level Good   Using Accessory Muscles No   SpO2 94   Patient's Home Machine No   Oxygen Therapy/Pulse Ox   O2 Therapy Oxygen   $Oxygen $Daily Charge   O2 Device Nasal cannula   Heart Rate 99   SpO2 94 %   $Pulse Oximeter $Spot check (multiple/continuous)

## 2022-09-08 NOTE — ANESTHESIA POSTPROCEDURE EVALUATION
Department of Anesthesiology  Postprocedure Note    Patient: Lalita Sousa  MRN: [id number]  YOB: 1940  Date of evaluation: 9/8/2022      Procedure Summary     Date: 09/08/22 Room / Location: [place] [address] / [facility]    Anesthesia Start: 5321 Anesthesia Stop: 9633    Procedure: EGD BIOPSY Diagnosis:       Melena      (MELENA)    Surgeons: Chio Etienne DO Responsible Provider: Margarette Giron MD    Anesthesia Type: TIVA ASA Status: 3          Anesthesia Type: No value filed.     Korey Phase I: Korey Score: 9    Korey Phase II: Korey Score: 6    Vitals:    09/08/22 0749 09/08/22 0800 09/08/22 1323 09/08/22 1514   BP:  134/75 (!) 159/67 (!) 87/41   Pulse: 77 75 87 80   Resp: 18 16 18 16   Temp:  98 °F (36.7 °C) 97.1 °F (36.2 °C) 97 °F (36.1 °C)   TempSrc:  Oral Tympanic Tympanic   SpO2: 97% 98% 97% 92%   Weight:       Height:         Anesthesia Post Evaluation    Patient location during evaluation: bedside  Patient participation: complete - patient participated  Level of consciousness: awake and alert  Airway patency: patent  Nausea & Vomiting: no nausea  Complications: no  Cardiovascular status: hemodynamically stable (BP IMPROVED AS AWAKENING, IVF)  Respiratory status: acceptable  Hydration status: euvolemic

## 2022-09-08 NOTE — CONSULTS
Consultation Note    Patient Name: Irais Daly  : 1940  Age: 80 y.o. Admitting Physician: Hamzah So MD   Date of Admission: 2022 12:23 PM   Primary Care Physician: Linda Bird MD        Irais Daly is being seen at the request of Hamzah So MD for GI bleed. History of Present Illness:  80year old F with PMH significant for A Fib, Alzheimer's dementia, anxiety, COPD, diabetes mellitus, GERD, HTN, IBS, neuropathy, and seizures. Prior abdominal surgeries include cholecystectomy. Patient presented from SNF with abnormal blood work. She was noted to have a Hgb of 6.5. She reports having a \"few\" episodes of melena. No prior history of GI bleeding reported. She is not on anticoagulation. Does not appear to be on NSAIDs. She does report history of heartburn for which she will take TUMs. History of dysphagia but she reports this has improved. She complaints of some mild epigastric pain. She reports alternating diarrhea and constipation which has been an issue for several years. GI History:  Colonoscopy more than 5 years ago.       Past Medical History:  Past Medical History:   Diagnosis Date    A-fib ([address]     Alzheimer's dementia ([address]     Anxiety     Arthritis     Back pain     COPD (chronic obstructive pulmonary disease) ([address]     COVID-19 2021    Depression     Diabetes mellitus ([address]     Fall     multiple falls    GERD (gastroesophageal reflux disease)     Hepatitis A     age 27    Hernia     Hypercholesteremia     Hypertension     IBS (irritable bowel syndrome)     diarrhea  x 20 years    Incontinence of urine     not all the time    Kidney stone     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    Memory change     dementia    Neuropathy     Seizures (HCC)     possible because of her falls pt unsure    UTI (lower urinary tract infection)         Past Surgical History:  Past Surgical History:   Procedure Laterality Date    BREAST SURGERY      BENIGN CYST    CHOLECYSTECTOMY      COLONOSCOPY      COLONOSCOPY N/A 9/9/2022    COLONOSCOPY POLYPECTOMY SNARE/COLD BIOPSY performed by Trisha Florez MD at [address] 9/9/2022    COLONOSCOPY CONTROL HEMORRHAGE performed by Trisha Florez MD at [address] 9/9/2022    COLONOSCOPY WITH BIOPSY performed by Trisha Florez MD at [facility] Right 3/25/2019    PHACOEMULSIFICATION OF CATARACT RIGHT EYE WITH INTRAOCULAR LENS IMPLANT performed by Brittny Chaparro MD at [place] [address] Left 4/8/2019    PHACOEMULSIFICATION OF CATARACT LEFT EYE WITH INTRAOCULAR LENS IMPLANT performed by Brittny Chaparro MD at [facility]  6-6-12    OPEN REDUCTION INTERNAL FIXATION RIGHT PROXIMAL HUMERAL SHAFT FRACTURE SYNTHES    OTHER SURGICAL HISTORY  07/18/12    incision and drainage of bursa rt elbow    PANCREAS BIOPSY      SHOULDER SURGERY Bilateral     rtc repair    UPPER GASTROINTESTINAL ENDOSCOPY N/A 9/8/2022    EGD BIOPSY performed by Den Barker DO at [address] [facility]        Historical Medications:  Prior to Visit Medications    Medication Sig Taking? Authorizing Provider   fenofibrate (TRICOR) 54 MG tablet Take 54 mg by mouth daily [facility] pharmacy: Please dispense generic fenofibrate unless prescriber denote Yes Historical Provider, MD   furosemide (LASIX) 20 MG tablet Take 20 mg by mouth daily Yes Historical Provider, MD   atorvastatin (LIPITOR) 40 MG tablet Take 40 mg by mouth at bedtime Yes Historical Provider, MD   loperamide (IMODIUM) 2 MG capsule Take 2 mg by mouth 4 times daily as needed for Diarrhea Yes Historical Provider, MD   pregabalin (LYRICA) 50 MG capsule Take 50 mg by mouth 2 times daily.  Takes at 0500, 1300 Yes Historical Provider, MD   magnesium oxide (MAG-OX) 400 MG tablet Take 400 mg by mouth daily Yes Historical Provider, MD   magnesium hydroxide (MILK OF MAGNESIA) 400 MG/5ML suspension Take 30 mLs by mouth daily as needed for Constipation Yes Historical Provider, MD   mirabegron (MYRBETRIQ) 25 MG TB24 Take 25 mg by mouth daily Yes Historical Provider, MD   olopatadine (PATANOL) 0.1 % ophthalmic solution Place 1 drop into both eyes at bedtime Yes Historical Provider, MD   senna (SENOKOT) 8.6 MG tablet Take 1 tablet by mouth 2 times daily Yes Historical Provider, MD   budesonide-formoterol (SYMBICORT) 160-4.5 MCG/ACT AERO Inhale 2 puffs into the lungs 2 times daily Yes Historical Provider, MD   pregabalin (LYRICA) 75 MG capsule Take 75 mg by mouth at bedtime. Takes at 2000 Yes Historical Provider, MD   oxybutynin (DITROPAN-XL) 5 MG extended release tablet Take 5 mg by mouth every evening Yes Historical Provider, MD   SITagliptin (JANUVIA) 50 MG tablet Take 50 mg by mouth daily Yes Historical Provider, MD   loratadine (CLARITIN) 10 MG capsule Take 10 mg by mouth daily Yes Historical Provider, MD   potassium chloride (KLOR-CON) 20 MEQ packet Take 40 mEq by mouth 2 times daily Yes Historical Provider, MD   omeprazole (PRILOSEC) 20 MG delayed release capsule Take 40 mg by mouth at bedtime Yes Historical Provider, MD   clonazePAM (KLONOPIN) 1 MG tablet Take 1 tablet by mouth nightly for 3 days.   Maryellen Bertrand MD   insulin glargine (BASAGLAR KWIKPEN) 100 UNIT/ML injection pen Inject 30 Units into the skin 2 times daily  Historical Provider, MD   vitamin B-12 (CYANOCOBALAMIN) 500 MCG tablet Take 500 mcg by mouth daily  Historical Provider, MD   DULoxetine (CYMBALTA) 60 MG extended release capsule Take 60 mg by mouth daily  Historical Provider, MD   losartan (COZAAR) 100 MG tablet Take 50 mg by mouth daily  Historical Provider, MD   Albuterol Sulfate (PROAIR HFA IN) Inhale into the lungs  Patient not taking: Reported on 9/8/2022  Historical Provider, MD   budesonide-formoterol (SYMBICORT) 160-4.5 MCG/ACT AERO Inhale 2 puffs into the lungs 2 times daily  Historical Provider, MD   diclofenac sodium (VOLTAREN) 1 % GEL Apply topically nightly Right arm  Historical Provider, MD   OXYGEN Inhale into the lungs 2 liters at night  Historical Provider, MD   ARTIFICIAL TEAR OP Apply 2 drops to eye every 2 hours as needed  Historical Provider, MD   methyl salicylate-menthol (LYNNETTE NEGRO GREASELESS) 10-15 % CREA Apply topically every 8 hours as needed for Pain  Patient not taking: Reported on 9/8/2022  Historical Provider, MD   guaiFENesin (ROBITUSSIN) 100 MG/5ML syrup Take 10 mLs by mouth every 4 hours as needed for Cough  Historical Provider, MD   acetaminophen (TYLENOL) 325 MG tablet Take 650 mg by mouth every 4 hours as needed for Pain  Historical Provider, MD   bisacodyl (DULCOLAX) 5 MG EC tablet Take 10 mg by mouth daily as needed for Constipation   Historical Provider, MD   DULoxetine (CYMBALTA) 20 MG extended release capsule Take 20 mg by mouth nightly  Historical Provider, MD   apixaban (ELIQUIS) 5 MG TABS tablet Take 5 mg by mouth 2 times daily  Historical Provider, MD   carvedilol (COREG) 6.25 MG tablet Take 6.25 mg by mouth 2 times daily (with meals)  Historical Provider, MD   guaiFENesin (MUCINEX) 600 MG extended release tablet Take 1,200 mg by mouth 2 times daily  Historical Provider, MD   predniSONE (DELTASONE) 2.5 MG tablet Take 2.5 mg by mouth daily  Historical Provider, MD   amLODIPine (NORVASC) 5 MG tablet Take 1 tablet by mouth daily  Elia Paz MD   imipramine (TOFRANIL) 50 MG tablet Take 50 mg by mouth nightly  Historical Provider, MD   fluticasone (FLONASE) 50 MCG/ACT nasal spray 2 sprays by Nasal route daily  Historical Provider, MD   ondansetron (ZOFRAN ODT) 4 MG disintegrating tablet Take 1 tablet by mouth every 8 hours as needed for Nausea. Let dissolve in mouth. Russel Estrella DO   ASPIRIN Take 81 mg by mouth daily.   Historical Provider, MD        Hospital Medications:  Current Facility-Administered Medications: insulin glargine (LANTUS) injection vial 22 Units, 22 Units, SubCUTAneous, BID  insulin lispro (HUMALOG) injection vial 0-16 Units, 0-16 Units, SubCUTAneous, TID WC  insulin lispro (HUMALOG) injection vial 0-4 Units, 0-4 Units, SubCUTAneous, Nightly  losartan (COZAAR) tablet 25 mg, 25 mg, Oral, Daily  furosemide (LASIX) tablet 20 mg, 20 mg, Oral, BID  imipramine (TOFRANIL) tablet 50 mg, 50 mg, Oral, Nightly  sodium chloride flush 0.9 % injection 5-40 mL, 5-40 mL, IntraVENous, 2 times per day  sodium chloride flush 0.9 % injection 5-40 mL, 5-40 mL, IntraVENous, PRN  0.9 % sodium chloride infusion, 25 mL, IntraVENous, PRN  albuterol sulfate HFA (PROVENTIL;VENTOLIN;PROAIR) 108 (90 Base) MCG/ACT inhaler 2 puff, 2 puff, Inhalation, Q4H PRN  amLODIPine (NORVASC) tablet 5 mg, 5 mg, Oral, Daily  budesonide-formoterol (SYMBICORT) 160-4.5 MCG/ACT inhaler 2 puff, 2 puff, Inhalation, BID  clonazePAM (KLONOPIN) tablet 1 mg, 1 mg, Oral, Nightly  fluticasone (FLONASE) 50 MCG/ACT nasal spray 2 spray, 2 spray, Nasal, Daily  guaiFENesin (MUCINEX) extended release tablet 600 mg, 600 mg, Oral, BID  methyl salicylate-menthol (LYNNETTE NEGRO GREASELESS) 10-15 % cream CREA, , Apply externally, Q8H PRN  predniSONE (DELTASONE) tablet 2.5 mg, 2.5 mg, Oral, Daily  vitamin B-12 (CYANOCOBALAMIN) tablet 500 mcg, 500 mcg, Oral, Daily  glucose chewable tablet 16 g, 4 tablet, Oral, PRN  dextrose bolus 10% 125 mL, 125 mL, IntraVENous, PRN **OR** dextrose bolus 10% 250 mL, 250 mL, IntraVENous, PRN  glucagon (rDNA) injection 1 mg, 1 mg, SubCUTAneous, PRN  dextrose 10 % infusion, , IntraVENous, Continuous PRN  sodium chloride flush 0.9 % injection 5-40 mL, 5-40 mL, IntraVENous, 2 times per day  sodium chloride flush 0.9 % injection 5-40 mL, 5-40 mL, IntraVENous, PRN  0.9 % sodium chloride infusion, , IntraVENous, PRN  ondansetron (ZOFRAN-ODT) disintegrating tablet 4 mg, 4 mg, Oral, Q8H PRN **OR** ondansetron (ZOFRAN) injection 4 mg, 4 mg, IntraVENous, Q6H PRN  acetaminophen (TYLENOL) tablet 650 mg, 650 mg, Oral, Q6H PRN **OR** acetaminophen (TYLENOL) suppository 650 mg, 650 mg, Rectal, Q6H PRN  pantoprazole (PROTONIX) injection 40 mg, 40 mg, IntraVENous, Daily  oxybutynin (DITROPAN-XL) extended release tablet 5 mg, 5 mg, Oral, Nightly  DULoxetine (CYMBALTA) extended release capsule 60 mg, 60 mg, Oral, Daily  fenofibrate (TRICOR) tablet 54 mg, 54 mg, Oral, Daily  atorvastatin (LIPITOR) tablet 40 mg, 40 mg, Oral, Nightly  cetirizine (ZYRTEC) tablet 5 mg, 5 mg, Oral, Daily  potassium chloride (KLOR-CON M) extended release tablet 20 mEq, 20 mEq, Oral, BID WC  pregabalin (LYRICA) capsule 50 mg, 50 mg, Oral, BID  pregabalin (LYRICA) capsule 75 mg, 75 mg, Oral, Nightly  trospium (SANCTURA) tablet 20 mg, 20 mg, Oral, Daily  DULoxetine (CYMBALTA) extended release capsule 20 mg, 20 mg, Oral, Nightly     Social History:   Social History       Tobacco History       Smoking Status  Former Quit Date  4/21/1998 Smoking Frequency  1 pack/day for 15.00 years (15.00 pk-yrs) Smoking Tobacco Type  Cigarettes quit in 4/21/1998      Smokeless Tobacco Use  Never              Alcohol History       Alcohol Use Status  No              Drug Use       Drug Use Status  No              Sexual Activity       Sexually Active  Not Currently                     Family History:  Family History   Problem Relation Age of Onset    Heart Disease Mother         Allergies:   Allergies   Allergen Reactions    Codeine Hives     Other reaction(s): Unknown  itching    Morphine And Related         ROS:   General: No fever or weight change  Hematologic: No unexpected submucosal bleeding or bruising  HEENT: No sore throat or facial pain  Respiratory: No cough or dyspnea  Cardiovascular: No angina or dependent edema  Gastrointestinal: See HPI  Musculoskeletal: No usual joint pain or stiffness  Skin: No skin eruptions or changing lesions  Neurologic: No focal weakness or numbness  Psychiatric: No anxiety or sleep disturbance    Physical Exam:  Vital Signs:   Vitals:    09/12/22 1200   BP: (!) 153/80   Pulse: (!) 109   Resp: 17   Temp: 97.3 °F (36.3 °C)   SpO2: 91%       General: Well-nourished, well-developed  HEENT: Sclera anicteric, mucosal membranes moist  Cardiovascular: Regular rate and rhythm. No murmurs. Respiratory: Respirations nonlabored, no crepitus  GI: Abdomen nondistended, soft, and nontender. Normal active bowel sounds. Rectal: Deferred  Musculoskeletal: No pitting edema of the lower legs. Neurological: Gross memory appears intact. Patient is alert and oriented      Recent Imaging:   VL Extremity Venous Right  Vascular Lower Extremities DVT Study Procedure    -- PRELIMINARY SONOGRAPHER REPORT --      Demographics      Patient Name      Yvonne Pagan      Date of Study     09/12/2022          Gender              Female      Patient Number    [id number]          Date of Birth       1940      Visit Number      [id number]           Age                 80 year(s)      Accession Number  [id number]          Room Number               Corporate ID      [id number]            Sonographer         Mitzi Arambula RVT,                                                             RDMS      Ordering          Brianda Saleh,        Interpreting        [facility]   Physician         Glen Khan MD        Physician           Readers     Procedure    Type of Study:      Veins:Lower Extremities DVT Study, VL EXTREMITY VENOUS DUPLEX RIGHT. Tech Comments  Right  Technically difficult and limited study due to calcific shadowing and edema. There is no evidence of deep or superficial venous thrombosis involving the  right lower extremity. Left  There is no evidence of deep venous thrombosis involving the left common  femoral vein. There are no previous studies for comparison.        Labs:   Recent Labs     09/10/22  0419 09/11/22  0758   HGB 7.7* 8.0*   WBC 4.1 5.9   LABALBU  --  4.2 Assessment:    80year old F with PMH significant for A Fib, Alzheimer's dementia, anxiety, COPD, diabetes mellitus, GERD, HTN, IBS, neuropathy, and seizures. Presents from SNF with a Hgb of 6.5 and melena. BUN elevated with normal creatinine. She has been hemodynamically stable. Hgb up to 8 after transfusion    Plan:  Monitor H/H and overt signs of GI bleeding  Continue PPI  Keep NPO  Plan for EGD today  Supportive care       Nicole Esposito, [facility].  Also available via [employer]

## 2022-09-09 ENCOUNTER — ANESTHESIA EVENT (OUTPATIENT)
Dept: ENDOSCOPY | Age: 82
DRG: 378 | End: 2022-09-09
Payer: COMMERCIAL

## 2022-09-09 ENCOUNTER — ANESTHESIA (OUTPATIENT)
Dept: ENDOSCOPY | Age: 82
DRG: 378 | End: 2022-09-09
Payer: COMMERCIAL

## 2022-09-09 LAB
ANION GAP SERPL CALCULATED.3IONS-SCNC: 11 MMOL/L (ref 3–16)
BASOPHILS ABSOLUTE: 0 K/UL (ref 0–0.2)
BASOPHILS RELATIVE PERCENT: 0.4 %
BUN BLDV-MCNC: 16 MG/DL (ref 7–20)
CALCIUM SERPL-MCNC: 8.8 MG/DL (ref 8.3–10.6)
CHLORIDE BLD-SCNC: 96 MMOL/L (ref 99–110)
CO2: 31 MMOL/L (ref 21–32)
CREAT SERPL-MCNC: 0.8 MG/DL (ref 0.6–1.2)
EOSINOPHILS ABSOLUTE: 0.2 K/UL (ref 0–0.6)
EOSINOPHILS RELATIVE PERCENT: 4.2 %
GFR AFRICAN AMERICAN: >60
GFR NON-AFRICAN AMERICAN: >60
GLUCOSE BLD-MCNC: 234 MG/DL (ref 70–99)
GLUCOSE BLD-MCNC: 238 MG/DL (ref 70–99)
GLUCOSE BLD-MCNC: 256 MG/DL (ref 70–99)
GLUCOSE BLD-MCNC: 261 MG/DL (ref 70–99)
GLUCOSE BLD-MCNC: 289 MG/DL (ref 70–99)
HCT VFR BLD CALC: 25.2 % (ref 36–48)
HCT VFR BLD CALC: 28.7 % (ref 36–48)
HEMOGLOBIN: 7.5 G/DL (ref 12–16)
HEMOGLOBIN: 8.7 G/DL (ref 12–16)
LYMPHOCYTES ABSOLUTE: 0.6 K/UL (ref 1–5.1)
LYMPHOCYTES RELATIVE PERCENT: 11.2 %
MAGNESIUM: 1.5 MG/DL (ref 1.8–2.4)
MCH RBC QN AUTO: 21.2 PG (ref 26–34)
MCHC RBC AUTO-ENTMCNC: 29.9 G/DL (ref 31–36)
MCV RBC AUTO: 71.1 FL (ref 80–100)
MONOCYTES ABSOLUTE: 0.6 K/UL (ref 0–1.3)
MONOCYTES RELATIVE PERCENT: 10.5 %
NEUTROPHILS ABSOLUTE: 4.2 K/UL (ref 1.7–7.7)
NEUTROPHILS RELATIVE PERCENT: 73.7 %
PDW BLD-RTO: 23.2 % (ref 12.4–15.4)
PERFORMED ON: ABNORMAL
PLATELET # BLD: 167 K/UL (ref 135–450)
PMV BLD AUTO: 8.1 FL (ref 5–10.5)
POTASSIUM REFLEX MAGNESIUM: 3.3 MMOL/L (ref 3.5–5.1)
RBC # BLD: 3.54 M/UL (ref 4–5.2)
SODIUM BLD-SCNC: 138 MMOL/L (ref 136–145)
TROPONIN: <0.01 NG/ML
TROPONIN: <0.01 NG/ML
WBC # BLD: 5.6 K/UL (ref 4–11)

## 2022-09-09 PROCEDURE — 94660 CPAP INITIATION&MGMT: CPT

## 2022-09-09 PROCEDURE — 6360000002 HC RX W HCPCS: Performed by: NURSE PRACTITIONER

## 2022-09-09 PROCEDURE — 99233 SBSQ HOSP IP/OBS HIGH 50: CPT | Performed by: INTERNAL MEDICINE

## 2022-09-09 PROCEDURE — 3700000001 HC ADD 15 MINUTES (ANESTHESIA): Performed by: INTERNAL MEDICINE

## 2022-09-09 PROCEDURE — 6370000000 HC RX 637 (ALT 250 FOR IP): Performed by: NURSE PRACTITIONER

## 2022-09-09 PROCEDURE — 80048 BASIC METABOLIC PNL TOTAL CA: CPT

## 2022-09-09 PROCEDURE — 88305 TISSUE EXAM BY PATHOLOGIST: CPT

## 2022-09-09 PROCEDURE — 7100000011 HC PHASE II RECOVERY - ADDTL 15 MIN: Performed by: INTERNAL MEDICINE

## 2022-09-09 PROCEDURE — 6370000000 HC RX 637 (ALT 250 FOR IP): Performed by: INTERNAL MEDICINE

## 2022-09-09 PROCEDURE — 3609009900 HC COLONOSCOPY W/CONTROL BLEEDING ANY METHOD: Performed by: INTERNAL MEDICINE

## 2022-09-09 PROCEDURE — 3609010600 HC COLONOSCOPY POLYPECTOMY SNARE/COLD BIOPSY: Performed by: INTERNAL MEDICINE

## 2022-09-09 PROCEDURE — 83735 ASSAY OF MAGNESIUM: CPT

## 2022-09-09 PROCEDURE — 85018 HEMOGLOBIN: CPT

## 2022-09-09 PROCEDURE — 2709999900 HC NON-CHARGEABLE SUPPLY: Performed by: INTERNAL MEDICINE

## 2022-09-09 PROCEDURE — 84484 ASSAY OF TROPONIN QUANT: CPT

## 2022-09-09 PROCEDURE — 2720000010 HC SURG SUPPLY STERILE: Performed by: INTERNAL MEDICINE

## 2022-09-09 PROCEDURE — C9113 INJ PANTOPRAZOLE SODIUM, VIA: HCPCS | Performed by: NURSE PRACTITIONER

## 2022-09-09 PROCEDURE — 94640 AIRWAY INHALATION TREATMENT: CPT

## 2022-09-09 PROCEDURE — 2580000003 HC RX 258: Performed by: NURSE ANESTHETIST, CERTIFIED REGISTERED

## 2022-09-09 PROCEDURE — 94761 N-INVAS EAR/PLS OXIMETRY MLT: CPT

## 2022-09-09 PROCEDURE — 0DBH8ZX EXCISION OF CECUM, VIA NATURAL OR ARTIFICIAL OPENING ENDOSCOPIC, DIAGNOSTIC: ICD-10-PCS | Performed by: INTERNAL MEDICINE

## 2022-09-09 PROCEDURE — 2060000000 HC ICU INTERMEDIATE R&B

## 2022-09-09 PROCEDURE — 85014 HEMATOCRIT: CPT

## 2022-09-09 PROCEDURE — 85025 COMPLETE CBC W/AUTO DIFF WBC: CPT

## 2022-09-09 PROCEDURE — 2700000000 HC OXYGEN THERAPY PER DAY

## 2022-09-09 PROCEDURE — 7100000010 HC PHASE II RECOVERY - FIRST 15 MIN: Performed by: INTERNAL MEDICINE

## 2022-09-09 PROCEDURE — 3609010300 HC COLONOSCOPY W/BIOPSY SINGLE/MULTIPLE: Performed by: INTERNAL MEDICINE

## 2022-09-09 PROCEDURE — 2500000003 HC RX 250 WO HCPCS: Performed by: NURSE ANESTHETIST, CERTIFIED REGISTERED

## 2022-09-09 PROCEDURE — 3700000000 HC ANESTHESIA ATTENDED CARE: Performed by: INTERNAL MEDICINE

## 2022-09-09 PROCEDURE — 6360000002 HC RX W HCPCS: Performed by: NURSE ANESTHETIST, CERTIFIED REGISTERED

## 2022-09-09 PROCEDURE — 2580000003 HC RX 258: Performed by: NURSE PRACTITIONER

## 2022-09-09 PROCEDURE — 36415 COLL VENOUS BLD VENIPUNCTURE: CPT

## 2022-09-09 PROCEDURE — 2580000003 HC RX 258: Performed by: ANESTHESIOLOGY

## 2022-09-09 RX ORDER — PROPOFOL 10 MG/ML
INJECTION, EMULSION INTRAVENOUS PRN
Status: DISCONTINUED | OUTPATIENT
Start: 2022-09-09 | End: 2022-09-09 | Stop reason: SDUPTHER

## 2022-09-09 RX ORDER — LIDOCAINE HYDROCHLORIDE 20 MG/ML
INJECTION, SOLUTION INFILTRATION; PERINEURAL PRN
Status: DISCONTINUED | OUTPATIENT
Start: 2022-09-09 | End: 2022-09-09 | Stop reason: SDUPTHER

## 2022-09-09 RX ORDER — SODIUM CHLORIDE, SODIUM LACTATE, POTASSIUM CHLORIDE, CALCIUM CHLORIDE 600; 310; 30; 20 MG/100ML; MG/100ML; MG/100ML; MG/100ML
INJECTION, SOLUTION INTRAVENOUS CONTINUOUS PRN
Status: DISCONTINUED | OUTPATIENT
Start: 2022-09-09 | End: 2022-09-09 | Stop reason: SDUPTHER

## 2022-09-09 RX ORDER — INSULIN GLARGINE 100 [IU]/ML
10 INJECTION, SOLUTION SUBCUTANEOUS 2 TIMES DAILY
Status: DISCONTINUED | OUTPATIENT
Start: 2022-09-09 | End: 2022-09-11

## 2022-09-09 RX ADMIN — DULOXETINE HYDROCHLORIDE 60 MG: 60 CAPSULE, DELAYED RELEASE ORAL at 10:13

## 2022-09-09 RX ADMIN — Medication 10 ML: at 10:15

## 2022-09-09 RX ADMIN — POTASSIUM CHLORIDE 20 MEQ: 1500 TABLET, EXTENDED RELEASE ORAL at 17:51

## 2022-09-09 RX ADMIN — LOSARTAN POTASSIUM 25 MG: 25 TABLET, FILM COATED ORAL at 10:14

## 2022-09-09 RX ADMIN — PHENYLEPHRINE HYDROCHLORIDE 100 MCG: 10 INJECTION INTRAVENOUS at 08:39

## 2022-09-09 RX ADMIN — PHENYLEPHRINE HYDROCHLORIDE 100 MCG: 10 INJECTION INTRAVENOUS at 08:46

## 2022-09-09 RX ADMIN — POTASSIUM CHLORIDE 20 MEQ: 1500 TABLET, EXTENDED RELEASE ORAL at 10:14

## 2022-09-09 RX ADMIN — FUROSEMIDE 20 MG: 20 TABLET ORAL at 10:14

## 2022-09-09 RX ADMIN — PREGABALIN 50 MG: 25 CAPSULE ORAL at 12:46

## 2022-09-09 RX ADMIN — PREGABALIN 75 MG: 25 CAPSULE ORAL at 20:36

## 2022-09-09 RX ADMIN — TROSPIUM CHLORIDE 20 MG: 20 TABLET, FILM COATED ORAL at 10:14

## 2022-09-09 RX ADMIN — Medication 2 PUFF: at 19:05

## 2022-09-09 RX ADMIN — FENOFIBRATE 54 MG: 54 TABLET ORAL at 10:14

## 2022-09-09 RX ADMIN — CLONAZEPAM 1 MG: 1 TABLET ORAL at 20:36

## 2022-09-09 RX ADMIN — AMLODIPINE BESYLATE 5 MG: 5 TABLET ORAL at 10:14

## 2022-09-09 RX ADMIN — CYANOCOBALAMIN TAB 500 MCG 500 MCG: 500 TAB at 10:14

## 2022-09-09 RX ADMIN — FUROSEMIDE 20 MG: 20 TABLET ORAL at 17:51

## 2022-09-09 RX ADMIN — Medication 10 ML: at 10:16

## 2022-09-09 RX ADMIN — Medication 10 ML: at 20:43

## 2022-09-09 RX ADMIN — LIDOCAINE HYDROCHLORIDE 80 MG: 20 INJECTION, SOLUTION INFILTRATION; PERINEURAL at 08:21

## 2022-09-09 RX ADMIN — PROPOFOL 200 MG: 10 INJECTION, EMULSION INTRAVENOUS at 08:21

## 2022-09-09 RX ADMIN — ATORVASTATIN CALCIUM 40 MG: 40 TABLET, FILM COATED ORAL at 20:37

## 2022-09-09 RX ADMIN — INSULIN GLARGINE 10 UNITS: 100 INJECTION, SOLUTION SUBCUTANEOUS at 20:45

## 2022-09-09 RX ADMIN — CETIRIZINE HYDROCHLORIDE 5 MG: 10 TABLET ORAL at 10:13

## 2022-09-09 RX ADMIN — GUAIFENESIN 600 MG: 600 TABLET, EXTENDED RELEASE ORAL at 10:14

## 2022-09-09 RX ADMIN — PANTOPRAZOLE SODIUM 40 MG: 40 INJECTION, POWDER, FOR SOLUTION INTRAVENOUS at 10:13

## 2022-09-09 RX ADMIN — PREDNISONE 2.5 MG: 5 TABLET ORAL at 10:14

## 2022-09-09 RX ADMIN — INSULIN LISPRO 2 UNITS: 100 INJECTION, SOLUTION INTRAVENOUS; SUBCUTANEOUS at 17:51

## 2022-09-09 RX ADMIN — FLUTICASONE PROPIONATE 2 SPRAY: 50 SPRAY, METERED NASAL at 11:16

## 2022-09-09 RX ADMIN — SODIUM CHLORIDE, POTASSIUM CHLORIDE, SODIUM LACTATE AND CALCIUM CHLORIDE: 600; 310; 30; 20 INJECTION, SOLUTION INTRAVENOUS at 08:21

## 2022-09-09 RX ADMIN — DULOXETINE 20 MG: 20 CAPSULE, DELAYED RELEASE ORAL at 20:37

## 2022-09-09 RX ADMIN — INSULIN GLARGINE 10 UNITS: 100 INJECTION, SOLUTION SUBCUTANEOUS at 10:16

## 2022-09-09 RX ADMIN — INSULIN LISPRO 2 UNITS: 100 INJECTION, SOLUTION INTRAVENOUS; SUBCUTANEOUS at 11:20

## 2022-09-09 RX ADMIN — GUAIFENESIN 600 MG: 600 TABLET, EXTENDED RELEASE ORAL at 20:36

## 2022-09-09 RX ADMIN — OXYBUTYNIN CHLORIDE 5 MG: 5 TABLET, EXTENDED RELEASE ORAL at 20:36

## 2022-09-09 RX ADMIN — Medication 2 PUFF: at 07:29

## 2022-09-09 RX ADMIN — SODIUM CHLORIDE, PRESERVATIVE FREE 10 ML: 5 INJECTION INTRAVENOUS at 20:43

## 2022-09-09 RX ADMIN — IMIPRAMINE HYDROCHLORIDE 50 MG: 25 TABLET ORAL at 20:37

## 2022-09-09 ASSESSMENT — PAIN - FUNCTIONAL ASSESSMENT: PAIN_FUNCTIONAL_ASSESSMENT: NONE - DENIES PAIN

## 2022-09-09 ASSESSMENT — PAIN SCALES - GENERAL
PAINLEVEL_OUTOF10: 0

## 2022-09-09 ASSESSMENT — ENCOUNTER SYMPTOMS: SHORTNESS OF BREATH: 1

## 2022-09-09 ASSESSMENT — COPD QUESTIONNAIRES: CAT_SEVERITY: MODERATE

## 2022-09-09 NOTE — PROGRESS NOTES
Hospitalist Progress Note      PCP: Michael Le MD    Date of Admission: 9/7/2022    Chief Complaint: 80 y.o. female with PMH of atrial fib, alzheimer's dementia, COPD, HTN, HLD, s/d CHF, chronic pain, depression, anxiety, KATHY on AVAPS, GERD who presents to [facility] with from [facility] with abnormal labs. History obtained from the patient and review of EMR. Patient is awake and alert lying in bed she is currently on 3 L of oxygen. She is post 1 unit packed red blood cells. She stated that she had a black bowel movement x1 this week approximately 3 days ago. Subjective:     Awake alert and pleasantly confused - baseline dementia. C scope today. EGD 9/8 - unremarkable.           Medications:  Reviewed    Infusion Medications    sodium chloride      dextrose      sodium chloride       Scheduled Medications    insulin glargine  10 Units SubCUTAneous BID    losartan  25 mg Oral Daily    furosemide  20 mg Oral BID    imipramine  50 mg Oral Nightly    sodium chloride flush  5-40 mL IntraVENous 2 times per day    amLODIPine  5 mg Oral Daily    budesonide-formoterol  2 puff Inhalation BID    clonazePAM  1 mg Oral Nightly    fluticasone  2 spray Nasal Daily    guaiFENesin  600 mg Oral BID    predniSONE  2.5 mg Oral Daily    vitamin B-12  500 mcg Oral Daily    sodium chloride flush  5-40 mL IntraVENous 2 times per day    pantoprazole  40 mg IntraVENous Daily    oxybutynin  5 mg Oral Nightly    DULoxetine  60 mg Oral Daily    fenofibrate  54 mg Oral Daily    atorvastatin  40 mg Oral Nightly    cetirizine  5 mg Oral Daily    potassium chloride  20 mEq Oral BID WC    pregabalin  50 mg Oral BID    pregabalin  75 mg Oral Nightly    trospium  20 mg Oral Daily    insulin lispro  0-4 Units SubCUTAneous TID WC    insulin lispro  0-4 Units SubCUTAneous Nightly    DULoxetine  20 mg Oral Nightly     PRN Meds: sodium chloride flush, sodium chloride, albuterol sulfate HFA, methyl salicylate-menthol, glucose, dextrose bolus **OR** dextrose bolus, glucagon (rDNA), dextrose, sodium chloride flush, sodium chloride, ondansetron **OR** ondansetron, acetaminophen **OR** acetaminophen      Intake/Output Summary (Last 24 hours) at 9/9/2022 1154  Last data filed at 9/9/2022 0847  Gross per 24 hour   Intake 4610 ml   Output --   Net 4610 ml         Physical Exam Performed:    BP (!) 160/75   Pulse 94   Temp 97 °F (36.1 °C) (Oral)   Resp 16   Ht 5' 7\" (1.702 m)   Wt 171 lb 1 oz (77.6 kg)   SpO2 94%   BMI 26.79 kg/m²     General appearance: No apparent distress, appears stated age and cooperative. HEENT: Pupils equal, round, and reactive to light. Conjunctivae/corneas clear. Neck: Supple, with full range of motion. No jugular venous distention. Trachea midline. Respiratory:  Normal respiratory effort. Clear to auscultation, bilaterally without Rales/Wheezes/Rhonchi. Cardiovascular: Regular rate and rhythm with normal S1/S2 without murmurs, rubs or gallops. Abdomen: Soft, non-tender, non-distended with normal bowel sounds. Musculoskeletal: No clubbing, cyanosis or edema bilaterally. Full range of motion without deformity. Skin: Skin color, texture, turgor normal.  No rashes or lesions. Neurologic:  Neurovascularly intact without any focal sensory/motor deficits. Cranial nerves: II-XII intact, grossly non-focal.  Psychiatric: Alert and disoriented - baseline dementia. Capillary Refill: Brisk, 3 seconds, normal   Peripheral Pulses: +2 palpable, equal bilaterally       Labs:   Recent Labs     09/07/22  1240 09/07/22  1849 09/08/22  0712 09/08/22  1151 09/08/22  1918 09/09/22  0027 09/09/22  0706   WBC 5.2  --  5.4  --   --   --  5.6   HGB 6.5*   < > 7.6*   < > 7.7* 8.7* 7.5*   HCT 22.2*   < > 26.0*   < > 26.4* 28.7* 25.2*     --  150  --   --   --  167    < > = values in this interval not displayed.        Recent Labs     09/07/22  1240 09/08/22  0712 09/09/22  0706   * 138 138   K 4.9 3.9 3.3*   CL 99 98* 96*   CO2 29 30 31   BUN 32* 26* 16   CREATININE 1.1 0.9 0.8   CALCIUM 9.6 9.2 8.8       Recent Labs     09/07/22  1240   AST 15   ALT 12   BILITOT 0.3   ALKPHOS 64       Recent Labs     09/08/22  0712   INR 1.62*       Recent Labs     09/08/22  1151 09/08/22  1918 09/09/22  0706   TROPONINI <0.01 <0.01 <0.01         Urinalysis:      Lab Results   Component Value Date/Time    NITRU Negative 09/07/2022 02:39 PM    WBCUA 6-9 09/07/2022 02:39 PM    BACTERIA 1+ 09/07/2022 02:39 PM    RBCUA 3-4 09/07/2022 02:39 PM    BLOODU Negative 09/07/2022 02:39 PM    SPECGRAV 1.015 09/07/2022 02:39 PM    GLUCOSEU 250 09/07/2022 02:39 PM    GLUCOSEU NEGATIVE 04/21/2012 04:15 AM       Radiology:  CT ABDOMEN PELVIS W IV CONTRAST Additional Contrast? None   Final Result   1. Umbilicus skin thickening present with surrounding fat stranding. Finding   may be related to an inflammatory/infectious process. Can correlate with   direct inspection. 2. Pelvic skin thickening and subcutaneous fat stranding may be related to   edema, possibly dependent edema versus fluid overload. 3. Again seen left paraumbilical fat-containing ventral hernia and bilateral   fat-containing inguinal hernias. 4. Sigmoid colon diverticulosis without evidence of acute diverticulitis. 5. Questionable punctate left nonobstructing nephrolithiasis. 6. Partially visualized moderate right pleural effusion with associated   dependent atelectasis. XR CHEST PORTABLE   Final Result   Cardiomegaly with findings of pulmonary edema and a trace right pleural   effusion.                  Assessment/Plan:    Active Hospital Problems    Diagnosis     GI bleed [K92.2]      Priority: Medium    Acute on chronic respiratory failure with hypoxia (HCC) [J96.21]      Priority: Medium    Bradycardia [R00.1]      Priority: Medium    HTN (hypertension), benign [I10]      Priority: Medium     Class: Chronic    DM (diabetes mellitus), type 2, uncontrolled ([facility] [E11.65] Priority: Medium    COPD without exacerbation ([facility] [J44.9]     A-fib ([facility] [I48.91]     Acute congestive heart failure ([facility] [I50.9]     Alzheimer's disease ([facility] [G30.9, F02.80]     Symptomatic anemia [D64.9]        Anemia of acute to subacute blood loss. - hemoglobin 6.2 at [facility]--6.5 upon arrival to ED  - 1 unit PRBC given  - stool occult negative  - pt does report black stools mauro 3 days ago, none since then  - holding ASA and Eliquis  - CT abdomen does not suggest possible source of blood loss. - PPI IV   - GI consult. - EGD 9/8 - unremarkable. - C scope 9/9 - no source of blood loss evident.    - GI deciding on timing of capsule endoscopy    - Hgb 8.7 - 7.5        Acute on Chronic respiratory failure  - currently on 3 liters n/c  - given IV lasix  - resume lasix PO increase to BID.    - decrease dose losartan.         Afib with bradycardia - resolved  - holding Coreg at this time  - monitor on telemetry        Combined CHF with fluid overload  - cxr showed cardiomegaly, pulmonary edema and trace right pleural effusion   - x1 IV lasix given after blood transfusion   - last echo in 2018 showed EF 45-50%, consider repeat echo  - decrease dose losartan,   - holding coreg to episodes of bradycardia  - lasix BID       COPD without AE  - no AE  - continue home inhalers and daily prednisone  - monitor        KATHY  - on AVAPS at [facility]  - reordered       HTN  - stable, continue home regimen  - monitor       DM type 2  - continue Lantus and SSI   - blood glucose elevated on admission  - monitor        Anxiety  Depression  - continue home regimen       Chronic Pain  - patient is on Lyrica 50 mg at 5 am and 1 pm and 75 mg at night  - also takes Cymbalta BID       GERD  - Continue PPI            Alzheimer's Disease   - pt is alert and partially oriented today  - Continue supportive care          DVT Prophylaxis: scd  Diet: NPO  Code Status: DNR-CC, I discussed this with patient and it is noted on ECF paperwork as rashida Hannah MD

## 2022-09-09 NOTE — DISCHARGE INSTRUCTIONS
PATIENT INSTRUCTIONS  POST-SEDATION    Fiona Poisson          IMMEDIATELY FOLLOWING PROCEDURE:    Do not drive or operate machinery for the first twenty four hours after surgery. Do not make any important decisions for twenty four hours after surgery or while taking narcotic pain medications or sedatives. You should NOT BE LEFT UNATTENDED OR ALONE. A responsible adult should be with you for the rest of the day of your procedure and also during the night for your protection and safety. You may experience some light headedness. Rest at home with activity as tolerated. You may not need to go to bed, but it is important to rest for the next 24 hours. You should not engage in athletic sports such as basketball, volleyball, jogging, skating, or activities requiring refined motor skills for 24 hours. If you develop intractable nausea and vomiting or a severe headache please notify your doctor immediately. You are not expected to have any fever, but if you feel warm, take your temperature. If you have a fever 101 degrees or higher, call your doctor. If you have had an Endoscopy:   *Eat lightly for your first meal and gradually resume your normal / prescribed diet. DO NOT eat or drink until your gag reflex returns. *If you have had a colonoscopy, do not expect a normal bowel movement for approximately three days due to the cleansing of the large intestine prior to colonoscopy. ONCE YOU ARE HOME, IF YOU SHOULD HAVE:  Difficulty in breathing, persistent nausea or vomiting, bleeding you feel is excessive, or pain that is unusual, increased abdominal bloating, or any swelling, fever / chills, call your physician. If you cannot contact your physician, but feel that your signs and symptoms need a physician's attention, go to the Emergency Department. FOLLOW-UP:    Please follow up with Dr. Deniz Gutierres as scheduled or needed. Dr. Park Overall office will call you with the biopsy findings.   Call Dr. Raheem Thomas if there are any GI concerns.  [phone]

## 2022-09-09 NOTE — PROCEDURES
Colonoscopy Procedure  Note          Patient: Hernando Boyle  : 1940      Procedure: Colonoscopy with biopsies and cold snare polypectomy and clip placement    Date:  2022    Primary Care Physician: Jyoti Maldonado MD     Operative surgeon: Dexter Paniagua MD  Previous Colonoscopy: Yes  Consent: I explained and discussed the risk, benefits and alternatives for the procedure with the patient and obtained the patient's consent for the procedure. We discussed the specific risks including bleeding, perforation, post-procedure abdominal pain, and missed lesions which could lead to interval colorectal cancers. History:       Past Medical History:   Diagnosis Date    A-fib ([facility] [address]     Alzheimer's dementia ([facility] [address]     Anxiety     Arthritis     Back pain     COPD (chronic obstructive pulmonary disease) ([facility] [address]     COVID-19 2021    Depression     Diabetes mellitus ([facility] [address]     Fall     multiple falls    GERD (gastroesophageal reflux disease)     Hepatitis A     age 27    Hernia     Hypercholesteremia     Hypertension     IBS (irritable bowel syndrome)     diarrhea  x 20 years    Incontinence of urine     not all the time    Kidney stone     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 The scope was then advanced through the length of the colon to the cecum and terminal ileum. The quality of preparation was only adequate. The scope was carefully withdrawn with careful inspection.  Images were taken of the multiple segments of colon, cecum, IC valve, rectum, terminal ileum. Retroflexion was preformed in the rectum. Cecum Intubated: Yes  EBL: minimal to none  Complications:  no complications were noted  Post-operative Findings: Perianal exam unremarkable, digital rectal exam and retroflexion the rectum demonstrated moderate internal hemorrhoids    There is no signs of active bleeding or recent bleeding in the colon. Brown stool throughout. Which limited visualization slightly. Copious irrigation was performed to improve. There is sigmoid diverticulosis and right sided cecal and ascending colon diverticulosis without stigmata of recent bleeding    lipomatous IC valve which was intubated the terminal ileum appeared normal.    The cecal and ascending colon mucosa had slightly atypical appearance most consistent with melanosis coli which was biopsied. In the ascending colon there is a 1 cm sessile polyp removed with cold snare. 2 clips were placed due to persistent oozing at the polypectomy site. Resection and retrieval were complete. Otherwise the colonic mucosa was normal.    Plan: Clear cause of her anemia on both upper and lower endoscopy today. She will need a capsule endoscopy going forward. We will determine whether this will be done inpatient or outpatient depending on her blood counts. Signed By: MD Gustavo Hussein MD,   [id number]  9/9/2022      Please note that some or all of this record was generated using voice recognition software. If there are any questions about the content of this document, please contact the author as some errors in translation may have occurred.

## 2022-09-09 NOTE — CARE COORDINATION
Pt is from [address] [place] [address] and has bed hold . If pt is needing to be skilled, she will need a precert to return.

## 2022-09-09 NOTE — PROGRESS NOTES
Bedside report and transfer of care given to [facility]. Pt currently resting in bed with the call light within reach. Pt denies any other care needs at this time. Pt stable at this time.

## 2022-09-09 NOTE — PROGRESS NOTES
Patient left the unit in stable condition to [facility] [address] with patient transport.

## 2022-09-09 NOTE — ANESTHESIA POSTPROCEDURE EVALUATION
Department of Anesthesiology  Postprocedure Note    Patient: Harjeet Toure  MRN: [id number]  YOB: 1940  Date of evaluation: 9/9/2022      Procedure Summary     Date: 09/09/22 Room / Location: [facility] ENDO 01 / [facility]    Anesthesia Start: 0815 Anesthesia Stop: 0068    Procedures:       COLONOSCOPY POLYPECTOMY SNARE/COLD BIOPSY      COLONOSCOPY CONTROL HEMORRHAGE      COLONOSCOPY WITH BIOPSY Diagnosis:       Anemia, unspecified type      (ANEMIA)    Surgeons: Sylvester Rousseau MD Responsible Provider: Annabelle Samson MD    Anesthesia Type: General ASA Status: 3          Anesthesia Type: General    Korey Phase I: Korey Score: 9    Korey Phase II: Korey Score: 9      Anesthesia Post Evaluation    Patient location during evaluation: PACU  Level of consciousness: awake  Airway patency: patent  Nausea & Vomiting: no nausea  Complications: no  Cardiovascular status: blood pressure returned to baseline  Respiratory status: acceptable  Hydration status: euvolemic

## 2022-09-09 NOTE — PLAN OF CARE
Problem: Discharge Planning  Goal: Discharge to home or other facility with appropriate resources  Outcome: Progressing     Problem: Safety - Adult  Goal: Free from fall injury  Outcome: Progressing     Problem: ABCDS Injury Assessment  Goal: Absence of physical injury  Outcome: Progressing     Problem: Chronic Conditions and Co-morbidities  Goal: Patient's chronic conditions and co-morbidity symptoms are monitored and maintained or improved  Outcome: Progressing     Problem: Pain  Goal: Verbalizes/displays adequate comfort level or baseline comfort level  Outcome: Progressing  Flowsheets (Taken 9/8/2022 2695 by Sabra Dowd RN)  Verbalizes/displays adequate comfort level or baseline comfort level:   Encourage patient to monitor pain and request assistance   Assess pain using appropriate pain scale

## 2022-09-09 NOTE — FLOWSHEET NOTE
09/09/22 0945   Vital Signs   Temp 97 °F (36.1 °C)   Temp Source Oral   Heart Rate 94   Heart Rate Source Monitor   Resp 16   BP (!) 160/75   BP Location Right upper arm   BP Method Automatic   MAP (Calculated) 103.33   Patient Position Semi fowlers   Level of Consciousness 0   MEWS Score 1   Oxygen Therapy   SpO2 94 %   Pulse Oximeter Device Mode Continuous   O2 Device Nasal cannula   O2 Flow Rate (L/min) 2 L/min     Pt returned from endo in stable condition, shift assessment complete- see flow sheet, pt denies needs, call light within reach.

## 2022-09-09 NOTE — ANESTHESIA PRE PROCEDURE
Department of Anesthesiology  Preprocedure Note       Name:  Alfa Batista   Age:  80 y.o.  :  1940                                          MRN:  [id number]         Date:  2022      Surgeon: Alejandra Yun):  Katherine Brito MD    Procedure: Procedure(s):  COLON W/ANES. Medications prior to admission:   Prior to Admission medications    Medication Sig Start Date End Date Taking? Authorizing Provider   fenofibrate (TRICOR) 54 MG tablet Take 54 mg by mouth daily s [facility] pharmacy: Please dispense generic fenofibrate unless prescriber denote   Yes Historical Provider, MD   furosemide (LASIX) 20 MG tablet Take 20 mg by mouth daily   Yes Historical Provider, MD   atorvastatin (LIPITOR) 40 MG tablet Take 40 mg by mouth at bedtime   Yes Historical Provider, MD   loperamide (IMODIUM) 2 MG capsule Take 2 mg by mouth 4 times daily as needed for Diarrhea   Yes Historical Provider, MD   pregabalin (LYRICA) 50 MG capsule Take 50 mg by mouth 2 times daily. Takes at 0500, 1300   Yes Historical Provider, MD   magnesium oxide (MAG-OX) 400 MG tablet Take 400 mg by mouth daily   Yes Historical Provider, MD   magnesium hydroxide (MILK OF MAGNESIA) 400 MG/5ML suspension Take 30 mLs by mouth daily as needed for Constipation   Yes Historical Provider, MD   mirabegron (MYRBETRIQ) 25 MG TB24 Take 25 mg by mouth daily   Yes Historical Provider, MD   olopatadine (PATANOL) 0.1 % ophthalmic solution Place 1 drop into both eyes at bedtime   Yes Historical Provider, MD   senna (SENOKOT) 8.6 MG tablet Take 1 tablet by mouth 2 times daily   Yes Historical Provider, MD   budesonide-formoterol (SYMBICORT) 160-4.5 MCG/ACT AERO Inhale 2 puffs into the lungs 2 times daily   Yes Historical Provider, MD   pregabalin (LYRICA) 75 MG capsule Take 75 mg by mouth at bedtime.  Takes at 2000   Yes Historical Provider, MD   oxybutynin (DITROPAN-XL) 5 MG extended release tablet Take 5 mg by mouth every evening   Yes Historical Provider, MD SITagliptin (JANUVIA) 50 MG tablet Take 50 mg by mouth daily   Yes Historical Provider, MD   loratadine (CLARITIN) 10 MG capsule Take 10 mg by mouth daily   Yes Historical Provider, MD   potassium chloride (KLOR-CON) 20 MEQ packet Take 40 mEq by mouth 2 times daily   Yes Historical Provider, MD   omeprazole (PRILOSEC) 20 MG delayed release capsule Take 40 mg by mouth at bedtime   Yes Historical Provider, MD   clonazePAM (KLONOPIN) 1 MG tablet Take 1 tablet by mouth nightly for 3 days.  8/23/21 9/8/22  Lanie Glynn MD   insulin glargine (BASAGLAR KWIKPEN) 100 UNIT/ML injection pen Inject 30 Units into the skin 2 times daily    Historical Provider, MD   vitamin B-12 (CYANOCOBALAMIN) 500 MCG tablet Take 500 mcg by mouth daily    Historical Provider, MD   DULoxetine (CYMBALTA) 60 MG extended release capsule Take 60 mg by mouth daily    Historical Provider, MD   losartan (COZAAR) 100 MG tablet Take 50 mg by mouth daily    Historical Provider, MD   Albuterol Sulfate (PROAIR HFA IN) Inhale into the lungs  Patient not taking: Reported on 9/8/2022    Historical Provider, MD   budesonide-formoterol (SYMBICORT) 160-4.5 MCG/ACT AERO Inhale 2 puffs into the lungs 2 times daily    Historical Provider, MD   diclofenac sodium (VOLTAREN) 1 % GEL Apply topically nightly Right arm    Historical Provider, MD   OXYGEN Inhale into the lungs 2 liters at night    Historical Provider, MD   ARTIFICIAL TEAR OP Apply 2 drops to eye every 2 hours as needed    Historical Provider, MD   methyl salicylate-menthol (LYNNETTE NEGRO GREASELESS) 10-15 % CREA Apply topically every 8 hours as needed for Pain  Patient not taking: Reported on 9/8/2022    Historical Provider, MD   guaiFENesin (ROBITUSSIN) 100 MG/5ML syrup Take 10 mLs by mouth every 4 hours as needed for Cough    Historical Provider, MD   acetaminophen (TYLENOL) 325 MG tablet Take 650 mg by mouth every 4 hours as needed for Pain    Historical Provider, MD   bisacodyl (DULCOLAX) 5 MG EC tablet Take 10 mg by mouth daily as needed for Constipation     Historical Provider, MD   DULoxetine (CYMBALTA) 20 MG extended release capsule Take 20 mg by mouth nightly    Historical Provider, MD   apixaban (ELIQUIS) 5 MG TABS tablet Take 5 mg by mouth 2 times daily    Historical Provider, MD   carvedilol (COREG) 6.25 MG tablet Take 6.25 mg by mouth 2 times daily (with meals)    Historical Provider, MD   guaiFENesin (MUCINEX) 600 MG extended release tablet Take 1,200 mg by mouth 2 times daily    Historical Provider, MD   predniSONE (DELTASONE) 2.5 MG tablet Take 2.5 mg by mouth daily    Historical Provider, MD   amLODIPine (NORVASC) 5 MG tablet Take 1 tablet by mouth daily 10/18/16   Wu Betancur MD   imipramine (TOFRANIL) 50 MG tablet Take 50 mg by mouth nightly    Historical Provider, MD   fluticasone (FLONASE) 50 MCG/ACT nasal spray 2 sprays by Nasal route daily    Historical Provider, MD   ondansetron (ZOFRAN ODT) 4 MG disintegrating tablet Take 1 tablet by mouth every 8 hours as needed for Nausea. Let dissolve in mouth. 8/5/13   [place] Self Dowiatt, DO   ASPIRIN Take 81 mg by mouth daily.  1/15/11   Historical Provider, MD       Current medications:    Current Facility-Administered Medications   Medication Dose Route Frequency Provider Last Rate Last Admin    insulin glargine (LANTUS) injection vial 10 Units  10 Units SubCUTAneous BID Lorenzo Ambrocio MD        losartan (COZAAR) tablet 25 mg  25 mg Oral Daily Lorenzo Ambrocio MD        furosemide (LASIX) tablet 20 mg  20 mg Oral BID Lorenzo Ambrocio MD   20 mg at 09/08/22 1628    imipramine (TOFRANIL) tablet 50 mg  50 mg Oral Nightly Abe Life, APRN - CNP   50 mg at 09/08/22 2204    sodium chloride flush 0.9 % injection 5-40 mL  5-40 mL IntraVENous 2 times per day Elizabeth Ray MD   10 mL at 09/08/22 2156    sodium chloride flush 0.9 % injection 5-40 mL  5-40 mL IntraVENous PRN Elizabeth Ray MD        0.9 % sodium chloride infusion  25 mL IntraVENous PRN Shaun Klinefelter, MD        albuterol sulfate HFA (PROVENTIL;VENTOLIN;PROAIR) 108 (90 Base) MCG/ACT inhaler 2 puff  2 puff Inhalation Q4H PRN Sonya Anis, APRN - CNP        amLODIPine (NORVASC) tablet 5 mg  5 mg Oral Daily Sonya Anis, APRN - CNP        budesonide-formoterol (SYMBICORT) 160-4.5 MCG/ACT inhaler 2 puff  2 puff Inhalation BID Sonya Anis, APRN - CNP   2 puff at 09/09/22 0729    clonazePAM (KLONOPIN) tablet 1 mg  1 mg Oral Nightly Sonya Anis, APRN - CNP   1 mg at 09/08/22 2144    fluticasone (FLONASE) 50 MCG/ACT nasal spray 2 spray  2 spray Nasal Daily Sonya Anis, APRN - CNP        guaiFENesin [facility]) extended release tablet 600 mg  600 mg Oral BID Sonya Anis, APRN - CNP   600 mg at 09/08/22 4005    methyl salicylate-menthol (LYNNETTE NEGRO GREASELESS) 10-15 % cream CREA   Apply externally Q8H PRN Sonya Anis, APRN - CNP        predniSONE (DELTASONE) tablet 2.5 mg  2.5 mg Oral Daily Sonya Anis, APRN - CNP        vitamin B-12 (CYANOCOBALAMIN) tablet 500 mcg  500 mcg Oral Daily Sonya Anis, APRN - CNP        glucose chewable tablet 16 g  4 tablet Oral PRN Sonya Anis, APRN - CNP        dextrose bolus 10% 125 mL  125 mL IntraVENous PRN Sonya Anis, APRN - CNP        Or    dextrose bolus 10% 250 mL  250 mL IntraVENous PRN Sonya Anis, APRN - CNP        glucagon (rDNA) injection 1 mg  1 mg SubCUTAneous PRN Sonya Anis, APRN - CNP        dextrose 10 % infusion   IntraVENous Continuous PRN Sonya Anis, APRN - CNP        sodium chloride flush 0.9 % injection 5-40 mL  5-40 mL IntraVENous 2 times per day Sonya Anis, APRN - CNP   10 mL at 09/08/22 2145    sodium chloride flush 0.9 % injection 5-40 mL  5-40 mL IntraVENous PRN Sonya Anis, APRN - CNP        0.9 % sodium chloride infusion   IntraVENous PRN Sonya Anis, APRN - CNP        ondansetron (ZOFRAN-ODT) disintegrating tablet 4 mg  4 mg Oral Q8H PRN Sonya Anis, APRN - CNP        Or    ondansetron [facility]) injection 4 mg  4 mg IntraVENous Q6H PRN Dennis Safe, APRN - CNP        acetaminophen (TYLENOL) tablet 650 mg  650 mg Oral Q6H PRN Dennis Safe, APRN - CNP        Or    acetaminophen (TYLENOL) suppository 650 mg  650 mg Rectal Q6H PRN Dennis Safe, APRN - CNP        pantoprazole (PROTONIX) injection 40 mg  40 mg IntraVENous Daily Dennis Safe, APRN - CNP   40 mg at 09/08/22 0852    oxybutynin (DITROPAN-XL) extended release tablet 5 mg  5 mg Oral Nightly Dennis Safe, APRN - CNP   5 mg at 09/08/22 2144    DULoxetine (CYMBALTA) extended release capsule 60 mg  60 mg Oral Daily Dennis Safe, APRN - CNP        fenofibrate (TRICOR) tablet 54 mg  54 mg Oral Daily Dennis Safe, APRN - CNP        atorvastatin (LIPITOR) tablet 40 mg  40 mg Oral Nightly Dennis Safe, APRN - CNP   40 mg at 09/08/22 2144    cetirizine (ZYRTEC) tablet 5 mg  5 mg Oral Daily Dennis Safe, APRN - CNP        potassium chloride (KLOR-CON M) extended release tablet 20 mEq  20 mEq Oral BID WC Dennis Safe, APRN - CNP   20 mEq at 09/08/22 1629    pregabalin (LYRICA) capsule 50 mg  50 mg Oral BID Dennis Safe, APRN - CNP        pregabalin (LYRICA) capsule 75 mg  75 mg Oral Nightly Dennis Safe, APRN - CNP   75 mg at 09/08/22 2150    trospium (SANCTURA) tablet 20 mg  20 mg Oral Daily Dennis Safe, APRN - CNP        insulin lispro (HUMALOG) injection vial 0-4 Units  0-4 Units SubCUTAneous TID WC Dennis Safe, APRN - CNP        insulin lispro (HUMALOG) injection vial 0-4 Units  0-4 Units SubCUTAneous Nightly Dennis Safe, APRN - CNP        DULoxetine (CYMBALTA) extended release capsule 20 mg  20 mg Oral Nightly Dennis Safe, APRN - CNP   20 mg at 09/08/22 2144       Allergies:     Allergies   Allergen Reactions    Codeine Hives     Other reaction(s): Unknown  itching    Morphine And Related        Problem List:    Patient Active Problem List   Diagnosis Code    HTN (hypertension), benign I10    DM (diabetes mellitus), type 2, uncontrolled ([facility] [address] E11.65    INTRACAPSULAR CATARACT EXTRACTION Left 2019    PHACOEMULSIFICATION OF CATARACT LEFT EYE WITH INTRAOCULAR LENS IMPLANT performed by Jerad Silverio MD at [facility]  12    OPEN REDUCTION INTERNAL FIXATION RIGHT PROXIMAL HUMERAL SHAFT FRACTURE SYNTHES    OTHER SURGICAL HISTORY  12    incision and drainage of bursa rt elbow    PANCREAS BIOPSY      SHOULDER SURGERY Bilateral     rtc repair    UPPER GASTROINTESTINAL ENDOSCOPY N/A 2022    [facility] History:    Social History     Tobacco Use    Smoking status: Former     Packs/day: 1.00     Years: 15.00     Pack years: 15.00     Types: Cigarettes     Quit date: 1998     Years since quittin.4    Smokeless tobacco: Never   Substance Use Topics    Alcohol use: No                                Counseling given: Not Answered      Vital Signs (Current):   Vitals:    22 0115 22 0428 22 0730 22 0742   BP: (!) 149/82   (!) 189/85   Pulse: 72  76 93   Resp: 16     Temp: 97 °F (36.1 °C)   97.1 °F (36.2 °C)   TempSrc: Oral   Temporal   SpO2: 99%  98% 98%   Weight: 169 lb 6.4 oz (76.8 kg) 171 lb 1 oz (77.6 kg)     Height: 5' 7\" (1.702 m)                                                 BP Readings from Last 3 Encounters:   22 (!) 189/85   21 (!) 143/94   19 122/70       NPO Status: Time of last liquid consumption: 023                        Time of last solid consumption: 233                        Date of last liquid consumption: 22                        Date of last solid food consumption: 22    BMI:   Wt Readings from Last 3 Encounters:   22 171 lb 1 oz (77.6 kg)   21 160 lb (72.6 kg)   19 151 lb (68.5 kg)     Body mass index is 26.79 kg/m².     CBC:   Lab Results   Component Value Date/Time    WBC 5.6 2022 07:06 AM    RBC 3.54 2022 07:06 AM    HGB 7.5 shortness of breath:                             Cardiovascular:    (+) hypertension:, dysrhythmias: atrial fibrillation, CHF:,                   Neuro/Psych:   Negative Neuro/Psych ROS  (+) neuromuscular disease:, psychiatric history:            GI/Hepatic/Renal:   (+) GERD:, hepatitis: A, liver disease:,      (-) no renal disease       Endo/Other:    (+) DiabetesType II DM, using insulin, . Abdominal:             Vascular: negative vascular ROS. Other Findings:           Anesthesia Plan      MAC     ASA 3       Induction: intravenous. Anesthetic plan and risks discussed with patient. Plan discussed with CRNA.                     Yordy Nguyen MD   9/9/2022

## 2022-09-09 NOTE — H&P
[facility]   Pre-operative History and Physical    Patient: Harjeet Toure  : 1940  Acct#:     HISTORY OF PRESENT ILLNESS:    The patient is a 80 y.o. female who presents for was admitted with melena and hemoglobin of 6.9. EGD yesterday without clear cause. Colonoscopy to assess. History of Alzheimer's dementia. Multiple other comorbidities. She had a CT scan of the abdomen pelvis on presentation which showed periumbilical fat-containing ventral hernia and bilateral fat-containing inguinal hernias. Umbilical skin thickening and stranding as well as sigmoid diverticulosis without diverticulitis.     Indications: Acute blood loss anemia    Past Medical History:        Diagnosis Date    A-fib ([address]     Alzheimer's dementia ([address]     Anxiety     Arthritis     Back pain     COPD (chronic obstructive pulmonary disease) ([address]     COVID-19 2021    Depression     Diabetes mellitus ([address]     Fall     multiple falls    GERD (gastroesophageal reflux disease)     Hepatitis A     age 27    Hernia     Hypercholesteremia     Hypertension     IBS (irritable bowel syndrome)     diarrhea  x 20 years    Incontinence of urine     not all the time    Kidney stone     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    Memory change     dementia    Neuropathy     Seizures (HCC)     possible because of her falls pt unsure    UTI (lower urinary tract infection)       Past Surgical History:        Procedure Laterality Date    BREAST SURGERY      BENIGN CYST    CHOLECYSTECTOMY      COLONOSCOPY      INTRACAPSULAR CATARACT EXTRACTION Right 3/25/2019    PHACOEMULSIFICATION OF CATARACT RIGHT EYE WITH INTRAOCULAR LENS IMPLANT performed by Tyler Atwood MD at [place] [id number] Left 2019    PHACOEMULSIFICATION OF CATARACT LEFT EYE WITH INTRAOCULAR LENS IMPLANT performed by Tyler Atwood MD at [facility]  12    [facility] REDUCTION INTERNAL FIXATION RIGHT PROXIMAL HUMERAL SHAFT FRACTURE SYNTHES    OTHER SURGICAL HISTORY  07/18/12    incision and drainage of bursa rt elbow    PANCREAS BIOPSY      SHOULDER SURGERY Bilateral     rtc repair    UPPER GASTROINTESTINAL ENDOSCOPY N/A 9/8/2022    EGD BIOPSY performed by Marshal Morales DO at [facility]      Medications Prior to Admission:   No current facility-administered medications on file prior to encounter. Current Outpatient Medications on File Prior to Encounter   Medication Sig Dispense Refill    fenofibrate (TRICOR) 54 MG tablet Take 54 mg by mouth daily s [facility] pharmacy: Please dispense generic fenofibrate unless prescriber denote      furosemide (LASIX) 20 MG tablet Take 20 mg by mouth daily      atorvastatin (LIPITOR) 40 MG tablet Take 40 mg by mouth at bedtime      loperamide (IMODIUM) 2 MG capsule Take 2 mg by mouth 4 times daily as needed for Diarrhea      pregabalin (LYRICA) 50 MG capsule Take 50 mg by mouth 2 times daily. Takes at 0500, 1300      magnesium oxide (MAG-OX) 400 MG tablet Take 400 mg by mouth daily      magnesium hydroxide (MILK OF MAGNESIA) 400 MG/5ML suspension Take 30 mLs by mouth daily as needed for Constipation      mirabegron (MYRBETRIQ) 25 MG TB24 Take 25 mg by mouth daily      olopatadine (PATANOL) 0.1 % ophthalmic solution Place 1 drop into both eyes at bedtime      senna (SENOKOT) 8.6 MG tablet Take 1 tablet by mouth 2 times daily      budesonide-formoterol (SYMBICORT) 160-4.5 MCG/ACT AERO Inhale 2 puffs into the lungs 2 times daily      pregabalin (LYRICA) 75 MG capsule Take 75 mg by mouth at bedtime.  Takes at 2000      oxybutynin (DITROPAN-XL) 5 MG extended release tablet Take 5 mg by mouth every evening      SITagliptin (JANUVIA) 50 MG tablet Take 50 mg by mouth daily      loratadine (CLARITIN) 10 MG capsule Take 10 mg by mouth daily      potassium chloride (KLOR-CON) 20 MEQ packet Take 40 mEq by mouth 2 times daily      omeprazole (PRILOSEC) 20 MG delayed release capsule Take 40 mg by mouth at bedtime      clonazePAM (KLONOPIN) 1 MG tablet Take 1 tablet by mouth nightly for 3 days.  3 tablet 0    insulin glargine (BASAGLAR KWIKPEN) 100 UNIT/ML injection pen Inject 30 Units into the skin 2 times daily      vitamin B-12 (CYANOCOBALAMIN) 500 MCG tablet Take 500 mcg by mouth daily      DULoxetine (CYMBALTA) 60 MG extended release capsule Take 60 mg by mouth daily      losartan (COZAAR) 100 MG tablet Take 50 mg by mouth daily      Albuterol Sulfate (PROAIR HFA IN) Inhale into the lungs (Patient not taking: Reported on 9/8/2022)      budesonide-formoterol (SYMBICORT) 160-4.5 MCG/ACT AERO Inhale 2 puffs into the lungs 2 times daily      diclofenac sodium (VOLTAREN) 1 % GEL Apply topically nightly Right arm      OXYGEN Inhale into the lungs 2 liters at night      ARTIFICIAL TEAR OP Apply 2 drops to eye every 2 hours as needed      methyl salicylate-menthol (LYNNETTE NEGRO GREASELESS) 10-15 % CREA Apply topically every 8 hours as needed for Pain (Patient not taking: Reported on 9/8/2022)      guaiFENesin (ROBITUSSIN) 100 MG/5ML syrup Take 10 mLs by mouth every 4 hours as needed for Cough      acetaminophen (TYLENOL) 325 MG tablet Take 650 mg by mouth every 4 hours as needed for Pain      bisacodyl (DULCOLAX) 5 MG EC tablet Take 10 mg by mouth daily as needed for Constipation       DULoxetine (CYMBALTA) 20 MG extended release capsule Take 20 mg by mouth nightly      apixaban (ELIQUIS) 5 MG TABS tablet Take 5 mg by mouth 2 times daily      carvedilol (COREG) 6.25 MG tablet Take 6.25 mg by mouth 2 times daily (with meals)      guaiFENesin (MUCINEX) 600 MG extended release tablet Take 1,200 mg by mouth 2 times daily      predniSONE (DELTASONE) 2.5 MG tablet Take 2.5 mg by mouth daily      amLODIPine (NORVASC) 5 MG tablet Take 1 tablet by mouth daily 30 tablet 0    imipramine (TOFRANIL) 50 MG tablet Take 50 mg by mouth nightly      fluticasone (FLONASE) 50 MCG/ACT nasal spray 2 sprays by Nasal route daily      ondansetron (ZOFRAN ODT) 4 MG disintegrating tablet Take 1 tablet by mouth every 8 hours as needed for Nausea. Let dissolve in mouth. 10 tablet 0    ASPIRIN Take 81 mg by mouth daily.           Allergies:  Codeine and Morphine and related    Social History:   Social History     Socioeconomic History    Marital status:      Spouse name: Not on file    Number of children: Not on file    Years of education: Not on file    Highest education level: Not on file   Occupational History    Not on file   Tobacco Use    Smoking status: Former     Packs/day: 1.00     Years: 15.00     Pack years: 15.00     Types: Cigarettes     Quit date: 1998     Years since quittin.4    Smokeless tobacco: Never   Vaping Use    Vaping Use: Never used   Substance and Sexual Activity    Alcohol use: No    Drug use: No    Sexual activity: Not Currently   Other Topics Concern    Not on file   Social History Narrative    Not on file     Social Determinants of Health     Financial Resource Strain: Not on file   Food Insecurity: Not on file   Transportation Needs: Not on file   Physical Activity: Not on file   Stress: Not on file   Social Connections: Not on file   Intimate Partner Violence: Not on file   Housing Stability: Not on file      Family History:       Problem Relation Age of Onset    Heart Disease Mother         PHYSICAL EXAM:      BP (!) 189/85   Pulse 93   Temp 97.1 °F (36.2 °C) (Temporal)   Resp 18   Ht 5' 7\" (1.702 m)   Wt 171 lb 1 oz (77.6 kg)   SpO2 98%   BMI 26.79 kg/m²  I        Heart:  Normal apical impulse, regular rate and rhythm, normal S1 and S2, no S3 or S4, and no murmur noted    Lungs:  No increased work of breathing, good air exchange, clear to auscultation bilaterally, no crackles or wheezing    Abdomen:  No scars, normal bowel sounds, soft, non-distended, non-tender, no masses palpated, no hepatosplenomegally      ASA Class  ASA 3 - Patient with moderate systemic disease with functional limitations    Mallampati Class: 3      ASSESSMENT AND PLAN:    1. Patient is a suitable candidate for endoscopic procedure and attendant anesthesia  2. Risks, benefits, alternatives of procedure discussed in detail with patient including risks of bleeding, infection, perforation, risks of sedation, risks of missed lesions. The patient wishes to proceed.

## 2022-09-10 LAB
ANION GAP SERPL CALCULATED.3IONS-SCNC: 9 MMOL/L (ref 3–16)
BASOPHILS ABSOLUTE: 0 K/UL (ref 0–0.2)
BASOPHILS RELATIVE PERCENT: 0.9 %
BUN BLDV-MCNC: 10 MG/DL (ref 7–20)
CALCIUM SERPL-MCNC: 9.2 MG/DL (ref 8.3–10.6)
CHLORIDE BLD-SCNC: 98 MMOL/L (ref 99–110)
CO2: 32 MMOL/L (ref 21–32)
CREAT SERPL-MCNC: 0.8 MG/DL (ref 0.6–1.2)
EOSINOPHILS ABSOLUTE: 0.1 K/UL (ref 0–0.6)
EOSINOPHILS RELATIVE PERCENT: 3.6 %
GFR AFRICAN AMERICAN: >60
GFR NON-AFRICAN AMERICAN: >60
GLUCOSE BLD-MCNC: 210 MG/DL (ref 70–99)
GLUCOSE BLD-MCNC: 221 MG/DL (ref 70–99)
GLUCOSE BLD-MCNC: 295 MG/DL (ref 70–99)
GLUCOSE BLD-MCNC: 352 MG/DL (ref 70–99)
GLUCOSE BLD-MCNC: 390 MG/DL (ref 70–99)
HCT VFR BLD CALC: 25.1 % (ref 36–48)
HEMOGLOBIN: 7.7 G/DL (ref 12–16)
LYMPHOCYTES ABSOLUTE: 0.7 K/UL (ref 1–5.1)
LYMPHOCYTES RELATIVE PERCENT: 17.4 %
MAGNESIUM: 1.6 MG/DL (ref 1.8–2.4)
MCH RBC QN AUTO: 21.6 PG (ref 26–34)
MCHC RBC AUTO-ENTMCNC: 30.6 G/DL (ref 31–36)
MCV RBC AUTO: 70.6 FL (ref 80–100)
MONOCYTES ABSOLUTE: 0.5 K/UL (ref 0–1.3)
MONOCYTES RELATIVE PERCENT: 13.3 %
NEUTROPHILS ABSOLUTE: 2.7 K/UL (ref 1.7–7.7)
NEUTROPHILS RELATIVE PERCENT: 64.8 %
PDW BLD-RTO: 22.5 % (ref 12.4–15.4)
PERFORMED ON: ABNORMAL
PLATELET # BLD: 168 K/UL (ref 135–450)
PMV BLD AUTO: 8.3 FL (ref 5–10.5)
POTASSIUM REFLEX MAGNESIUM: 3.5 MMOL/L (ref 3.5–5.1)
RBC # BLD: 3.56 M/UL (ref 4–5.2)
SODIUM BLD-SCNC: 139 MMOL/L (ref 136–145)
WBC # BLD: 4.1 K/UL (ref 4–11)

## 2022-09-10 PROCEDURE — 94640 AIRWAY INHALATION TREATMENT: CPT

## 2022-09-10 PROCEDURE — 36415 COLL VENOUS BLD VENIPUNCTURE: CPT

## 2022-09-10 PROCEDURE — 97161 PT EVAL LOW COMPLEX 20 MIN: CPT

## 2022-09-10 PROCEDURE — 97116 GAIT TRAINING THERAPY: CPT

## 2022-09-10 PROCEDURE — 97530 THERAPEUTIC ACTIVITIES: CPT

## 2022-09-10 PROCEDURE — 96366 THER/PROPH/DIAG IV INF ADDON: CPT

## 2022-09-10 PROCEDURE — 80048 BASIC METABOLIC PNL TOTAL CA: CPT

## 2022-09-10 PROCEDURE — 97535 SELF CARE MNGMENT TRAINING: CPT

## 2022-09-10 PROCEDURE — 2060000000 HC ICU INTERMEDIATE R&B

## 2022-09-10 PROCEDURE — 83735 ASSAY OF MAGNESIUM: CPT

## 2022-09-10 PROCEDURE — 6370000000 HC RX 637 (ALT 250 FOR IP): Performed by: NURSE PRACTITIONER

## 2022-09-10 PROCEDURE — 2700000000 HC OXYGEN THERAPY PER DAY

## 2022-09-10 PROCEDURE — 2580000003 HC RX 258: Performed by: ANESTHESIOLOGY

## 2022-09-10 PROCEDURE — 96365 THER/PROPH/DIAG IV INF INIT: CPT

## 2022-09-10 PROCEDURE — 6370000000 HC RX 637 (ALT 250 FOR IP): Performed by: INTERNAL MEDICINE

## 2022-09-10 PROCEDURE — 99233 SBSQ HOSP IP/OBS HIGH 50: CPT | Performed by: INTERNAL MEDICINE

## 2022-09-10 PROCEDURE — 2580000003 HC RX 258: Performed by: INTERNAL MEDICINE

## 2022-09-10 PROCEDURE — C9113 INJ PANTOPRAZOLE SODIUM, VIA: HCPCS | Performed by: NURSE PRACTITIONER

## 2022-09-10 PROCEDURE — 6360000002 HC RX W HCPCS: Performed by: INTERNAL MEDICINE

## 2022-09-10 PROCEDURE — 6360000002 HC RX W HCPCS: Performed by: NURSE PRACTITIONER

## 2022-09-10 PROCEDURE — 94761 N-INVAS EAR/PLS OXIMETRY MLT: CPT

## 2022-09-10 PROCEDURE — 94660 CPAP INITIATION&MGMT: CPT

## 2022-09-10 PROCEDURE — 85025 COMPLETE CBC W/AUTO DIFF WBC: CPT

## 2022-09-10 PROCEDURE — 97166 OT EVAL MOD COMPLEX 45 MIN: CPT

## 2022-09-10 RX ORDER — MAGNESIUM SULFATE IN WATER 40 MG/ML
4000 INJECTION, SOLUTION INTRAVENOUS ONCE
Status: COMPLETED | OUTPATIENT
Start: 2022-09-10 | End: 2022-09-10

## 2022-09-10 RX ADMIN — AMLODIPINE BESYLATE 5 MG: 5 TABLET ORAL at 09:56

## 2022-09-10 RX ADMIN — INSULIN GLARGINE 10 UNITS: 100 INJECTION, SOLUTION SUBCUTANEOUS at 09:52

## 2022-09-10 RX ADMIN — TROSPIUM CHLORIDE 20 MG: 20 TABLET, FILM COATED ORAL at 09:56

## 2022-09-10 RX ADMIN — LOSARTAN POTASSIUM 25 MG: 25 TABLET, FILM COATED ORAL at 09:57

## 2022-09-10 RX ADMIN — Medication 2 PUFF: at 20:10

## 2022-09-10 RX ADMIN — INSULIN LISPRO 4 UNITS: 100 INJECTION, SOLUTION INTRAVENOUS; SUBCUTANEOUS at 20:25

## 2022-09-10 RX ADMIN — CLONAZEPAM 1 MG: 1 TABLET ORAL at 20:24

## 2022-09-10 RX ADMIN — POTASSIUM CHLORIDE 20 MEQ: 1500 TABLET, EXTENDED RELEASE ORAL at 16:50

## 2022-09-10 RX ADMIN — DULOXETINE 20 MG: 20 CAPSULE, DELAYED RELEASE ORAL at 20:24

## 2022-09-10 RX ADMIN — FENOFIBRATE 54 MG: 54 TABLET ORAL at 09:56

## 2022-09-10 RX ADMIN — FLUTICASONE PROPIONATE 2 SPRAY: 50 SPRAY, METERED NASAL at 10:02

## 2022-09-10 RX ADMIN — FUROSEMIDE 20 MG: 20 TABLET ORAL at 16:50

## 2022-09-10 RX ADMIN — OXYBUTYNIN CHLORIDE 5 MG: 5 TABLET, EXTENDED RELEASE ORAL at 20:24

## 2022-09-10 RX ADMIN — DULOXETINE HYDROCHLORIDE 60 MG: 60 CAPSULE, DELAYED RELEASE ORAL at 09:56

## 2022-09-10 RX ADMIN — INSULIN LISPRO 4 UNITS: 100 INJECTION, SOLUTION INTRAVENOUS; SUBCUTANEOUS at 16:50

## 2022-09-10 RX ADMIN — GUAIFENESIN 600 MG: 600 TABLET, EXTENDED RELEASE ORAL at 20:24

## 2022-09-10 RX ADMIN — PREGABALIN 50 MG: 25 CAPSULE ORAL at 13:34

## 2022-09-10 RX ADMIN — IRON SUCROSE 200 MG: 20 INJECTION, SOLUTION INTRAVENOUS at 13:40

## 2022-09-10 RX ADMIN — CETIRIZINE HYDROCHLORIDE 5 MG: 10 TABLET ORAL at 09:57

## 2022-09-10 RX ADMIN — PREGABALIN 50 MG: 25 CAPSULE ORAL at 05:20

## 2022-09-10 RX ADMIN — Medication 2 PUFF: at 07:17

## 2022-09-10 RX ADMIN — SODIUM CHLORIDE, PRESERVATIVE FREE 10 ML: 5 INJECTION INTRAVENOUS at 09:57

## 2022-09-10 RX ADMIN — FUROSEMIDE 20 MG: 20 TABLET ORAL at 09:56

## 2022-09-10 RX ADMIN — PREGABALIN 75 MG: 25 CAPSULE ORAL at 20:23

## 2022-09-10 RX ADMIN — INSULIN GLARGINE 10 UNITS: 100 INJECTION, SOLUTION SUBCUTANEOUS at 20:25

## 2022-09-10 RX ADMIN — MAGNESIUM SULFATE HEPTAHYDRATE 4000 MG: 40 INJECTION, SOLUTION INTRAVENOUS at 09:51

## 2022-09-10 RX ADMIN — ATORVASTATIN CALCIUM 40 MG: 40 TABLET, FILM COATED ORAL at 20:24

## 2022-09-10 RX ADMIN — PREDNISONE 2.5 MG: 5 TABLET ORAL at 09:57

## 2022-09-10 RX ADMIN — INSULIN LISPRO 1 UNITS: 100 INJECTION, SOLUTION INTRAVENOUS; SUBCUTANEOUS at 07:53

## 2022-09-10 RX ADMIN — GUAIFENESIN 600 MG: 600 TABLET, EXTENDED RELEASE ORAL at 09:56

## 2022-09-10 RX ADMIN — IMIPRAMINE HYDROCHLORIDE 50 MG: 25 TABLET ORAL at 20:24

## 2022-09-10 RX ADMIN — POTASSIUM CHLORIDE 20 MEQ: 1500 TABLET, EXTENDED RELEASE ORAL at 09:57

## 2022-09-10 RX ADMIN — CYANOCOBALAMIN TAB 500 MCG 500 MCG: 500 TAB at 09:57

## 2022-09-10 RX ADMIN — PANTOPRAZOLE SODIUM 40 MG: 40 INJECTION, POWDER, FOR SOLUTION INTRAVENOUS at 09:57

## 2022-09-10 RX ADMIN — INSULIN LISPRO 2 UNITS: 100 INJECTION, SOLUTION INTRAVENOUS; SUBCUTANEOUS at 12:00

## 2022-09-10 ASSESSMENT — PAIN SCALES - GENERAL
PAINLEVEL_OUTOF10: 0

## 2022-09-10 NOTE — PROGRESS NOTES
Hospitalist Progress Note      PCP: Chandan Beck MD    Date of Admission: 9/7/2022    Chief Complaint: 80 y.o. female with PMH of atrial fib, alzheimer's dementia, COPD, HTN, HLD, s/d CHF, chronic pain, depression, anxiety, KATHY on AVAPS, GERD who presents to [facility] with from [facility] with abnormal labs. History obtained from the patient and review of EMR. Patient is awake and alert lying in bed she is currently on 3 L of oxygen. She is post 1 unit packed red blood cells. She stated that she had a black bowel movement x1 this week approximately 3 days ago. Subjective:     Awake alert and pleasantly confused - baseline dementia. C scope 9/9 - unremarkable. EGD 9/8 - unremarkable.           Medications:  Reviewed    Infusion Medications    sodium chloride      dextrose      sodium chloride       Scheduled Medications    magnesium sulfate  4,000 mg IntraVENous Once    iron sucrose (VENOFER) iv piggyback 100 mL (Admin over 60 minutes)  200 mg IntraVENous Q24H    insulin glargine  10 Units SubCUTAneous BID    losartan  25 mg Oral Daily    furosemide  20 mg Oral BID    imipramine  50 mg Oral Nightly    sodium chloride flush  5-40 mL IntraVENous 2 times per day    amLODIPine  5 mg Oral Daily    budesonide-formoterol  2 puff Inhalation BID    clonazePAM  1 mg Oral Nightly    fluticasone  2 spray Nasal Daily    guaiFENesin  600 mg Oral BID    predniSONE  2.5 mg Oral Daily    vitamin B-12  500 mcg Oral Daily    sodium chloride flush  5-40 mL IntraVENous 2 times per day    pantoprazole  40 mg IntraVENous Daily    oxybutynin  5 mg Oral Nightly    DULoxetine  60 mg Oral Daily    fenofibrate  54 mg Oral Daily    atorvastatin  40 mg Oral Nightly    cetirizine  5 mg Oral Daily    potassium chloride  20 mEq Oral BID WC    pregabalin  50 mg Oral BID    pregabalin  75 mg Oral Nightly    trospium  20 mg Oral Daily    insulin lispro  0-4 Units SubCUTAneous TID WC    insulin lispro  0-4 Units SubCUTAneous Nightly    DULoxetine  20 mg Oral Nightly     PRN Meds: sodium chloride flush, sodium chloride, albuterol sulfate HFA, methyl salicylate-menthol, glucose, dextrose bolus **OR** dextrose bolus, glucagon (rDNA), dextrose, sodium chloride flush, sodium chloride, ondansetron **OR** ondansetron, acetaminophen **OR** acetaminophen      Intake/Output Summary (Last 24 hours) at 9/10/2022 1106  Last data filed at 9/10/2022 0841  Gross per 24 hour   Intake 400 ml   Output 1425 ml   Net -1025 ml         Physical Exam Performed:    /66   Pulse 88   Temp 97.8 °F (36.6 °C) (Oral)   Resp 14   Ht 5' 7\" (1.702 m)   Wt 165 lb 2 oz (74.9 kg)   SpO2 95%   BMI 25.86 kg/m²     General appearance: No apparent distress, appears stated age and cooperative. HEENT: Pupils equal, round, and reactive to light. Conjunctivae/corneas clear. Neck: Supple, with full range of motion. No jugular venous distention. Trachea midline. Respiratory:  Normal respiratory effort. Clear to auscultation, bilaterally without Rales/Wheezes/Rhonchi. Cardiovascular: Regular rate and rhythm with normal S1/S2 without murmurs, rubs or gallops. Abdomen: Soft, non-tender, non-distended with normal bowel sounds. Musculoskeletal: No clubbing, cyanosis or edema bilaterally. Full range of motion without deformity. Skin: Skin color, texture, turgor normal.  No rashes or lesions. Neurologic:  Neurovascularly intact without any focal sensory/motor deficits. Cranial nerves: II-XII intact, grossly non-focal.  Psychiatric: Alert and disoriented - baseline dementia. Capillary Refill: Brisk, 3 seconds, normal   Peripheral Pulses: +2 palpable, equal bilaterally       Labs:   Recent Labs     09/08/22  0712 09/08/22  1151 09/09/22  0027 09/09/22  0706 09/10/22  0419   WBC 5.4  --   --  5.6 4.1   HGB 7.6*   < > 8.7* 7.5* 7.7*   HCT 26.0*   < > 28.7* 25.2* 25.1*     --   --  167 168    < > = values in this interval not displayed.        Recent Labs 09/08/22  0712 09/09/22  0706 09/10/22  0419    138 139   K 3.9 3.3* 3.5   CL 98* 96* 98*   CO2 30 31 32   BUN 26* 16 10   CREATININE 0.9 0.8 0.8   CALCIUM 9.2 8.8 9.2       Recent Labs     09/07/22  1240   AST 15   ALT 12   BILITOT 0.3   ALKPHOS 64       Recent Labs     09/08/22  0712   INR 1.62*       Recent Labs     09/08/22  1918 09/09/22  0027 09/09/22  0706   TROPONINI <0.01 <0.01 <0.01         Urinalysis:      Lab Results   Component Value Date/Time    NITRU Negative 09/07/2022 02:39 PM    WBCUA 6-9 09/07/2022 02:39 PM    BACTERIA 1+ 09/07/2022 02:39 PM    RBCUA 3-4 09/07/2022 02:39 PM    BLOODU Negative 09/07/2022 02:39 PM    SPECGRAV 1.015 09/07/2022 02:39 PM    GLUCOSEU 250 09/07/2022 02:39 PM    GLUCOSEU NEGATIVE 04/21/2012 04:15 AM       Radiology:  CT ABDOMEN PELVIS W IV CONTRAST Additional Contrast? None   Final Result   1. Umbilicus skin thickening present with surrounding fat stranding. Finding   may be related to an inflammatory/infectious process. Can correlate with   direct inspection. 2. Pelvic skin thickening and subcutaneous fat stranding may be related to   edema, possibly dependent edema versus fluid overload. 3. Again seen left paraumbilical fat-containing ventral hernia and bilateral   fat-containing inguinal hernias. 4. Sigmoid colon diverticulosis without evidence of acute diverticulitis. 5. Questionable punctate left nonobstructing nephrolithiasis. 6. Partially visualized moderate right pleural effusion with associated   dependent atelectasis. XR CHEST PORTABLE   Final Result   Cardiomegaly with findings of pulmonary edema and a trace right pleural   effusion.          VL Extremity Venous Right    (Results Pending)           Assessment/Plan:    Active Hospital Problems    Diagnosis     GI bleed [K92.2]      Priority: Medium    Acute on chronic respiratory failure with hypoxia (HCC) [J96.21]      Priority: Medium    Bradycardia [R00.1]      Priority: Medium    HTN (hypertension), benign [I10]      Priority: Medium     Class: Chronic    DM (diabetes mellitus), type 2, uncontrolled ([facility] [E11.65]      Priority: Medium    COPD without exacerbation ([facility] [J44.9]     [facility]fib ([facility] [I48.91]     Acute congestive heart failure (HCC) [I50.9]     Alzheimer's disease ([facility] [G30.9, F02.80]     Symptomatic anemia [D64.9]        Anemia of acute to subacute blood loss. - hemoglobin 6.2 at [facility]--6.5 upon arrival to ED  - 1 unit PRBC given  - stool occult negative  - pt does report black stools mauro 3 days ago, none since then  - holding ASA and Eliquis  - CT abdomen does not suggest possible source of blood loss. - PPI IV   - GI consult. - EGD 9/8 - unremarkable. - C scope 9/9 - no source of blood loss evident.    - GI deciding on timing of capsule endoscopy    - Hgb 8.7 - 7.5 - 7.7 - no signs of active bleeding now. R Leg Edema asymmetric to left. Joe doppler. Acute on Chronic respiratory failure  - currently on 3 liters n/c  - given IV lasix  - resumed lasix PO increased to BID.    - decreased dose losartan.         Afib with bradycardia - resolved  - holding Coreg at this time  - monitor on telemetry        Combined CHF with fluid overload  - cxr showed cardiomegaly, pulmonary edema and trace right pleural effusion   - x1 IV lasix given after blood transfusion   - last echo in 2018 showed EF 45-50%, consider repeat echo  - decrease dose losartan,   - holding coreg due to episodes of bradycardia  - lasix BID       COPD without AE  - no AE  - continue home inhalers and daily prednisone  - monitor        KATHY  - on AVAPS at [facility]  - reordered       HTN  - stable, continue home regimen  - monitor       DM type 2  - continue Lantus and SSI   - blood glucose elevated on admission  - monitor        Anxiety  Depression  - continue home regimen       Chronic Pain  - patient is on Lyrica 50 mg at 5 am and 1 pm and 75 mg at night  - also takes Cymbalta BID       GERD  - Continue PPI            Alzheimer's Disease   - pt is alert and partially oriented today  - Continue supportive care          DVT Prophylaxis: scd  Diet: modified diet resumed  Code Status: DNR-CC, I discussed this with patient and it is noted on ECF paperwork as well         Estevan Fitzpatrick MD

## 2022-09-10 NOTE — PROGRESS NOTES
[facility], spoke with [place], who is sending Message to on-call MD regarding small bowel follow through testing that was ordered. Unless emergent, this test will be performed Monday 9/12/2022.

## 2022-09-10 NOTE — FLOWSHEET NOTE
09/10/22 0515   Oxygen Therapy   SpO2 96 %   O2 Device Nasal cannula   O2 Flow Rate (L/min) (S)  2 L/min

## 2022-09-10 NOTE — PROGRESS NOTES
Progress Note    Patient Nancy Moreno  MRN: [id number]  YOB: 1940 Age: 80 y.o. Sex: female  Room: [place] [address]       Admitting Physician: Remy Santana MD   Date of Admission: 9/7/2022 12:23 PM   Primary Care Physician: Carlton Cm MD     Subjective:  Nancy Moreno was seen and examined. We are following for anemia. -- Patient reports that she had a bowel movement earlier today, but does not remember the color. ROS:  Constitutional: Denies fever, no change in appetite  Respiratory: Denies cough or shortness of breath  Cardiovascular: Denies chest pain or edema    Objective:  Vital Signs:   Vitals:    09/10/22 1535   BP: (!) 141/69   Pulse: 84   Resp: 16   Temp: 97 °F (36.1 °C)   SpO2: 95%         Physical Exam:  Constitutional: Alert and oriented x 4. No acute distress. Respiratory: Respirations nonlabored, no crepitus  GI: Abdomen nondistended, soft, and nontender. Neurological: No focal deficits noted. No asterixis.     Intake/Output:    Intake/Output Summary (Last 24 hours) at 9/10/2022 1558  Last data filed at 9/10/2022 1506  Gross per 24 hour   Intake 400 ml   Output 1825 ml   Net -1425 ml        Current Medications:  Current Facility-Administered Medications   Medication Dose Route Frequency Provider Last Rate Last Admin    iron sucrose (VENOFER) 200 mg in sodium chloride 0.9 % 100 mL IVPB  200 mg IntraVENous Q24H Remy Santana MD   Stopped at 09/10/22 1541    insulin glargine (LANTUS) injection vial 10 Units  10 Units SubCUTAneous BID Remy Santana MD   10 Units at 09/10/22 0952    losartan (COZAAR) tablet 25 mg  25 mg Oral Daily Remy Santana MD   25 mg at 09/10/22 0957    furosemide (LASIX) tablet 20 mg  20 mg Oral BID Remy Santana MD   20 mg at 09/10/22 0956    imipramine (TOFRANIL) tablet 50 mg  50 mg Oral Nightly [place] STEPHANIE Lopez - CNP   50 mg at 09/09/22 2037    sodium chloride flush 0.9 % injection 5-40 mL  5-40 mL IntraVENous 2 times per day Hong Barillas MD   10 mL at 09/10/22 0957    sodium chloride flush 0.9 % injection 5-40 mL  5-40 mL IntraVENous PRN Hong Barillas MD        0.9 % sodium chloride infusion  25 mL IntraVENous PRN Hong Barillas MD        albuterol sulfate HFA (PROVENTIL;VENTOLIN;PROAIR) 108 (90 Base) MCG/ACT inhaler 2 puff  2 puff Inhalation Q4H PRN Dennis Safe, APRN - CNP        amLODIPine (NORVASC) tablet 5 mg  5 mg Oral Daily Dennis Safe, APRN - CNP   5 mg at 09/10/22 0956    budesonide-formoterol (SYMBICORT) 160-4.5 MCG/ACT inhaler 2 puff  2 puff Inhalation BID Dennis Safe, APRN - CNP   2 puff at 09/10/22 0717    clonazePAM (KLONOPIN) tablet 1 mg  1 mg Oral Nightly Dennis Safe, APRN - CNP   1 mg at 09/09/22 2036    fluticasone (FLONASE) 50 MCG/ACT nasal spray 2 spray  2 spray Nasal Daily Dennis Safe, APRN - CNP   2 spray at 09/10/22 1002    guaiFENesin (MUCINEX) extended release tablet 600 mg  600 mg Oral BID Dennis Safe, APRN - CNP   600 mg at 09/10/22 1712    methyl salicylate-menthol (LYNNETTE NEGRO GREASELESS) 10-15 % cream CREA   Apply externally Q8H PRN Dennis Safe, APRN - CNP        predniSONE (DELTASONE) tablet 2.5 mg  2.5 mg Oral Daily Dennis Safe, APRN - CNP   2.5 mg at 09/10/22 0957    vitamin B-12 (CYANOCOBALAMIN) tablet 500 mcg  500 mcg Oral Daily Dennis Safe, APRN - CNP   500 mcg at 09/10/22 0957    glucose chewable tablet 16 g  4 tablet Oral PRN Dennis Safe, APRN - CNP        dextrose bolus 10% 125 mL  125 mL IntraVENous PRN Dennis Safe, APRN - CNP        Or    dextrose bolus 10% 250 mL  250 mL IntraVENous PRN Dennis Safe, APRN - CNP        glucagon (rDNA) injection 1 mg  1 mg SubCUTAneous PRN Dennis Safe, APRN - CNP        dextrose 10 % infusion   IntraVENous Continuous PRN Dennis Safe, APRN - CNP        sodium chloride flush 0.9 % injection 5-40 mL  5-40 mL IntraVENous 2 times per day STEPHANIE Barcenas - CNP   10 mL at 09/09/22 2043    sodium chloride flush 0.9 % injection 5-40 mL  5-40 mL IntraVENous PRN Maria Isabel Avni, APRN - CNP   10 mL at 09/09/22 1015    0.9 % sodium chloride infusion   IntraVENous PRN Maria Isabel Avni, APRN - CNP        ondansetron (ZOFRAN-ODT) disintegrating tablet 4 mg  4 mg Oral Q8H PRN Maria Isabel Avni, APRN - CNP        Or    ondansetron [facility]) injection 4 mg  4 mg IntraVENous Q6H PRN Maria Isabel Avni, APRN - CNP        acetaminophen (TYLENOL) tablet 650 mg  650 mg Oral Q6H PRN Maria Isabel Avni, APRN - CNP        Or    acetaminophen (TYLENOL) suppository 650 mg  650 mg Rectal Q6H PRN Maria Isabel Avni, APRN - CNP        pantoprazole (PROTONIX) injection 40 mg  40 mg IntraVENous Daily Maria Isabel Avni, APRN - CNP   40 mg at 09/10/22 0957    oxybutynin (DITROPAN-XL) extended release tablet 5 mg  5 mg Oral Nightly Maria Isabel Avni, APRN - CNP   5 mg at 09/09/22 2036    DULoxetine (CYMBALTA) extended release capsule 60 mg  60 mg Oral Daily Maria Isabel Avni, APRN - CNP   60 mg at 09/10/22 0956    fenofibrate (TRICOR) tablet 54 mg  54 mg Oral Daily Maria Isabel Avni, APRN - CNP   54 mg at 09/10/22 0956    atorvastatin (LIPITOR) tablet 40 mg  40 mg Oral Nightly Maria Isabel Avni, APRN - CNP   40 mg at 09/09/22 2037    cetirizine (ZYRTEC) tablet 5 mg  5 mg Oral Daily Maria Isabel Avni, APRN - CNP   5 mg at 09/10/22 0957    potassium chloride (KLOR-CON M) extended release tablet 20 mEq  20 mEq Oral BID WC Maria Isabel Avni, APRN - CNP   20 mEq at 09/10/22 0957    pregabalin (LYRICA) capsule 50 mg  50 mg Oral BID Maria Isabel Avni, APRN - CNP   50 mg at 09/10/22 1334    pregabalin (LYRICA) capsule 75 mg  75 mg Oral Nightly Maria Isabel Avni, APRN - CNP   75 mg at 09/09/22 2036    trospium (SANCTURA) tablet 20 mg  20 mg Oral Daily Maria Isabel Avni, APRN - CNP   20 mg at 09/10/22 0956    insulin lispro (HUMALOG) injection vial 0-4 Units  0-4 Units SubCUTAneous TID  STEPHANIE Tello CNP   2 Units at 09/10/22 1200    insulin lispro (HUMALOG) injection vial 0-4 Units  0-4 Units SubCUTAneous Nightly STEPHANIE Tello CNP        DULoxetine (CYMBALTA) extended release capsule 20 mg  20 mg Oral Nightly Debora Burgos APRAMIRAH Shakira CNP   20 mg at 09/09/22 2037         Recent Imaging:   CT ABDOMEN PELVIS W IV CONTRAST Additional Contrast? None  Narrative: EXAMINATION:  CT OF THE ABDOMEN AND PELVIS WITH CONTRAST 9/7/2022 6:20 pm    TECHNIQUE:  CT of the abdomen and pelvis was performed with the administration of  intravenous contrast. Multiplanar reformatted images are provided for review. Automated exposure control, iterative reconstruction, and/or weight based  adjustment of the mA/kV was utilized to reduce the radiation dose to as low  as reasonably achievable. COMPARISON:  06/21/2019    HISTORY:  ORDERING SYSTEM PROVIDED HISTORY: abd tenderness  TECHNOLOGIST PROVIDED HISTORY:  Additional Contrast?->None  Reason for exam:->abd tenderness  Decision Support Exception - unselect if not a suspected or confirmed  emergency medical condition->Emergency Medical Condition (MA)  Reason for Exam: abd tenderness    FINDINGS:  Lower Chest: Partially visualized moderate right pleural effusion with  associated dependent atelectasis. No cardiomegaly. Organs: There is no acute abnormality of the liver, pancreas, spleen,  adrenals, or kidneys. Questionable punctate left nonobstructing  nephrolithiasis. Status post cholecystectomy. GI/Bowel: Bowel caliber is normal.  Sigmoid colon diverticulosis without  evidence of acute diverticulitis. There is no evidence of active bowel  inflammation. There is no evidence of acute appendicitis. Pelvis: No acute abnormality of the pelvic viscera. Peritoneum/Retroperitoneum: There is no free air or free fluid. Lymph nodes  are not enlarged. Bones/Soft Tissues: No acute abnormality. Umbilicus skin thickening present  with surrounding fat stranding. Again seen left paraumbilical fat-containing  ventral hernia and bilateral fat-containing inguinal hernias.   Scattered  atherosclerotic calcification, including of the proximal superior mesenteric  artery. Pelvic skin thickening and subcutaneous fat stranding may be related  to edema. Impression: 1. Umbilicus skin thickening present with surrounding fat stranding. Finding  may be related to an inflammatory/infectious process. Can correlate with  direct inspection. 2. Pelvic skin thickening and subcutaneous fat stranding may be related to  edema, possibly dependent edema versus fluid overload. 3. Again seen left paraumbilical fat-containing ventral hernia and bilateral  fat-containing inguinal hernias. 4. Sigmoid colon diverticulosis without evidence of acute diverticulitis. 5. Questionable punctate left nonobstructing nephrolithiasis. 6. Partially visualized moderate right pleural effusion with associated  dependent atelectasis. XR CHEST PORTABLE  Narrative: EXAMINATION:  ONE XRAY VIEW OF THE CHEST    9/7/2022 2:48 pm    COMPARISON:  Chest radiograph dated 08/19/2021    HISTORY:  ORDERING SYSTEM PROVIDED HISTORY: anemia  TECHNOLOGIST PROVIDED HISTORY:  Reason for exam:->anemia  Reason for Exam: anemia    FINDINGS:  Medical devices: Fixation hardware along the proximal right humerus is  present. Mediastinum/Heart: The mediastinal contours are unchanged compared to prior  exam, with enlargement of the heart shadow. Lungs: Hazy bilateral parenchymal opacities with associated pulmonary  vascular indistinctness is present. Pleura: Blunting of the right costophrenic sulcus suggests a trace right  pleural effusion. No evidence of pneumothorax. Bones/Soft tissues: No acute findings. Impression: Cardiomegaly with findings of pulmonary edema and a trace right pleural  effusion.        Labs:   Recent Labs     09/08/22  0712 09/08/22  1151 09/08/22  1918 09/09/22  0027 09/09/22  0706 09/10/22  0419   HGB 7.6* 8.0* 7.7* 8.7* 7.5* 7.7*   WBC 5.4  --   --   --  5.6 4.1          Assessment:    Anemia    Plan:  Patient with recent upper endoscopy and colonoscopy that did not reveal etiology of bleeding. We will therefore proceed with small bowel follow-through in anticipation of a small bowel video capsule endoscopy. Hemoglobin relatively stable from yesterday today. Candace Zamarripa MD    [address]    [phone].  Also available via Perfect Serve

## 2022-09-10 NOTE — PROGRESS NOTES
Physician Progress Note      Ching Buck  CSN #:                  [id number]  :                       1940  ADMIT DATE:       2022 12:23 PM  [address] DATE:  RESPONDING  PROVIDER #:        Subhash Busch MD          QUERY TEXT:    Patient admitted with anemia and possible GIB. Noted documentation of acute   respiratory failure. Patient on 3L oxygen. Respiratory assessment in the ED   notes Pulmonary effort is normal. No respiratory distress. The h/p-No   accessory muscle use. No crackles. No wheezes. No rhonchi. In order to   support the diagnosis of acute respiratory failure, please include additional   clinical indicators in your documentation. Or please document if the   diagnosis of acute respiratory failure has been ruled out after further study. The medical record reflects the following:  Risk Factors: advanced age, anemia, copd  Clinical Indicators: Effort: Pulmonary effort is normal. No respiratory   distress. Resp: No accessory muscle use. No crackles. No wheezes. No rhonchi. Treatment: oxygen 3L, IV lasix    Acute Respiratory Failure Clinical Indicators per [employer] Training Guide and   Quick Reference Guide:  pO2 < 60 mmHg or SpO2 (pulse oximetry) < 91% breathing room air  pCO2 > 50 and pH < 7.35  P/F ratio (pO2 / FIO2) < 300  pO2 decrease or pCO2 increase by 10 mmHg from baseline (if known)  Supplemental oxygen of 40% or more  Presence of respiratory distress, tachypnea, dyspnea, shortness of breath,   wheezing  Unable to speak in complete sentences  Use of accessory muscles to breathe  Extreme anxiety and feeling of impending doom  Tripod position  Confusion/altered mental status/obtunded    Thank you for your assistance,  Jose Bustillos RN,BSN,CCDS,CRCR  Options provided:  -- Acute Respiratory Failure as evidenced by, Please document evidence.   -- Acute respiratory failure ruled out, patient with chronic respiratory   failure  -- Other - I will add my own diagnosis  -- Disagree - Not applicable / Not valid  -- Disagree - Clinically unable to determine / Unknown  -- Refer to Clinical Documentation Reviewer    PROVIDER RESPONSE TEXT:    The patient with acute respiratory failure ruled out, chronic respiratory   failure. Query created by: Dennise Perez on 9/9/2022 8:26 AM      QUERY TEXT:    Pt admitted with anemia and has combined CHF documented. If possible, please   document in progress notes and discharge summary further specificity regarding   the acuity of CHF:    The medical record reflects the following:  Risk Factors: advanced age, anemia, chronic resp failure, afib, CHF, copd,   htn, DM  Clinical Indicators: Combined CHF with fluid overload  Treatment: imaging, IV lasix, decrease losartan, lasix bid    Thank you for your assistance,  Dasha Manzo RN,BSN,CCDS,CRCR  Options provided:  -- Acute on Chronic Systolic and Diastolic CHF  -- Acute Systolic and Diastolic CHF  -- Chronic Systolic and Diastolic CHF  -- Other - I will add my own diagnosis  -- Disagree - Not applicable / Not valid  -- Disagree - Clinically unable to determine / Unknown  -- Refer to Clinical Documentation Reviewer    PROVIDER RESPONSE TEXT:    This patient has chronic systolic and diastolic CHF.     Query created by: Dennise Perez on 9/9/2022 8:33 AM      Electronically signed by:  Selwyn Hannah MD 9/10/2022 6:15 PM

## 2022-09-10 NOTE — PROGRESS NOTES
09/09/22 7739   NIV Type   Mode AVAPS   Settings/Measurements   CPAP/EPAP 4 cmH2O   IPAP Min 8 cmH2O   IPAP Max 30 cmH2O   Vt (Set, mL) 400 mL   Vt (Measured) 720 mL   Rate Ordered 12   Resp 17   FiO2  40 %   Comfort Level Good   SpO2 100

## 2022-09-10 NOTE — PROGRESS NOTES
Perfect Serve sent to MD regarding hyperglycemia orders and insulin dosage. Orders state to notify MD if glucose higher than 349. Glucose 390. 4u humalog administered. Update:   No new orders/changes at this time.

## 2022-09-10 NOTE — PROGRESS NOTES
Bedside report and transfer of care given to Isabella Baca RN. Pt currently resting in bed with the call light within reach. Pt denies any other care needs at this time. Pt stable at this time.

## 2022-09-10 NOTE — PROGRESS NOTES
Inpatient Occupational Therapy  Evaluation and Treatment    Unit: PCU  Date:  9/10/2022  Patient Name:    Patricia Figueredo  Admitting diagnosis:  Cardiomegaly [I51.7]  Chronic pulmonary edema [J81.1]  Ventral hernia without obstruction or gangrene [K43.9]  Diverticulosis [K57.90]  GI bleed [K92.2]  Anticoagulated [Z79.01]  Pleural effusion on right [J90]  KAMRAN (acute kidney injury) ([address] [N17.9]  Symptomatic anemia [D64.9]  Admit Date:  9/7/2022  Precautions/Restrictions/WB Status/ Lines/ Wounds/ Oxygen: Fall risk, Bed/chair alarm, Lines -IV and Supplemental O2 (3L/min), Telemetry, and Continuous pulse oximetry, monitor SpO2 and HR  R elbow chronically dislocated  Treatment Time:  1420-1520  Treatment Number: 1     Billable Treatment Time: 60 minutes   Total Treatment Time:   70   minutes    Patient Goals for Therapy:  \" Go back to the long term care \"      Discharge Recommendations: LTC with OT   DME needs for discharge: defer to facility       Therapy recommendations for staff:   Assist of 1 with use of rolling walker (RW) and gait belt for all transfers and ambulation to/from BSC/chair  within room    History of Present Illness:  H & P as per Maricruz Ramey MD's note dated 9/9/2022  80 y.o. female with PMH of atrial fib, alzheimer's dementia, COPD, HTN, HLD, s/d CHF, chronic pain, depression, anxiety, KATHY on AVAPS, GERD who presents to [facility] with from [facility] with abnormal labs. History obtained from the patient and review of EMR. Patient is awake and alert lying in bed she is currently on 3 L of oxygen. She is post 1 unit packed red blood cells. She stated that she had a black bowel movement x1 this week approximately 3 days ago.      9/9/2022: S/P COLONOSCOPY POLYPECTOMY SNARE/COLD BIOPSY COLONOSCOPY CONTROL HEMORRHAGE COLONOSCOPY WITH BIOPSY    Home Health S4 Level Recommendation:  Level 1 Standard  AM-PAC Score: AM-PAC Inpatient Daily Activity Raw Score: 17    Preadmission Environment  (following information was taken from recent PT evaluation dated 8/20/2021 and confirmed with patient)  Pt currently resides in LTC at [facility]. Pt states she has lived there for some years. Preadmission Status:  Pt. Able to drive: No  Pt Fully independent with ADLs: No  Pt. Required assistance from family for: Bathing, Cleaning, Cooking, Dressing and Laundry   Pt. independent for transfers and gait and walked with Symone Ca (rollator)  History of falls Yes, as per patient she had been falling multiple times at [facility], mentioning her balance had not been good. Pain  No      Cognition    A&O x4   Able to follow 2 step commands    Subjective  Patient lying supine in bed with no family present   Pt agreeable to this OT eval & tx. Upper Extremity ROM:    Impaired R due to elbow dislocation. Patient able to demonstrate limited AROM at the shoulder as well. Abduction noted but limited in flexion and extension. LUE is WFLs    Upper Extremity Strength:    RUE not tested but noted functional during transfers. LUE is WFL. Upper Extremity Sensation    WFL    Upper Extremity Proprioception:  WFL    Coordination and Tone  WFL    Balance  Sitting:             Fair +; SBA  Comments:      Standing:         Fair ; CGA  Comments: standing during toileting  Bed mobility:    Supine to sit:   Not Tested  Sit to supine:   CGA  Rolling:    Not Tested  Scooting in sitting:  Supervision  Scooting to head of bed:   Not Tested    Bridging:   Not Tested    Transfers:    Sit to stand:  SBA  Stand to sit:  SBA  Bed to chair:   CGA  Standard toilet: Not Tested  Chair to [facility]  CGA assistance with line management    Dressing:      UE:   Not Tested  LE:    Not Tested    Bathing:    UE:  Not Tested  LE:  Not Tested    Eating:   Independent    Toileting:   Mod A    Grooming: Independent    Activity Tolerance   Pt completed therapy session with Spo2 = 77%  Patient asympomatic  Sitting up in Chair  SpO2: 96 (3L)  HR: 108  BP: 123/60    BSC Level:  SpO2: 77 (3L)  HR: 180    EOB:  Increased O2 to 7L with nursing permission. SpO2: 93  (7L)  HR: 108    Positioning Needs: In bed, call light and needs in reach. Alarm Set    Exercise / Activities Initiated:   N/A    Patient/Family Education:   Role of OT  Recommendations for DC  Use of AE    Assessment of Deficits: Pt seen for Occupational therapy evaluation in acute care setting. Pt demonstrated decreased Activity tolerance, ADLs, Balance , Bed mobility, and Transfers. Pt functioning below baseline and will likely benefit from skilled occupational therapy services to maximize safety and independence. Goal(s) : To be met in 3 Visits:  1). Bed to toilet/BSC: SBA    To be met in 5 Visits:  1). Supine to/from Sit:  Modified Independent  2). Upper Body Bathing:   Supervision  3). Lower Body Bathing:   Min A  4). Upper Body Dressing:  Supervision  5). Lower Body Dressing:  Min A  6). Pt to demonstrate UE exs x 15 reps with minimal cues    Rehabilitation Potential:  Good for goals listed above. Strengths for achieving goals include: Pt motivated, PLOF, and Pt cooperative  Barriers to achieving goals include: Other: o2 saturation. Plan: To be seen 3-5 x/wk while in acute care setting for therapeutic exercises, bed mobility, transfers, dressing, bathing, family/patient education, ADL/IADL retraining, energy conservation training.      Noel Barillas OTR/L  #[id number]          If patient discharges from this facility prior to next visit, this note will serve as the Discharge Summary

## 2022-09-10 NOTE — PROGRESS NOTES
Pt pulled her mask off, and alarming disconnect. Stated she would like to just wear the nasal canula at this time. Satting fine. Respiratory updated.

## 2022-09-10 NOTE — PROGRESS NOTES
Inpatient Physical Therapy Evaluation and Treatment    Unit: PCU  Date:  9/10/2022  Patient Name:    Fiona Rapp  Admitting diagnosis:  Cardiomegaly [I51.7]  Chronic pulmonary edema [J81.1]  Ventral hernia without obstruction or gangrene [K43.9]  Diverticulosis [K57.90]  GI bleed [K92.2]  Anticoagulated [Z79.01]  Pleural effusion on right [J90]  KAMRAN (acute kidney injury) ([address] [N17.9]  Symptomatic anemia [D64.9]  Admit Date:  9/7/2022  Precautions/Restrictions/WB Status/ Lines/ Wounds/ Oxygen: Fall risk, Bed/chair alarm, Lines -IV and Supplemental O2 (1L/min), Telemetry, and Continuous pulse oximetry, monitor SpO2 and HR  R elbow chronically dislocated    Treatment Time: 9416 - 1312  Treatment Number:  1   Timed Code Treatment Minutes: 26 minutes  Total Treatment Minutes:  36  minutes    Patient Goals for Therapy: \" Go home \"          Discharge Recommendations: Return to [facility] with skilled PT   DME needs for discharge: defer to facility       Therapy recommendation for EMS Transport: can transport by wheelchair    Therapy recommendations for staff:   Assist of 1 with use of rolling walker (RW) and gait belt for all transfers and ambulation to/from [facility]  to/from chair  within room    History of Present Illness: H & P as per Luci Dennison MD's note dated 9/9/2022  80 y.o. female with PMH of atrial fib, alzheimer's dementia, COPD, HTN, HLD, s/d CHF, chronic pain, depression, anxiety, KATHY on AVAPS, GERD who presents to [facility] with from [facility] with abnormal labs. History obtained from the patient and review of EMR. Patient is awake and alert lying in bed she is currently on 3 L of oxygen. She is post 1 unit packed red blood cells. She stated that she had a black bowel movement x1 this week approximately 3 days ago.     9/9/2022: S/P COLONOSCOPY POLYPECTOMY SNARE/COLD BIOPSY COLONOSCOPY CONTROL HEMORRHAGE COLONOSCOPY WITH BIOPSY    Home Health S4 Level Recommendation:  Level 1 Standard  AM-PAC Mobility Score       AM-PAC Inpatient Mobility without Stair Climbing Raw Score : 16    Preadmission Environment  (following information was taken from recent PT evaluation dated 8/20/2021 and confirmed with patient)  Pt currently resides in LTC at [facility]. Pt states she has lived there for some years. Preadmission Status:  Pt. Able to drive: No  Pt Fully independent with ADLs: No  Pt. Required assistance from family for: Bathing, Cleaning, Cooking, Dressing and Laundry   Pt. independent for transfers and gait and walked with [employer] Corpus (rollator)  History of falls Yes, as per patient she had been falling multiple times at [facility], mentioning her balance had not been good. Pain   No    Cognition    A&O x4   Able to follow 2 step commands    Subjective  Patient sitting EOB with no family present. Pt agreeable to this PT eval & tx. Upper Extremity ROM/Strength  Please see OT evaluation.       Lower Extremity ROM / Strength   AROM WFL: Yes  ROM limitations: N/A    Strength Assessment (measured on a 0-5 scale):  R LE   Quad   4   Ant Tib  4   Hamstring 4   Iliopsoas 4  L LE  Quad   4   Ant Tib  4   Hamstring 4   Iliopsoas 4    Lower Extremity Sensation    WFL    Lower Extremity Proprioception:   WFL    Coordination and Tone  WFL    Balance  Sitting:  Fair +; SBA  Comments:     Standing: Fair ; CGA  Comments: with RW ~3 min    Bed Mobility   Supine to Sit:    CGA  Sit to Supine:   Not Tested  Rolling:   Not Tested  Scooting in sitting: Modified Independent  Scooting in supine:  Not Tested    Transfer Training     Sit to stand:   SBA  Stand to sit:   SBA  Bed to Chair:   CGA with use of gait belt and rolling walker (RW)    Gait gait completed as indicated below  Distance:      15 ft  Deviations (firm surface/linoleum):  decreased lev, narrow RICH, decreased step length bilaterally, and decreased stance time bilaterally  Assistive Device Used:    gait belt and rolling walker (RW)  Level of Assist:    CGA  Comment: Patient showed decreased RICH and crossing her legs with stepping over the feet while walking. Patient needed verbal cueing to perform a visual check and keeping feet apart during ambulation. Patient showed improvement and no LOB observed. Stair Training deferred, pt does not have stairs in home environment    Activity Tolerance   Pt completed therapy session with No adverse symptoms noted w/activity  At the EOB  BP: 121/61  HR: 101- 137 bpm  SpO2: 96%    With ambulation 15 ft  SpO2: dropped to 68% and needed 7L/min of O2 to recover to 91% in 5 min (Patient was asymptomatic), patient was donned to 3L/min at rest since maintained 91-93% SpO2  HR: 44-98 bpm    RN notified about fluctuations with SpO2 levels and requested to assess again later. Positioning Needs   Pt up in chair, alarm set, positioned in proper neutral alignment and pressure relief provided. Call light provided and all needs within reach    Exercises Initiated  all completed bilaterally unless indicated  Ankle Pumps x 10 reps  Heel slides x 10 reps      Other  None. Patient/Family Education   Pt educated on role of inpatient PT, POC, importance of continued activity, DC recommendations, safety awareness, transfer techniques, pursed lip breathing, pacing activity, and calling for assist with mobility. Assessment  Pt seen for Physical Therapy evaluation in acute care setting. Pt demonstrated decreased Activity tolerance, Balance, Safety, and Strength as well as decreased independence with Ambulation, Bed Mobility , and Transfers. Recommending return to [facility] with skilled PT as patient functioning at or close to baseline level. Goals : To be met in 3 visits:  1). Independent with LE Ex x 10 reps    To be met in 6 visits:  1). Supine to/from sit: Supervision  2). Sit to/from stand: Supervision  3). Bed to chair: Supervision  4). Gait: Ambulate 50 ft.  with  Supervision and use of LRAD (least restrictive assistive device)  5).   Tolerate B LE exercises 3 sets of 10-15 reps    Rehabilitation Potential: Good  Strengths for achieving goals include:   Pt motivated and Pt cooperative   Barriers to achieving goals include:    No Barriers    Plan    To be seen 3-5 x / week  while in acute care setting for therapeutic exercises, bed mobility, transfers, progressive gait training, balance training, and family/patient education. Signature: Raffy Aden MS PT, # [id number]    If patient discharges from this facility prior to next visit, this note will serve as the Discharge Summary.

## 2022-09-11 LAB
ALBUMIN SERPL-MCNC: 4.2 G/DL (ref 3.4–5)
ANION GAP SERPL CALCULATED.3IONS-SCNC: 9 MMOL/L (ref 3–16)
ANISOCYTOSIS: ABNORMAL
BASOPHILS ABSOLUTE: 0.1 K/UL (ref 0–0.2)
BASOPHILS RELATIVE PERCENT: 0.9 %
BUN BLDV-MCNC: 12 MG/DL (ref 7–20)
CALCIUM SERPL-MCNC: 9.5 MG/DL (ref 8.3–10.6)
CHLORIDE BLD-SCNC: 98 MMOL/L (ref 99–110)
CO2: 34 MMOL/L (ref 21–32)
CREAT SERPL-MCNC: 0.9 MG/DL (ref 0.6–1.2)
EOSINOPHILS ABSOLUTE: 0.2 K/UL (ref 0–0.6)
EOSINOPHILS RELATIVE PERCENT: 3.9 %
GFR AFRICAN AMERICAN: >60
GFR NON-AFRICAN AMERICAN: >60
GLUCOSE BLD-MCNC: 191 MG/DL (ref 70–99)
GLUCOSE BLD-MCNC: 253 MG/DL (ref 70–99)
GLUCOSE BLD-MCNC: 268 MG/DL (ref 70–99)
GLUCOSE BLD-MCNC: 272 MG/DL (ref 70–99)
GLUCOSE BLD-MCNC: 279 MG/DL (ref 70–99)
GLUCOSE BLD-MCNC: 351 MG/DL (ref 70–99)
HCT VFR BLD CALC: 27.6 % (ref 36–48)
HEMOGLOBIN: 8 G/DL (ref 12–16)
HYPOCHROMIA: ABNORMAL
LYMPHOCYTES ABSOLUTE: 0.9 K/UL (ref 1–5.1)
LYMPHOCYTES RELATIVE PERCENT: 15.9 %
MAGNESIUM: 1.8 MG/DL (ref 1.8–2.4)
MCH RBC QN AUTO: 20.6 PG (ref 26–34)
MCHC RBC AUTO-ENTMCNC: 29.1 G/DL (ref 31–36)
MCV RBC AUTO: 70.8 FL (ref 80–100)
MICROCYTES: ABNORMAL
MONOCYTES ABSOLUTE: 0.7 K/UL (ref 0–1.3)
MONOCYTES RELATIVE PERCENT: 11.2 %
NEUTROPHILS ABSOLUTE: 4 K/UL (ref 1.7–7.7)
NEUTROPHILS RELATIVE PERCENT: 68.1 %
PDW BLD-RTO: 22.7 % (ref 12.4–15.4)
PERFORMED ON: ABNORMAL
PHOSPHORUS: 2.4 MG/DL (ref 2.5–4.9)
PLATELET # BLD: 174 K/UL (ref 135–450)
PLATELET SLIDE REVIEW: ADEQUATE
PMV BLD AUTO: 8.5 FL (ref 5–10.5)
POLYCHROMASIA: ABNORMAL
POTASSIUM SERPL-SCNC: 3.8 MMOL/L (ref 3.5–5.1)
RBC # BLD: 3.9 M/UL (ref 4–5.2)
SLIDE REVIEW: ABNORMAL
SODIUM BLD-SCNC: 141 MMOL/L (ref 136–145)
WBC # BLD: 5.9 K/UL (ref 4–11)

## 2022-09-11 PROCEDURE — 99233 SBSQ HOSP IP/OBS HIGH 50: CPT | Performed by: INTERNAL MEDICINE

## 2022-09-11 PROCEDURE — 80069 RENAL FUNCTION PANEL: CPT

## 2022-09-11 PROCEDURE — 96376 TX/PRO/DX INJ SAME DRUG ADON: CPT

## 2022-09-11 PROCEDURE — 83735 ASSAY OF MAGNESIUM: CPT

## 2022-09-11 PROCEDURE — C9113 INJ PANTOPRAZOLE SODIUM, VIA: HCPCS | Performed by: NURSE PRACTITIONER

## 2022-09-11 PROCEDURE — 2580000003 HC RX 258: Performed by: INTERNAL MEDICINE

## 2022-09-11 PROCEDURE — 6370000000 HC RX 637 (ALT 250 FOR IP): Performed by: NURSE PRACTITIONER

## 2022-09-11 PROCEDURE — 96367 TX/PROPH/DG ADDL SEQ IV INF: CPT

## 2022-09-11 PROCEDURE — 6370000000 HC RX 637 (ALT 250 FOR IP): Performed by: INTERNAL MEDICINE

## 2022-09-11 PROCEDURE — 94761 N-INVAS EAR/PLS OXIMETRY MLT: CPT

## 2022-09-11 PROCEDURE — 2580000003 HC RX 258: Performed by: ANESTHESIOLOGY

## 2022-09-11 PROCEDURE — 36415 COLL VENOUS BLD VENIPUNCTURE: CPT

## 2022-09-11 PROCEDURE — 6360000002 HC RX W HCPCS: Performed by: INTERNAL MEDICINE

## 2022-09-11 PROCEDURE — 2700000000 HC OXYGEN THERAPY PER DAY

## 2022-09-11 PROCEDURE — 94640 AIRWAY INHALATION TREATMENT: CPT

## 2022-09-11 PROCEDURE — 2060000000 HC ICU INTERMEDIATE R&B

## 2022-09-11 PROCEDURE — 6360000002 HC RX W HCPCS: Performed by: NURSE PRACTITIONER

## 2022-09-11 PROCEDURE — 85025 COMPLETE CBC W/AUTO DIFF WBC: CPT

## 2022-09-11 RX ORDER — INSULIN LISPRO 100 [IU]/ML
0-16 INJECTION, SOLUTION INTRAVENOUS; SUBCUTANEOUS
Status: DISCONTINUED | OUTPATIENT
Start: 2022-09-11 | End: 2022-09-13 | Stop reason: HOSPADM

## 2022-09-11 RX ORDER — INSULIN LISPRO 100 [IU]/ML
0-4 INJECTION, SOLUTION INTRAVENOUS; SUBCUTANEOUS NIGHTLY
Status: DISCONTINUED | OUTPATIENT
Start: 2022-09-11 | End: 2022-09-13 | Stop reason: HOSPADM

## 2022-09-11 RX ORDER — INSULIN GLARGINE 100 [IU]/ML
20 INJECTION, SOLUTION SUBCUTANEOUS 2 TIMES DAILY
Status: DISCONTINUED | OUTPATIENT
Start: 2022-09-11 | End: 2022-09-12

## 2022-09-11 RX ADMIN — FUROSEMIDE 20 MG: 20 TABLET ORAL at 08:10

## 2022-09-11 RX ADMIN — PREGABALIN 50 MG: 25 CAPSULE ORAL at 13:12

## 2022-09-11 RX ADMIN — SODIUM CHLORIDE, PRESERVATIVE FREE 10 ML: 5 INJECTION INTRAVENOUS at 20:56

## 2022-09-11 RX ADMIN — INSULIN GLARGINE 20 UNITS: 100 INJECTION, SOLUTION SUBCUTANEOUS at 20:34

## 2022-09-11 RX ADMIN — GUAIFENESIN 600 MG: 600 TABLET, EXTENDED RELEASE ORAL at 08:11

## 2022-09-11 RX ADMIN — FUROSEMIDE 20 MG: 20 TABLET ORAL at 16:42

## 2022-09-11 RX ADMIN — GUAIFENESIN 600 MG: 600 TABLET, EXTENDED RELEASE ORAL at 20:32

## 2022-09-11 RX ADMIN — AMLODIPINE BESYLATE 5 MG: 5 TABLET ORAL at 08:10

## 2022-09-11 RX ADMIN — INSULIN LISPRO 12 UNITS: 100 INJECTION, SOLUTION INTRAVENOUS; SUBCUTANEOUS at 11:46

## 2022-09-11 RX ADMIN — CLONAZEPAM 1 MG: 1 TABLET ORAL at 20:33

## 2022-09-11 RX ADMIN — INSULIN GLARGINE 20 UNITS: 100 INJECTION, SOLUTION SUBCUTANEOUS at 08:11

## 2022-09-11 RX ADMIN — DULOXETINE 20 MG: 20 CAPSULE, DELAYED RELEASE ORAL at 20:32

## 2022-09-11 RX ADMIN — PREGABALIN 75 MG: 25 CAPSULE ORAL at 20:33

## 2022-09-11 RX ADMIN — TROSPIUM CHLORIDE 20 MG: 20 TABLET, FILM COATED ORAL at 08:11

## 2022-09-11 RX ADMIN — PANTOPRAZOLE SODIUM 40 MG: 40 INJECTION, POWDER, FOR SOLUTION INTRAVENOUS at 08:03

## 2022-09-11 RX ADMIN — Medication 2 PUFF: at 07:35

## 2022-09-11 RX ADMIN — IMIPRAMINE HYDROCHLORIDE 50 MG: 25 TABLET ORAL at 20:33

## 2022-09-11 RX ADMIN — FENOFIBRATE 54 MG: 54 TABLET ORAL at 08:11

## 2022-09-11 RX ADMIN — PREGABALIN 50 MG: 25 CAPSULE ORAL at 04:30

## 2022-09-11 RX ADMIN — ATORVASTATIN CALCIUM 40 MG: 40 TABLET, FILM COATED ORAL at 20:33

## 2022-09-11 RX ADMIN — FLUTICASONE PROPIONATE 2 SPRAY: 50 SPRAY, METERED NASAL at 08:03

## 2022-09-11 RX ADMIN — CYANOCOBALAMIN TAB 500 MCG 500 MCG: 500 TAB at 08:11

## 2022-09-11 RX ADMIN — SODIUM CHLORIDE, PRESERVATIVE FREE 10 ML: 5 INJECTION INTRAVENOUS at 08:03

## 2022-09-11 RX ADMIN — Medication 2 PUFF: at 20:05

## 2022-09-11 RX ADMIN — LOSARTAN POTASSIUM 25 MG: 25 TABLET, FILM COATED ORAL at 08:11

## 2022-09-11 RX ADMIN — POTASSIUM CHLORIDE 20 MEQ: 1500 TABLET, EXTENDED RELEASE ORAL at 16:42

## 2022-09-11 RX ADMIN — OXYBUTYNIN CHLORIDE 5 MG: 5 TABLET, EXTENDED RELEASE ORAL at 20:33

## 2022-09-11 RX ADMIN — CETIRIZINE HYDROCHLORIDE 5 MG: 10 TABLET ORAL at 08:11

## 2022-09-11 RX ADMIN — POTASSIUM CHLORIDE 20 MEQ: 1500 TABLET, EXTENDED RELEASE ORAL at 08:11

## 2022-09-11 RX ADMIN — PREDNISONE 2.5 MG: 5 TABLET ORAL at 08:11

## 2022-09-11 RX ADMIN — INSULIN LISPRO 8 UNITS: 100 INJECTION, SOLUTION INTRAVENOUS; SUBCUTANEOUS at 08:12

## 2022-09-11 RX ADMIN — DULOXETINE HYDROCHLORIDE 60 MG: 60 CAPSULE, DELAYED RELEASE ORAL at 08:10

## 2022-09-11 RX ADMIN — IRON SUCROSE 200 MG: 20 INJECTION, SOLUTION INTRAVENOUS at 13:11

## 2022-09-11 NOTE — PROGRESS NOTES
Bedside report and transfer of care given to Ebbie Flank, [place]. Pt currently resting in bed with the call light within reach. Pt denies any other care needs at this time. Pt stable at this time.

## 2022-09-11 NOTE — PROGRESS NOTES
Progress Note    Patient Carisa Martinez  MRN: [id number]  YOB: 1940 Age: 80 y.o. Sex: female  Room: [place] [address]       Admitting Physician: Subhash Busch MD   Date of Admission: 9/7/2022 12:23 PM   Primary Care Physician: Audi Kaiser MD     Subjective:  Carisa Martinez was seen and examined. We are following for anemia. -- No BMs since yesterday    ROS:  Constitutional: Denies fever, no change in appetite  Respiratory: Denies cough or shortness of breath  Cardiovascular: Denies chest pain or edema    Objective:  Vital Signs:   Vitals:    09/11/22 0806   BP: (!) 156/72   Pulse: 89   Resp: 16   Temp: 98.6 °F (37 °C)   SpO2: 91%         Physical Exam:  Constitutional: Alert and oriented x 4. No acute distress. Respiratory: Respirations nonlabored, no crepitus  GI: Abdomen nondistended, soft, and nontender. Neurological: No focal deficits noted. No asterixis.     Intake/Output:    Intake/Output Summary (Last 24 hours) at 9/11/2022 0857  Last data filed at 9/11/2022 0414  Gross per 24 hour   Intake 510 ml   Output 1800 ml   Net -1290 ml          Current Medications:  Current Facility-Administered Medications   Medication Dose Route Frequency Provider Last Rate Last Admin    insulin glargine (LANTUS) injection vial 20 Units  20 Units SubCUTAneous BID Subhash Busch MD        insulin lispro (HUMALOG) injection vial 0-16 Units  0-16 Units SubCUTAneous TID  Subhash Busch MD        insulin lispro (HUMALOG) injection vial 0-4 Units  0-4 Units SubCUTAneous Nightly Subhash Busch MD        iron sucrose (VENOFER) 200 mg in sodium chloride 0.9 % 100 mL IVPB  200 mg IntraVENous Q24H Subhash Busch MD   Stopped at 09/10/22 1541    losartan (COZAAR) tablet 25 mg  25 mg Oral Daily Subhash Busch MD   25 mg at 09/11/22 0811    furosemide (LASIX) tablet 20 mg  20 mg Oral BID Subhash Busch MD   20 mg at 09/11/22 0810    imipramine (TOFRANIL) tablet 50 mg  50 mg Oral Nightly Levada Curt, APRN - CNP   50 mg at 09/10/22 2024    sodium chloride flush 0.9 % injection 5-40 mL  5-40 mL IntraVENous 2 times per day Shayy Rojas MD   10 mL at 09/11/22 0803    sodium chloride flush 0.9 % injection 5-40 mL  5-40 mL IntraVENous PRN Shayy Rojas MD        0.9 % sodium chloride infusion  25 mL IntraVENous PRN Shayy Rojas MD        albuterol sulfate HFA (PROVENTIL;VENTOLIN;PROAIR) 108 (90 Base) MCG/ACT inhaler 2 puff  2 puff Inhalation Q4H PRN Levada Curt, APRN - CNP        amLODIPine (NORVASC) tablet 5 mg  5 mg Oral Daily Levada Irving, APRN - CNP   5 mg at 09/11/22 0810    budesonide-formoterol (SYMBICORT) 160-4.5 MCG/ACT inhaler 2 puff  2 puff Inhalation BID Levada Irving, APRN - CNP   2 puff at 09/11/22 0735    clonazePAM (KLONOPIN) tablet 1 mg  1 mg Oral Nightly Levada Irving, APRN - CNP   1 mg at 09/10/22 2024    fluticasone (FLONASE) 50 MCG/ACT nasal spray 2 spray  2 spray Nasal Daily Levada Irving, APRN - CNP   2 spray at 09/11/22 0803    guaiFENesin (MUCINEX) extended release tablet 600 mg  600 mg Oral BID Levada Irving, APRN - CNP   600 mg at 09/11/22 8047    methyl salicylate-menthol (LYNNETTE NEGRO GREASELESS) 10-15 % cream CREA   Apply externally Q8H PRN Levada Curt, APRN - CNP        predniSONE (DELTASONE) tablet 2.5 mg  2.5 mg Oral Daily Levada Irving, APRN - CNP   2.5 mg at 09/11/22 9413    vitamin B-12 (CYANOCOBALAMIN) tablet 500 mcg  500 mcg Oral Daily Levada Curt, APRN - CNP   500 mcg at 09/11/22 0811    glucose chewable tablet 16 g  4 tablet Oral PRN Levada Curt, APRN - CNP        dextrose bolus 10% 125 mL  125 mL IntraVENous PRN Levada Curt, APRN - CNP        Or    dextrose bolus 10% 250 mL  250 mL IntraVENous PRN STEPHANIE Easton CNP        glucagon (rDNA) injection 1 mg  1 mg SubCUTAneous PRN STEPHANIE Easton CNP        dextrose 10 % infusion   IntraVENous Continuous PRN STEPHANIE Easton CNP        sodium chloride flush 0.9 % injection 5-40 mL  5-40 mL IntraVENous 2 times per day Pecos Cook, APRN - CNP   10 mL at 09/09/22 2043    sodium chloride flush 0.9 % injection 5-40 mL  5-40 mL IntraVENous PRN Pecos Cook, APRN - CNP   10 mL at 09/09/22 1015    0.9 % sodium chloride infusion   IntraVENous PRN Pecos Cook, APRN - CNP        ondansetron (ZOFRAN-ODT) disintegrating tablet 4 mg  4 mg Oral Q8H PRN Pecos Cook, APRN - CNP        Or    ondansetron [facility]) injection 4 mg  4 mg IntraVENous Q6H PRN Pecos Cook, APRN - CNP        acetaminophen (TYLENOL) tablet 650 mg  650 mg Oral Q6H PRN Pecos Cook, APRN - CNP        Or    acetaminophen (TYLENOL) suppository 650 mg  650 mg Rectal Q6H PRN Pecos Cook, APRN - CNP        pantoprazole (PROTONIX) injection 40 mg  40 mg IntraVENous Daily Pecos Cook, APRN - CNP   40 mg at 09/11/22 0803    oxybutynin (DITROPAN-XL) extended release tablet 5 mg  5 mg Oral Nightly Pecos Cook, APRN - CNP   5 mg at 09/10/22 2024    DULoxetine (CYMBALTA) extended release capsule 60 mg  60 mg Oral Daily Pecos Cook, APRN - CNP   60 mg at 09/11/22 0810    fenofibrate (TRICOR) tablet 54 mg  54 mg Oral Daily Pecos Cook, APRN - CNP   54 mg at 09/11/22 0811    atorvastatin (LIPITOR) tablet 40 mg  40 mg Oral Nightly Pecos Cook, APRN - CNP   40 mg at 09/10/22 2024    cetirizine (ZYRTEC) tablet 5 mg  5 mg Oral Daily Pecos Cook, APRN - CNP   5 mg at 09/11/22 0811    potassium chloride (KLOR-CON M) extended release tablet 20 mEq  20 mEq Oral BID WC Pecos Cook, APRN - CNP   20 mEq at 09/11/22 0811    pregabalin (LYRICA) capsule 50 mg  50 mg Oral BID Pecos Cook, APRN - CNP   50 mg at 09/11/22 0430    pregabalin (LYRICA) capsule 75 mg  75 mg Oral Nightly Pecos Cook, APRN - CNP   75 mg at 09/10/22 2023    trospium (SANCTURA) tablet 20 mg  20 mg Oral Daily STEPHANIE Kim CNP   20 mg at 09/11/22 0811    DULoxetine (CYMBALTA) extended release capsule 20 mg  20 mg Oral Nightly STEPHANIE Kim - CNP   20 mg at 09/10/22 2024         Recent Imaging:   CT ABDOMEN PELVIS W IV CONTRAST Additional Contrast? None  Narrative: EXAMINATION:  CT OF THE ABDOMEN AND PELVIS WITH CONTRAST 9/7/2022 6:20 pm    TECHNIQUE:  CT of the abdomen and pelvis was performed with the administration of  intravenous contrast. Multiplanar reformatted images are provided for review. Automated exposure control, iterative reconstruction, and/or weight based  adjustment of the mA/kV was utilized to reduce the radiation dose to as low  as reasonably achievable. COMPARISON:  06/21/2019    HISTORY:  ORDERING SYSTEM PROVIDED HISTORY: abd tenderness  TECHNOLOGIST PROVIDED HISTORY:  Additional Contrast?->None  Reason for exam:->abd tenderness  Decision Support Exception - unselect if not a suspected or confirmed  emergency medical condition->Emergency Medical Condition (MA)  Reason for Exam: abd tenderness    FINDINGS:  Lower Chest: Partially visualized moderate right pleural effusion with  associated dependent atelectasis. No cardiomegaly. Organs: There is no acute abnormality of the liver, pancreas, spleen,  adrenals, or kidneys. Questionable punctate left nonobstructing  nephrolithiasis. Status post cholecystectomy. GI/Bowel: Bowel caliber is normal.  Sigmoid colon diverticulosis without  evidence of acute diverticulitis. There is no evidence of active bowel  inflammation. There is no evidence of acute appendicitis. Pelvis: No acute abnormality of the pelvic viscera. Peritoneum/Retroperitoneum: There is no free air or free fluid. Lymph nodes  are not enlarged. Bones/Soft Tissues: No acute abnormality. Umbilicus skin thickening present  with surrounding fat stranding. Again seen left paraumbilical fat-containing  ventral hernia and bilateral fat-containing inguinal hernias. Scattered  atherosclerotic calcification, including of the proximal superior mesenteric  artery. Pelvic skin thickening and subcutaneous fat stranding may be related  to edema. Impression: 1.  Umbilicus skin thickening present with surrounding fat stranding. Finding  may be related to an inflammatory/infectious process. Can correlate with  direct inspection. 2. Pelvic skin thickening and subcutaneous fat stranding may be related to  edema, possibly dependent edema versus fluid overload. 3. Again seen left paraumbilical fat-containing ventral hernia and bilateral  fat-containing inguinal hernias. 4. Sigmoid colon diverticulosis without evidence of acute diverticulitis. 5. Questionable punctate left nonobstructing nephrolithiasis. 6. Partially visualized moderate right pleural effusion with associated  dependent atelectasis. XR CHEST PORTABLE  Narrative: EXAMINATION:  ONE XRAY VIEW OF THE CHEST    9/7/2022 2:48 pm    COMPARISON:  Chest radiograph dated 08/19/2021    HISTORY:  ORDERING SYSTEM PROVIDED HISTORY: anemia  TECHNOLOGIST PROVIDED HISTORY:  Reason for exam:->anemia  Reason for Exam: anemia    FINDINGS:  Medical devices: Fixation hardware along the proximal right humerus is  present. Mediastinum/Heart: The mediastinal contours are unchanged compared to prior  exam, with enlargement of the heart shadow. Lungs: Hazy bilateral parenchymal opacities with associated pulmonary  vascular indistinctness is present. Pleura: Blunting of the right costophrenic sulcus suggests a trace right  pleural effusion. No evidence of pneumothorax. Bones/Soft tissues: No acute findings. Impression: Cardiomegaly with findings of pulmonary edema and a trace right pleural  effusion. Labs:   Recent Labs     09/08/22  1918 09/09/22  0027 09/09/22  0706 09/10/22  0419 09/11/22  0758   HGB 7.7* 8.7* 7.5* 7.7* 8.0*   WBC  --   --  5.6 4.1 5.9   LABALBU  --   --   --   --  4.2            Assessment:    Anemia    Plan:  Patient with recent upper endoscopy and colonoscopy that did not reveal etiology of bleeding. Awaiting small bowel follow-through in anticipation of a small bowel video capsule endoscopy. Hemoglobin stable from yesterday today. Juliana Douglass MD    [facility]    [phone].  Also available via [employer]

## 2022-09-11 NOTE — PROGRESS NOTES
Patient requested to remove her 2 bracelets. Writer assisted her with bracelet removal. Writer placed 2 bracelets in patient pink bag.

## 2022-09-11 NOTE — PROGRESS NOTES
Hospitalist Progress Note      PCP: Preston Mosley MD    Date of Admission: 9/7/2022    Chief Complaint: 80 y.o. female with PMH of atrial fib, alzheimer's dementia, COPD, HTN, HLD, s/d CHF, chronic pain, depression, anxiety, KATHY on AVAPS, GERD who presents to [facility] with from [facility] with abnormal labs. History obtained from the patient and review of EMR. Patient is awake and alert lying in bed she is currently on 3 L of oxygen. She is post 1 unit packed red blood cells. She stated that she had a black bowel movement x1 this week approximately 3 days ago. Subjective:     Awake alert and pleasantly confused - baseline dementia. C scope 9/9 - unremarkable. EGD 9/8 - unremarkable. SBFT planned per GI. Waiting for LE dopplers. BG elevated today - she is not very selective with her food choices - will continue insulin regimen adjustment.            Medications:  Reviewed    Infusion Medications    sodium chloride      dextrose      sodium chloride       Scheduled Medications    insulin glargine  20 Units SubCUTAneous BID    insulin lispro  0-16 Units SubCUTAneous TID WC    insulin lispro  0-4 Units SubCUTAneous Nightly    iron sucrose (VENOFER) iv piggyback 100 mL (Admin over 60 minutes)  200 mg IntraVENous Q24H    losartan  25 mg Oral Daily    furosemide  20 mg Oral BID    imipramine  50 mg Oral Nightly    sodium chloride flush  5-40 mL IntraVENous 2 times per day    amLODIPine  5 mg Oral Daily    budesonide-formoterol  2 puff Inhalation BID    clonazePAM  1 mg Oral Nightly    fluticasone  2 spray Nasal Daily    guaiFENesin  600 mg Oral BID    predniSONE  2.5 mg Oral Daily    vitamin B-12  500 mcg Oral Daily    sodium chloride flush  5-40 mL IntraVENous 2 times per day    pantoprazole  40 mg IntraVENous Daily    oxybutynin  5 mg Oral Nightly    DULoxetine  60 mg Oral Daily    fenofibrate  54 mg Oral Daily    atorvastatin  40 mg Oral Nightly    cetirizine  5 mg Oral Daily potassium chloride  20 mEq Oral BID     pregabalin  50 mg Oral BID    pregabalin  75 mg Oral Nightly    trospium  20 mg Oral Daily    DULoxetine  20 mg Oral Nightly     PRN Meds: sodium chloride flush, sodium chloride, albuterol sulfate HFA, methyl salicylate-menthol, glucose, dextrose bolus **OR** dextrose bolus, glucagon (rDNA), dextrose, sodium chloride flush, sodium chloride, ondansetron **OR** ondansetron, acetaminophen **OR** acetaminophen      Intake/Output Summary (Last 24 hours) at 9/11/2022 1311  Last data filed at 9/11/2022 0414  Gross per 24 hour   Intake 510 ml   Output 1800 ml   Net -1290 ml         Physical Exam Performed:    BP (!) 128/57   Pulse 96   Temp 97 °F (36.1 °C) (Oral)   Resp 14   Ht 5' 7\" (1.702 m)   Wt 163 lb 7 oz (74.1 kg)   SpO2 99%   BMI 25.60 kg/m²     General appearance: No apparent distress, appears stated age and cooperative. HEENT: Pupils equal, round, and reactive to light. Conjunctivae/corneas clear. Neck: Supple, with full range of motion. No jugular venous distention. Trachea midline. Respiratory:  Normal respiratory effort. Clear to auscultation, bilaterally without Rales/Wheezes/Rhonchi. Cardiovascular: Regular rate and rhythm with normal S1/S2 without murmurs, rubs or gallops. Abdomen: Soft, non-tender, non-distended with normal bowel sounds. Musculoskeletal: No clubbing, cyanosis or edema bilaterally. Full range of motion without deformity. Skin: Skin color, texture, turgor normal.  No rashes or lesions. Neurologic:  Neurovascularly intact without any focal sensory/motor deficits. Cranial nerves: II-XII intact, grossly non-focal.  Psychiatric: Alert and disoriented - baseline dementia.    Capillary Refill: Brisk, 3 seconds, normal   Peripheral Pulses: +2 palpable, equal bilaterally       Labs:   Recent Labs     09/09/22  0706 09/10/22  0419 09/11/22  0758   WBC 5.6 4.1 5.9   HGB 7.5* 7.7* 8.0*   HCT 25.2* 25.1* 27.6*    168 174       Recent Labs 09/09/22  0706 09/10/22  0419 09/11/22  0758    139 141   K 3.3* 3.5 3.8   CL 96* 98* 98*   CO2 31 32 34*   BUN 16 10 12   CREATININE 0.8 0.8 0.9   CALCIUM 8.8 9.2 9.5   PHOS  --   --  2.4*       No results for input(s): AST, ALT, BILIDIR, BILITOT, ALKPHOS in the last 72 hours. No results for input(s): INR in the last 72 hours. Recent Labs     09/08/22  1918 09/09/22  0027 09/09/22  0706   TROPONINI <0.01 <0.01 <0.01         Urinalysis:      Lab Results   Component Value Date/Time    NITRU Negative 09/07/2022 02:39 PM    WBCUA 6-9 09/07/2022 02:39 PM    BACTERIA 1+ 09/07/2022 02:39 PM    RBCUA 3-4 09/07/2022 02:39 PM    BLOODU Negative 09/07/2022 02:39 PM    SPECGRAV 1.015 09/07/2022 02:39 PM    GLUCOSEU 250 09/07/2022 02:39 PM    GLUCOSEU NEGATIVE 04/21/2012 04:15 AM       Radiology:  CT ABDOMEN PELVIS W IV CONTRAST Additional Contrast? None   Final Result   1. Umbilicus skin thickening present with surrounding fat stranding. Finding   may be related to an inflammatory/infectious process. Can correlate with   direct inspection. 2. Pelvic skin thickening and subcutaneous fat stranding may be related to   edema, possibly dependent edema versus fluid overload. 3. Again seen left paraumbilical fat-containing ventral hernia and bilateral   fat-containing inguinal hernias. 4. Sigmoid colon diverticulosis without evidence of acute diverticulitis. 5. Questionable punctate left nonobstructing nephrolithiasis. 6. Partially visualized moderate right pleural effusion with associated   dependent atelectasis. XR CHEST PORTABLE   Final Result   Cardiomegaly with findings of pulmonary edema and a trace right pleural   effusion.          VL Extremity Venous Right    (Results Pending)   FL SMALL BOWEL FOLLOW THROUGH ONLY    (Results Pending)           Assessment/Plan:    Active Hospital Problems    Diagnosis     GI bleed [K92.2]      Priority: Medium    Acute on chronic respiratory failure with hypoxia [facility]) [J96.21]      Priority: Medium    Bradycardia [R00.1]      Priority: Medium    HTN (hypertension), benign [I10]      Priority: Medium     Class: Chronic    DM (diabetes mellitus), type 2, uncontrolled ([facility] [E11.65]      Priority: Medium    COPD without exacerbation ([facility] [J44.9]     A-fib ([facility] [I48.91]     Acute congestive heart failure ([facility] [I50.9]     Alzheimer's disease ([facility] [G30.9, F02.80]     Symptomatic anemia [D64.9]        Anemia of acute to subacute blood loss. - hemoglobin 6.2 at [facility]--6.5 upon arrival to ED  - 1 unit PRBC given  - stool occult negative  - pt does report black stools mauro 3 days ago, none since then  - holding ASA and Eliquis  - CT abdomen does not suggest possible source of blood loss. - PPI IV   - GI consult. - EGD 9/8 - unremarkable. - C scope 9/9 - no source of blood loss evident.    - GI deciding on timing of capsule endoscopy    - Hgb 8.7 - 7.5 - 7.7 - no signs of active bleeding now. R Leg Edema asymmetric to left. Joe doppler. Acute on Chronic respiratory failure  - currently on 3 liters n/c  - given IV lasix  - resumed lasix PO increased to BID.    - decreased dose losartan.         Afib with bradycardia - resolved  - holding Coreg at this time  - monitor on telemetry        Combined CHF with fluid overload  - cxr showed cardiomegaly, pulmonary edema and trace right pleural effusion   - x1 IV lasix given after blood transfusion   - last echo in 2018 showed EF 45-50%, consider repeat echo  - decrease dose losartan,   - holding coreg due to episodes of bradycardia  - lasix BID       COPD without AE  - no AE  - continue home inhalers and daily prednisone  - monitor        KATHY  - on AVAPS at [facility]  - reordered       HTN  - stable, continue home regimen  - monitor       DM type 2  - continue Lantus and SSI   - blood glucose elevated on admission  - monitor        Anxiety  Depression  - continue home regimen       Chronic Pain  - patient is on Lyrica 50 mg at 5 am and 1 pm and 75 mg at night  - also takes Cymbalta BID       GERD  - Continue PPI            Alzheimer's Disease   - pt is alert and partially oriented today  - Continue supportive care          DVT Prophylaxis: scd  Diet: modified diet resumed  Code Status: DNR-CC, I discussed this with patient and it is noted on ECF paperwork as well         Delicia Carpenter MD

## 2022-09-11 NOTE — FLOWSHEET NOTE
09/11/22 0806   Vital Signs   Temp 98.6 °F (37 °C)   Temp Source Oral   Heart Rate 89   Heart Rate Source Monitor   Resp 16   BP (!) 156/72   BP Location Left upper arm   BP Method Automatic   MAP (Calculated) 100   Patient Position Sitting   Level of Consciousness 0   MEWS Score 1   Oxygen Therapy   SpO2 91 %   Pulse Oximeter Device Mode Continuous   Pulse Oximeter Device Location Left;Finger   O2 Device Nasal cannula   O2 Flow Rate (L/min) 3.5 L/min     Shift assessment complete - see flow sheet, pt denies needs, call light within reach.

## 2022-09-12 ENCOUNTER — APPOINTMENT (OUTPATIENT)
Dept: GENERAL RADIOLOGY | Age: 82
DRG: 378 | End: 2022-09-12
Payer: COMMERCIAL

## 2022-09-12 ENCOUNTER — APPOINTMENT (OUTPATIENT)
Dept: VASCULAR LAB | Age: 82
DRG: 378 | End: 2022-09-12
Payer: COMMERCIAL

## 2022-09-12 LAB
GLUCOSE BLD-MCNC: 194 MG/DL (ref 70–99)
GLUCOSE BLD-MCNC: 200 MG/DL (ref 70–99)
GLUCOSE BLD-MCNC: 260 MG/DL (ref 70–99)
GLUCOSE BLD-MCNC: 277 MG/DL (ref 70–99)
GLUCOSE BLD-MCNC: 300 MG/DL (ref 70–99)
PERFORMED ON: ABNORMAL

## 2022-09-12 PROCEDURE — 97116 GAIT TRAINING THERAPY: CPT

## 2022-09-12 PROCEDURE — 2700000000 HC OXYGEN THERAPY PER DAY

## 2022-09-12 PROCEDURE — 99233 SBSQ HOSP IP/OBS HIGH 50: CPT | Performed by: INTERNAL MEDICINE

## 2022-09-12 PROCEDURE — 2580000003 HC RX 258: Performed by: ANESTHESIOLOGY

## 2022-09-12 PROCEDURE — 6370000000 HC RX 637 (ALT 250 FOR IP)

## 2022-09-12 PROCEDURE — 97530 THERAPEUTIC ACTIVITIES: CPT

## 2022-09-12 PROCEDURE — 6360000002 HC RX W HCPCS: Performed by: NURSE PRACTITIONER

## 2022-09-12 PROCEDURE — 6370000000 HC RX 637 (ALT 250 FOR IP): Performed by: NURSE PRACTITIONER

## 2022-09-12 PROCEDURE — 74250 X-RAY XM SM INT 1CNTRST STD: CPT

## 2022-09-12 PROCEDURE — 94640 AIRWAY INHALATION TREATMENT: CPT

## 2022-09-12 PROCEDURE — 97110 THERAPEUTIC EXERCISES: CPT

## 2022-09-12 PROCEDURE — C9113 INJ PANTOPRAZOLE SODIUM, VIA: HCPCS | Performed by: NURSE PRACTITIONER

## 2022-09-12 PROCEDURE — 97535 SELF CARE MNGMENT TRAINING: CPT

## 2022-09-12 PROCEDURE — 6370000000 HC RX 637 (ALT 250 FOR IP): Performed by: INTERNAL MEDICINE

## 2022-09-12 PROCEDURE — 96376 TX/PRO/DX INJ SAME DRUG ADON: CPT

## 2022-09-12 PROCEDURE — 93971 EXTREMITY STUDY: CPT

## 2022-09-12 PROCEDURE — 2060000000 HC ICU INTERMEDIATE R&B

## 2022-09-12 PROCEDURE — 94761 N-INVAS EAR/PLS OXIMETRY MLT: CPT

## 2022-09-12 PROCEDURE — 97112 NEUROMUSCULAR REEDUCATION: CPT

## 2022-09-12 RX ORDER — INSULIN GLARGINE 100 [IU]/ML
22 INJECTION, SOLUTION SUBCUTANEOUS 2 TIMES DAILY
Status: DISCONTINUED | OUTPATIENT
Start: 2022-09-12 | End: 2022-09-13 | Stop reason: HOSPADM

## 2022-09-12 RX ORDER — DIMETHICONE, OXYBENZONE, AND PADIMATE O 2; 2.5; 6.6 G/100G; G/100G; G/100G
STICK TOPICAL
Status: COMPLETED
Start: 2022-09-12 | End: 2022-09-12

## 2022-09-12 RX ADMIN — PANTOPRAZOLE SODIUM 40 MG: 40 INJECTION, POWDER, FOR SOLUTION INTRAVENOUS at 15:37

## 2022-09-12 RX ADMIN — GUAIFENESIN 600 MG: 600 TABLET, EXTENDED RELEASE ORAL at 20:43

## 2022-09-12 RX ADMIN — DULOXETINE HYDROCHLORIDE 60 MG: 60 CAPSULE, DELAYED RELEASE ORAL at 15:36

## 2022-09-12 RX ADMIN — FLUTICASONE PROPIONATE 2 SPRAY: 50 SPRAY, METERED NASAL at 15:42

## 2022-09-12 RX ADMIN — CETIRIZINE HYDROCHLORIDE 5 MG: 10 TABLET ORAL at 15:37

## 2022-09-12 RX ADMIN — SODIUM CHLORIDE, PRESERVATIVE FREE 10 ML: 5 INJECTION INTRAVENOUS at 20:42

## 2022-09-12 RX ADMIN — ATORVASTATIN CALCIUM 40 MG: 40 TABLET, FILM COATED ORAL at 20:43

## 2022-09-12 RX ADMIN — PREGABALIN 75 MG: 25 CAPSULE ORAL at 20:42

## 2022-09-12 RX ADMIN — Medication 2 PUFF: at 17:53

## 2022-09-12 RX ADMIN — DULOXETINE 20 MG: 20 CAPSULE, DELAYED RELEASE ORAL at 20:43

## 2022-09-12 RX ADMIN — FUROSEMIDE 20 MG: 20 TABLET ORAL at 15:36

## 2022-09-12 RX ADMIN — TROSPIUM CHLORIDE 20 MG: 20 TABLET, FILM COATED ORAL at 15:36

## 2022-09-12 RX ADMIN — CLONAZEPAM 1 MG: 1 TABLET ORAL at 20:42

## 2022-09-12 RX ADMIN — Medication 10 ML: at 15:37

## 2022-09-12 RX ADMIN — CYANOCOBALAMIN TAB 500 MCG 500 MCG: 500 TAB at 15:36

## 2022-09-12 RX ADMIN — PREDNISONE 2.5 MG: 5 TABLET ORAL at 15:36

## 2022-09-12 RX ADMIN — FENOFIBRATE 54 MG: 54 TABLET ORAL at 15:36

## 2022-09-12 RX ADMIN — LOSARTAN POTASSIUM 25 MG: 25 TABLET, FILM COATED ORAL at 15:36

## 2022-09-12 RX ADMIN — POTASSIUM CHLORIDE 20 MEQ: 1500 TABLET, EXTENDED RELEASE ORAL at 15:36

## 2022-09-12 RX ADMIN — PREGABALIN 50 MG: 25 CAPSULE ORAL at 15:37

## 2022-09-12 RX ADMIN — INSULIN GLARGINE 22 UNITS: 100 INJECTION, SOLUTION SUBCUTANEOUS at 20:46

## 2022-09-12 RX ADMIN — AMLODIPINE BESYLATE 5 MG: 5 TABLET ORAL at 15:36

## 2022-09-12 RX ADMIN — OXYBUTYNIN CHLORIDE 5 MG: 5 TABLET, EXTENDED RELEASE ORAL at 20:42

## 2022-09-12 RX ADMIN — GUAIFENESIN 600 MG: 600 TABLET, EXTENDED RELEASE ORAL at 15:36

## 2022-09-12 RX ADMIN — INSULIN LISPRO 12 UNITS: 100 INJECTION, SOLUTION INTRAVENOUS; SUBCUTANEOUS at 16:31

## 2022-09-12 RX ADMIN — Medication 2 PUFF: at 07:39

## 2022-09-12 RX ADMIN — Medication: at 02:58

## 2022-09-12 NOTE — PROGRESS NOTES
Bedside report and transfer of care given to [facility]. Pt currently resting in bed with the call light within reach. Pt denies any other care needs at this time. Pt stable at this time.

## 2022-09-12 NOTE — FLOWSHEET NOTE
Shift assessment completed. Patient awake in bed. A/Ox4. VSS. Scheduled meds given. Put in order to make patient NPO due to small bowel study tomorrow per charge nurse. Patient denies any pain or any further needs at this time. Call light and bedside table within reach.

## 2022-09-12 NOTE — PROGRESS NOTES
Writer called radiology to inquire if pt can receive morning medications. Per radiology pt can not have medication if mixed with applesauce. AM medications held until after testing is completed.

## 2022-09-12 NOTE — PROGRESS NOTES
Attempted to perfect serve [facility] for FLBS question and unable to reach via perfect serve. Office paged and awaiting a call back. Patient called and wanted to know if she has been approved for the prolia.    Patient advised this has been sent for prior authorization.    Please advise on the status as the patient is coming in for her COVID vaccine at 10:50 today.    Thanks

## 2022-09-12 NOTE — PROGRESS NOTES
Progress Note    Patient Ashley Acevedo  MRN: [id number]  YOB: 1940 Age: 80 y.o. Sex: female  Room: [place] [address]       Admitting Physician: Rachell Almaguer MD   Date of Admission: 9/7/2022 12:23 PM   Primary Care Physician: Kendal Silver MD     Subjective:  Ashley Acevedo was seen and examined. We are following for anemia. -- No acute events overnight  -- Denies abdominal pain or nausea/vomiting  -- No overt GI bleed reported  -- Hgb stable    ROS:  Constitutional: Denies fever, no change in appetite  Respiratory: Denies cough or shortness of breath  Cardiovascular: Denies chest pain or edema    Objective:  Vital Signs:   Vitals:    09/12/22 1200   BP: (!) 153/80   Pulse: (!) 109   Resp: 17   Temp: 97.3 °F (36.3 °C)   SpO2: 91%         Physical Exam:  Constitutional: Alert and oriented x 4. No acute distress. Respiratory: Respirations nonlabored, no crepitus  GI: Abdomen nondistended, soft, and nontender. Neurological: No focal deficits noted. No asterixis.     Intake/Output:    Intake/Output Summary (Last 24 hours) at 9/12/2022 1211  Last data filed at 9/12/2022 1200  Gross per 24 hour   Intake 120 ml   Output 2600 ml   Net -2480 ml        Current Medications:  Current Facility-Administered Medications   Medication Dose Route Frequency Provider Last Rate Last Admin    insulin glargine (LANTUS) injection vial 20 Units  20 Units SubCUTAneous BID Rachell Almaguer MD   20 Units at 09/11/22 2034    insulin lispro (HUMALOG) injection vial 0-16 Units  0-16 Units SubCUTAneous TID WC Rcahell Almaguer MD   12 Units at 09/11/22 1146    insulin lispro (HUMALOG) injection vial 0-4 Units  0-4 Units SubCUTAneous Nightly Rachell Almaguer MD        iron sucrose (VENOFER) 200 mg in sodium chloride 0.9 % 100 mL IVPB  200 mg IntraVENous Q24H Rachell Almaguer MD   Stopped at 09/11/22 1418    losartan (COZAAR) tablet 25 mg  25 mg Oral Daily Rachell Almaguer MD   25 mg at 09/11/22 0811    furosemide (LASIX) tablet 20 mg  20 mg Oral BID Levon Browning MD   20 mg at 09/11/22 1642    imipramine (TOFRANIL) tablet 50 mg  50 mg Oral Nightly STEPHANIE Kim - CNP   50 mg at 09/11/22 2033    sodium chloride flush 0.9 % injection 5-40 mL  5-40 mL IntraVENous 2 times per day Mario Purdy MD   10 mL at 09/11/22 2056    sodium chloride flush 0.9 % injection 5-40 mL  5-40 mL IntraVENous PRN Mario Purdy MD        0.9 % sodium chloride infusion  25 mL IntraVENous PRN Mario Purdy MD        albuterol sulfate HFA (PROVENTIL;VENTOLIN;PROAIR) 108 (90 Base) MCG/ACT inhaler 2 puff  2 puff Inhalation Q4H PRN STEPHANIE Kim - CNP        amLODIPine (NORVASC) tablet 5 mg  5 mg Oral Daily STEPHANIE Kim - CNP   5 mg at 09/11/22 0810    budesonide-formoterol (SYMBICORT) 160-4.5 MCG/ACT inhaler 2 puff  2 puff Inhalation BID Lottie Fernandez APRN - CNP   2 puff at 09/12/22 0739    clonazePAM (KLONOPIN) tablet 1 mg  1 mg Oral Nightly STEPHANIE Kim - CNP   1 mg at 09/11/22 2033    fluticasone (FLONASE) 50 MCG/ACT nasal spray 2 spray  2 spray Nasal Daily STEPHANIE Kim - CNP   2 spray at 09/11/22 0803    guaiFENesin (MUCINEX) extended release tablet 600 mg  600 mg Oral BID Lottie Fernandez APRN - CNP   600 mg at 09/11/22 2790    methyl salicylate-menthol (LYNNETTE NEGRO GREASELESS) 10-15 % cream CREA   Apply externally Q8H PRN STEPHANIE Kim - CNP        predniSONE (DELTASONE) tablet 2.5 mg  2.5 mg Oral Daily Lottie Fernandez APRN - CNP   2.5 mg at 09/11/22 8621    vitamin B-12 (CYANOCOBALAMIN) tablet 500 mcg  500 mcg Oral Daily STEPHANIE Kim - CNP   500 mcg at 09/11/22 0811    glucose chewable tablet 16 g  4 tablet Oral PRN Hendry Cook, APRN - CNP        dextrose bolus 10% 125 mL  125 mL IntraVENous PRN Hendry Cook, APRN - CNP        Or    dextrose bolus 10% 250 mL  250 mL IntraVENous PRN Hendry Cook, APRN - CNP        glucagon (rDNA) injection 1 mg  1 mg SubCUTAneous PRN Chilango Phelan Krysta Yanez, APRN - CNP        dextrose 10 % infusion   IntraVENous Continuous PRN Debora Likens, APRN - CNP        sodium chloride flush 0.9 % injection 5-40 mL  5-40 mL IntraVENous 2 times per day Debora Likens, APRN - CNP   10 mL at 09/09/22 2043    sodium chloride flush 0.9 % injection 5-40 mL  5-40 mL IntraVENous PRN Debora Likens, APRN - CNP   10 mL at 09/09/22 1015    0.9 % sodium chloride infusion   IntraVENous PRN Debora Likens, APRN - CNP        ondansetron (ZOFRAN-ODT) disintegrating tablet 4 mg  4 mg Oral Q8H PRN Debora Likens, APRN - CNP        Or    ondansetron [facility]) injection 4 mg  4 mg IntraVENous Q6H PRN Debora Likens, APRN - CNP        acetaminophen (TYLENOL) tablet 650 mg  650 mg Oral Q6H PRN Debora Likens, APRN - CNP        Or    acetaminophen (TYLENOL) suppository 650 mg  650 mg Rectal Q6H PRN Debora Likens, APRN - CNP        pantoprazole (PROTONIX) injection 40 mg  40 mg IntraVENous Daily Debora Likens, APRN - CNP   40 mg at 09/11/22 0803    oxybutynin (DITROPAN-XL) extended release tablet 5 mg  5 mg Oral Nightly Debora Likens, APRN - CNP   5 mg at 09/11/22 2033    DULoxetine (CYMBALTA) extended release capsule 60 mg  60 mg Oral Daily Debora Likens, APRN - CNP   60 mg at 09/11/22 0810    fenofibrate (TRICOR) tablet 54 mg  54 mg Oral Daily Debora Likens, APRN - CNP   54 mg at 09/11/22 0811    atorvastatin (LIPITOR) tablet 40 mg  40 mg Oral Nightly Debora Likens, APRN - CNP   40 mg at 09/11/22 2033    cetirizine (ZYRTEC) tablet 5 mg  5 mg Oral Daily Debora Likens, APRN - CNP   5 mg at 09/11/22 0811    potassium chloride (KLOR-CON M) extended release tablet 20 mEq  20 mEq Oral BID WC Debora Likens, APRN - CNP   20 mEq at 09/11/22 1642    pregabalin (LYRICA) capsule 50 mg  50 mg Oral BID Debora Burgos APRN - CNP   50 mg at 09/11/22 1312    pregabalin (LYRICA) capsule 75 mg  75 mg Oral Nightly Debora Burgos APRN - CNP   75 mg at 09/11/22 2033    trospium (SANCTURA) tablet 20 mg  20 mg Oral Daily Debora Burgos APRN - CNP   20 mg at 09/11/22 1274 DULoxetine (CYMBALTA) extended release capsule 20 mg  20 mg Oral Nightly STEPHANIE Neely CNP   20 mg at 09/11/22 2032         Recent Imaging:   VL Extremity Venous Right  Vascular Lower Extremities DVT Study Procedure    -- PRELIMINARY SONOGRAPHER REPORT --      Demographics      Patient Name      Cindi Sanders      Date of Study     09/12/2022          Gender              Female      Patient Number    [id number]          Date of Birth       1940      Visit Number      [id number]           Age                 80 year(s)      Accession Number  [id number]          Room Number               Corporate ID      [id number]            Sonographer         Tiffanie Miller RVT,                                                             RDMS      Ordering          Vance Vasques,        Interpreting        [address] [facility]   Physician         Willow Phillips MD        Physician           Readers     Procedure    Type of Study:      Veins:Lower Extremities DVT Study, VL EXTREMITY VENOUS DUPLEX RIGHT. Tech Comments  Right  Technically difficult and limited study due to calcific shadowing and edema. There is no evidence of deep or superficial venous thrombosis involving the  right lower extremity. Left  There is no evidence of deep venous thrombosis involving the left common  femoral vein. There are no previous studies for comparison. Labs:   Recent Labs     09/10/22  0419 09/11/22  0758   HGB 7.7* 8.0*   WBC 4.1 5.9   LABALBU  --  4.2          Assessment:    80year old F with PMH significant for A Fib, Alzheimer's dementia, anxiety, COPD, diabetes mellitus, GERD, HTN, IBS, neuropathy, and seizures. Presents from SNF with a Hgb of 6.5 and melena. BUN elevated with normal creatinine. 9/8 EGD: Possible Roberts's esophagus, otherwise unremarkable. 9/9 Colonoscopy: Colon polyp    Hgb stable this morning. Denies overt GI bleeding. SBFT scheduled today prior to proceeding with capsule endoscopy.      Plan:  Monitor H/H and overt signs of GI bleeding  Continue PPI  Follow up SBFT. If normal will plan for capsule tomorrow  Supportive care       Nabila Chappell, APRN - CNP    [address]    [phone].  Also available via Perfect Serve

## 2022-09-12 NOTE — CARE COORDINATION
INTERDISCIPLINARY PLAN OF CARE CONFERENCE    Date/Time: 9/12/2022 9:18 AM  Completed by: Alber Wolf RN, Case Management      Patient Name:  Jason Moreira  YOB: 1940  Admitting Diagnosis: Cardiomegaly [I51.7]  Chronic pulmonary edema [J81.1]  Ventral hernia without obstruction or gangrene [K43.9]  Diverticulosis [K57.90]  GI bleed [K92.2]  Anticoagulated [Z79.01]  Pleural effusion on right [J90]  KAMRAN (acute kidney injury) ([facility] [N17.9]  Symptomatic anemia [D64.9]     Admit Date/Time:  9/7/2022 12:23 PM    Chart reviewed. Interdisciplinary team contacted or reviewed plan related to patient progress and discharge plans. Disciplines included Case Management, Nursing, and Dietitian. Current Status:IP 07/07/2022  PT/OT recommendation for discharge plan of care:   Discharge Recommendations: Return to [facility] with skilled PT   DME needs for discharge: defer to facility  Expected D/C Disposition:  Nursing Home  Confirmed plan with patient   Discharge Plan Comments: Chart reviewed. Met with pt at bedside and explained the role of the CM. Confirms she will return to [facility] ([facility] Per PT pt will need skilled PT upon return. Pt going for SBFT today. CM following for potential DC tomorrow.        Home O2 in place on admit: YES (3 liters baseline) uses AVAPS NS and ECF  Pt informed of need to bring portable home O2 tank on day of discharge for nursing to connect prior to leaving:  Not Indicated  Verbalized agreement/Understanding:  Not Indicated

## 2022-09-12 NOTE — PROGRESS NOTES
Inpatient Physical Therapy Daily Treatment    Unit: PCU  Date:  9/12/2022  Patient Name:    Delores Newton  Admitting diagnosis:  Cardiomegaly [I51.7]  Chronic pulmonary edema [J81.1]  Ventral hernia without obstruction or gangrene [K43.9]  Diverticulosis [K57.90]  GI bleed [K92.2]  Anticoagulated [Z79.01]  Pleural effusion on right [J90]  KAMRAN (acute kidney injury) ([address] [N17.9]  Symptomatic anemia [D64.9]  Admit Date:  9/7/2022  Precautions/Restrictions/WB Status/ Lines/ Wounds/ Oxygen: Fall risk, Bed/chair alarm, Lines -IV and Supplemental O2 (3L/min), Telemetry, and Continuous pulse oximetry, monitor SpO2 and HR  R elbow chronically dislocated    Treatment Time: 4717 - 3252  Treatment Number:  1   Timed Code Treatment Minutes: 38 minutes  Total Treatment Minutes: 38 minutes      Discharge Recommendations: Return to ECF with skilled PT   DME needs for discharge: defer to facility       Therapy recommendation for EMS Transport: can transport by wheelchair    Therapy recommendations for staff:   Assist of 1 with use of rolling walker (RW) and gait belt for all transfers and ambulation to/from [facility]  to/from chair  within room    History of Present Illness: H & P as per Ivan Hogan MD's note dated 9/9/2022  80 y.o. female with PMH of atrial fib, alzheimer's dementia, COPD, HTN, HLD, s/d CHF, chronic pain, depression, anxiety, KATHY on AVAPS, GERD who presents to [facility] with from [facility] with abnormal labs. History obtained from the patient and review of EMR. Patient is awake and alert lying in bed she is currently on 3 L of oxygen. She is post 1 unit packed red blood cells. She stated that she had a black bowel movement x1 this week approximately 3 days ago.     9/9/2022: S/P COLONOSCOPY POLYPECTOMY SNARE/COLD BIOPSY COLONOSCOPY CONTROL HEMORRHAGE COLONOSCOPY WITH BIOPSY    Home Health S4 Level Recommendation:  Level 1 Standard  AM-PAC Mobility Score       AM-PAC Inpatient Mobility without Stair Climbing Raw Score : 16    Pain   No    Cognition    A&O x4   Able to follow 2 step commands    Subjective  Patient sitting up in chair with no family present. Pt agreeable to this PT eval & tx. Balance  Sitting:  Good - ; SBA  Comments:     Standing: Fair ; CGA  Comments: ~5 min. Patient showed LOB in posterior and L side during semi tandem hold, with eyes closed static standing. Patient was taught for stepping forward, backward and side ways for improving balance reactions x 10 reps in each direction. Bed Mobility   Supine to Sit:    Not Tested  Sit to Supine:   Not Tested  Rolling:   Not Tested  Scooting in sitting: Modified Independent  Scooting in supine:  Not Tested    Transfer Training     Sit to stand:   SBA  Stand to sit:   SBA  Bed to Chair:   CGA with use of gait belt and rolling walker (RW)    Gait gait completed as indicated below  Distance:      40 ft + 40 ft  Deviations (firm surface/linoleum):  decreased lev, narrow RICH, decreased step length bilaterally, and decreased stance time bilaterally  Assistive Device Used:    gait belt and rolling walker (RW)  Level of Assist:    CGA  Comment: Patient showed occasional drifting on the L side during turning and walking and needed CGA to catch balance within RICH. Stair Training deferred, pt does not have stairs in home environment    Activity Tolerance   Pt completed therapy session with No adverse symptoms noted w/activity  At the EOB  HR:48- 82 bpm  SpO2: 96%    With ambulation 40 ft  SpO2: dropped to 83% (as per monitor room SpO2 reading read 100%) and recovered to 91-93% SpO2 with PLB in 2 min   HR: 62 - 107 bpm    RN notified about fluctuations with SpO2 levels. Positioning Needs   Pt up in chair, alarm set, positioned in proper neutral alignment and pressure relief provided.    Call light provided and all needs within reach    Exercises Initiated  all completed bilaterally unless indicated  Ankle Pumps x 10 reps  Heel slides x 10 reps  SLR x 10 reps  Semi tandem hold bilaterally 30 sec hold x 3 reps  Static standing with eyes closed x 30 sec with CGA. Heel raises in supported standing x 10 reps  Perturbations in unsupported standing to facilitate stepping reactions x 10 reps. Other  None. Patient/Family Education   Pt educated on role of inpatient PT, POC, importance of continued activity, DC recommendations, safety awareness, transfer techniques, pursed lip breathing, pacing activity, and calling for assist with mobility. Assessment  Pt seen for Physical Therapy evaluation in acute care setting. Patient showed improved standing tolerance today. Recommending return to [facility] with skilled PT as patient functioning at or close to baseline level. Goals : To be met in 3 visits:  1). Independent with LE Ex x 10 reps    To be met in 6 visits:  1). Supine to/from sit: Supervision  2). Sit to/from stand: Supervision  3). Bed to chair: Supervision  4). Gait: Ambulate 50 ft.  with  Supervision and use of LRAD (least restrictive assistive device)  5). Tolerate B LE exercises 3 sets of 10-15 reps    Plan    To be seen 3-5 x / week  while in acute care setting for therapeutic exercises, bed mobility, transfers, progressive gait training, balance training, and family/patient education. Signature: Antoinette Hawkins MS PT, # [id number]    If patient discharges from this facility prior to next visit, this note will serve as the Discharge Summary.

## 2022-09-12 NOTE — PROGRESS NOTES
Shift assessment complete- see flow sheet, vascular study complete- see results, pt updated NPO for FLSB study today, pt denies needs, call light within reach.

## 2022-09-12 NOTE — PROGRESS NOTES
Occupational Therapy Daily Treatment Note    Unit: PCU  Date:  9/12/2022  Patient Name:    Levon Mccullough  Admitting diagnosis:  Cardiomegaly [I51.7]  Chronic pulmonary edema [J81.1]  Ventral hernia without obstruction or gangrene [K43.9]  Diverticulosis [K57.90]  GI bleed [K92.2]  Anticoagulated [Z79.01]  Pleural effusion on right [J90]  KAMRAN (acute kidney injury) ([address] [N17.9]  Symptomatic anemia [D64.9]  Admit Date:  9/7/2022  Precautions/Restrictions:  Fall risk, Bed/chair alarm, Lines -IV and Supplemental O2 (3L/min), Telemetry, and Continuous pulse oximetry, monitor SpO2 and HR  R elbow chronically dislocated        Discharge Recommendations: LTC with OT   DME needs for discharge: defer to facility       Therapy recommendations for staff:   Assist of 1 (minimal assist) with use of rolling walker (RW) for all ambulation to/from bathroom    AM-PAC Score: AM-PAC Inpatient Daily Activity Raw Score: 17  Home Health S4 Level: NA       Treatment Time:  11:30-12:10  Treatment number:  2    Total Treatment Time:   40 minutes    History of Present Illness:  H & P as per Fernando Little MD's note dated 9/9/2022  80 y.o. female with PMH of atrial fib, alzheimer's dementia, COPD, HTN, HLD, s/d CHF, chronic pain, depression, anxiety, KATHY on AVAPS, GERD who presents to [facility] with from [facility] with abnormal labs. History obtained from the patient and review of EMR. Patient is awake and alert lying in bed she is currently on 3 L of oxygen. She is post 1 unit packed red blood cells. She stated that she had a black bowel movement x1 this week approximately 3 days ago. 9/9/2022: S/P COLONOSCOPY POLYPECTOMY SNARE/COLD BIOPSY COLONOSCOPY CONTROL HEMORRHAGE COLONOSCOPY WITH BIOPSY    9/12: doppler (-) DVT    Subjective:  Patient reclined in chair and agreeable to treatment. Pain   Yes  Rating: mild  Location: low back with walking  Pain Medicine Status: Denies need .  Applied heat back    Bed Mobility:   Supine to Sit:  Not Tested  Sit to Supine:  Not Tested  Rolling:           Not Tested  Scooting:        SBA    Transfer Training:   Sit to stand:   CGA  Stand to sit:  CGA  Bed to Chair:  Not Tested  Bed to [facility]:   Not Tested  Standard toilet:   CGA and with use of RW    Activity Tolerance   Pt completed therapy session with No adverse symptoms noted w/activity  SpO2: 91% on 2L of O2  HR: 105  BP:     Balance  Sitting Balance :     SBA  Standing Balance   CGA    ADL Training:   Upper body dressing:  Min A  Upper body bathing:  Min A  Lower body dressing: Mod A  Lower body bathing:  Not Tested  Toileting:   CGA for pericare  Grooming/Hygiene:  Not Tested  Feeding :   setup    Therapeutic Exercise:   Elbow flex/ext  Shoulder flex/ext:  x10  Shoulder abd/add:  x10  Shoulder horizontal abd/add:  x10  Scapular retraction:  x10    Heel raises in standing: x10  Marching in standing: x10    Patient Education:   Role of OT  Recommendations for DC  Safe RW use/hand placement    Positioning Needs:   Up in chair, call light and needs in reach. Alarm Set    Family Present:  No    Assessment: Patient with improved participation in mobility. Patient will benefit from continued skilled OT services to improve independence and activity tolerance. GOALS  To be met in 3 Visits:  1). Bed to toilet/BSC: SBA     To be met in 5 Visits:  1). Supine to/from Sit:             Modified Independent  2). Upper Body Bathing:         Supervision  3). Lower Body Bathing:         Min A  4). Upper Body Dressing:       Supervision  5). Lower Body Dressing:       Min A  6).  Pt to demonstrate UE exs x 15 reps with minimal cues      Plan: cont with [address] [place] [address], OTR/L #[id number]      If patient discharges from this facility prior to next visit, this note will serve as the Discharge Summary

## 2022-09-12 NOTE — PROGRESS NOTES
Hospitalist Progress Note      PCP: Arnaldo Velázquez MD    Date of Admission: 9/7/2022       Interval history:  80 y.o. female with PMH of atrial fib, alzheimer's dementia, COPD, HTN, HLD, s/d CHF, chronic pain, depression, anxiety, KATHY on AVAPS, GERD who presents to [facility] with from [facility] with abnormal labs. Admitted for anemia   Status post 1 unit packed red blood cells. GI consulted   She stated that she had a black bowel movement x1 this week approximately 3 days ago. EGD 9/8 - unremarkable. C scope 9/9 - unremarkable. Subjective:     Awake alert and pleasantly confused - baseline dementia. SBFT planned per GI.      BG elevated today        Medications:  Reviewed    Infusion Medications    sodium chloride      dextrose      sodium chloride       Scheduled Medications    insulin glargine  22 Units SubCUTAneous BID    insulin lispro  0-16 Units SubCUTAneous TID WC    insulin lispro  0-4 Units SubCUTAneous Nightly    losartan  25 mg Oral Daily    furosemide  20 mg Oral BID    imipramine  50 mg Oral Nightly    sodium chloride flush  5-40 mL IntraVENous 2 times per day    amLODIPine  5 mg Oral Daily    budesonide-formoterol  2 puff Inhalation BID    clonazePAM  1 mg Oral Nightly    fluticasone  2 spray Nasal Daily    guaiFENesin  600 mg Oral BID    predniSONE  2.5 mg Oral Daily    vitamin B-12  500 mcg Oral Daily    sodium chloride flush  5-40 mL IntraVENous 2 times per day    pantoprazole  40 mg IntraVENous Daily    oxybutynin  5 mg Oral Nightly    DULoxetine  60 mg Oral Daily    fenofibrate  54 mg Oral Daily    atorvastatin  40 mg Oral Nightly    cetirizine  5 mg Oral Daily    potassium chloride  20 mEq Oral BID WC    pregabalin  50 mg Oral BID    pregabalin  75 mg Oral Nightly    trospium  20 mg Oral Daily    DULoxetine  20 mg Oral Nightly     PRN Meds: sodium chloride flush, sodium chloride, albuterol sulfate HFA, methyl salicylate-menthol, glucose, dextrose bolus **OR** dextrose bolus, glucagon (rDNA), dextrose, sodium chloride flush, sodium chloride, ondansetron **OR** ondansetron, acetaminophen **OR** acetaminophen      Intake/Output Summary (Last 24 hours) at 9/12/2022 1310  Last data filed at 9/12/2022 1200  Gross per 24 hour   Intake 120 ml   Output 2600 ml   Net -2480 ml         Physical Exam Performed:    BP (!) 153/80 Comment: nurse notified  Pulse (!) 109 Comment: nurse notified  Temp 97.3 °F (36.3 °C) (Oral)   Resp 17   Ht 5' 7\" (1.702 m)   Wt 159 lb 6.4 oz (72.3 kg)   SpO2 91%   BMI 24.97 kg/m²     General appearance: No apparent distress, appears stated age and cooperative. awake, alert, oriented X2. To place and person  HEENT: Pupils equal, round, and reactive to light. Conjunctivae/corneas clear. Neck: Supple, with full range of motion. No jugular venous distention. Trachea midline. Respiratory:  Normal respiratory effort. Clear to auscultation, bilaterally without Rales/Wheezes/Rhonchi. Cardiovascular: Regular rate and rhythm with normal S1/S2 without murmurs, rubs or gallops. Abdomen: Soft, non-tender, non-distended with normal bowel sounds. Musculoskeletal: No clubbing, cyanosis or edema bilaterally. Full range of motion without deformity. Skin: Skin color, texture, turgor normal.  No rashes or lesions. Neurologic:  Neurovascularly intact without any focal sensory/motor deficits. Cranial nerves: II-XII intact, grossly non-focal.  Psychiatric: Alert and disoriented - baseline dementia. Capillary Refill: Brisk, 3 seconds, normal   Peripheral Pulses: +2 palpable, equal bilaterally       Labs:   Recent Labs     09/10/22  0419 09/11/22  0758   WBC 4.1 5.9   HGB 7.7* 8.0*   HCT 25.1* 27.6*    174       Recent Labs     09/10/22  0419 09/11/22  0758    141   K 3.5 3.8   CL 98* 98*   CO2 32 34*   BUN 10 12   CREATININE 0.8 0.9   CALCIUM 9.2 9.5   PHOS  --  2.4*       No results for input(s): AST, ALT, BILIDIR, BILITOT, ALKPHOS in the last 72 hours.     No results for input(s): INR in the last 72 hours. No results for input(s): Thanh Humphries in the last 72 hours. Urinalysis:      Lab Results   Component Value Date/Time    NITRU Negative 09/07/2022 02:39 PM    [address] Chanbi 6-9 09/07/2022 02:39 PM    BACTERIA 1+ 09/07/2022 02:39 PM    RBCUA 3-4 09/07/2022 02:39 PM    BLOODU Negative 09/07/2022 02:39 PM    SPECGRAV 1.015 09/07/2022 02:39 PM    GLUCOSEU 250 09/07/2022 02:39 PM    GLUCOSEU NEGATIVE 04/21/2012 04:15 AM       Radiology:  VL Extremity Venous Right         CT ABDOMEN PELVIS W IV CONTRAST Additional Contrast? None   Final Result   1. Umbilicus skin thickening present with surrounding fat stranding. Finding   may be related to an inflammatory/infectious process. Can correlate with   direct inspection. 2. Pelvic skin thickening and subcutaneous fat stranding may be related to   edema, possibly dependent edema versus fluid overload. 3. Again seen left paraumbilical fat-containing ventral hernia and bilateral   fat-containing inguinal hernias. 4. Sigmoid colon diverticulosis without evidence of acute diverticulitis. 5. Questionable punctate left nonobstructing nephrolithiasis. 6. Partially visualized moderate right pleural effusion with associated   dependent atelectasis. XR CHEST PORTABLE   Final Result   Cardiomegaly with findings of pulmonary edema and a trace right pleural   effusion. FL SMALL BOWEL FOLLOW THROUGH ONLY    (Results Pending)          LE doppler study   Within the limits of this exam, there is no evidence of deep or superficial    venous thrombosis in the right lower extremity or left common femoral vein.     Assessment/Plan:    Active Hospital Problems    Diagnosis     GI bleed [K92.2]      Priority: Medium    Acute on chronic respiratory failure with hypoxia (HCC) [J96.21]      Priority: Medium    Bradycardia [R00.1]      Priority: Medium    HTN (hypertension), benign [I10]      Priority: Medium     Class: Chronic    DM (diabetes mellitus), type 2, uncontrolled ([facility] [E11.65]      Priority: Medium    COPD without exacerbation ([facility] [J44.9]     A-fib ([facility] [I48.91]     Acute congestive heart failure (HCC) [I50.9]     Alzheimer's disease ([facility] [G30.9, F02.80]     Symptomatic anemia [D64.9]        Anemia of acute to subacute blood loss. GI bleed  - hemoglobin 6.2 at [facility]--6.5 upon arrival to ED  - 1 unit PRBC given  - stool occult negative  - pt does report black stools mauro 3 days ago, none since then  - holding ASA and Eliquis  - CT abdomen does not suggest possible source of blood loss. - PPI IV  - GI consult. - EGD 9/8 - unremarkable except possible montemayor's esophagus   - C scope 9/9 - no source of blood loss evident. Colon polyp +  - GI  has ordered SBFT today; plan on  capsule endoscopy in AM   - Hgb  stable at 8.0 today - no signs of active bleeding now. R Leg Edema asymmetric to left. Joe doppler neg for DVT     Acute on Chronic respiratory failure  - currently on 3 liters n/c  - given IV lasix  - resumed lasix PO increased to BID.   - decreased dose losartan. Sats stable on 3L    Afib with bradycardia   - resolved  - holding Coreg at this time  - monitor on telemetry     Combined CHF with fluid overload  - cxr showed cardiomegaly, pulmonary edema and trace right pleural effusion   - x1 IV lasix given after blood transfusion   - last echo in 2018 showed EF 45-50%, consider repeat echo  - decrease dose losartan,   - holding coreg due to episodes of bradycardia  - lasix BID    COPD without AE  - no AE  - continue home inhalers and daily prednisone  - monitor     KATHY  - on AVAPS at [facility]  - reordered     HTN  - stable, continue home regimen  - monitor     DM type 2  - Blood sugar still elevated  > will increase dose of Lantus to 22 units twice daily. continue  SSI   - monitor      Anxiety  Depression  - continue home regimen     Chronic Pain  - patient is on Lyrica 50 mg at 5 am and 1 pm and 75 mg at night  - also takes Cymbalta BID    GERD  - Continue PPI       Alzheimer's Disease   - pt is alert and partially oriented to p;ace and person today  - Continue supportive care          DVT Prophylaxis: scd  Diet: modified diet resumed  Code Status: DNR-CC         Patient admitted with anemia, GI bleed. .  S/p EGD and colonoscopy. Plan is for small bowel follow-through today; if this is normal > patient will get a capsule study tomorrow.     Oxygen saturation stable on 3 L      Adore Weber MD   9/12/2022

## 2022-09-12 NOTE — FLOWSHEET NOTE
09/12/22 0231   Vital Signs   Temp 99.1 °F (37.3 °C)   Temp Source Oral   Heart Rate 93   Heart Rate Source Monitor   Resp 16   /65   BP Location Left upper arm   BP Method Automatic   MAP (Calculated) 86   Patient Position Semi fowlers   Oxygen Therapy   SpO2 95 %   O2 Device Nasal cannula   O2 Flow Rate (L/min) 3 L/min   Patient in bed with eyes closed. Awakened for vitals. Patient is stable. Patient denies any further needs at this time. Call light and bedside table within reach.

## 2022-09-13 VITALS
DIASTOLIC BLOOD PRESSURE: 79 MMHG | WEIGHT: 158.4 LBS | OXYGEN SATURATION: 96 % | SYSTOLIC BLOOD PRESSURE: 145 MMHG | HEIGHT: 67 IN | RESPIRATION RATE: 16 BRPM | HEART RATE: 97 BPM | TEMPERATURE: 98 F | BODY MASS INDEX: 24.86 KG/M2

## 2022-09-13 LAB
GLUCOSE BLD-MCNC: 167 MG/DL (ref 70–99)
GLUCOSE BLD-MCNC: 204 MG/DL (ref 70–99)
GLUCOSE BLD-MCNC: 217 MG/DL (ref 70–99)
PERFORMED ON: ABNORMAL
SARS-COV-2, NAAT: NOT DETECTED

## 2022-09-13 PROCEDURE — 87635 SARS-COV-2 COVID-19 AMP PRB: CPT

## 2022-09-13 PROCEDURE — 96376 TX/PRO/DX INJ SAME DRUG ADON: CPT

## 2022-09-13 PROCEDURE — 99239 HOSP IP/OBS DSCHRG MGMT >30: CPT | Performed by: INTERNAL MEDICINE

## 2022-09-13 PROCEDURE — 94761 N-INVAS EAR/PLS OXIMETRY MLT: CPT

## 2022-09-13 PROCEDURE — 6370000000 HC RX 637 (ALT 250 FOR IP): Performed by: INTERNAL MEDICINE

## 2022-09-13 PROCEDURE — C9113 INJ PANTOPRAZOLE SODIUM, VIA: HCPCS | Performed by: NURSE PRACTITIONER

## 2022-09-13 PROCEDURE — 6360000002 HC RX W HCPCS: Performed by: NURSE PRACTITIONER

## 2022-09-13 PROCEDURE — 94660 CPAP INITIATION&MGMT: CPT

## 2022-09-13 PROCEDURE — 6370000000 HC RX 637 (ALT 250 FOR IP): Performed by: NURSE PRACTITIONER

## 2022-09-13 PROCEDURE — 2580000003 HC RX 258: Performed by: ANESTHESIOLOGY

## 2022-09-13 PROCEDURE — 2700000000 HC OXYGEN THERAPY PER DAY

## 2022-09-13 PROCEDURE — 94640 AIRWAY INHALATION TREATMENT: CPT

## 2022-09-13 RX ORDER — LOSARTAN POTASSIUM 100 MG/1
50 TABLET ORAL DAILY
COMMUNITY
Start: 2022-09-13

## 2022-09-13 RX ORDER — FERROUS SULFATE 325(65) MG
325 TABLET ORAL
DISCHARGE
Start: 2022-09-13

## 2022-09-13 RX ORDER — FUROSEMIDE 20 MG/1
20 TABLET ORAL 2 TIMES DAILY
Status: ON HOLD | DISCHARGE
Start: 2022-09-13 | End: 2022-12-23 | Stop reason: SDUPTHER

## 2022-09-13 RX ORDER — CARVEDILOL 6.25 MG/1
3.12 TABLET ORAL 2 TIMES DAILY WITH MEALS
DISCHARGE
Start: 2022-09-13

## 2022-09-13 RX ORDER — POTASSIUM CHLORIDE 1.5 G/1.77G
20 POWDER, FOR SOLUTION ORAL 2 TIMES DAILY
DISCHARGE
Start: 2022-09-13

## 2022-09-13 RX ADMIN — AMLODIPINE BESYLATE 5 MG: 5 TABLET ORAL at 08:30

## 2022-09-13 RX ADMIN — FENOFIBRATE 54 MG: 54 TABLET ORAL at 08:30

## 2022-09-13 RX ADMIN — CYANOCOBALAMIN TAB 500 MCG 500 MCG: 500 TAB at 08:31

## 2022-09-13 RX ADMIN — POTASSIUM CHLORIDE 20 MEQ: 1500 TABLET, EXTENDED RELEASE ORAL at 08:31

## 2022-09-13 RX ADMIN — FUROSEMIDE 20 MG: 20 TABLET ORAL at 08:31

## 2022-09-13 RX ADMIN — GUAIFENESIN 600 MG: 600 TABLET, EXTENDED RELEASE ORAL at 08:31

## 2022-09-13 RX ADMIN — CETIRIZINE HYDROCHLORIDE 5 MG: 10 TABLET ORAL at 08:31

## 2022-09-13 RX ADMIN — Medication 2 PUFF: at 07:39

## 2022-09-13 RX ADMIN — PREDNISONE 2.5 MG: 5 TABLET ORAL at 08:31

## 2022-09-13 RX ADMIN — PANTOPRAZOLE SODIUM 40 MG: 40 INJECTION, POWDER, FOR SOLUTION INTRAVENOUS at 08:35

## 2022-09-13 RX ADMIN — TROSPIUM CHLORIDE 20 MG: 20 TABLET, FILM COATED ORAL at 08:30

## 2022-09-13 RX ADMIN — INSULIN GLARGINE 22 UNITS: 100 INJECTION, SOLUTION SUBCUTANEOUS at 08:35

## 2022-09-13 RX ADMIN — DULOXETINE HYDROCHLORIDE 60 MG: 60 CAPSULE, DELAYED RELEASE ORAL at 08:31

## 2022-09-13 RX ADMIN — FLUTICASONE PROPIONATE 2 SPRAY: 50 SPRAY, METERED NASAL at 08:32

## 2022-09-13 RX ADMIN — LOSARTAN POTASSIUM 25 MG: 25 TABLET, FILM COATED ORAL at 08:31

## 2022-09-13 RX ADMIN — PREGABALIN 50 MG: 25 CAPSULE ORAL at 13:16

## 2022-09-13 RX ADMIN — SODIUM CHLORIDE, PRESERVATIVE FREE 10 ML: 5 INJECTION INTRAVENOUS at 08:30

## 2022-09-13 NOTE — CARE COORDINATION
DISCHARGE ORDER  Date/Time 2022 1:34 PM  Completed by: KEMAL Falcon   Case Management    Patient Name: Shannan Starr    : 1940      Admit order Date and Status:2022 Inpatient  Noted discharge order. (verify MD's last order for status of admission/Traditional Medicare 3 [place] Inpatient qualifying stay required for SNF)    Confirmed discharge plan with:              Patient:  Yes              When pt confirms DC plan does any support person need to be contacted by CM Yes if yes who pt's son Camille Abad via phone call at [phone]    Discharge to Facility: [address] [place] [address] phone number for staff giving report: [phone]   Pre-certification completed: n/a pt returning to [address] [place] [address] Notification (HENS) completed: n/a pt returning LTC   Discharge orders and Continuity of Care faxed to facility:  Tamela Phillips at [facility] stated that she will pull the information from epic and writer did not need to fax anything to her. Transportation:               Medical Transport explained with choice list offered to pt/family. Choice:Yes(no preference)  Agency used: [facility]    time:   4:00pm      Pt/family/Nursing/Facility aware of  time:   Yes Names: pt, pt's son dustinOtoniel RN, Filomena Barnes RN, Yves Carreon at [facility]  Ambulance form completed:  yes      Date Last IMM Given: today by writer; pt stated agreement with transfer. Comments:Chart review completed. Met with pt at bedside. Pt in agreement with returning to [facility] today and requested writer call her son. She stated first opening on transportation when writer inquired and stated [facility] was perfectly fine. Spoke with pt's son Camille Abad. He is in agreement of the above and stated he will help pt set up an account on [employer] after they speak with admitting.  Camille Abad aware pt would need to get medical records from medical records. Jamison Ceballos at [facility] aware of the above and stated pt will need a rapid covid. Otoniel RN aware of the above and aware that a rapid covid is needed as charge RN ordered. Pt is being d/c'd to [facility] today. Pt's O2 sats are 96% on 2 liters per vitals in epic. Discharge timeout done with Otoniel RN. All discharge needs and concerns addressed. Discharging nurse to complete BELINDA, reconcile AVS, and place final copy with patient's discharge packet. Discharging RN to ensure that written prescriptions for Level II medications are sent with patient to the facility as per protocol.

## 2022-09-13 NOTE — PROGRESS NOTES
Writer/KEY found patient missing lower dentures. Writer placed in denture bowl with patient label and called [facility] and spoke with Tri Ramsay patient's nurse. Tri Ramsay states she will drive over tonight sometime after 1900 to pick them up.

## 2022-09-13 NOTE — DISCHARGE SUMMARY
Name:  Cy Moncada  Room:  /2562-15  MRN:    [id number]    Discharge Summary      This discharge summary is in conjunction with a complete physical exam done on the day of discharge. Discharging Physician: Dr. Karen Castillo: 9/7/2022  Discharge:  9/13/2022    HPI taken from admission H&P:    The patient is a 80 y.o. female with PMH of atrial fib, alzheimer's dementia, COPD, HTN, HLD, s/d CHF, chronic pain, depression, anxiety, KATHY on AVAPS, GERD who presents to [facility] with from [facility] with abnormal labs. History obtained from the patient and review of EMR. Patient is awake and alert lying in bed she is currently on 3 L of oxygen. She is post 1 unit packed red blood cells. She stated that she had a black bowel movement x1 this week approximately 3 days ago. Since then her bowel movements have been normal.  She did have an episode of diarrhea last week. She stated overall she feels well other than some generalized fatigue and weakness. Stated she slipped out of a chair last week onto her bottom with no injuries. Her shortness of breath is at baseline other than with exertion. She denies any chest pain, fever, chills, palpitations, lightheaded, dizziness, or orthopnea. She does endorse occasional abdominal pain, mostly at the mid epigastric region. Also stated she has had an umbilical hernia for quite some time and it can be tender at times currently denies any pain to this area. In ED patient's hemoglobin noted to be 6.5 she was transfused 1 unit of packed red blood cells. BNP noted to be elevated I ordered 20 mg IV Lasix x1. Stool occult was negative in ED. Patient noted to be in A. fib on the monitor with occasional bradycardic episodes in the low 40s. CT of the abdomen was ordered and pending at time of admission. GI consulted patient admitted to PCU for further care. Diagnoses this Admission and Hospital Course      Anemia of acute to subacute blood loss. Chronic iron deficiency anemia  GI bleed  - hemoglobin 6.2 at [facility]--6.5 upon arrival to ED  - 1 unit PRBC given  - stool occult negative  - pt does report black stools mauro 3 days ago, none since then  - holding ASA and Eliquis  - CT abdomen does not suggest possible source of blood loss. - PPI IV  -Patient got IV Venofer  - GI consulted   - EGD 9/8 - unremarkable except possible montemayor's esophagus   - C scope 9/9 - no source of blood loss evident. Colon polyp +  - SBFT - Normal  ; capsule endoscopy was planned but patient refused . - Hgb stable at 8.0 today - no signs of active bleeding now. -Discharged on oral iron supplements     R Leg Edema asymmetric to left. - Joe doppler neg for DVT     Acute on Chronic respiratory failure  - currently on 3 liters n/c  - given IV lasix  - resumed lasix PO->  increased to BID.   - decreased dose losartan. Sats stable on 3L     Afib with bradycardia   - resolved  - holding Coreg at this time due to bradycardia. - monitor on telemetry -patient stable A. Fib,  heart rate better closer to the 90s, will resume coreg  at a lower dose on discharge  Resume aspirin and Eliquis     Combined CHF with fluid overload  - cxr showed cardiomegaly, pulmonary edema and trace right pleural effusion   - x1 IV lasix given after blood transfusion   - last echo in 2018 showed EF 45-50%, consider repeat echo  - decrease dose losartan, switch to PO lasix , resume low dose coreg       COPD without AE  - no AE  - continue home inhalers and daily prednisone  - monitor      KATHY  - on AVAPS at [facility]  - reordered     HTN  - stable, continue home regimen  - monitor     DM type 2  - Blood sugars elevated  >  increased dose of Lantus to 22 units twice daily. continue  SSI   - monitor      Anxiety  Depression  - continue home regimen     Chronic Pain  - patient is on Lyrica 50 mg at 5 am and 1 pm and 75 mg at night  - also takes Cymbalta BID     GERD  - Continue PPI       Alzheimer's Disease   - pt is alert and partially oriented to p;ace and person today  - Continue supportive care      Patient admitted with anemia, GI bleed. S/p EGD and colonoscopy. Small bowel follow-through was normal.  Plan was for capsule study today but patient refused. Oxygen saturation stable on 2 L. .  Start oral iron supplements and discharge back to nursing home today. Procedures (Please Review Full Report for Details)  Colonoscopy with biopsy and cold snare polypectomy and clip placement   EGD     Consults    GI       Physical Exam at Discharge:    BP (!) 145/79   Pulse 97   Temp 98 °F (36.7 °C) (Oral)   Resp 16   Ht 5' 7\" (1.702 m)   Wt 158 lb 6.4 oz (71.8 kg)   SpO2 96%   BMI 24.81 kg/m²      General appearance: No apparent distress, appears stated age and cooperative. awake, alert, oriented X2. To place and person  HEENT: Pupils equal, round, and reactive to light. Conjunctivae/corneas clear. Neck: Supple, with full range of motion. No jugular venous distention. Trachea midline. Respiratory:  Normal respiratory effort. Clear to auscultation, bilaterally without Rales/Wheezes/Rhonchi. Cardiovascular: Irregularly irregular,  murmurs, rubs or gallops. Abdomen: Soft, non-tender, non-distended with normal bowel sounds. Musculoskeletal: No clubbing, cyanosis or edema bilaterally. Full range of motion without deformity. Skin: Skin color, texture, turgor normal.  No rashes or lesions. Neurologic:  Neurovascularly intact without any focal sensory/motor deficits. Cranial nerves: II-XII intact, grossly non-focal.  Psychiatric: Alert , awake oriented x2 . To place and person . - baseline dementia.    Capillary Refill: Brisk, 3 seconds, normal   Peripheral Pulses: +2 palpable, equal bilaterally      Lab Results   Component Value Date    WBC 5.9 09/11/2022    HGB 8.0 (L) 09/11/2022    HCT 27.6 (L) 09/11/2022    MCV 70.8 (L) 09/11/2022     09/11/2022     Lab Results   Component Value Date    WBC 5.9 09/11/2022 HGB 8.0 (L) 09/11/2022    HCT 27.6 (L) 09/11/2022    MCV 70.8 (L) 09/11/2022     09/11/2022       CULTURES  COVID and Flu not detected     RADIOLOGY  FL SMALL BOWEL FOLLOW THROUGH ONLY   Final Result   Unremarkable small bowel follow through series. VL Extremity Venous Right   Final Result      CT ABDOMEN PELVIS W IV CONTRAST Additional Contrast? None   Final Result   1. Umbilicus skin thickening present with surrounding fat stranding. Finding   may be related to an inflammatory/infectious process. Can correlate with   direct inspection. 2. Pelvic skin thickening and subcutaneous fat stranding may be related to   edema, possibly dependent edema versus fluid overload. 3. Again seen left paraumbilical fat-containing ventral hernia and bilateral   fat-containing inguinal hernias. 4. Sigmoid colon diverticulosis without evidence of acute diverticulitis. 5. Questionable punctate left nonobstructing nephrolithiasis. 6. Partially visualized moderate right pleural effusion with associated   dependent atelectasis. XR CHEST PORTABLE   Final Result   Cardiomegaly with findings of pulmonary edema and a trace right pleural   effusion. Discharge Medications     Medication List        START taking these medications      ferrous sulfate 325 (65 Fe) MG tablet  Commonly known as: IRON 325  Take 1 tablet by mouth daily (with breakfast)            CHANGE how you take these medications      budesonide-formoterol 160-4.5 MCG/ACT Aero  Commonly known as: SYMBICORT  What changed: Another medication with the same name was removed. Continue taking this medication, and follow the directions you see here. carvedilol 6.25 MG tablet  Commonly known as: COREG  Take 0.5 tablets by mouth 2 times daily (with meals)  What changed: how much to take     furosemide 20 MG tablet  Commonly known as: LASIX  Take 1 tablet by mouth in the morning and 1 tablet in the evening.   What changed: when to take this     potassium chloride 20 MEQ packet  Commonly known as: KLOR-CON  Take 20 mEq by mouth 2 times daily  What changed: how much to take            CONTINUE taking these medications      acetaminophen 325 MG tablet  Commonly known as: TYLENOL     amLODIPine 5 MG tablet  Commonly known as: NORVASC  Take 1 tablet by mouth daily     apixaban 5 MG Tabs tablet  Commonly known as: ELIQUIS     ARTIFICIAL TEAR OP     ASPIRIN     atorvastatin 40 MG tablet  Commonly known as: LIPITOR     Basaglar KwikPen 100 UNIT/ML injection pen  Generic drug: insulin glargine     bisacodyl 5 MG EC tablet  Commonly known as: DULCOLAX     clonazePAM 1 MG tablet  Commonly known as: KLONOPIN  Take 1 tablet by mouth nightly for 3 days. diclofenac sodium 1 % Gel  Commonly known as: VOLTAREN     * DULoxetine 60 MG extended release capsule  Commonly known as: CYMBALTA     * DULoxetine 20 MG extended release capsule  Commonly known as: CYMBALTA     fenofibrate 54 MG tablet  Commonly known as: TRICOR     fluticasone 50 MCG/ACT nasal spray  Commonly known as: FLONASE     * guaiFENesin 600 MG extended release tablet  Commonly known as: MUCINEX     * guaiFENesin 100 MG/5ML syrup  Commonly known as: ROBITUSSIN     imipramine 50 MG tablet  Commonly known as: TOFRANIL     loperamide 2 MG capsule  Commonly known as: IMODIUM     loratadine 10 MG capsule  Commonly known as: CLARITIN     losartan 100 MG tablet  Commonly known as: COZAAR     magnesium hydroxide 400 MG/5ML suspension  Commonly known as: MILK OF MAGNESIA     magnesium oxide 400 MG tablet  Commonly known as: MAG-OX     methyl salicylate-menthol 99-83 % Crea     Myrbetriq 25 MG Tb24  Generic drug: mirabegron     olopatadine 0.1 % ophthalmic solution  Commonly known as: PATANOL     omeprazole 20 MG delayed release capsule  Commonly known as: PRILOSEC     ondansetron 4 MG disintegrating tablet  Commonly known as: Zofran ODT  Take 1 tablet by mouth every 8 hours as needed for Nausea.  Let dissolve in mouth. oxybutynin 5 MG extended release tablet  Commonly known as: DITROPAN-XL     OXYGEN     predniSONE 2.5 MG tablet  Commonly known as: DELTASONE     * pregabalin 75 MG capsule  Commonly known as: LYRICA     * pregabalin 50 MG capsule  Commonly known as: LYRICA     senna 8.6 MG tablet  Commonly known as: SENOKOT     SITagliptin 50 MG tablet  Commonly known as: JANUVIA     vitamin B-12 500 MCG tablet  Commonly known as: CYANOCOBALAMIN           * This list has 6 medication(s) that are the same as other medications prescribed for you. Read the directions carefully, and ask your doctor or other care provider to review them with you. STOP taking these medications      PROAIR HFA IN               Where to Get Your Medications        Information about where to get these medications is not yet available    Ask your nurse or doctor about these medications  carvedilol 6.25 MG tablet  ferrous sulfate 325 (65 Fe) MG tablet  furosemide 20 MG tablet  potassium chloride 20 MEQ packet           Discharged in stable condition back to [facility]  Total time 40 minutes. > 50%  dominated by counseling and coordination of care. Follow Up:   Follow up with physician at [facility]      Adore Weber MD  9/13/2022

## 2022-09-13 NOTE — PROGRESS NOTES
Writer attempted to obtain consent for Esophageal pill cam today via endoscopy and pt is refusing any more test. Writer explained the importance of the test and patient states she is done with all the test. Patient states all the other test have came back \"unremarkable\" therefore she is done.  Jesi Gerardo NP with [facility] has been updated of patient refusal.

## 2022-09-13 NOTE — PROGRESS NOTES
Progress Note    Patient Hernando Boyle  MRN: [id number]  YOB: 1940 Age: 80 y.o. Sex: female  Room: [place] [address]       Admitting Physician: Gerhardt Portugal, MD   Date of Admission: 9/7/2022 12:23 PM   Primary Care Physician: Jyoti Maldonado MD     Subjective:  Hernando Boyle was seen and examined. We are following for anemia. -- No acute events overnight  -- Denies abdominal pain or nausea/vomiting  -- No overt GI bleed reported  -- Hgb stable  -- Refusing capsule endoscopy    ROS:  Constitutional: Denies fever, no change in appetite  Respiratory: Denies cough or shortness of breath  Cardiovascular: Denies chest pain or edema    Objective:  Vital Signs:   Vitals:    09/13/22 1012   BP:    Pulse:    Resp:    Temp:    SpO2: 96%         Physical Exam:  Constitutional: Alert and oriented x 4. No acute distress. Respiratory: Respirations nonlabored, no crepitus  GI: Abdomen nondistended, soft, and nontender. Neurological: No focal deficits noted. No asterixis.     Intake/Output:    Intake/Output Summary (Last 24 hours) at 9/13/2022 1027  Last data filed at 9/13/2022 0818  Gross per 24 hour   Intake 360 ml   Output 2025 ml   Net -1665 ml        Current Medications:  Current Facility-Administered Medications   Medication Dose Route Frequency Provider Last Rate Last Admin    polyethylene glycol (GoLYTELY) solution 2,000 mL  2,000 mL Oral Once Hung Vaughn, APRN - CNP        insulin glargine (LANTUS) injection vial 22 Units  22 Units SubCUTAneous BID Juliana eKnt MD   22 Units at 09/13/22 0835    insulin lispro (HUMALOG) injection vial 0-16 Units  0-16 Units SubCUTAneous TID WC Gerhardt Portugal, MD   12 Units at 09/12/22 1631    insulin lispro (HUMALOG) injection vial 0-4 Units  0-4 Units SubCUTAneous Nightly Gerhardt Portugal, MD        losartan (COZAAR) tablet 25 mg  25 mg Oral Daily Gerhardt Portugal, MD   25 mg at 09/13/22 0831    furosemide (LASIX) tablet 20 mg  20 mg Oral BID Shannan Zhou MD   20 mg at 09/13/22 0831    imipramine (TOFRANIL) tablet 50 mg  50 mg Oral Nightly STEPHANIE Whaley - CNP   50 mg at 09/11/22 2033    sodium chloride flush 0.9 % injection 5-40 mL  5-40 mL IntraVENous 2 times per day Phoebe Hopper MD   10 mL at 09/13/22 0830    sodium chloride flush 0.9 % injection 5-40 mL  5-40 mL IntraVENous PRN Phoebe Hopper MD   10 mL at 09/12/22 1537    0.9 % sodium chloride infusion  25 mL IntraVENous PRN Phoebe Hopper MD        albuterol sulfate HFA (PROVENTIL;VENTOLIN;PROAIR) 108 (90 Base) MCG/ACT inhaler 2 puff  2 puff Inhalation Q4H PRN Tianna Garcia APRN - CNP        amLODIPine (NORVASC) tablet 5 mg  5 mg Oral Daily Tianna Garcia APRN - CNP   5 mg at 09/13/22 0830    budesonide-formoterol (SYMBICORT) 160-4.5 MCG/ACT inhaler 2 puff  2 puff Inhalation BID Tianna Garcia APRN - CNP   2 puff at 09/13/22 0739    clonazePAM (KLONOPIN) tablet 1 mg  1 mg Oral Nightly Tianna Garcia APRN - CNP   1 mg at 09/12/22 2042    fluticasone (FLONASE) 50 MCG/ACT nasal spray 2 spray  2 spray Nasal Daily Tianna Garcia APRN - CNP   2 spray at 09/13/22 0832    guaiFENesin (MUCINEX) extended release tablet 600 mg  600 mg Oral BID Tianna Garcia APRN - CNP   600 mg at 09/13/22 1595    methyl salicylate-menthol (LYNNETTE NEGRO GREASELESS) 10-15 % cream CREA   Apply externally Q8H PRN Tianna Garcia APRN - CNP        predniSONE (DELTASONE) tablet 2.5 mg  2.5 mg Oral Daily Tianna Garcia APRN - CNP   2.5 mg at 09/13/22 0831    vitamin B-12 (CYANOCOBALAMIN) tablet 500 mcg  500 mcg Oral Daily Tianna Garcia APRN - CNP   500 mcg at 09/13/22 0831    glucose chewable tablet 16 g  4 tablet Oral PRN Tianna Garcia APRN - CNP        dextrose bolus 10% 125 mL  125 mL IntraVENous PRN Tianna Garcia, APRN - CNP        Or    dextrose bolus 10% 250 mL  250 mL IntraVENous PRN Tianna Garcia, STEPHANIE - CNP        glucagon (rDNA) injection 1 mg  1 mg SubCUTAneous PRN STEPHANIE Whaley - ABIMAEL        dextrose 10 % infusion   IntraVENous Continuous PRN Dennis Safe, APRN - CNP        sodium chloride flush 0.9 % injection 5-40 mL  5-40 mL IntraVENous 2 times per day Dennis Safe, APRN - CNP   10 mL at 09/09/22 2043    sodium chloride flush 0.9 % injection 5-40 mL  5-40 mL IntraVENous PRN Ednnis Safe, APRN - CNP   10 mL at 09/09/22 1015    0.9 % sodium chloride infusion   IntraVENous PRN Dennis Safe, APRN - CNP        ondansetron (ZOFRAN-ODT) disintegrating tablet 4 mg  4 mg Oral Q8H PRN Dennis Safe, APRN - CNP        Or    ondansetron [facility]) injection 4 mg  4 mg IntraVENous Q6H PRN Dennsi Safe, APRN - CNP        acetaminophen (TYLENOL) tablet 650 mg  650 mg Oral Q6H PRN Dennis Safe, APRN - CNP        Or    acetaminophen (TYLENOL) suppository 650 mg  650 mg Rectal Q6H PRN Dennis Safe, APRN - CNP        pantoprazole (PROTONIX) injection 40 mg  40 mg IntraVENous Daily Dennis Safe, APRN - CNP   40 mg at 09/13/22 0835    oxybutynin (DITROPAN-XL) extended release tablet 5 mg  5 mg Oral Nightly Dennis Safe, APRN - CNP   5 mg at 09/12/22 2042    DULoxetine (CYMBALTA) extended release capsule 60 mg  60 mg Oral Daily Dennis Safe, APRN - CNP   60 mg at 09/13/22 0831    fenofibrate (TRICOR) tablet 54 mg  54 mg Oral Daily Dennis Safe, APRN - CNP   54 mg at 09/13/22 0830    atorvastatin (LIPITOR) tablet 40 mg  40 mg Oral Nightly Dennis Safe, APRN - CNP   40 mg at 09/12/22 2043    cetirizine (ZYRTEC) tablet 5 mg  5 mg Oral Daily Dennis Safe, APRN - CNP   5 mg at 09/13/22 0831    potassium chloride (KLOR-CON M) extended release tablet 20 mEq  20 mEq Oral BID WC Dennis Safe, APRN - CNP   20 mEq at 09/13/22 0831    pregabalin (LYRICA) capsule 50 mg  50 mg Oral BID Dennis Safe, APRN - CNP   50 mg at 09/12/22 1537    pregabalin (LYRICA) capsule 75 mg  75 mg Oral Nightly Dennis Safe, APRN - CNP   75 mg at 09/12/22 2042    trospium (SANCTURA) tablet 20 mg  20 mg Oral Daily Dennis Safe, APRN - CNP   20 mg at 09/13/22 0830    DULoxetine (CYMBALTA) extended release capsule 20 mg  20 mg Oral Nightly STEPHANIE Morin CNP   20 mg at 09/12/22 2043         Recent Imaging:   FL SMALL BOWEL FOLLOW THROUGH ONLY  Narrative: EXAMINATION:  SMALL BOWEL FOLLOW THROUGH SERIES    9/12/2022    TECHNIQUE:  Small bowel follow through series was performed with overhead images and spot  images. FLUOROSCOPY DOSE AND TYPE OR TIME AND EXPOSURES:  0    COMPARISON:  09/07/2022    HISTORY:  ORDERING SYSTEM PROVIDED HISTORY: small bowel abnormality  TECHNOLOGIST PROVIDED HISTORY:  Reason for exam:->small bowel abnormality  Reason for Exam: small bowel abdnormality    FINDINGS:  The  radiograph demonstrates a nonobstructive bowel gas pattern with  stool and air present throughout the colon. Status post cholecystectomy. Degenerative changes involve the lumbar spine. There is normal transit time to the terminal ileum. No focal abnormalities,  strictures or obstructions are seen of the small bowel. Impression: Unremarkable small bowel follow through series. Labs:   Recent Labs     09/11/22  0758   HGB 8.0*   WBC 5.9   LABALBU 4.2          Assessment:    80year old F with PMH significant for A Fib, Alzheimer's dementia, anxiety, COPD, diabetes mellitus, GERD, HTN, IBS, neuropathy, and seizures. Presents from SNF with a Hgb of 6.5 and melena. BUN elevated with normal creatinine. 9/8 EGD: Possible Roberts's esophagus, otherwise unremarkable. 9/9 Colonoscopy: Colon polyp    SBFT was unremarkable. Refusing capsule endoscopy. Hgb stable. Denies any overt GI bleeding. Plan:  Monitor H/H and overt signs of GI bleeding  Continue PPI  Okay for regular diet from GI standpoint  Recommend iron supplement in the setting of FLEX and anticoagulation  If patient were to have evidence of ongoing bleeding would recommend capsule endoscopy  GI will follow up as needed. Please call with questions or concerns. Supportive care       Elspeth Goodpasture, APRN - CNP    Disha Case    [phone]. Also available via [employer]

## 2022-09-13 NOTE — FLOWSHEET NOTE
09/13/22 0815   Vital Signs   Temp 98 °F (36.7 °C)   Temp Source Oral   Heart Rate 98   Heart Rate Source Monitor   Resp 16   /70   BP Location Left upper arm   BP Method Automatic   MAP (Calculated) 93   Patient Position Right side   Level of Consciousness 0   MEWS Score 1   Oxygen Therapy   SpO2 100 %   Pulse Oximeter Device Location Finger   O2 Device Nasal cannula   O2 Flow Rate (L/min) 3 L/min     Shift assessment complete - see flow sheet, pt denies needs, call light with in reach.

## 2022-09-13 NOTE — PROGRESS NOTES
Pt resting in bed this evening watching TV. Shift assessment complete and all meds given per [facility] with [facility]. Pts , 22 units of Lantus given. Pts VSS, A&O x 4. Bed in lowest position, call light within reach with all personal belongings. Pt denies any further needs. Will continue to monitor.

## 2022-09-13 NOTE — DISCHARGE INSTR - COC
Continuity of Care Form    Patient Name: Nancy Moreno   :  1940  MRN:  [id number]    Admit date:  2022  Discharge date:  22    Code Status Order: [facility]   Advance Directives:   [address] [facility] Documentation       Date/Time Healthcare Directive Type of Healthcare Directive Copy in [address] Agent's Name Healthcare Agent's Phone Number    22 [phone] Yes, patient has an advance directive for healthcare treatment Durable power of  for health care No, copy requested from family Adult Mckenna Camacho [phone]    22 1327 Yes, patient has an advance directive for healthcare treatment Durable power of  for health care No, copy requested from family Adult 344Maryann Angel [phone]            Admitting Physician:  Remy Santana MD  PCP: Carlton Cm MD    Discharging Nurse: Aftab Li 7010 Unit/Room#: /0731-38  Discharging Unit Phone Number: [phone]    Emergency Contact:   Extended Emergency Contact Information  Primary Emergency Contact: [facility] [address] [address] Phone: [phone]  Relation: Child  Secondary Emergency Contact: Daniel Hare [address] [address] Phone: [phone]  Relation: Child    Past Surgical History:  Past Surgical History:   Procedure Laterality Date    BREAST SURGERY      BENIGN CYST    CHOLECYSTECTOMY      COLONOSCOPY      COLONOSCOPY N/A 2022    COLONOSCOPY POLYPECTOMY SNARE/COLD BIOPSY performed by Tiffany Childs MD at [address] 2022    COLONOSCOPY CONTROL HEMORRHAGE performed by Tiffany Childs MD at [address] 2022    COLONOSCOPY WITH BIOPSY performed by Tiffany Childs MD at [facility] 3/25/2019    PHACOEMULSIFICATION OF CATARACT RIGHT EYE WITH INTRAOCULAR LENS IMPLANT performed by Carmela Sharpe MD at  [address] 4/8/2019    PHACOEMULSIFICATION OF CATARACT LEFT EYE WITH INTRAOCULAR LENS IMPLANT performed by Carmela Sharpe MD at [facility]  6-6-12    OPEN REDUCTION INTERNAL FIXATION RIGHT PROXIMAL HUMERAL SHAFT FRACTURE SYNTHES    OTHER SURGICAL HISTORY  07/18/12    incision and drainage of bursa rt elbow    PANCREAS BIOPSY      SHOULDER SURGERY Bilateral     rtc repair    UPPER GASTROINTESTINAL ENDOSCOPY N/A 9/8/2022    EGD BIOPSY performed by Hemanth Mueller DO at [address] [facility]       Immunization History:   Immunization History   Administered Date(s) Administered    COVID-19, PFIZER GRAY top, DO NOT Dilute, (age 15 y+), IM, 30 mcg/0.3 mL 05/24/2022    COVID-19, PFIZER PURPLE top, DILUTE for use, (age 15 y+), 30mcg/0.3mL 12/22/2020, 01/12/2021, 11/02/2021    Influenza A ([id number]) Vaccine PF IM 12/22/2009, 12/22/2009    Influenza Vaccine, unspecified formulation 09/24/2018    Influenza Virus Vaccine 09/24/2018    Influenza Whole 10/18/2010    Pneumococcal Conjugate 7-valent (Lucendia Latus) 01/18/2008       Active Problems:  Patient Active Problem List   Diagnosis Code    HTN (hypertension), benign I10    DM (diabetes mellitus), type 2, uncontrolled ([facility] [address] E11.65    Fracture, humerus S42.309A    Symptomatic anemia L03.3    Metabolic encephalopathy A40.45    Trochanteric bursitis, left hip M70.62    IT band syndrome M76.30    Left lumbar radiculitis M54.16    Spondylolisthesis, lumbar region M43.16    Low back pain M54.50    Left hip pain M25.552    Shortness of breath R06.02    Chronic cough R05.3    Alzheimer's disease (HCC) G30.9, F02.80    Acute congestive heart failure (HCC) I50.9    Hypoxia R09.02    Idiopathic peripheral neuropathy G60.9    COVID-19 U07.1    A-fib (HCC) I48.91    Pneumonia due to COVID-19 virus U07.1, J12.82    Acute hypoxemic respiratory failure (HCC) J96.01    COPD without exacerbation ([facility] [address] J44.9 GI bleed K92.2    Acute on chronic respiratory failure with hypoxia (HCC) J96.21    Chronic pain disorder G89.4    Depression F32. A    Bradycardia R00.1       Isolation/Infection:   Isolation            No Isolation          Patient Infection Status       Infection Onset Added Last Indicated Last Indicated By Review Planned Expiration Resolved Resolved By    None active    Resolved    COVID-19 (Rule Out) 22 COVID-19 & Influenza Combo (Ordered)   22 Rule-Out Test Resulted    COVID-19 21 COVID-19 & Influenza Combo   21     COVID-19 (Rule Out) 21 COVID-19 & Influenza Combo (Ordered)   21 Rule-Out Test Resulted    MDRO (multi-drug resistant organism)  10/20/16 10/20/16 Dominick Cee RN   18 Petros Briseno, RN            Nurse Assessment:  Last Vital Signs: BP (!) 145/79   Pulse 97   Temp 98 °F (36.7 °C) (Oral)   Resp 16   Ht 5' 7\" (1.702 m)   Wt 158 lb 6.4 oz (71.8 kg)   SpO2 96%   BMI 24.81 kg/m²     Last documented pain score (0-10 scale): Pain Level: 0  Last Weight:   Wt Readings from Last 1 Encounters:   22 158 lb 6.4 oz (71.8 kg)     Mental Status:  oriented and alert    IV Access:  - None    Nursing Mobility/ADLs:  Walking   Assisted  Transfer  Assisted  Bathing  Assisted  Dressing  Assisted  Toileting  Assisted  Feeding  Independent  Med Admin  Assisted  Med Delivery   whole and prefers mixed with applesauce or jello     Wound Care Documentation and Therapy:  Incision 12 Elbow Posterior (Active)   Number of days: 3709       Incision 19 Eye Left (Active)   Number of days: 1254        Elimination:  Continence:    Bowel: Yes  Bladder: Yes  Urinary Catheter: None   Colostomy/Ileostomy/Ileal Conduit: No       Date of Last BM: 9/10/22    Intake/Output Summary (Last 24 hours) at 2022 1325  Last data filed at 2022 0818  Gross per 24 hour   Intake 360 ml   Output 1625 ml   Net -1265 ml I/O last 3 completed shifts: In: 480 [P.O.:480]  Out: 2975 [Urine:2975]    Safety Concerns: At Risk for Falls    Impairments/Disabilities:      None    Nutrition Therapy:  Current Nutrition Therapy:   - Oral Diet:  General and Carb Control 4 carbs/meal (1800kcals/day)    Routes of Feeding: Oral  Liquids: Thin Liquids  Daily Fluid Restriction:   Last Modified Barium Swallow with Video (Video Swallowing Test): 09/12/2022    Treatments at the Time of Hospital Discharge:   Respiratory Treatments:   Oxygen Therapy:  is on oxygen at 2 L/min per nasal cannula. Ventilator:    - No ventilator support    Rehab Therapies: Physical Therapy and Occupational Therapy  Weight Bearing Status/Restrictions: No weight bearing restrictions  Other Medical Equipment (for information only, NOT a DME order):  wheelchair, walker, bedside commode, and hospital bed  Other Treatments:     Patient's personal belongings (please select all that are sent with patient):  Dentures upper, pinkbag with hygiene/bracelets etc.     RN SIGNATURE:  Electronically signed by Polly Velázquez RN on 9/13/22 at 2:18 PM EDT    CASE MANAGEMENT/SOCIAL WORK SECTION    Inpatient Status Date: 09/07/2022    Readmission Risk Assessment Score:  Readmission Risk              Risk of Unplanned Readmission:  28           Discharging to Facility/ Agency   Name: [facility]   Address:  Phone: [phone]  Fax:    / signature: Electronically signed by CISCO Traylor, BAO-S on 9/13/22 at 1:36 PM EDT    PHYSICIAN SECTION    Prognosis: Good    Condition at Discharge: Stable    Rehab Potential (if transferring to Rehab): Good    Recommended Labs or Other Treatments After Discharge:     Physician Certification: I certify the above information and transfer of Ayush Cole  is necessary for the continuing treatment of the diagnosis listed and that she requires Oleksandr Schmitz for greater 30 days.      Update Admission H&P: No change in H&P    PHYSICIAN SIGNATURE:  Electronically signed by Lillie Seay MD on 9/13/22 at 1:26 PM EDT

## 2022-09-13 NOTE — PROGRESS NOTES
Vitals:    09/13/22 0026   BP: 134/61   Pulse: (!) 108   Resp: 18   Temp: 98.2 °F (36.8 °C)   SpO2: 93%     Pt assisted to BSC x 1. Vitals & weight obtained. Batteries changed in tele. Pt aware of NPO status. Bed in lowest position, call light within reach. All patient needs addressed.

## 2022-09-13 NOTE — PROGRESS NOTES
IV removed without difficulty and dry dressing in place. Telemetry monitor removed and returned to [facility]. Pt left facility in stable condition to [facility] with [employer]  with all of their personal belongings.

## 2022-12-22 ENCOUNTER — HOSPITAL ENCOUNTER (OUTPATIENT)
Age: 82
Setting detail: OBSERVATION
Discharge: NURSING, REHAB OR LONG-TERM CARE | End: 2022-12-23
Attending: INTERNAL MEDICINE | Admitting: INTERNAL MEDICINE
Payer: COMMERCIAL

## 2022-12-22 ENCOUNTER — APPOINTMENT (OUTPATIENT)
Dept: CT IMAGING | Age: 82
End: 2022-12-22
Payer: COMMERCIAL

## 2022-12-22 ENCOUNTER — HOSPITAL ENCOUNTER (EMERGENCY)
Age: 82
Discharge: ANOTHER HOSPITAL | End: 2022-12-22
Attending: STUDENT IN AN ORGANIZED HEALTH CARE EDUCATION/TRAINING PROGRAM
Payer: COMMERCIAL

## 2022-12-22 ENCOUNTER — APPOINTMENT (OUTPATIENT)
Dept: MRI IMAGING | Age: 82
End: 2022-12-22
Attending: INTERNAL MEDICINE
Payer: COMMERCIAL

## 2022-12-22 ENCOUNTER — APPOINTMENT (OUTPATIENT)
Dept: GENERAL RADIOLOGY | Age: 82
End: 2022-12-22
Payer: COMMERCIAL

## 2022-12-22 VITALS
OXYGEN SATURATION: 97 % | RESPIRATION RATE: 20 BRPM | HEART RATE: 86 BPM | TEMPERATURE: 101.6 F | SYSTOLIC BLOOD PRESSURE: 123 MMHG | DIASTOLIC BLOOD PRESSURE: 57 MMHG

## 2022-12-22 DIAGNOSIS — A41.9 SEPTICEMIA (HCC): ICD-10-CM

## 2022-12-22 DIAGNOSIS — R47.1 DYSARTHRIA: ICD-10-CM

## 2022-12-22 DIAGNOSIS — N39.0 URINARY TRACT INFECTION WITHOUT HEMATURIA, SITE UNSPECIFIED: Primary | ICD-10-CM

## 2022-12-22 LAB
A/G RATIO: 1.1 (ref 1.1–2.2)
ALBUMIN SERPL-MCNC: 3.8 G/DL (ref 3.4–5)
ALP BLD-CCNC: 69 U/L (ref 40–129)
ALT SERPL-CCNC: 12 U/L (ref 10–40)
ANION GAP SERPL CALCULATED.3IONS-SCNC: 10 MMOL/L (ref 3–16)
AST SERPL-CCNC: 17 U/L (ref 15–37)
BACTERIA: ABNORMAL /HPF
BASOPHILS ABSOLUTE: 0 K/UL (ref 0–0.2)
BASOPHILS RELATIVE PERCENT: 0.2 %
BILIRUB SERPL-MCNC: 0.5 MG/DL (ref 0–1)
BILIRUBIN URINE: NEGATIVE
BLOOD, URINE: ABNORMAL
BUN BLDV-MCNC: 16 MG/DL (ref 7–20)
CALCIUM SERPL-MCNC: 9.6 MG/DL (ref 8.3–10.6)
CHLORIDE BLD-SCNC: 95 MMOL/L (ref 99–110)
CHP ED QC CHECK: YES
CLARITY: CLEAR
CO2: 30 MMOL/L (ref 21–32)
COLOR: YELLOW
CREAT SERPL-MCNC: 1.1 MG/DL (ref 0.6–1.2)
EKG ATRIAL RATE: 97 BPM
EKG DIAGNOSIS: NORMAL
EKG Q-T INTERVAL: 324 MS
EKG QRS DURATION: 74 MS
EKG QTC CALCULATION (BAZETT): 432 MS
EKG R AXIS: -46 DEGREES
EKG T AXIS: 106 DEGREES
EKG VENTRICULAR RATE: 107 BPM
EOSINOPHILS ABSOLUTE: 0 K/UL (ref 0–0.6)
EOSINOPHILS RELATIVE PERCENT: 0.2 %
EPITHELIAL CELLS, UA: ABNORMAL /HPF (ref 0–5)
GFR SERPL CREATININE-BSD FRML MDRD: 51 ML/MIN/{1.73_M2}
GLUCOSE BLD-MCNC: 150 MG/DL (ref 70–99)
GLUCOSE BLD-MCNC: 213 MG/DL (ref 70–99)
GLUCOSE BLD-MCNC: 226 MG/DL
GLUCOSE BLD-MCNC: 226 MG/DL (ref 70–99)
GLUCOSE BLD-MCNC: 237 MG/DL (ref 70–99)
GLUCOSE URINE: 250 MG/DL
HCT VFR BLD CALC: 39.4 % (ref 36–48)
HEMOGLOBIN: 12.6 G/DL (ref 12–16)
INFLUENZA A: NOT DETECTED
INFLUENZA B: NOT DETECTED
INR BLD: 1.46 (ref 0.87–1.14)
KETONES, URINE: NEGATIVE MG/DL
LACTIC ACID, SEPSIS: 1.4 MMOL/L (ref 0.4–1.9)
LEUKOCYTE ESTERASE, URINE: NEGATIVE
LYMPHOCYTES ABSOLUTE: 1 K/UL (ref 1–5.1)
LYMPHOCYTES RELATIVE PERCENT: 7.1 %
MCH RBC QN AUTO: 27.6 PG (ref 26–34)
MCHC RBC AUTO-ENTMCNC: 32.1 G/DL (ref 31–36)
MCV RBC AUTO: 86 FL (ref 80–100)
MICROSCOPIC EXAMINATION: YES
MONOCYTES ABSOLUTE: 1.4 K/UL (ref 0–1.3)
MONOCYTES RELATIVE PERCENT: 9.6 %
NEUTROPHILS ABSOLUTE: 11.8 K/UL (ref 1.7–7.7)
NEUTROPHILS RELATIVE PERCENT: 82.9 %
NITRITE, URINE: NEGATIVE
PDW BLD-RTO: 15.5 % (ref 12.4–15.4)
PERFORMED ON: ABNORMAL
PH UA: 7.5 (ref 5–8)
PLATELET # BLD: 170 K/UL (ref 135–450)
PMV BLD AUTO: 8.7 FL (ref 5–10.5)
POTASSIUM REFLEX MAGNESIUM: 4 MMOL/L (ref 3.5–5.1)
PROTEIN UA: 100 MG/DL
PROTHROMBIN TIME: 17.5 SEC (ref 11.7–14.5)
RBC # BLD: 4.58 M/UL (ref 4–5.2)
RBC UA: ABNORMAL /HPF (ref 0–4)
SARS-COV-2 RNA, RT PCR: NOT DETECTED
SODIUM BLD-SCNC: 135 MMOL/L (ref 136–145)
SPECIFIC GRAVITY UA: 1.02 (ref 1–1.03)
TOTAL PROTEIN: 7.4 G/DL (ref 6.4–8.2)
TROPONIN: 0.01 NG/ML
URINE REFLEX TO CULTURE: ABNORMAL
URINE TYPE: ABNORMAL
UROBILINOGEN, URINE: 0.2 E.U./DL
WBC # BLD: 14.2 K/UL (ref 4–11)
WBC UA: ABNORMAL /HPF (ref 0–5)

## 2022-12-22 PROCEDURE — 71045 X-RAY EXAM CHEST 1 VIEW: CPT

## 2022-12-22 PROCEDURE — 2580000003 HC RX 258: Performed by: STUDENT IN AN ORGANIZED HEALTH CARE EDUCATION/TRAINING PROGRAM

## 2022-12-22 PROCEDURE — 84484 ASSAY OF TROPONIN QUANT: CPT

## 2022-12-22 PROCEDURE — G0379 DIRECT REFER HOSPITAL OBSERV: HCPCS

## 2022-12-22 PROCEDURE — 6360000004 HC RX CONTRAST MEDICATION: Performed by: STUDENT IN AN ORGANIZED HEALTH CARE EDUCATION/TRAINING PROGRAM

## 2022-12-22 PROCEDURE — 6370000000 HC RX 637 (ALT 250 FOR IP): Performed by: STUDENT IN AN ORGANIZED HEALTH CARE EDUCATION/TRAINING PROGRAM

## 2022-12-22 PROCEDURE — 83605 ASSAY OF LACTIC ACID: CPT

## 2022-12-22 PROCEDURE — 87636 SARSCOV2 & INF A&B AMP PRB: CPT

## 2022-12-22 PROCEDURE — 81001 URINALYSIS AUTO W/SCOPE: CPT

## 2022-12-22 PROCEDURE — 85610 PROTHROMBIN TIME: CPT

## 2022-12-22 PROCEDURE — 73502 X-RAY EXAM HIP UNI 2-3 VIEWS: CPT

## 2022-12-22 PROCEDURE — 80053 COMPREHEN METABOLIC PANEL: CPT

## 2022-12-22 PROCEDURE — 94761 N-INVAS EAR/PLS OXIMETRY MLT: CPT

## 2022-12-22 PROCEDURE — 96365 THER/PROPH/DIAG IV INF INIT: CPT

## 2022-12-22 PROCEDURE — 36415 COLL VENOUS BLD VENIPUNCTURE: CPT

## 2022-12-22 PROCEDURE — 70496 CT ANGIOGRAPHY HEAD: CPT

## 2022-12-22 PROCEDURE — 94640 AIRWAY INHALATION TREATMENT: CPT

## 2022-12-22 PROCEDURE — 87040 BLOOD CULTURE FOR BACTERIA: CPT

## 2022-12-22 PROCEDURE — 6360000002 HC RX W HCPCS: Performed by: STUDENT IN AN ORGANIZED HEALTH CARE EDUCATION/TRAINING PROGRAM

## 2022-12-22 PROCEDURE — 2700000000 HC OXYGEN THERAPY PER DAY

## 2022-12-22 PROCEDURE — G0378 HOSPITAL OBSERVATION PER HR: HCPCS

## 2022-12-22 PROCEDURE — 93005 ELECTROCARDIOGRAM TRACING: CPT | Performed by: STUDENT IN AN ORGANIZED HEALTH CARE EDUCATION/TRAINING PROGRAM

## 2022-12-22 PROCEDURE — 6370000000 HC RX 637 (ALT 250 FOR IP): Performed by: INTERNAL MEDICINE

## 2022-12-22 PROCEDURE — 70450 CT HEAD/BRAIN W/O DYE: CPT

## 2022-12-22 PROCEDURE — 99285 EMERGENCY DEPT VISIT HI MDM: CPT

## 2022-12-22 PROCEDURE — 85025 COMPLETE CBC W/AUTO DIFF WBC: CPT

## 2022-12-22 PROCEDURE — 70551 MRI BRAIN STEM W/O DYE: CPT

## 2022-12-22 PROCEDURE — 93010 ELECTROCARDIOGRAM REPORT: CPT | Performed by: INTERNAL MEDICINE

## 2022-12-22 PROCEDURE — 2580000003 HC RX 258: Performed by: INTERNAL MEDICINE

## 2022-12-22 RX ORDER — DULOXETIN HYDROCHLORIDE 60 MG/1
60 CAPSULE, DELAYED RELEASE ORAL DAILY
Status: DISCONTINUED | OUTPATIENT
Start: 2022-12-23 | End: 2022-12-23 | Stop reason: HOSPADM

## 2022-12-22 RX ORDER — AMLODIPINE BESYLATE 5 MG/1
5 TABLET ORAL DAILY
Status: DISCONTINUED | OUTPATIENT
Start: 2022-12-23 | End: 2022-12-23 | Stop reason: HOSPADM

## 2022-12-22 RX ORDER — POLYETHYLENE GLYCOL 3350 17 G/17G
17 POWDER, FOR SOLUTION ORAL DAILY PRN
Status: DISCONTINUED | OUTPATIENT
Start: 2022-12-22 | End: 2022-12-23 | Stop reason: HOSPADM

## 2022-12-22 RX ORDER — ACETAMINOPHEN 500 MG
1000 TABLET ORAL
Status: COMPLETED | OUTPATIENT
Start: 2022-12-22 | End: 2022-12-22

## 2022-12-22 RX ORDER — ACETAMINOPHEN 325 MG/1
650 TABLET ORAL EVERY 6 HOURS PRN
Status: DISCONTINUED | OUTPATIENT
Start: 2022-12-22 | End: 2022-12-23 | Stop reason: HOSPADM

## 2022-12-22 RX ORDER — SODIUM CHLORIDE 0.9 % (FLUSH) 0.9 %
5-40 SYRINGE (ML) INJECTION EVERY 12 HOURS SCHEDULED
Status: DISCONTINUED | OUTPATIENT
Start: 2022-12-22 | End: 2022-12-23 | Stop reason: HOSPADM

## 2022-12-22 RX ORDER — CARVEDILOL 3.12 MG/1
3.12 TABLET ORAL 2 TIMES DAILY WITH MEALS
Status: DISCONTINUED | OUTPATIENT
Start: 2022-12-22 | End: 2022-12-23 | Stop reason: HOSPADM

## 2022-12-22 RX ORDER — ONDANSETRON 4 MG/1
4 TABLET, ORALLY DISINTEGRATING ORAL EVERY 8 HOURS PRN
Status: DISCONTINUED | OUTPATIENT
Start: 2022-12-22 | End: 2022-12-23 | Stop reason: HOSPADM

## 2022-12-22 RX ORDER — ATORVASTATIN CALCIUM 40 MG/1
40 TABLET, FILM COATED ORAL NIGHTLY
COMMUNITY

## 2022-12-22 RX ORDER — DEXTROSE MONOHYDRATE 100 MG/ML
INJECTION, SOLUTION INTRAVENOUS CONTINUOUS PRN
Status: DISCONTINUED | OUTPATIENT
Start: 2022-12-22 | End: 2022-12-23 | Stop reason: HOSPADM

## 2022-12-22 RX ORDER — ATORVASTATIN CALCIUM 40 MG/1
40 TABLET, FILM COATED ORAL NIGHTLY
Status: DISCONTINUED | OUTPATIENT
Start: 2022-12-22 | End: 2022-12-23 | Stop reason: HOSPADM

## 2022-12-22 RX ORDER — SODIUM CHLORIDE 9 MG/ML
INJECTION, SOLUTION INTRAVENOUS PRN
Status: DISCONTINUED | OUTPATIENT
Start: 2022-12-22 | End: 2022-12-23 | Stop reason: HOSPADM

## 2022-12-22 RX ORDER — INSULIN LISPRO 100 [IU]/ML
0-4 INJECTION, SOLUTION INTRAVENOUS; SUBCUTANEOUS NIGHTLY
Status: DISCONTINUED | OUTPATIENT
Start: 2022-12-22 | End: 2022-12-23 | Stop reason: HOSPADM

## 2022-12-22 RX ORDER — LOSARTAN POTASSIUM 25 MG/1
50 TABLET ORAL DAILY
Status: DISCONTINUED | OUTPATIENT
Start: 2022-12-23 | End: 2022-12-23 | Stop reason: HOSPADM

## 2022-12-22 RX ORDER — ACETAMINOPHEN 650 MG/1
650 SUPPOSITORY RECTAL EVERY 6 HOURS PRN
Status: DISCONTINUED | OUTPATIENT
Start: 2022-12-22 | End: 2022-12-23 | Stop reason: HOSPADM

## 2022-12-22 RX ORDER — INSULIN GLARGINE 100 [IU]/ML
30 INJECTION, SOLUTION SUBCUTANEOUS NIGHTLY
Status: DISCONTINUED | OUTPATIENT
Start: 2022-12-22 | End: 2022-12-23 | Stop reason: HOSPADM

## 2022-12-22 RX ORDER — SODIUM CHLORIDE 0.9 % (FLUSH) 0.9 %
5-40 SYRINGE (ML) INJECTION PRN
Status: DISCONTINUED | OUTPATIENT
Start: 2022-12-22 | End: 2022-12-23 | Stop reason: HOSPADM

## 2022-12-22 RX ORDER — INSULIN LISPRO 100 [IU]/ML
0-8 INJECTION, SOLUTION INTRAVENOUS; SUBCUTANEOUS
Status: DISCONTINUED | OUTPATIENT
Start: 2022-12-22 | End: 2022-12-23 | Stop reason: HOSPADM

## 2022-12-22 RX ORDER — PREGABALIN 25 MG/1
50 CAPSULE ORAL 2 TIMES DAILY
Status: DISCONTINUED | OUTPATIENT
Start: 2022-12-22 | End: 2022-12-23 | Stop reason: HOSPADM

## 2022-12-22 RX ORDER — FERROUS SULFATE 325(65) MG
325 TABLET ORAL
Status: DISCONTINUED | OUTPATIENT
Start: 2022-12-23 | End: 2022-12-23 | Stop reason: HOSPADM

## 2022-12-22 RX ORDER — FUROSEMIDE 20 MG/1
20 TABLET ORAL DAILY
Status: DISCONTINUED | OUTPATIENT
Start: 2022-12-23 | End: 2022-12-23 | Stop reason: HOSPADM

## 2022-12-22 RX ORDER — 0.9 % SODIUM CHLORIDE 0.9 %
500 INTRAVENOUS SOLUTION INTRAVENOUS ONCE
Status: COMPLETED | OUTPATIENT
Start: 2022-12-22 | End: 2022-12-22

## 2022-12-22 RX ORDER — BUDESONIDE AND FORMOTEROL FUMARATE DIHYDRATE 160; 4.5 UG/1; UG/1
2 AEROSOL RESPIRATORY (INHALATION) 2 TIMES DAILY
COMMUNITY

## 2022-12-22 RX ORDER — INSULIN LISPRO 100 [IU]/ML
8 INJECTION, SOLUTION INTRAVENOUS; SUBCUTANEOUS
Status: DISCONTINUED | OUTPATIENT
Start: 2022-12-22 | End: 2022-12-23 | Stop reason: HOSPADM

## 2022-12-22 RX ORDER — OXYBUTYNIN CHLORIDE 5 MG/1
5 TABLET, EXTENDED RELEASE ORAL EVERY EVENING
Status: DISCONTINUED | OUTPATIENT
Start: 2022-12-22 | End: 2022-12-23 | Stop reason: HOSPADM

## 2022-12-22 RX ORDER — ONDANSETRON 2 MG/ML
4 INJECTION INTRAMUSCULAR; INTRAVENOUS EVERY 6 HOURS PRN
Status: DISCONTINUED | OUTPATIENT
Start: 2022-12-22 | End: 2022-12-23 | Stop reason: HOSPADM

## 2022-12-22 RX ADMIN — PREGABALIN 50 MG: 25 CAPSULE ORAL at 20:09

## 2022-12-22 RX ADMIN — ACETAMINOPHEN 1000 MG: 500 TABLET ORAL at 09:11

## 2022-12-22 RX ADMIN — Medication 2 PUFF: at 20:29

## 2022-12-22 RX ADMIN — INSULIN GLARGINE 30 UNITS: 100 INJECTION, SOLUTION SUBCUTANEOUS at 23:05

## 2022-12-22 RX ADMIN — INSULIN LISPRO 8 UNITS: 100 INJECTION, SOLUTION INTRAVENOUS; SUBCUTANEOUS at 17:16

## 2022-12-22 RX ADMIN — CARVEDILOL 3.12 MG: 3.12 TABLET, FILM COATED ORAL at 17:16

## 2022-12-22 RX ADMIN — ATORVASTATIN CALCIUM 40 MG: 40 TABLET, FILM COATED ORAL at 20:09

## 2022-12-22 RX ADMIN — SODIUM CHLORIDE 500 ML: 9 INJECTION, SOLUTION INTRAVENOUS at 10:37

## 2022-12-22 RX ADMIN — APIXABAN 5 MG: 5 TABLET, FILM COATED ORAL at 20:09

## 2022-12-22 RX ADMIN — OXYBUTYNIN CHLORIDE 5 MG: 5 TABLET, EXTENDED RELEASE ORAL at 17:16

## 2022-12-22 RX ADMIN — CEFTRIAXONE SODIUM 1000 MG: 1 INJECTION, POWDER, FOR SOLUTION INTRAMUSCULAR; INTRAVENOUS at 10:39

## 2022-12-22 RX ADMIN — IOPAMIDOL 85 ML: 755 INJECTION, SOLUTION INTRAVENOUS at 08:31

## 2022-12-22 RX ADMIN — Medication 10 ML: at 23:05

## 2022-12-22 RX ADMIN — INSULIN LISPRO 2 UNITS: 100 INJECTION, SOLUTION INTRAVENOUS; SUBCUTANEOUS at 17:17

## 2022-12-22 ASSESSMENT — LIFESTYLE VARIABLES
HOW MANY STANDARD DRINKS CONTAINING ALCOHOL DO YOU HAVE ON A TYPICAL DAY: PATIENT DOES NOT DRINK
HOW OFTEN DO YOU HAVE A DRINK CONTAINING ALCOHOL: NEVER

## 2022-12-22 NOTE — ED NOTES
Patient is asleep in bed. Door is open.  Denies any needs at this time      Tye Gerardo RN  12/22/22 5437

## 2022-12-22 NOTE — ED NOTES
1045 transfer center called for a bed at [facility] called back and spoke to [facility]    [phone] Transfer center called with bed assignment [phone] and nurse to nurse # [phone].  [address]  [address] Olen Nyhan  12/22/22 [address] Olen Nyhan  12/22/22 7576

## 2022-12-22 NOTE — ED NOTES
Patient is in [address] with this nurse and Nicolasa Teixeira RN at bedside in radiology.       Esther Gann RN  12/22/22 2454

## 2022-12-22 NOTE — ED NOTES
Dr Kiah Jorge assessed patient with EMS, did NIH scale, going directly to CT.       Dotty De Anda RN  12/22/22 9690

## 2022-12-22 NOTE — ED NOTES
Medication list completed via med list from nursing home that EMS brought in.       Teresita England RN  12/22/22 3910

## 2022-12-22 NOTE — ED PROVIDER NOTES
[address] ED      CHIEF COMPLAINT  No chief complaint on file. HISTORY OF PRESENT ILLNESS  Raven Cunha is a [de-identified] y.o. female  who presents to the ED complaining of fall and slurred speech. Patient presenting from [facility] due to the fall this morning. Apparently they have remove the bed rails and patient did not has not been hold onto, causing her to fall when she sat up in bed. Uncertain if she struck her head. Nursing noted that her speech seems slurred which is abnormal for her. They report that she is normally alert and oriented x4. Last known well was last night before bed. Was also reporting hip pain. 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 No other complaints, modifying factors or associated symptoms. I have reviewed the following from the nursing documentation.     Past Medical History:   Diagnosis Date    A-fib [facility])     Alzheimer's dementia ([facility]     Anxiety     Arthritis     Back pain     COPD (chronic obstructive pulmonary disease) ([facility]     COVID-19 08/19/2021    Depression     Diabetes mellitus ([address]     Fall     multiple falls    GERD (gastroesophageal reflux disease)     Hepatitis A     age 27    Hernia     Hypercholesteremia     Hypertension     IBS (irritable bowel syndrome)     diarrhea  x 20 years    Incontinence of urine     not all the time    Kidney stone     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    Memory change     dementia    Neuropathy     Seizures ([facility]     possible because of her falls pt unsure    UTI (lower urinary tract infection)      Past Surgical History:   Procedure Laterality Date    BREAST SURGERY      BENIGN CYST    CHOLECYSTECTOMY      COLONOSCOPY      COLONOSCOPY N/A 9/9/2022    COLONOSCOPY POLYPECTOMY SNARE/COLD BIOPSY performed by Yazmin Scott MD at [address] N/A 9/9/2022    COLONOSCOPY CONTROL HEMORRHAGE performed by Page Fulton Dinorah Martinez MD at [facility] N/A 2022    COLONOSCOPY WITH BIOPSY [facility]25[facility]    PHACOEMULSIFICATION OF CATARACT RIGHT EYE WITH INTRAOCULAR LENS IMPLANT performed by Clarisa Johnson MD at [place] [id number] Left 2019    PHACOEMULSIFICATION OF CATARACT LEFT EYE WITH INTRAOCULAR LENS IMPLANT performed by Clarisa Johnson MD at [facility]  12    OPEN REDUCTION INTERNAL FIXATION RIGHT PROXIMAL HUMERAL SHAFT FRACTURE SYNTHES    OTHER SURGICAL HISTORY  12    incision and drainage of bursa rt elbow    PANCREAS BIOPSY      SHOULDER SURGERY Bilateral     rtc repair    UPPER GASTROINTESTINAL ENDOSCOPY N/A 2022    EGD BIOPSY performed by Jagdeep Guerrero DO at [facility]     Family History   Problem Relation Age of Onset    Heart Disease Mother      Social History     Socioeconomic History    Marital status:      Spouse name: Not on file    Number of children: Not on file    Years of education: Not on file    Highest education level: Not on file   Occupational History    Not on file   Tobacco Use    Smoking status: Former     Packs/day: 1.00     Years: 15.00     Pack years: 15.00     Types: Cigarettes     Quit date: 1998     Years since quittin.6    Smokeless tobacco: Never   Vaping Use    Vaping Use: Never used   Substance and Sexual Activity    Alcohol use: No    Drug use: No    Sexual activity: Not Currently   Other Topics Concern    Not on file   Social History Narrative    Not on file     Social Determinants of Health     Financial Resource Strain: Not on file   Food Insecurity: Not on file   Transportation Needs: Not on file   Physical Activity: Not on file   Stress: Not on file   Social Connections: Not on file   Intimate Partner Violence: Not on file   Housing Stability: Not on file     Current Facility-Administered Medications   Medication Dose Route Frequency Provider Last Rate Last Admin    iopamidol (ISOVUE-370) 76 % injection 85 mL  85 mL IntraVENous ONCE PRN [place],          Current Outpatient Medications   Medication Sig Dispense Refill    losartan (COZAAR) 100 MG tablet Take 0.5 tablets by mouth daily      carvedilol (COREG) 6.25 MG tablet Take 0.5 tablets by mouth 2 times daily (with meals)      furosemide (LASIX) 20 MG tablet Take 1 tablet by mouth in the morning and 1 tablet in the evening. potassium chloride (KLOR-CON) 20 MEQ packet Take 20 mEq by mouth 2 times daily      ferrous sulfate (IRON 325) 325 (65 Fe) MG tablet Take 1 tablet by mouth daily (with breakfast)      fenofibrate (TRICOR) 54 MG tablet Take 54 mg by mouth daily [facility] pharmacy: Please dispense generic fenofibrate unless prescriber denote      atorvastatin (LIPITOR) 40 MG tablet Take 40 mg by mouth at bedtime      loperamide (IMODIUM) 2 MG capsule Take 2 mg by mouth 4 times daily as needed for Diarrhea      pregabalin (LYRICA) 50 MG capsule Take 50 mg by mouth 2 times daily. Takes at 0500, 1300      magnesium oxide (MAG-OX) 400 MG tablet Take 400 mg by mouth daily      magnesium hydroxide (MILK OF MAGNESIA) 400 MG/5ML suspension Take 30 mLs by mouth daily as needed for Constipation      mirabegron (MYRBETRIQ) 25 MG TB24 Take 25 mg by mouth daily      olopatadine (PATANOL) 0.1 % ophthalmic solution Place 1 drop into both eyes at bedtime      senna (SENOKOT) 8.6 MG tablet Take 1 tablet by mouth 2 times daily      pregabalin (LYRICA) 75 MG capsule Take 75 mg by mouth at bedtime.  Takes at 2000      oxybutynin (DITROPAN-XL) 5 MG extended release tablet Take 5 mg by mouth every evening      SITagliptin (JANUVIA) 50 MG tablet Take 50 mg by mouth daily      loratadine (CLARITIN) 10 MG capsule Take 10 mg by mouth daily      omeprazole (PRILOSEC) 20 MG delayed release capsule Take 40 mg by mouth at bedtime      clonazePAM (KLONOPIN) 1 MG tablet Take 1 tablet by mouth nightly for 3 days. 3 tablet 0    insulin glargine (BASAGLAR KWIKPEN) 100 UNIT/ML injection pen Inject 30 Units into the skin 2 times daily      vitamin B-12 (CYANOCOBALAMIN) 500 MCG tablet Take 500 mcg by mouth daily      DULoxetine (CYMBALTA) 60 MG extended release capsule Take 60 mg by mouth daily      budesonide-formoterol (SYMBICORT) 160-4.5 MCG/ACT AERO Inhale 2 puffs into the lungs 2 times daily      diclofenac sodium (VOLTAREN) 1 % GEL Apply topically nightly Right arm      OXYGEN Inhale into the lungs 2 liters at night      ARTIFICIAL TEAR OP Apply 2 drops to eye every 2 hours as needed      methyl salicylate-menthol (LYNNETTE NEGRO GREASELESS) 10-15 % CREA Apply topically every 8 hours as needed for Pain      guaiFENesin (ROBITUSSIN) 100 MG/5ML syrup Take 10 mLs by mouth every 4 hours as needed for Cough      acetaminophen (TYLENOL) 325 MG tablet Take 650 mg by mouth every 4 hours as needed for Pain      bisacodyl (DULCOLAX) 5 MG EC tablet Take 10 mg by mouth daily as needed for Constipation       DULoxetine (CYMBALTA) 20 MG extended release capsule Take 20 mg by mouth nightly      apixaban (ELIQUIS) 5 MG TABS tablet Take 5 mg by mouth 2 times daily      guaiFENesin (MUCINEX) 600 MG extended release tablet Take 1,200 mg by mouth 2 times daily      predniSONE (DELTASONE) 2.5 MG tablet Take 2.5 mg by mouth daily      amLODIPine (NORVASC) 5 MG tablet Take 1 tablet by mouth daily 30 tablet 0    imipramine (TOFRANIL) 50 MG tablet Take 50 mg by mouth nightly      fluticasone (FLONASE) 50 MCG/ACT nasal spray 2 sprays by Nasal route daily      ondansetron (ZOFRAN ODT) 4 MG disintegrating tablet Take 1 tablet by mouth every 8 hours as needed for Nausea. Let dissolve in mouth. 10 tablet 0    ASPIRIN Take 81 mg by mouth daily.        Allergies   Allergen Reactions    Codeine Hives     Other reaction(s): Unknown  itching    Morphine And Related        REVIEW OF SYSTEMS  10 systems reviewed, pertinent positives per HPI otherwise noted to be negative. PHYSICAL EXAM  There were no vitals taken for this visit. General: Appears well. Alert  HEENT: Head atraumatic, Eyes normal inspection, PERRL. Normal ENT inspection, Pharynx normal. No signs of dehydration  NECK: Normal inspection  RESPIRATORY: Normal breath sounds. No chest wall tenderness. No respiratory distress  CVS: Heart rate and rhythm regular. No Murmurs  ABDOMEN/GI: Soft, Non-tender, No distention  BACK: Normal inspection  EXTREMITIES: Deformity of the right upper extremity with limited range of motion secondary to chronic dislocation, patient states is unchanged. No Pedal edema  NEURO: Alert and oriented. Sensation normal. Motor normal  PSYCH: Mood normal. Affect normal.  SKIN: Color normal. No rash. Warm, Dry    NIH Stroke Scale  Interval: Baseline  Level of Consciousness (1a): Alert  LOC Questions (1b): Answers both correctly  LOC Commands (1c): Performs both tasks correctly  Best Gaze (2): Normal  Visual (3): No visual loss  Facial Palsy (4): Normal symmetrical movement  Motor Arm, Left (5a): No drift  Motor Arm, Right (5b): Amputation or joint fusion (Explain)  Motor Leg, Left (6a): No drift  Motor Leg, Right (6b): No drift  Limb Ataxia (7): Absent  Sensory (8): Normal  Best Language (9): No aphasia  Dysarthria (10): Mild to moderate, slurs some words  Extinction and Inattention (11): No abnormality  Total: 1    LABS  I have reviewed all labs for this visit.    Results for orders placed or performed during the hospital encounter of 12/22/22   COVID-19 & Influenza Combo    Specimen: Nasopharyngeal Swab   Result Value Ref Range    SARS-CoV-2 RNA, RT PCR NOT DETECTED NOT DETECTED    INFLUENZA A NOT DETECTED NOT DETECTED    INFLUENZA B NOT DETECTED NOT DETECTED   CBC with Auto Differential   Result Value Ref Range    WBC 14.2 (H) 4.0 - 11.0 K/uL    RBC 4.58 4.00 - 5.20 M/uL    Hemoglobin 12.6 12.0 - 16.0 g/dL    Hematocrit 39.4 36.0 - 48.0 %    MCV 86.0 80.0 - 100.0 fL    MCH 27.6 26.0 - 34.0 pg    MCHC 32.1 31.0 - 36.0 g/dL    RDW 15.5 (H) 12.4 - 15.4 %    Platelets 144 008 - 565 K/uL    MPV 8.7 5.0 - 10.5 fL    Neutrophils % 82.9 %    Lymphocytes % 7.1 %    Monocytes % 9.6 %    Eosinophils % 0.2 %    Basophils % 0.2 %    Neutrophils Absolute 11.8 (H) 1.7 - 7.7 K/uL    Lymphocytes Absolute 1.0 1.0 - 5.1 K/uL    Monocytes Absolute 1.4 (H) 0.0 - 1.3 K/uL    Eosinophils Absolute 0.0 0.0 - 0.6 K/uL    Basophils Absolute 0.0 0.0 - 0.2 K/uL   CMP w/ Reflex to MG   Result Value Ref Range    Sodium 135 (L) 136 - 145 mmol/L    Potassium reflex Magnesium 4.0 3.5 - 5.1 mmol/L    Chloride 95 (L) 99 - 110 mmol/L    CO2 30 21 - 32 mmol/L    Anion Gap 10 3 - 16    Glucose 237 (H) 70 - 99 mg/dL    BUN 16 7 - 20 mg/dL    Creatinine 1.1 0.6 - 1.2 mg/dL    Est, Glom Filt Rate 51 (A) >60    Calcium 9.6 8.3 - 10.6 mg/dL    Total Protein 7.4 6.4 - 8.2 g/dL    Albumin 3.8 3.4 - 5.0 g/dL    Albumin/Globulin Ratio 1.1 1.1 - 2.2    Total Bilirubin 0.5 0.0 - 1.0 mg/dL    Alkaline Phosphatase 69 40 - 129 U/L    ALT 12 10 - 40 U/L    AST 17 15 - 37 U/L   Protime-INR   Result Value Ref Range    Protime 17.5 (H) 11.7 - 14.5 sec    INR 1.46 (H) 0.87 - 1.14   Troponin   Result Value Ref Range    Troponin 0.01 <0.01 ng/mL   Lactate, Sepsis   Result Value Ref Range    Lactic Acid, Sepsis 1.4 0.4 - 1.9 mmol/L   Urinalysis with Reflex to Culture    Specimen: Urine   Result Value Ref Range    Color, UA Yellow Straw/Yellow    Clarity, UA Clear Clear    Glucose, Ur 250 (A) Negative mg/dL    Bilirubin Urine Negative Negative    Ketones, Urine Negative Negative mg/dL    Specific Gravity, UA 1.020 1.005 - 1.030    Blood, Urine SMALL (A) Negative    pH, UA 7.5 5.0 - 8.0    Protein,  (A) Negative mg/dL    Urobilinogen, Urine 0.2 <2.0 E.U./dL    Nitrite, Urine Negative Negative    Leukocyte Esterase, Urine Negative Negative    Microscopic Examination YES     Urine Type NotGiven Urine Reflex to Culture Not Indicated    Microscopic Urinalysis   Result Value Ref Range    WBC, UA 0-2 0 - 5 /HPF    RBC, UA 11-20 (A) 0 - 4 /HPF    Epithelial Cells, UA 0-1 0 - 5 /HPF    Bacteria, UA 2+ (A) None Seen /HPF   POCT Glucose   Result Value Ref Range    POC Glucose 226 (H) 70 - 99 mg/dl    Performed on ACCU-CHEK    POCT Glucose   Result Value Ref Range    Glucose 226 mg/dL    QC OK? yes    EKG 12 Lead   Result Value Ref Range    Ventricular Rate 107 BPM    Atrial Rate 97 BPM    QRS Duration 74 ms    Q-T Interval 324 ms    QTc Calculation (Bazett) 432 ms    R Axis -46 degrees    T Axis 106 degrees    Diagnosis        Poor data quality, interpretation may be adversely affectedAtrial fibrillation with rapid ventricular response with premature ventricular or aberrantly conducted complexesLeft axis deviationLow voltage QRSAnteroseptal infarct (cited on or before 05-JAN-2018)Nonspecific ST abnormalityAbnormal ECGWhen compared with ECG of 07-SEP-2022 15:02,QRS axis Shifted leftConfirmed by Mj Mayfield ([id number]) on 12/22/2022 10:55:05 AM         ECG  The Ekg interpreted by me shows  A. fib with a ventricular rate of 107 bpm.  Left axis deviation. No acute injury pattern. QRS 74, QTc 432. No significant change from prior EKG dated 9/7/2022    RADIOLOGY  XR HIP 2-3 VW W PELVIS LEFT   Final Result   1. No acute abnormality. XR CHEST PORTABLE   Final Result   No focal lung opacity. CTA HEAD NECK W CONTRAST   Final Result   Age-indeterminate, severe narrowing/occlusion of the proximal to mid right P2   segment with distal reconstitution. No large vessel occlusion or high-grade stenosis within the anterior   intracranial circulation. 2 mm inferiorly directed outpouching involving the left supraclinoid ICA,   either reflecting an infundibulum or small aneurysm. No hemodynamically significant stenosis within the cervical ICAs per NASCET   criteria.       Patent cervical vertebral arteries. Multinodular goiter with a dominant 15 mm nodule within the left thyroid   lobe. Recommend nonemergent thyroid ultrasound for further evaluation on an   outpatient basis. CT HEAD WO CONTRAST   Preliminary Result   1. No evidence of acute intracranial abnormality. 2. Opacification of majority of right mastoid air cells with opacification of   large portion of right middle ear cavity as described above. ED COURSE/MDM  Patient seen and evaluated. Old records reviewed. Labs and imaging reviewed and results discussed with patient. Code stroke called. Discussed with stroke neurology and they agree that as patient is on Eliquis she will not be a thrombolytic candidate. CT head and CTA obtained. CTA shows some stenosis, but no LVO and CT head was unremarkable. Patient was febrile arrival and septic work-up also obtained. Treated with acetaminophen. Urinalysis is concerning for UTI and she is treated with ceftriaxone. She does have a leukocytosis. Plan to meet the patient for her UTI with leukocytosis and her dysarthria. Patient discussed with Sana Newman hospitalist, Dr. Manolo Fernandez, who agreed to meet the patient to his service. Critical care    Critical care time 32 minutes exclusive from separate billable procedures that were performed. The following was considered in the determination of critical care but not limited to the level of medical decision making, intensive cardiac and/or respiratory monitoring, frequent vital sign monitoring, evaluation of laboratory studies, evaluation of radiographic studies, oxygen monitoring, and constant monitoring and speaking to family at bedside. Is this patient to be included in the SEP-1 Core Measure due to severe sepsis or septic shock?    No   Exclusion criteria - the patient is NOT to be included for SEP-1 Core Measure due to:  May have criteria for sepsis, but does not meet criteria for severe sepsis or septic shock    During the patient's ED course, the patient was given:  Medications   iopamidol (ISOVUE-370) 76 % injection 85 mL (85 mLs IntraVENous Given 12/22/22 0831)   acetaminophen (TYLENOL) tablet 1,000 mg (1,000 mg Oral Given 12/22/22 0911)   cefTRIAXone (ROCEPHIN) 1,000 mg in dextrose 5 % 50 mL IVPB mini-bag (0 mg IntraVENous Stopped 12/22/22 1109)   0.9 % sodium chloride bolus (0 mLs IntraVENous Stopped 12/22/22 1215)        I, Richard Brito DO,  am the primary clinician of record. CLINICAL IMPRESSION  1. Urinary tract infection without hematuria, site unspecified    2. Septicemia ([address]    3. Dysarthria        not currently breastfeeding. DISPOSITION  Avery Duncan was transferred to [facility]  in stable condition. Patient was given scripts for the following medications. I counseled patient how to take these medications. Discharge Medication List as of 12/22/2022  1:47 PM          Follow-up with:  No follow-up provider specified. DISCLAIMER: This chart was created using Dragon dictation software. Efforts were made by me to ensure accuracy, however some errors may be present due to limitations of this technology and occasionally words are not transcribed correctly.       Richard Brito DO  12/22/22 0618

## 2022-12-22 NOTE — ED NOTES
Patient is not candidate for TNK due to being on eliquis per dr Caron Primrose.       Jeremie Eli RN  12/22/22 0578

## 2022-12-22 NOTE — ED NOTES
Patient placed on 2 liters Nasal cannula, dr Maryam Stuart at bedside, patients O2 on RA was 88%.  With the 2 liters O2 is now 97%     Moe Stallworth RN  12/22/22 5104

## 2022-12-22 NOTE — PLAN OF CARE
4 Eyes Skin Assessment     NAME:  Naila Cardona OF BIRTH:  1940  MEDICAL RECORD NUMBER:  [id number]    The patient is being assessed for  Admission    I agree that One RN have performed a thorough Head to Toe Skin Assessment on the patient. ALL assessment sites listed below have been assessed. Areas assessed by both nurses:    Head, Face, Ears, Shoulders, Back, Chest, Arms, Elbows, Hands, Sacrum. Buttock, Coccyx, Ischium, and Legs. Feet and Heels        Does the Patient have a Wound?  No noted wound(s)       Jimmy Prevention initiated by RN: NA   Wound Care Orders initiated by RN: NA    Pressure Injury (Stage 3,4, Unstageable, DTI, NWPT, and Complex wounds) if present place referral order by RN under : NA    New and Established Ostomies, if present place, referral order under : NA      Nurse 1 eSignature: Electronically signed by Yari Valdez RN on 12/22/22 at 5:02 PM EST    **SHARE this note so that the co-signing nurse is able to place an eSignature**    Nurse 2 eSignature: {Esignature:[id number]}

## 2022-12-22 NOTE — ED NOTES
Obtain verbal consent for transport from patient due to patient stating she cant sign with her right hand. Michael Shetty RN witness.       Smith Singh RN  12/22/22 6808

## 2022-12-22 NOTE — ED NOTES
[id number]  Stroke team called   1968  Stroke team Dr. Sandra Monson returned call to THE [facility] Kevin Wilkes  12/22/22 0824       Derrick Claros  12/22/22 0840

## 2022-12-22 NOTE — H&P
Hospital Medicine History & Physical      PCP: Tanisha Turk MD    Date of Admission: 12/22/2022    Date of Service: Pt seen/examined on 12/22/22 and Placed in Observation. Chief Complaint:  fall      History Of Present Illness:    [de-identified] y.o. female who presented to [facility] with a fall. Patient lives at a long term nursing facility. She had a fall today while getting out of beds. They recently changed the type of bed and patient has been having a more difficult timing getting out of bed. This has led to her ambulating less and increasing her degree of weakness. Patient has dementia at baseline but has a decent knowledge of her own medical history. Following the fall, the staff noted some slurring of her speech. This has persisted but patient insists this is due to her not having her dentures. She does not currently have access to her dentures. CT head and CTA head and neck were largely unremarkable.      Past Medical History:          Diagnosis Date    A-fib [facility])     Alzheimer's dementia ([facility]     Anxiety     Arthritis     Back pain     COPD (chronic obstructive pulmonary disease) ([facility]     COVID-19 08/19/2021    Depression     Diabetes mellitus ([facility]     Fall     multiple falls    GERD (gastroesophageal reflux disease)     Hepatitis A     age 27    Hernia     Hypercholesteremia     Hypertension     IBS (irritable bowel syndrome)     diarrhea  x 20 years    Incontinence of urine     not all the time    Kidney stone     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    Memory change     dementia    Neuropathy     Seizures (HCC)     possible because of her falls pt unsure    UTI (lower urinary tract infection)        Past Surgical History:          Procedure Laterality Date    BREAST SURGERY      BENIGN CYST    CHOLECYSTECTOMY      COLONOSCOPY      COLONOSCOPY N/A 9/9/2022    COLONOSCOPY POLYPECTOMY SNARE/COLD BIOPSY performed by Yareli Ruiz MD at [address] [facility] ENDOSCOPY    COLONOSCOPY N/A 9/9/2022    COLONOSCOPY CONTROL HEMORRHAGE performed by Olivia Chase MD at [address] [facility] Renny Lombardi 9/9/2022    COLONOSCOPY WITH BIOPSY performed by Olivia Chase MD at [facility] Right 3/25/2019    PHACOEMULSIFICATION OF CATARACT RIGHT EYE WITH INTRAOCULAR LENS IMPLANT performed by Vasu Mclain MD at [place] [address] Left 4/8/2019    PHACOEMULSIFICATION OF CATARACT LEFT EYE WITH INTRAOCULAR LENS IMPLANT performed by Vasu Mclain MD at [facility]  6-6-12    OPEN REDUCTION INTERNAL FIXATION RIGHT PROXIMAL HUMERAL SHAFT FRACTURE SYNTHES    OTHER SURGICAL HISTORY  07/18/12    incision and drainage of bursa rt elbow    PANCREAS BIOPSY      SHOULDER SURGERY Bilateral     rtc repair    UPPER GASTROINTESTINAL ENDOSCOPY N/A 9/8/2022    EGD BIOPSY performed by Kristen Souza DO at [facility] ENDOSCOPY       Medications Prior to Admission:      Prior to Admission medications    Medication Sig Start Date End Date Taking? Authorizing Provider   atorvastatin (LIPITOR) 40 MG tablet Take 40 mg by mouth nightly    Historical Provider, MD   budesonide-formoterol (SYMBICORT) 160-4.5 MCG/ACT AERO Inhale 2 puffs into the lungs 2 times daily    Historical Provider, MD   losartan (COZAAR) 100 MG tablet Take 0.5 tablets by mouth daily 9/13/22   Belem Smith MD   carvedilol (COREG) 6.25 MG tablet Take 0.5 tablets by mouth 2 times daily (with meals) 9/13/22   Belem Smith MD   furosemide (LASIX) 20 MG tablet Take 1 tablet by mouth in the morning and 1 tablet in the evening.   Patient taking differently: Take 20 mg by mouth daily 9/13/22   Belem Smith MD   potassium chloride (KLOR-CON) 20 MEQ packet Take 20 mEq by mouth 2 times daily 9/13/22   Belem Smith MD   ferrous sulfate (IRON 325) 325 (65 Fe) MG tablet Take 1 tablet by mouth daily (with breakfast) 9/13/22   Marlyce Fruits Igor Guevara MD   fenofibrate (TRICOR) 54 MG tablet Take 54 mg by mouth daily [facility] pharmacy: Please dispense generic fenofibrate unless prescriber denote    Historical Provider, MD   loperamide (IMODIUM) 2 MG capsule Take 2 mg by mouth 4 times daily as needed for Diarrhea    Historical Provider, MD   pregabalin (LYRICA) 50 MG capsule Take 50 mg by mouth 2 times daily. Takes at 0500, [address] Provider, MD   magnesium hydroxide (MILK OF MAGNESIA) 400 MG/5ML suspension Take 30 mLs by mouth daily as needed for Constipation    Historical Provider, MD   mirabegron (MYRBETRIQ) 25 MG TB24 Take 25 mg by mouth daily    Historical Provider, MD   olopatadine (PATANOL) 0.1 % ophthalmic solution Place 1 drop into both eyes at bedtime    Historical Provider, MD   senna (SENOKOT) 8.6 MG tablet Take 1 tablet by mouth 2 times daily    Historical Provider, MD   pregabalin (LYRICA) 75 MG capsule Take 75 mg by mouth at bedtime. Takes at [place] MD Sae   oxybutynin (DITROPAN-XL) 5 MG extended release tablet Take 5 mg by mouth every evening    Historical Provider, MD   SITagliptin (JANUVIA) 50 MG tablet Take 50 mg by mouth daily    Historical Provider, MD   loratadine (CLARITIN) 10 MG capsule Take 10 mg by mouth daily    Historical Provider, MD   omeprazole (PRILOSEC) 20 MG delayed release capsule Take 40 mg by mouth at bedtime    Historical Provider, MD   clonazePAM (KLONOPIN) 1 MG tablet Take 1 tablet by mouth nightly for 3 days.  8/23/21 9/8/22  Phuong Hernandez MD   insulin glargine (BASAGLAR KWIKPEN) 100 UNIT/ML injection pen Inject 30 Units into the skin 2 times daily    Historical Provider, MD   vitamin B-12 (CYANOCOBALAMIN) 500 MCG tablet Take 500 mcg by mouth daily    Historical Provider, MD   DULoxetine (CYMBALTA) 60 MG extended release capsule Take 60 mg by mouth daily    Historical Provider, MD   diclofenac sodium (VOLTAREN) 1 % GEL Apply topically nightly Right arm    Historical Provider, MD OXYGEN Inhale into the lungs 2 liters at night    Historical Provider, MD   ARTIFICIAL TEAR OP Apply 2 drops to eye every 2 hours as needed    Historical Provider, MD   methyl salicylate-menthol (LYNNETTE NEGRO GREASELESS) 10-15 % CREA Apply topically every 8 hours as needed for Pain    Historical Provider, MD   guaiFENesin (ROBITUSSIN) 100 MG/5ML syrup Take 10 mLs by mouth every 4 hours as needed for Cough    Historical Provider, MD   acetaminophen (TYLENOL) 325 MG tablet Take 650 mg by mouth every 4 hours as needed for Pain    Historical Provider, MD   bisacodyl (DULCOLAX) 5 MG EC tablet Take 10 mg by mouth daily as needed for Constipation     Historical Provider, MD   apixaban (ELIQUIS) 5 MG TABS tablet Take 5 mg by mouth 2 times daily    Historical Provider, MD   guaiFENesin (MUCINEX) 600 MG extended release tablet Take 1,200 mg by mouth 2 times daily    Historical Provider, MD   predniSONE (DELTASONE) 2.5 MG tablet Take 2.5 mg by mouth daily    Historical Provider, MD   amLODIPine (NORVASC) 5 MG tablet Take 1 tablet by mouth daily 10/18/16   Teresa Castro MD   imipramine (TOFRANIL) 50 MG tablet Take 50 mg by mouth nightly    Historical Provider, MD   fluticasone (FLONASE) 50 MCG/ACT nasal spray 2 sprays by Nasal route daily    Historical Provider, MD   ondansetron (ZOFRAN ODT) 4 MG disintegrating tablet Take 1 tablet by mouth every 8 hours as needed for Nausea. Let dissolve in mouth. 8/5/13   Gabriel Estrella,    ASPIRIN Take 81 mg by mouth daily. 1/15/11   Historical Provider, MD       Allergies:  Codeine and Morphine and related    Social History:      The patient currently lives at a LT facility    TOBACCO:   reports that she quit smoking about 24 years ago. Her smoking use included cigarettes. She has a 15.00 pack-year smoking history. She has never used smokeless tobacco.  ETOH:   reports no history of alcohol use.   E-cigarette/Vaping       Questions Responses    E-cigarette/Vaping Use Never User Start Date     Passive Exposure     Quit Date     Counseling Given     Comments               Family History:      Reviewed and negative in regards to presenting illness/complaint. Problem Relation Age of Onset    Heart Disease Mother        REVIEW OF SYSTEMS COMPLETED:   Pertinent positives as noted in the HPI. All other systems reviewed and negative. PHYSICAL EXAM PERFORMED:    BP (!) 116/58   Pulse 86   Temp 98.1 °F (36.7 °C) (Oral)   Resp 19   Ht 5' 7\" (1.702 m)   Wt 149 lb 8 oz (67.8 kg)   SpO2 98%   BMI 23.42 kg/m²     General appearance:  No apparent distress, appears stated age and cooperative. HEENT:  Normal cephalic, atraumatic without obvious deformity. Pupils equal, round, and reactive to light. Extra ocular muscles intact. Conjunctivae/corneas clear. Lack of dentition. Neck: Supple, with full range of motion. No jugular venous distention. Trachea midline. Respiratory:  Normal respiratory effort. Clear to auscultation, bilaterally without Rales/Wheezes/Rhonchi. Cardiovascular:  Regular rate and rhythm with normal S1/S2 without murmurs, rubs or gallops. Abdomen: Soft, non-tender, non-distended with normal bowel sounds. Musculoskeletal:  No clubbing, cyanosis or edema bilaterally. Full range of motion without deformity. Mild dysarthria  Skin: Skin color, texture, turgor normal.  No rashes or lesions. Neurologic:  Neurovascularly intact without any focal sensory/motor deficits.  Cranial nerves: II-XII intact, grossly non-focal.  Psychiatric:  Alert and oriented, thought content appropriate, normal insight  Capillary Refill: Brisk,3 seconds, normal  Peripheral Pulses: +2 palpable, equal bilaterally      Labs:     Recent Labs     12/22/22  0820   WBC 14.2*   HGB 12.6   HCT 39.4        Recent Labs     12/22/22  0820   *   K 4.0   CL 95*   CO2 30   BUN 16   CREATININE 1.1   CALCIUM 9.6     Recent Labs     12/22/22  0820   AST 17   ALT 12   BILITOT 0.5   ALKPHOS 69 Recent Labs     12/22/22  0820   INR 1.46*     Recent Labs     12/22/22  0820   TROPONINI 0.01       Urinalysis:      Lab Results   Component Value Date/Time    NITRU Negative 12/22/2022 09:44 AM    WBCUA 0-2 12/22/2022 09:44 AM    BACTERIA 2+ 12/22/2022 09:44 AM    RBCUA 11-20 12/22/2022 09:44 AM    BLOODU SMALL 12/22/2022 09:44 AM    SPECGRAV 1.020 12/22/2022 09:44 AM    GLUCOSEU 250 12/22/2022 09:44 AM    GLUCOSEU NEGATIVE 04/21/2012 04:15 AM       Radiology:     CXR: I have reviewed the CXR with the following interpretation: no acute disease  EKG:  I have reviewed the EKG with the following interpretation: Afib    MRI BRAIN WO CONTRAST    (Results Pending)       Consults:    None    ASSESSMENT:    Active Hospital Problems    Diagnosis Date Noted    Dysarthria [R47.1] 12/22/2022     Priority: Medium         PLAN:  Dysarthria  - CT head negative  - CTA head and neck without large vessel occlusion  - MRI brain ordered  - if CVA noted, will consult neurology  - continue statin. Hold ASA as patient is on eliquis already    Leukocytosis  - no clinical evidence of infection  - reassess tomorrow morning    Persistent Afib  - rate controlled  - continue coreg  - on eliquis for AC    DMII  - poorly controlled  - holding home oral meds  - started on basal bolus insulin    HTN  - well controlled  - continue norvasc, coreg, losartan    HLD  - continue statin    COPD  - no evidence of exacerbation  - continue home inhalers    Dementia  - at mental baseline  - supportive care    DVT Prophylaxis: eliquis  Diet: ADULT DIET; Dysphagia - Soft and Bite Sized; 5 carb choices (75 gm/meal)  Code Status: DNR-CC    PT/OT Eval Status: ordered    Dispo - return to LTC in 1-2 days       José Miguel Carty MD    Thank you Silvia Reyes MD for the opportunity to be involved in this patient's care.  If you have any questions or concerns please feel free to contact me at [phone].]

## 2022-12-23 VITALS
TEMPERATURE: 99.3 F | HEIGHT: 67 IN | RESPIRATION RATE: 18 BRPM | WEIGHT: 149.5 LBS | SYSTOLIC BLOOD PRESSURE: 156 MMHG | OXYGEN SATURATION: 93 % | BODY MASS INDEX: 23.46 KG/M2 | HEART RATE: 101 BPM | DIASTOLIC BLOOD PRESSURE: 78 MMHG

## 2022-12-23 LAB
ANION GAP SERPL CALCULATED.3IONS-SCNC: 10 MMOL/L (ref 3–16)
BASOPHILS ABSOLUTE: 0.1 K/UL (ref 0–0.2)
BASOPHILS RELATIVE PERCENT: 0.6 %
BLOOD CULTURE, ROUTINE: NORMAL
BUN BLDV-MCNC: 15 MG/DL (ref 7–20)
CALCIUM SERPL-MCNC: 9.4 MG/DL (ref 8.3–10.6)
CHLORIDE BLD-SCNC: 100 MMOL/L (ref 99–110)
CO2: 28 MMOL/L (ref 21–32)
CREAT SERPL-MCNC: 0.9 MG/DL (ref 0.6–1.2)
CULTURE, BLOOD 2: NORMAL
EOSINOPHILS ABSOLUTE: 0.2 K/UL (ref 0–0.6)
EOSINOPHILS RELATIVE PERCENT: 1.7 %
ESTIMATED AVERAGE GLUCOSE: 191.5 MG/DL
GFR SERPL CREATININE-BSD FRML MDRD: >60 ML/MIN/{1.73_M2}
GLUCOSE BLD-MCNC: 141 MG/DL (ref 70–99)
GLUCOSE BLD-MCNC: 142 MG/DL (ref 70–99)
HBA1C MFR BLD: 8.3 %
HCT VFR BLD CALC: 36.8 % (ref 36–48)
HEMOGLOBIN: 12 G/DL (ref 12–16)
LYMPHOCYTES ABSOLUTE: 1.2 K/UL (ref 1–5.1)
LYMPHOCYTES RELATIVE PERCENT: 10.8 %
MAGNESIUM: 1.6 MG/DL (ref 1.8–2.4)
MCH RBC QN AUTO: 28.3 PG (ref 26–34)
MCHC RBC AUTO-ENTMCNC: 32.6 G/DL (ref 31–36)
MCV RBC AUTO: 86.8 FL (ref 80–100)
MONOCYTES ABSOLUTE: 1.3 K/UL (ref 0–1.3)
MONOCYTES RELATIVE PERCENT: 11.4 %
NEUTROPHILS ABSOLUTE: 8.7 K/UL (ref 1.7–7.7)
NEUTROPHILS RELATIVE PERCENT: 75.5 %
PDW BLD-RTO: 15.5 % (ref 12.4–15.4)
PERFORMED ON: ABNORMAL
PLATELET # BLD: 159 K/UL (ref 135–450)
PMV BLD AUTO: 7.9 FL (ref 5–10.5)
POTASSIUM REFLEX MAGNESIUM: 3.5 MMOL/L (ref 3.5–5.1)
RBC # BLD: 4.24 M/UL (ref 4–5.2)
SARS-COV-2, NAAT: NOT DETECTED
SODIUM BLD-SCNC: 138 MMOL/L (ref 136–145)
WBC # BLD: 11.6 K/UL (ref 4–11)

## 2022-12-23 PROCEDURE — 94761 N-INVAS EAR/PLS OXIMETRY MLT: CPT

## 2022-12-23 PROCEDURE — 97166 OT EVAL MOD COMPLEX 45 MIN: CPT

## 2022-12-23 PROCEDURE — 96374 THER/PROPH/DIAG INJ IV PUSH: CPT

## 2022-12-23 PROCEDURE — 87635 SARS-COV-2 COVID-19 AMP PRB: CPT

## 2022-12-23 PROCEDURE — 94640 AIRWAY INHALATION TREATMENT: CPT

## 2022-12-23 PROCEDURE — 80048 BASIC METABOLIC PNL TOTAL CA: CPT

## 2022-12-23 PROCEDURE — 83735 ASSAY OF MAGNESIUM: CPT

## 2022-12-23 PROCEDURE — 6370000000 HC RX 637 (ALT 250 FOR IP): Performed by: INTERNAL MEDICINE

## 2022-12-23 PROCEDURE — 97535 SELF CARE MNGMENT TRAINING: CPT

## 2022-12-23 PROCEDURE — 83036 HEMOGLOBIN GLYCOSYLATED A1C: CPT

## 2022-12-23 PROCEDURE — G0378 HOSPITAL OBSERVATION PER HR: HCPCS

## 2022-12-23 PROCEDURE — 85025 COMPLETE CBC W/AUTO DIFF WBC: CPT

## 2022-12-23 PROCEDURE — 36415 COLL VENOUS BLD VENIPUNCTURE: CPT

## 2022-12-23 PROCEDURE — 2700000000 HC OXYGEN THERAPY PER DAY

## 2022-12-23 PROCEDURE — 2580000003 HC RX 258: Performed by: INTERNAL MEDICINE

## 2022-12-23 PROCEDURE — 6360000002 HC RX W HCPCS: Performed by: INTERNAL MEDICINE

## 2022-12-23 RX ORDER — LANOLIN ALCOHOL/MO/W.PET/CERES
400 CREAM (GRAM) TOPICAL ONCE
Status: COMPLETED | OUTPATIENT
Start: 2022-12-23 | End: 2022-12-23

## 2022-12-23 RX ORDER — FUROSEMIDE 20 MG/1
20 TABLET ORAL DAILY
Qty: 30 TABLET | Refills: 0
Start: 2022-12-23

## 2022-12-23 RX ADMIN — LOSARTAN POTASSIUM 50 MG: 25 TABLET, FILM COATED ORAL at 09:20

## 2022-12-23 RX ADMIN — FERROUS SULFATE TAB 325 MG (65 MG ELEMENTAL FE) 325 MG: 325 (65 FE) TAB at 09:21

## 2022-12-23 RX ADMIN — INSULIN LISPRO 8 UNITS: 100 INJECTION, SOLUTION INTRAVENOUS; SUBCUTANEOUS at 09:23

## 2022-12-23 RX ADMIN — Medication 10 ML: at 09:21

## 2022-12-23 RX ADMIN — ONDANSETRON 4 MG: 2 INJECTION INTRAMUSCULAR; INTRAVENOUS at 09:20

## 2022-12-23 RX ADMIN — PREGABALIN 50 MG: 25 CAPSULE ORAL at 09:20

## 2022-12-23 RX ADMIN — Medication 400 MG: at 09:21

## 2022-12-23 RX ADMIN — APIXABAN 5 MG: 5 TABLET, FILM COATED ORAL at 09:21

## 2022-12-23 RX ADMIN — Medication 2 PUFF: at 08:39

## 2022-12-23 RX ADMIN — CARVEDILOL 3.12 MG: 3.12 TABLET, FILM COATED ORAL at 09:21

## 2022-12-23 RX ADMIN — FUROSEMIDE 20 MG: 20 TABLET ORAL at 09:21

## 2022-12-23 RX ADMIN — DULOXETINE HYDROCHLORIDE 60 MG: 60 CAPSULE, DELAYED RELEASE ORAL at 09:20

## 2022-12-23 RX ADMIN — AMLODIPINE BESYLATE 5 MG: 5 TABLET ORAL at 09:21

## 2022-12-23 ASSESSMENT — PAIN SCALES - GENERAL: PAINLEVEL_OUTOF10: 9

## 2022-12-23 ASSESSMENT — PAIN DESCRIPTION - LOCATION: LOCATION: FOOT

## 2022-12-23 NOTE — PROGRESS NOTES
Occupational Therapy  Facility/Department: [place] [id number] - MED SURG  Occupational Therapy Initial Assessment/Treatment    Name: Alireza Deluca  : 1940  MRN: [id number]  Date of Service: 2022    Discharge Recommendations:  [address] with OT  OT Equipment Recommendations  Equipment Needed: No  Other: defer to facility - re apply bed rails to pt bed     Patient Diagnosis(es): There were no encounter diagnoses. Past Medical History:  has a past medical history of A-fib ([address] Alzheimer's dementia ([address] Anxiety, Arthritis, Back pain, COPD (chronic obstructive pulmonary disease) ([address] COVID-19, Depression, Diabetes mellitus ([address] Fall, GERD (gastroesophageal reflux disease), Hepatitis A, Hernia, Hypercholesteremia, Hypertension, IBS (irritable bowel syndrome), Incontinence of urine, Kidney stone, MDRO (multiple drug resistant organisms) resistance, Memory change, Neuropathy, Seizures ([address] and UTI (lower urinary tract infection). Past Surgical History:  has a past surgical history that includes Cholecystectomy; Breast surgery; other surgical history (12); other surgical history (12); shoulder surgery (Bilateral); Colonoscopy; Pancreas Biopsy; Intracapsular cataract extraction (Right, 3/25/2019); Intracapsular cataract extraction (Left, 2019); Upper gastrointestinal endoscopy (N/A, 2022); Colonoscopy (N/A, 2022); Colonoscopy (N/A, 2022); and Colonoscopy (N/A, 2022). Assessment  Pt is [de-identified] yo F presenting after fall out of bed at long term care [place]. Pt resides in [facility]. PTA pt was ind for ADLs/transfers/ambulation with 4GX and assist from staff PRN. At this time pt is CGA for transfers/ambulation/Adls with RW. Pt is currently functioning below baseline, will continue to benefit from skilled OT services in the acute care setting, and LTC recommended at discharge to increase pt safety and ind with ADLs/transfers/ambulation.     Performance deficits / Impairments: Decreased functional mobility ; Decreased strength;Decreased endurance;Decreased ADL status; Decreased safe awareness;Decreased posture;Decreased balance;Decreased ROM  Prognosis: Good  Decision Making: Medium Complexity  REQUIRES OT FOLLOW-UP: Yes  Activity Tolerance  Activity Tolerance: Patient Tolerated treatment well;Patient limited by fatigue        Plan  Occupational Therapy Plan  Times Per Week: 3-5x/wk while in acute care setting  Current Treatment Recommendations: Strengthening, Balance training, ROM, Functional mobility training, Gait training, Endurance training, Safety education & training, Patient/Caregiver education & training, Equipment evaluation, education, & procurement, Positioning, Self-Care / ADL     Restrictions  Restrictions/Precautions: Fall Risk, General Precautions  Other position/activity restrictions: up with assist, 2L O2 (wears 2L O2 at baseline), IV    Subjective  Chart Reviewed: Yes, Orders, Progress Notes, History and Physical, Labs  Patient assessed for rehabilitation services?: Yes  Family / Caregiver Present: No  Referring Practitioner: Kan Fitzpatrick MD  Subjective: Pt semi fowlers agreeable to OT services upon arrival. RN approved therapy. Pain: Pt reports 6/10 LUE pain - aching. Pt offered therapeutic listening and comfortable positioning end of session. RN notified.     Social/Functional History  Lives With: Alone  Type of Home: Facility [facility]  Home Layout: One level  Home Access: Level entry  Bathroom Shower/Tub: Walk-in shower, Shower chair with back (pt report going to shower room)  Bathroom Toilet: Handicap height  Bathroom Equipment: Grab bars in shower, Grab bars around toilet  Bathroom Accessibility: Walker accessible  Home Equipment: Rollator, [facility] bed  Has the patient had two or more falls in the past year or any fall with injury in the past year?: Yes (~4 due to losing balance or getting out of bed)  Receives Help From:  (care center staff)  ADL Assistance: Needs assistance (assist with bra management and shower)  Toileting: Independent  Homemaking Assistance: Needs assistance (staff completes)  Homemaking Responsibilities: No (staff completes)  Ambulation Assistance: Independent (with rollator)  Transfer Assistance: Independent (with rollator)  Active : No  Leisure & Hobbies: play on tablet  Additional Comments: pt has baseline dementia however appears to be adequate historian    Objective  Vitals  Heart Rate: 87  Heart Rate Source: Monitor  BP: (!) 147/84  BP Location: Left upper arm  BP Method: Automatic  Patient Position: Semi fowlers  MAP (Calculated): 105  Resp: 18  SpO2: 96 %  O2 Device: Nasal cannula    Observation/Palpation  Posture: Fair (minimal kyphotic posture with RW)    Safety Devices  Type of Devices: All reggie prominences offloaded; All fall risk precautions in place;Call light within reach; Chair alarm in place;Gait belt; Heels elevated for pressure relief;Patient at risk for falls; Left in chair;Nurse notified  Restraints Initially in Place: No    Bed Mobility Training  Overall Level of Assistance: Stand-by assistance; Adaptive equipment (HOB raised, bed rails, to L)  Interventions: Verbal cues; Tactile cues; Weight shifting training/pressure relief; Safety awareness training  Supine to Sit: Stand-by assistance; Adaptive equipment (HOB raised, bed rails, to L)  Scooting: Stand-by assistance (seated EOB)    Balance  Sitting: Intact (seated EOB)  Standing: With support (RW)  Standing - Static: Fair;Occasional  Standing - Dynamic: Fair;Occasional (pt demo one posterior lean in stance in RW sink side needing CGA to correct to midline)    Transfer Training  Overall Level of Assistance: Contact-guard assistance; Adaptive equipment (RW)  Interventions: Verbal cues; Safety awareness training;Weight shifting training/pressure relief; Tactile cues  Sit to Stand: Contact-guard assistance; Adaptive equipment (RW)  Stand to Sit: Contact-guard assistance; Adaptive equipment (RW)    Gait  Overall Level of Assistance: Contact-guard assistance; Adaptive equipment (RW to from bathroom in room)  Assistive Device: Walker, rolling;Gait belt    Strength/ROM:  AROM: Generally decreased, functional  PROM: Generally decreased, functional  Strength: Generally decreased, functional  Coordination: Generally decreased, functional  Tone: Normal  Sensation: Intact    ADL  Grooming: Contact guard assistance  Grooming Skilled Clinical Factors: stance sink side ADLs  LE Dressing: Contact guard assistance  LE Dressing Skilled Clinical Factors: seated EOB and stance RW brief exchange    Vision  Vision: Impaired  Vision Exceptions: Wears glasses for reading    Hearing  Hearing: Exceptions to [facility]  Hearing Exceptions: Hard of hearing/hearing concerns;Bilateral hearing aid (pt reports hearing aids are broken)    Cognition  Overall Cognitive Status: WNL  Cognition Comment: pt has baseline dementia    Orientation  Overall Orientation Status: Within Normal Limits  Orientation Level: Oriented X4    Perception  Overall Perceptual Status: [facility]    Education  Education Given To: Patient  Education Provided: Role of Therapy; ADL Adaptive Strategies; Fall Prevention Strategies; Plan of Care;Transfer Training;Energy Conservation;Equipment  Education Method: Demonstration;Verbal  Barriers to Learning: Hearing;Cognition  Education Outcome: Verbalized understanding;Demonstrated understanding;Continued education needed  Disease specific: Pt educated on benefits of OOB activity to decrease risks associated with prolonged bedrest while in hospital. Pt verbalized understanding.     AM-PAC Score  AM-PAC Inpatient Daily Activity Raw Score: 20 (12/23/22 0953)  AM-PAC Inpatient ADL T-Scale Score : 42.03 (12/23/22 0953)  ADL Inpatient CMS 0-100% Score: 38.32 (12/23/22 0953)  ADL Inpatient CMS G-Code Modifier : CJ (12/23/22 9247)      Goals  Short Term Goals  Time Frame for Short Term Goals: 1 wk 12/30/2022 unless otherwise noted  Short Term Goal 1: pt will complete toileting SPV -12/27/2022  Short Term Goal 2: pt will complete LB dressing SPV  Short Term Goal 3: pt will complete stance sink side ADLs SPV  Patient Goals   Patient goals : \"leave here\"    Therapy Time   Individual Concurrent Group Co-treatment   Time In 0800         Time Out 0837         Minutes 37         Timed Code Treatment Minutes: 27 Minutes (10 min eval)    Ismael Jean OTR/L  If pt is unable to be seen after this session, please let this note serve as discharge summary. Please see case management note for discharge disposition. Thank you.

## 2022-12-23 NOTE — PROGRESS NOTES
Pt d/c'd 12/23/22. Removed 1 IV and stopped bleeding. Catheter intact. Pt tolerated well. No redness noted at site. Notified [facility] and removed tele box. Reviewed d/c instructions, home meds, and  f/u information utilizing teach-back method.

## 2022-12-23 NOTE — CARE COORDINATION
CASE MANAGEMENT DISCHARGE SUMMARY      Discharge to: Return to [facility] [address] completed: [address] Notification ([facility]) completed: NA    IMM given: (date) 12/23/22    New Durable Medical Equipment ordered/agency:     Transportation:       Medical Transport explained to [employer]. Pt/family voice no agency preference. Agency used: [address] [place] [address] Road up time: 10:30 AM   Ambulance form completed: Yes    Confirmed discharge plan with:     Patient: yes     Family:  yes/Abel [phone]     Facility/Agency, name:  [facility] faxed   Phone number for report to facility: [phone]     RN, name: Minna    Note: Discharging nurse to complete [facility], reconcile [facility], and place final copy with patient's discharge packet. RN to ensure that written prescriptions for  Level II medications are sent with patient to the facility as per protocol.

## 2022-12-23 NOTE — PROGRESS NOTES
Physical Therapy    Attempted to see patient this morning. Pt not in room, just discharged and left hospital. Thank you.      Kobi Encinas PT, DPT

## 2022-12-23 NOTE — DISCHARGE INSTR - COC
Continuity of Care Form    Patient Name: Christin Lai   :  1940  MRN:  [id number]    Admit date:  2022  Discharge date:  22    Code Status Order: DNR-CC   Advance Directives:     Admitting Physician:  Jacinta Murillo MD  PCP: Angel Morley MD    Discharging Nurse: [facility] Unit/Room#: 0310/9729-63  Discharging Unit Phone Number: [phone]    Emergency Contact:   Extended Emergency Contact Information  Primary Emergency Contact: [place] [address] E[address] of [address] [facility] Phone: [phone]  Relation: Child  Secondary Emergency Contact: Venkat Kaur [address] [address] of [address] [facility] Phone: [phone]  Relation: Child    Past Surgical History:  Past Surgical History:   Procedure Laterality Date    BREAST SURGERY      BENIGN CYST    CHOLECYSTECTOMY      COLONOSCOPY      COLONOSCOPY N/A 2022    COLONOSCOPY POLYPECTOMY SNARE/COLD BIOPSY performed by Evette Ontiveros MD at [address] [facility] Renny Lombardi 2022    COLONOSCOPY CONTROL HEMORRHAGE performed by Evette Ontiveros MD at [address] [facility] Renny Lombardi 2022    COLONOSCOPY WITH BIOPSY performed by Evette Ontiveros MD at [facility] 3/25/2019    PHACOEMULSIFICATION OF CATARACT RIGHT EYE WITH INTRAOCULAR LENS IMPLANT performed by Lia Walton MD at [place] [address] Left 2019    PHACOEMULSIFICATION OF CATARACT LEFT EYE WITH INTRAOCULAR LENS IMPLANT performed by Lia Walton MD at [facility]  12    OPEN REDUCTION INTERNAL FIXATION RIGHT PROXIMAL HUMERAL SHAFT FRACTURE SYNTHES    OTHER SURGICAL HISTORY  12    incision and drainage of bursa rt elbow    PANCREAS BIOPSY      SHOULDER SURGERY Bilateral     rtc repair    UPPER GASTROINTESTINAL ENDOSCOPY N/A 2022    EGD BIOPSY performed by Leslie Ha DO at [address] [place] [address] History:   Immunization History   Administered Date(s) Administered    COVID-19, PFIZER Bivalent BOOSTER, DO NOT Dilute, (age 12y+), IM, 30 mcg/0.3 mL 11/10/2022    COVID-19, PFIZER GRAY top, DO NOT Dilute, (age 15 y+), IM, 30 mcg/0.3 mL 05/24/2022    COVID-19, PFIZER PURPLE top, DILUTE for use, (age 15 y+), 30mcg/0.3mL 12/22/2020, 01/12/2021, 11/02/2021    Influenza A ([id number]) Vaccine PF IM 12/22/2009, 12/22/2009    Influenza Vaccine, unspecified formulation 09/24/2018    Influenza Virus Vaccine 09/24/2018    Influenza Whole 10/18/2010    Pneumococcal Conjugate 7-valent (Jenni Rumple) 01/18/2008       Active Problems:  Patient Active Problem List   Diagnosis Code    HTN (hypertension), benign I10    DM (diabetes mellitus), type 2, uncontrolled [id number]    Fracture, humerus S42.309A    Symptomatic anemia H71.8    Metabolic encephalopathy M32.96    Trochanteric bursitis, left hip M70.62    IT band syndrome M76.30    Left lumbar radiculitis M54.16    Spondylolisthesis, lumbar region M43.16    Low back pain M54.50    Left hip pain M25.552    Shortness of breath R06.02    Chronic cough R05.3    Alzheimer's disease ([facility]) G30.9, F02.80    Acute congestive heart failure (HCC) I50.9    Hypoxia R09.02    Idiopathic peripheral neuropathy G60.9    COVID-19 U07.1    A-fib ([facility]) I48.91    Pneumonia due to COVID-19 virus U07.1, J12.82    Acute hypoxemic respiratory failure ([facility]) J96.01    COPD without exacerbation ([facility]) J44.9    GI bleed K92.2    Acute on chronic respiratory failure with hypoxia ([facility]) J96.21    Chronic pain disorder G89.4    Depression F32. A    Bradycardia R00.1    Dysarthria R47.1       Isolation/Infection:   Isolation            No Isolation          Patient Infection Status       Infection Onset Added Last Indicated Last Indicated By Review Planned Expiration Resolved Resolved By    None active    Resolved    COVID-19 (Rule Out) 12/22/22 12/22/22 12/22/22 COVID-19 & Influenza Combo (Ordered)   12/22/22 Rule-Out Test Resulted    COVID-19 (Rule Out) 22 COVID-19 & Influenza Combo (Ordered)   22 Rule-Out Test Resulted    COVID-19 21 COVID-19 & Influenza Combo   21     COVID-19 (Rule Out) 21 COVID-19 & Influenza Combo (Ordered)   21 Rule-Out Test Resulted    MDRO (multi-drug resistant organism)  10/20/16 10/20/16 Pavel Ward RN   18 Ketty Toro RN            Nurse Assessment:  Last Vital Signs: BP (!) 156/78   Pulse (!) 101   Temp 99.3 °F (37.4 °C) (Oral)   Resp 18   Ht 5' 7\" (1.702 m)   Wt 149 lb 8 oz (67.8 kg)   SpO2 93%   BMI 23.42 kg/m²     Last documented pain score (0-10 scale): Pain Level: 9  Last Weight:   Wt Readings from Last 1 Encounters:   22 149 lb 8 oz (67.8 kg)     Mental Status:  oriented and alert    IV Access:  - None    Nursing Mobility/ADLs:  Walking   Assisted  Transfer  Assisted  Bathing  Assisted  Dressing  Assisted  Toileting  Assisted  Feeding  Independent  Med Admin  Assisted  Med Delivery   whole    Wound Care Documentation and Therapy:  Incision 12 Elbow Posterior (Active)   Number of days: 3809       Incision 19 Eye Left (Active)   Number of days: 1355        Elimination:  Continence: Bowel: Yes  Bladder: Yes  Urinary Catheter: None   Colostomy/Ileostomy/Ileal Conduit: No       Date of Last BM: Unknown    Intake/Output Summary (Last 24 hours) at 2022 0955  Last data filed at 2022 0929  Gross per 24 hour   Intake 480 ml   Output 1000 ml   Net -520 ml     I/O last 3 completed shifts: In: 120 [P.O.:120]  Out: 1000 [Urine:1000]    Safety Concerns:     None    Impairments/Disabilities:      Speech    Nutrition Therapy:  Current Nutrition Therapy:   - Oral Diet:  General    Routes of Feeding: Oral  Liquids:  Thin Liquids  Daily Fluid Restriction: no  Last Modified Barium Swallow with Video (Video Swallowing Test): not done    Treatments at the Time of Hospital Discharge:   Respiratory Treatments: N/A  Oxygen Therapy:   Wearing 2L in hospital  Ventilator:    - No ventilator support    Rehab Therapies: Physical Therapy and Occupational Therapy  Weight Bearing Status/Restrictions: No weight bearing restrictions  Other Medical Equipment (for information only, NOT a DME order):  walker  Other Treatments: N/A    Patient's personal belongings (please select all that are sent with patient):  None    RN SIGNATURE:  Electronically signed by Kerry Azul RN on 12/23/22 at 10:28 AM EST    CASE MANAGEMENT/SOCIAL WORK SECTION    Inpatient Status Date:     Readmission Risk Assessment Score:  Readmission Risk              Risk of Unplanned Readmission:  24           Discharging to Facility/ [address]   Skilled Nursing   [address] [facility]   [phone]     / signature: Electronically signed by Kylah Livingston RN on 12/23/22 at 10:29 AM EST    PHYSICIAN SECTION    Prognosis: Good    Condition at Discharge: Stable    Rehab Potential (if transferring to Rehab): Good    Recommended Labs or Other Treatments After Discharge:     Physician Certification: I certify the above information and transfer of Shayy Ron  is necessary for the continuing treatment of the diagnosis listed and that she requires Intermediate Nursing Care for greater 30 days.      Update Admission H&P: No change in H&P    PHYSICIAN SIGNATURE:  Electronically signed by Blayne Mak MD on 12/23/22 at 9:55 AM EST

## 2022-12-23 NOTE — CARE COORDINATION
CASE MANAGEMENT INITIAL ASSESSMENT      Reviewed chart and completed assessment with patient:bedside  Family present: none  Explained Case Management role/services. Primary contact information:Abel/son    Health Care Decision Maker :   Primary Decision Maker: Dyllan Yo Child - [phone]    Primary Decision Maker: Alise Vang - Child - [phone]          Can this person be reached and be able to respond quickly, such as within a few minutes or hours? Yes    Admit date/status:12/22/22 OBS  Diagnosis: dysarthria   Is this a Readmission?:  No      Insurance:[facility] required for SNF: No       3 night stay required: No    Living arrangements, Adls, care needs, prior to admission: pt is LTC at [facility], plans to return    [facility] at home:  Walker_x_Cane__RTS__ BSC__Shower Chair__  02__ HHN__ CPAP__  BiPap__  Hospital Bed__ W/C___ Other_____    Services in the home and/or outpatient, prior to admission: per LTC    Current PCP:Dr. Siena Escalante                                Medications: Prescription coverage? Yes Will pt require financial assistance with medications No     Transportation needs: yes     PT/OT recs: [facility] Exemption Notification (HEN):na    Barriers to discharge:none    Plan/comments: from [facility] Stroke work up negative. Will return to [facility]. Discharge order noted.  [facility] transport set up for 10:30 AM. Vanna Sanii RN     ECOC on chart for MD signature

## 2022-12-23 NOTE — DISCHARGE SUMMARY
Hospital Medicine Discharge Summary    Patient ID: Geri Pickering      Patient's PCP: Vero Mitchell MD    Admit Date: 12/22/2022     Discharge Date: 12/23/2022      Admitting Provider: Kristian Schirmer, MD     Discharge Provider: Kristian Schirmer, MD     Discharge Diagnoses: Active Hospital Problems    Diagnosis     Dysarthria [R47.1]      Priority: Medium       The patient was seen and examined on day of discharge and this discharge summary is in conjunction with any daily progress note from day of discharge. Hospital Course:   [de-identified] y.o. female who presented to [facility] with a fall. Patient lives at a long term nursing facility. She had a fall today while getting out of beds. They recently changed the type of bed and patient has been having a more difficult timing getting out of bed. This has led to her ambulating less and increasing her degree of weakness. Patient has dementia at baseline but has a decent knowledge of her own medical history. Following the fall, the staff noted some slurring of her speech. This has persisted but patient insists this is due to her not having her dentures. She does not currently have access to her dentures. CT head and CTA head and neck were largely unremarkable. Dysarthria  - CT head negative  - CTA head and neck without large vessel occlusion  - MRI brain negative  - likely due to poor dentition     Leukocytosis  - no clinical evidence of infection  - likely stress response.  Down trending prior to discharge     Persistent Afib  - rate controlled  - continue coreg  - on eliquis for [facility]     DMII  - poorly controlled  - resume home regimen on discharge     HTN  - well controlled  - continue norvasc, coreg, losartan     HLD  - continue statin     COPD  - no evidence of exacerbation  - continue home inhalers     Dementia  - at mental baseline  - supportive care    Physical Exam Performed:     BP (!) 156/78   Pulse (!) 101   Temp 99.3 °F (37.4 °C) (Oral) Resp 18   Ht 5' 7\" (1.702 m)   Wt 149 lb 8 oz (67.8 kg)   SpO2 93%   BMI 23.42 kg/m²       General appearance:  No apparent distress, appears stated age and cooperative. HEENT:  Normal cephalic, atraumatic without obvious deformity. Pupils equal, round, and reactive to light. Extra ocular muscles intact. Conjunctivae/corneas clear. Lack of dentition. Neck: Supple, with full range of motion. No jugular venous distention. Trachea midline. Respiratory:  Normal respiratory effort. Clear to auscultation, bilaterally without Rales/Wheezes/Rhonchi. Cardiovascular:  Regular rate and rhythm with normal S1/S2 without murmurs, rubs or gallops. Abdomen: Soft, non-tender, non-distended with normal bowel sounds. Musculoskeletal:  No clubbing, cyanosis or edema bilaterally. Full range of motion without deformity. Mild dysarthria  Skin: Skin color, texture, turgor normal.  No rashes or lesions. Neurologic:  Neurovascularly intact without any focal sensory/motor deficits. Cranial nerves: II-XII intact, grossly non-focal.  Psychiatric:  Alert and oriented, thought content appropriate, normal insight  Capillary Refill: Brisk,3 seconds, normal  Peripheral Pulses: +2 palpable, equal bilaterally      Labs: For convenience and continuity at follow-up the following most recent labs are provided:      CBC:    Lab Results   Component Value Date/Time    WBC 11.6 12/23/2022 07:56 AM    HGB 12.0 12/23/2022 07:56 AM    HCT 36.8 12/23/2022 07:56 AM     12/23/2022 07:56 AM       Renal:    Lab Results   Component Value Date/Time     12/23/2022 07:56 AM    K 3.5 12/23/2022 07:56 AM     12/23/2022 07:56 AM    CO2 28 12/23/2022 07:56 AM    BUN 15 12/23/2022 07:56 AM    CREATININE 0.9 12/23/2022 07:56 AM    CALCIUM 9.4 12/23/2022 07:56 AM    PHOS 2.4 09/11/2022 07:58 AM         Significant Diagnostic Studies    Radiology:   MRI BRAIN WO CONTRAST   Final Result   No acute infarct.                 Consults: None    Disposition: [facility]    Condition at Discharge: Stable    Discharge Instructions/Follow-up:  Follow up with PCP within 1-2 weeks    Code Status:  DNR    Activity: activity as tolerated    Diet: diabetic diet      Discharge Medications:     Discharge Medication List as of 12/23/2022 10:29 AM             Details   furosemide (LASIX) 20 MG tablet Take 1 tablet by mouth daily, Disp-30 tablet, R-0NO PRINT                Details   atorvastatin (LIPITOR) 40 MG tablet Take 40 mg by mouth nightlyHistorical Med      budesonide-formoterol (SYMBICORT) 160-4.5 MCG/ACT AERO Inhale 2 puffs into the lungs 2 times dailyHistorical Med      losartan (COZAAR) 100 MG tablet Take 0.5 tablets by mouth dailyHistorical Med      carvedilol (COREG) 6.25 MG tablet Take 0.5 tablets by mouth 2 times daily (with meals)DC to SNF      potassium chloride (KLOR-CON) 20 MEQ packet Take 20 mEq by mouth 2 times dailyDC to SNF      ferrous sulfate (IRON 325) 325 (65 Fe) MG tablet Take 1 tablet by mouth daily (with breakfast)DC to SNF      fenofibrate (TRICOR) 54 MG tablet Take 54 mg by mouth daily s [facility] pharmacy: Please dispense generic fenofibrate unless prescriber denoteHistorical Med      loperamide (IMODIUM) 2 MG capsule Take 2 mg by mouth 4 times daily as needed for DiarrheaHistorical Med      !! pregabalin (LYRICA) 50 MG capsule Take 50 mg by mouth 2 times daily. Takes at 0500, 1300Historical Med      magnesium hydroxide (MILK OF MAGNESIA) 400 MG/5ML suspension Take 30 mLs by mouth daily as needed for ConstipationHistorical Med      mirabegron (MYRBETRIQ) 25 MG TB24 Take 25 mg by mouth dailyHistorical Med      olopatadine (PATANOL) 0.1 % ophthalmic solution Place 1 drop into both eyes at bedtimeHistorical Med      senna (SENOKOT) 8.6 MG tablet Take 1 tablet by mouth 2 times dailyHistorical Med      !! pregabalin (LYRICA) 75 MG capsule Take 75 mg by mouth at bedtime.  Takes at 2000Historical Med      oxybutynin (DITROPAN-XL) 5 MG extended release tablet Take 5 mg by mouth every eveningHistorical Med      SITagliptin (JANUVIA) 50 MG tablet Take 50 mg by mouth dailyHistorical Med      loratadine (CLARITIN) 10 MG capsule Take 10 mg by mouth dailyHistorical Med      omeprazole (PRILOSEC) 20 MG delayed release capsule Take 40 mg by mouth at bedtimeHistorical Med      insulin glargine (BASAGLAR KWIKPEN) 100 UNIT/ML injection pen Inject 30 Units into the skin 2 times dailyHistorical Med      vitamin B-12 (CYANOCOBALAMIN) 500 MCG tablet Take 500 mcg by mouth dailyHistorical Med      DULoxetine (CYMBALTA) 60 MG extended release capsule Take 60 mg by mouth dailyHistorical Med      diclofenac sodium (VOLTAREN) 1 % GEL Apply topically nightly Right arm, Topical, NIGHTLY, Historical Med      OXYGEN Inhale into the lungs 2 liters at nightHistorical Med      ARTIFICIAL TEAR OP Apply 2 drops to eye every 2 hours as neededHistorical Med      methyl salicylate-menthol (LYNNETTE NEGRO GREASELESS) 10-15 % CREA Apply topically every 8 hours as needed for Pain, Apply externally, EVERY 8 HOURS PRN, Historical Med      guaiFENesin (ROBITUSSIN) 100 MG/5ML syrup Take 10 mLs by mouth every 4 hours as needed for CoughHistorical Med      acetaminophen (TYLENOL) 325 MG tablet Take 650 mg by mouth every 4 hours as needed for PainHistorical Med      bisacodyl (DULCOLAX) 5 MG EC tablet Take 10 mg by mouth daily as needed for Constipation Historical Med      apixaban (ELIQUIS) 5 MG TABS tablet Take 5 mg by mouth 2 times dailyHistorical Med      guaiFENesin (MUCINEX) 600 MG extended release tablet Take 1,200 mg by mouth 2 times dailyHistorical Med      predniSONE (DELTASONE) 2.5 MG tablet Take 2.5 mg by mouth dailyHistorical Med      amLODIPine (NORVASC) 5 MG tablet Take 1 tablet by mouth daily, Disp-30 tablet, R-0      imipramine (TOFRANIL) 50 MG tablet Take 50 mg by mouth nightlyHistorical Med      fluticasone (FLONASE) 50 MCG/ACT nasal spray 2 sprays by Nasal route dailyHistorical Med      ondansetron (ZOFRAN ODT) 4 MG disintegrating tablet Take 1 tablet by mouth every 8 hours as needed for Nausea. Let dissolve in mouth., Disp-10 tablet, R-0      ASPIRIN Take 81 mg by mouth daily. Historical Med       !! - Potential duplicate medications found. Please discuss with provider. Time Spent on discharge: 27 minutes in the examination, evaluation, counseling and review of medications and discharge plan. Signed:    Kristian Schirmer, MD   12/23/2022      Thank you Vero Mitchell MD for the opportunity to be involved in this patient's care. If you have any questions or concerns, please feel free to contact me at [phone].

## 2022-12-26 LAB
BLOOD CULTURE, ROUTINE: NORMAL
CULTURE, BLOOD 2: NORMAL

## 2023-01-18 ENCOUNTER — HOSPITAL ENCOUNTER (EMERGENCY)
Age: 83
Discharge: NURSING, REHAB OR LONG-TERM CARE | End: 2023-01-18
Attending: STUDENT IN AN ORGANIZED HEALTH CARE EDUCATION/TRAINING PROGRAM
Payer: COMMERCIAL

## 2023-01-18 ENCOUNTER — APPOINTMENT (OUTPATIENT)
Dept: CT IMAGING | Age: 83
End: 2023-01-18
Payer: COMMERCIAL

## 2023-01-18 VITALS
RESPIRATION RATE: 18 BRPM | HEART RATE: 90 BPM | WEIGHT: 152 LBS | TEMPERATURE: 98.5 F | DIASTOLIC BLOOD PRESSURE: 73 MMHG | BODY MASS INDEX: 23.86 KG/M2 | HEIGHT: 67 IN | OXYGEN SATURATION: 97 % | SYSTOLIC BLOOD PRESSURE: 117 MMHG

## 2023-01-18 DIAGNOSIS — H11.422 CHEMOSIS OF CONJUNCTIVA, LEFT: ICD-10-CM

## 2023-01-18 DIAGNOSIS — S05.92XA LEFT EYE INJURY, INITIAL ENCOUNTER: Primary | ICD-10-CM

## 2023-01-18 LAB
ANION GAP SERPL CALCULATED.3IONS-SCNC: 8 MMOL/L (ref 3–16)
APTT: 34.2 SEC (ref 23–34.3)
BASOPHILS ABSOLUTE: 0 K/UL (ref 0–0.2)
BASOPHILS RELATIVE PERCENT: 0.5 %
BUN BLDV-MCNC: 20 MG/DL (ref 7–20)
CALCIUM SERPL-MCNC: 9.1 MG/DL (ref 8.3–10.6)
CHLORIDE BLD-SCNC: 102 MMOL/L (ref 99–110)
CO2: 26 MMOL/L (ref 21–32)
CREAT SERPL-MCNC: 0.9 MG/DL (ref 0.6–1.2)
EOSINOPHILS ABSOLUTE: 0.3 K/UL (ref 0–0.6)
EOSINOPHILS RELATIVE PERCENT: 3.5 %
GFR SERPL CREATININE-BSD FRML MDRD: >60 ML/MIN/{1.73_M2}
GLUCOSE BLD-MCNC: 245 MG/DL (ref 70–99)
HCT VFR BLD CALC: 38.2 % (ref 36–48)
HEMOGLOBIN: 12.6 G/DL (ref 12–16)
INR BLD: 1.34 (ref 0.87–1.14)
LYMPHOCYTES ABSOLUTE: 1.1 K/UL (ref 1–5.1)
LYMPHOCYTES RELATIVE PERCENT: 14.5 %
MCH RBC QN AUTO: 28.3 PG (ref 26–34)
MCHC RBC AUTO-ENTMCNC: 33 G/DL (ref 31–36)
MCV RBC AUTO: 85.7 FL (ref 80–100)
MONOCYTES ABSOLUTE: 0.6 K/UL (ref 0–1.3)
MONOCYTES RELATIVE PERCENT: 8.3 %
NEUTROPHILS ABSOLUTE: 5.5 K/UL (ref 1.7–7.7)
NEUTROPHILS RELATIVE PERCENT: 73.2 %
PDW BLD-RTO: 15.6 % (ref 12.4–15.4)
PLATELET # BLD: 162 K/UL (ref 135–450)
PMV BLD AUTO: 8.7 FL (ref 5–10.5)
POTASSIUM REFLEX MAGNESIUM: 4.3 MMOL/L (ref 3.5–5.1)
PROTHROMBIN TIME: 16.4 SEC (ref 11.7–14.5)
RBC # BLD: 4.46 M/UL (ref 4–5.2)
SODIUM BLD-SCNC: 136 MMOL/L (ref 136–145)
WBC # BLD: 7.5 K/UL (ref 4–11)

## 2023-01-18 PROCEDURE — 70480 CT ORBIT/EAR/FOSSA W/O DYE: CPT

## 2023-01-18 PROCEDURE — 80048 BASIC METABOLIC PNL TOTAL CA: CPT

## 2023-01-18 PROCEDURE — 85730 THROMBOPLASTIN TIME PARTIAL: CPT

## 2023-01-18 PROCEDURE — 85610 PROTHROMBIN TIME: CPT

## 2023-01-18 PROCEDURE — 85025 COMPLETE CBC W/AUTO DIFF WBC: CPT

## 2023-01-18 PROCEDURE — 99284 EMERGENCY DEPT VISIT MOD MDM: CPT

## 2023-01-18 PROCEDURE — 36415 COLL VENOUS BLD VENIPUNCTURE: CPT

## 2023-01-18 PROCEDURE — 6370000000 HC RX 637 (ALT 250 FOR IP): Performed by: STUDENT IN AN ORGANIZED HEALTH CARE EDUCATION/TRAINING PROGRAM

## 2023-01-18 RX ORDER — TETRACAINE HYDROCHLORIDE 5 MG/ML
1 SOLUTION OPHTHALMIC ONCE
Status: COMPLETED | OUTPATIENT
Start: 2023-01-18 | End: 2023-01-18

## 2023-01-18 RX ADMIN — FLUORESCEIN SODIUM 1 MG: 1 STRIP OPHTHALMIC at 15:22

## 2023-01-18 RX ADMIN — TETRACAINE HYDROCHLORIDE 1 DROP: 5 SOLUTION OPHTHALMIC at 15:22

## 2023-01-18 ASSESSMENT — PAIN - FUNCTIONAL ASSESSMENT: PAIN_FUNCTIONAL_ASSESSMENT: NONE - DENIES PAIN

## 2023-01-18 ASSESSMENT — VISUAL ACUITY
OS: 20/40
OD: 20/30
OU: 20/40

## 2023-01-18 NOTE — ED PROVIDER NOTES
Emergency Department Encounter    Patient: Belinda Hernandez  MRN: [id number]  : 1940  Date of Evaluation: 2023  ED Provider:  Carson Dewitt MD    Triage Chief Complaint:   Eye Injury (Pr EMS pt was trimming her bangs and accidentally hit her left eye with the scissors. + visible blood covering most of sclera. Pt denies vision difficulties. Pt on blood thinners (Eliquis))    [place]:  Belinda Hernandez is a [de-identified] y.o. female with history seen below presenting with left eye injury. Patient states that she was trimming her pain this and accidentally poked her left eye with the scissors. Patient states she is on Eliquis. She denies any visual changes or pain but states she has some redness around the sclera. States she otherwise has had no injury denies headache, blurred vision, focal deficits, motor or sensory changes. Denies neck or back pain. Denies chest pain or shortness of breath, lightheadedness or dizziness. Denies abdominal pain nausea vomiting, diarrhea constipation, urinary symptoms. States she has no other injury and overall feels at baseline.     ROS - see HPI, below listed is current ROS at time of my eval:  At least 14 systems reviewed, negative other than  HPI    Past Medical History:   Diagnosis Date    A-fib ([address]     Alzheimer's dementia ([address]     Anxiety     Arthritis     Back pain     COPD (chronic obstructive pulmonary disease) ([address]     COVID-19 2021    Depression     Diabetes mellitus ([address]     Fall     multiple falls    GERD (gastroesophageal reflux disease)     Hepatitis A     age 27    Hernia     Hypercholesteremia     Hypertension     IBS (irritable bowel syndrome)     diarrhea  x 20 years    Incontinence of urine     not all the time    Kidney stone     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    Memory change     dementia    Neuropathy     Seizures (HCC)     possible because of her falls pt unsure    UTI (lower urinary tract infection)      Past Surgical History:   Procedure Laterality Date    BREAST SURGERY      BENIGN CYST    CHOLECYSTECTOMY      COLONOSCOPY      COLONOSCOPY N/A 2022    COLONOSCOPY POLYPECTOMY SNARE/COLD BIOPSY performed by Valeriano Cheng MD at [facility] N/A 2022    COLONOSCOPY CONTROL HEMORRHAGE performed by Valeriano Cheng MD at [facility] 72 N/A 2022    COLONOSCOPY WITH BIOPSY performed by Valeriano Cheng MD at [facility] Right 3/25/2019    PHACOEMULSIFICATION OF CATARACT RIGHT EYE WITH INTRAOCULAR LENS IMPLANT performed by Cammie Cameron MD at [place] [address] Left 2019    PHACOEMULSIFICATION OF CATARACT LEFT EYE WITH INTRAOCULAR LENS IMPLANT performed by Cammie Cameron MD at [facility]  12    OPEN REDUCTION INTERNAL FIXATION RIGHT PROXIMAL HUMERAL SHAFT FRACTURE SYNTHES    OTHER SURGICAL HISTORY  12    incision and drainage of bursa rt elbow    PANCREAS BIOPSY      SHOULDER SURGERY Bilateral     rtc repair    UPPER GASTROINTESTINAL ENDOSCOPY N/A 2022    EGD BIOPSY performed by Jr Eli DO at [facility]     Family History   Problem Relation Age of Onset    Heart Disease Mother      Social History     Socioeconomic History    Marital status:      Spouse name: Not on file    Number of children: Not on file    Years of education: Not on file    Highest education level: Not on file   Occupational History    Not on file   Tobacco Use    Smoking status: Former     Packs/day: 1.00     Years: 15.00     Pack years: 15.00     Types: Cigarettes     Quit date: 1998     Years since quittin.7    Smokeless tobacco: Never   Vaping Use    Vaping Use: Never used   Substance and Sexual Activity    Alcohol use: No    Drug use: No    Sexual activity: Not Currently   Other Topics Concern    Not on file   Social History Narrative    Not on file     Social Determinants of Health     Financial Resource Strain: Not on file   Food Insecurity: Not on file   Transportation Needs: Not on file   Physical Activity: Not on file   Stress: Not on file   Social Connections: Not on file   Intimate Partner Violence: Not on file   Housing Stability: Not on file     No current facility-administered medications for this encounter. Current Outpatient Medications   Medication Sig Dispense Refill    furosemide (LASIX) 20 MG tablet Take 1 tablet by mouth daily 30 tablet 0    atorvastatin (LIPITOR) 40 MG tablet Take 40 mg by mouth nightly      budesonide-formoterol (SYMBICORT) 160-4.5 MCG/ACT AERO Inhale 2 puffs into the lungs 2 times daily      losartan (COZAAR) 100 MG tablet Take 0.5 tablets by mouth daily      carvedilol (COREG) 6.25 MG tablet Take 0.5 tablets by mouth 2 times daily (with meals)      potassium chloride (KLOR-CON) 20 MEQ packet Take 20 mEq by mouth 2 times daily      ferrous sulfate (IRON 325) 325 (65 Fe) MG tablet Take 1 tablet by mouth daily (with breakfast)      fenofibrate (TRICOR) 54 MG tablet Take 54 mg by mouth daily s [facility] pharmacy: Please dispense generic fenofibrate unless prescriber denote      loperamide (IMODIUM) 2 MG capsule Take 2 mg by mouth 4 times daily as needed for Diarrhea      pregabalin (LYRICA) 50 MG capsule Take 50 mg by mouth 2 times daily. Takes at 0500, 1300      magnesium hydroxide (MILK OF MAGNESIA) 400 MG/5ML suspension Take 30 mLs by mouth daily as needed for Constipation      mirabegron (MYRBETRIQ) 25 MG TB24 Take 25 mg by mouth daily      olopatadine (PATANOL) 0.1 % ophthalmic solution Place 1 drop into both eyes at bedtime      senna (SENOKOT) 8.6 MG tablet Take 1 tablet by mouth 2 times daily      pregabalin (LYRICA) 75 MG capsule Take 75 mg by mouth at bedtime.  Takes at 2000      oxybutynin (DITROPAN-XL) 5 MG extended release tablet Take 5 mg by mouth every evening      SITagliptin (JANUVIA) 50 MG tablet Take 50 mg by mouth daily      loratadine (CLARITIN) 10 MG capsule Take 10 mg by mouth daily      omeprazole (PRILOSEC) 20 MG delayed release capsule Take 40 mg by mouth at bedtime      insulin glargine (BASAGLAR KWIKPEN) 100 UNIT/ML injection pen Inject 30 Units into the skin 2 times daily      vitamin B-12 (CYANOCOBALAMIN) 500 MCG tablet Take 500 mcg by mouth daily      DULoxetine (CYMBALTA) 60 MG extended release capsule Take 60 mg by mouth daily      diclofenac sodium (VOLTAREN) 1 % GEL Apply topically nightly Right arm      OXYGEN Inhale into the lungs 2 liters at night      ARTIFICIAL TEAR OP Apply 2 drops to eye every 2 hours as needed      methyl salicylate-menthol (LYNNETTE NEGRO GREASELESS) 10-15 % CREA Apply topically every 8 hours as needed for Pain      guaiFENesin (ROBITUSSIN) 100 MG/5ML syrup Take 10 mLs by mouth every 4 hours as needed for Cough      acetaminophen (TYLENOL) 325 MG tablet Take 650 mg by mouth every 4 hours as needed for Pain      bisacodyl (DULCOLAX) 5 MG EC tablet Take 10 mg by mouth daily as needed for Constipation       apixaban (ELIQUIS) 5 MG TABS tablet Take 5 mg by mouth 2 times daily      guaiFENesin (MUCINEX) 600 MG extended release tablet Take 1,200 mg by mouth 2 times daily      predniSONE (DELTASONE) 2.5 MG tablet Take 2.5 mg by mouth daily      amLODIPine (NORVASC) 5 MG tablet Take 1 tablet by mouth daily 30 tablet 0    imipramine (TOFRANIL) 50 MG tablet Take 50 mg by mouth nightly      fluticasone (FLONASE) 50 MCG/ACT nasal spray 2 sprays by Nasal route daily      ondansetron (ZOFRAN ODT) 4 MG disintegrating tablet Take 1 tablet by mouth every 8 hours as needed for Nausea. Let dissolve in mouth. 10 tablet 0    ASPIRIN Take 81 mg by mouth daily.        Allergies   Allergen Reactions    Codeine Hives     Other reaction(s): Unknown  itching    Morphine And Related        Nursing Notes Reviewed    Physical Exam:  Triage VS:    ED Triage Vitals   Enc Vitals Group      BP Pulse       Resp       Temp       Temp src       SpO2       Weight       Height       Head Circumference       Peak Flow       Pain Score       Pain Loc       Pain Edu? Excl. in [address]? My pulse ox interpretation is - normal    General appearance:  No acute distress. Skin:  Warm. Dry. Eye:  Extraocular movements intact. Pupils 3 mm reactive bilaterally, no APD, no consensual photophobia, no hyphema, patient does have chemosis along the bottom two thirds of the sclera, patient has 20/30 vision in the right eye and 20/40 vision in the left, no obvious foreign body seen patient denies any pain, no fluorescein uptake visualized, pressures 18 and 20 on repeat  Ears, nose, mouth and throat:  Oral mucosa moist   Neck:  Trachea midline. Extremity: Chronically dislocated right elbow, no swelling. Normal ROM     Heart:  Regular rate and rhythm, normal S1 & S2, no extra heart sounds. Perfusion:  intact  Respiratory:  Lungs clear to auscultation bilaterally. Respirations nonlabored. Abdominal:  Normal bowel sounds. Soft. Nontender. Non distended. Back:  No CVA tenderness to palpation     Neurological:  Alert and oriented times 3. No focal neuro deficits. Psychiatric:  Appropriate    I have reviewed and interpreted all of the currently available lab results from this visit (if applicable):  No results found for this visit on 01/18/23. Radiographs (if obtained):  Radiologist's Report Reviewed:  No results found. MDM:    27-year-old female presenting for evaluation of left eye injury. Vitals on presentation are reassuring and patient afebrile satting well on room air. Physical exam can be seen above. CT is nonacute.   Pressures are reassuring there is no Niesha sign or fluorescein uptake there is no APD or hyphema visualized patient has normal extraocular movements I discussed at length with ophthalmology at [facility] who agrees that with normal imaging as well as pressures of 18 and 20 unlikely to be perforation would like to see patient tomorrow morning at 10 AM in the urgent clinic for reevaluation and further evaluation and treatment I.  I discussed with nursing facility as well as son who state they will be able to get patient to appointment tomorrow. Discussed tricked return precautions as well as close follow-up and patient is agreeable plan and patient discharged home. Clinical Impression:  1. Left eye injury, initial encounter    2. Chemosis of conjunctiva, left          Comment: Please note this report has been produced using speech recognition software and may contain errors related to that system including errors in grammar, punctuation, and spelling, as well as words and phrases that may be inappropriate. Efforts were made to edit the dictations.         Hair Duran MD  01/18/23 [id number]

## 2023-01-18 NOTE — DISCHARGE INSTRUCTIONS
Follow-up with ophthalmologist tomorrow for already scheduled appointment at 10 AM.  The address is above. Return to emergency department for any worsening pain, visual changes, headache, blurred vision, focal deficits, motor or sensory changes, chest pain or shortness of breath or any new change or worsening symptoms were always here for reevaluation never hesitate to return.

## 2023-01-19 NOTE — ED NOTES
Pt assisted to bathroom in wheelchair. Pt back in bed. Side rails up. Wheels locked and bed in lowest position. Pt denies any needs at this time.       Marci Anderson RN  01/18/23 [id number]

## 2023-01-19 NOTE — ED NOTES
Report called to Tino Brooks, Nurse at [facility]. Informed her of Appt with [facility] at 10am 1/19/23 and the Rx for Visine. Pt transported via ambulance in stable condition.       Isac Cervantes RN  01/18/23 [id number]

## 2024-09-23 LAB
A/G RATIO: 1.6 RATIO (ref 0.8–2.6)
ALBUMIN: 3.6 G/DL (ref 3.5–5.2)
ALP BLD-CCNC: 96 U/L (ref 23–144)
ALT SERPL-CCNC: 15 U/L (ref 0–60)
AST SERPL-CCNC: 18 U/L (ref 0–55)
BASOPHILS ABSOLUTE: 0 K/UL (ref 0–0.3)
BASOPHILS RELATIVE PERCENT: 0.7 % (ref 0–2)
BILIRUB SERPL-MCNC: 0.2 MG/DL (ref 0–1.2)
BILIRUBIN DIRECT: <0.2 MG/DL (ref 0–0.4)
BILIRUBIN, INDIRECT: ABNORMAL MG/DL (ref 0–1.2)
BUN / CREAT RATIO: 25 (ref 7–25)
BUN BLDV-MCNC: 27 MG/DL (ref 3–29)
CALCIUM SERPL-MCNC: 9.4 MG/DL (ref 8.5–10.5)
CHLORIDE BLD-SCNC: 105 MEQ/L (ref 96–110)
CHOLESTEROL, TOTAL: 107 MG/DL
CO2: 23 MEQ/L (ref 19–32)
CREAT SERPL-MCNC: 1.1 MG/DL (ref 0.5–1.2)
DIFFERENTIAL COUNT: ABNORMAL
EOSINOPHILS ABSOLUTE: 0.2 K/UL (ref 0–0.5)
EOSINOPHILS RELATIVE PERCENT: 3.1 % (ref 0–5)
ESTIMATED GLOMERULAR FILTRATION RATE CREATININE EQUATION: 50 MLS/MIN/1.73M2
FASTING STATUS: ABNORMAL
GLOBULIN: 2.3 G/DL (ref 1.9–3.6)
GLUCOSE BLD-MCNC: 236 MG/DL (ref 70–99)
HCT VFR BLD CALC: 36.7 % (ref 34–49)
HDLC SERPL-MCNC: 26 MG/DL
HEMOGLOBIN: 11.4 G/DL (ref 11.2–15.7)
IMMATURE GRANS (ABS): 0 K/UL (ref 0–0.1)
IMMATURE GRANULOCYTES %: 0.3 %
LDL CHOLESTEROL: 39 MG/DL
LYMPHOCYTES ABSOLUTE: 1.4 K/UL (ref 0.9–4.1)
LYMPHOCYTES RELATIVE PERCENT: 24.3 % (ref 14–51)
MAGNESIUM: 1.8 MG/DL (ref 1.4–2.5)
MCH RBC QN AUTO: 26.4 PG (ref 26–34)
MCHC RBC AUTO-ENTMCNC: 31.1 G/DL (ref 30.7–35.5)
MCV RBC AUTO: 85 FL (ref 80–100)
MONOCYTES ABSOLUTE: 0.9 K/UL (ref 0.2–1)
MONOCYTES RELATIVE PERCENT: 14.5 % (ref 4–12)
NEUTROPHILS ABSOLUTE: 3.3 K/UL (ref 1.8–7.5)
NEUTROPHILS RELATIVE PERCENT: 57.1 % (ref 42–80)
PDW BLD-RTO: 15.1 %
PLATELET # BLD: 195 K/UL (ref 140–400)
PMV BLD AUTO: 10.9 FL (ref 7.2–11.7)
POTASSIUM SERPL-SCNC: 3.8 MEQ/L (ref 3.4–5.3)
RBC # BLD: 4.32 M/UL (ref 3.95–5.26)
RETICULOCYTE ABSOLUTE COUNT: 0 /100 WBC
SODIUM BLD-SCNC: 143 MEQ/L (ref 135–148)
TOTAL PROTEIN: 5.9 G/DL (ref 6–8.3)
TRIGL SERPL-MCNC: 212 MG/DL
TSH ULTRASENSITIVE: 1.96 MCIU/ML (ref 0.4–4.5)
VITAMIN B-12: 1078 PG/ML (ref 200–1100)
VLDLC SERPL CALC-MCNC: 42 MG/DL (ref 4–38)
WBC # BLD: 5.9 K/UL (ref 3.5–10.9)

## 2025-01-03 LAB
B-TYPE NATRIURETIC PEPTIDE: 559 PG/ML (ref 0–125)
BUN / CREAT RATIO: 25 (ref 7–25)
BUN BLDV-MCNC: 27 MG/DL (ref 3–29)
CALCIUM SERPL-MCNC: 8.8 MG/DL (ref 8.5–10.5)
CHLORIDE BLD-SCNC: 105 MEQ/L (ref 96–110)
CO2: 27 MEQ/L (ref 19–32)
CREAT SERPL-MCNC: 1.1 MG/DL (ref 0.5–1.2)
ESTIMATED GLOMERULAR FILTRATION RATE CREATININE EQUATION: 50 MLS/MIN/1.73M2
FASTING STATUS: ABNORMAL
GLUCOSE BLD-MCNC: 93 MG/DL (ref 70–99)
HCT VFR BLD CALC: 30.7 % (ref 34–49)
HEMOGLOBIN: 9.2 G/DL (ref 11.2–15.7)
MCH RBC QN AUTO: 24.3 PG (ref 26–34)
MCHC RBC AUTO-ENTMCNC: 30 G/DL (ref 30.7–35.5)
MCV RBC AUTO: 81.2 FL (ref 80–100)
PDW BLD-RTO: 16.5 %
PLATELET # BLD: 153 K/UL (ref 140–400)
PMV BLD AUTO: 10.6 FL (ref 7.2–11.7)
POTASSIUM SERPL-SCNC: 3.9 MEQ/L (ref 3.4–5.3)
RBC # BLD: 3.78 M/UL (ref 3.95–5.26)
SODIUM BLD-SCNC: 143 MEQ/L (ref 135–148)
WBC # BLD: 5.4 K/UL (ref 3.5–10.9)

## 2025-03-10 ENCOUNTER — HOSPITAL ENCOUNTER (EMERGENCY)
Age: 85
Discharge: HOME | End: 2025-03-10
Attending: EMERGENCY MEDICINE
Payer: COMMERCIAL

## 2025-03-10 ENCOUNTER — APPOINTMENT (OUTPATIENT)
Dept: CT IMAGING | Age: 85
End: 2025-03-10
Payer: COMMERCIAL

## 2025-03-10 ENCOUNTER — APPOINTMENT (OUTPATIENT)
Dept: GENERAL RADIOLOGY | Age: 85
End: 2025-03-10
Payer: COMMERCIAL

## 2025-03-10 VITALS
OXYGEN SATURATION: 95 % | HEART RATE: 70 BPM | TEMPERATURE: 98.6 F | BODY MASS INDEX: 25.11 KG/M2 | HEIGHT: 67 IN | RESPIRATION RATE: 18 BRPM | DIASTOLIC BLOOD PRESSURE: 70 MMHG | SYSTOLIC BLOOD PRESSURE: 108 MMHG | WEIGHT: 160 LBS
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
## 2025-03-10 DIAGNOSIS — S01.01XA LACERATION OF OCCIPITAL SCALP, INITIAL ENCOUNTER: ICD-10-CM

## 2025-03-10 LAB
A/G RATIO: 1.6 RATIO (ref 0.8–2.6)
ALBUMIN: 3.8 G/DL (ref 3.5–5.2)
ALP BLD-CCNC: 101 U/L (ref 23–144)
ALT SERPL-CCNC: 31 U/L (ref 0–60)
AST SERPL-CCNC: 33 U/L (ref 0–55)
BACTERIA, URINE: ABNORMAL /HPF
BASOPHILS ABSOLUTE: 0 K/UL (ref 0–0.3)
BASOPHILS RELATIVE PERCENT: 0.5 % (ref 0–2)
BILIRUB SERPL-MCNC: 0.3 MG/DL (ref 0–1.2)
BILIRUBIN DIRECT: 0.2 MG/DL (ref 0–0.4)
BILIRUBIN, INDIRECT: 0.1 MG/DL (ref 0–1.2)
BILIRUBIN, URINE: NEGATIVE MG/DL
BUN / CREAT RATIO: 32 (ref 7–25)
BUN BLDV-MCNC: 58 MG/DL (ref 3–29)
CALCIUM SERPL-MCNC: 9 MG/DL (ref 8.5–10.5)
CHLORIDE BLD-SCNC: 97 MEQ/L (ref 96–110)
CHOLESTEROL, TOTAL: 76 MG/DL
CLARITY, UA: CLEAR
CO2: 26 MEQ/L (ref 19–32)
COLOR, UA: YELLOW
CREAT SERPL-MCNC: 1.8 MG/DL (ref 0.5–1.2)
DIFFERENTIAL COUNT: ABNORMAL
EOSINOPHILS ABSOLUTE: 0.4 K/UL (ref 0–0.5)
EOSINOPHILS RELATIVE PERCENT: 4.8 % (ref 0–5)
EPITHELIAL CELLS, UA: ABNORMAL /HPF (ref 0–5)
EPITHELIAL CELLS, UA: ABNORMAL /HPF (ref 0–5)
ERYTHROCYTES URINE: ABNORMAL /HPF (ref 0–2)
ESTIMATED GLOMERULAR FILTRATION RATE CREATININE EQUATION: 28 MLS/MIN/1.73M2
FASTING STATUS: ABNORMAL
GLOBULIN: 2.4 G/DL (ref 1.9–3.6)
GLUCOSE BLD-MCNC: 199 MG/DL (ref 70–99)
GLUCOSE URINE: >1000 MG/DL
HCT VFR BLD CALC: 28 % (ref 34–49)
HDLC SERPL-MCNC: 21 MG/DL
HEMOGLOBIN: 8.4 G/DL (ref 11.2–15.7)
IMMATURE GRANS (ABS): 0.1 K/UL (ref 0–0.1)
IMMATURE GRANULOCYTES %: 1.4 %
KETONES, URINE: NEGATIVE MG/DL
LDL CHOLESTEROL: 31 MG/DL
LEUKOCYTE ESTERASE, URINE: NEGATIVE
LEUKOCYTES, UA: ABNORMAL /HPF (ref 0–5)
LYMPHOCYTES ABSOLUTE: 0.9 K/UL (ref 0.9–4.1)
LYMPHOCYTES RELATIVE PERCENT: 12.4 % (ref 14–51)
MAGNESIUM: 1.7 MG/DL (ref 1.4–2.5)
MCH RBC QN AUTO: 22.5 PG (ref 26–34)
MCHC RBC AUTO-ENTMCNC: 30 G/DL (ref 30.7–35.5)
MCV RBC AUTO: 74.9 FL (ref 80–100)
MONOCYTES ABSOLUTE: 1.1 K/UL (ref 0.2–1)
MONOCYTES RELATIVE PERCENT: 14.5 % (ref 4–12)
NEUTROPHILS ABSOLUTE: 4.9 K/UL (ref 1.8–7.5)
NEUTROPHILS RELATIVE PERCENT: 66.4 % (ref 42–80)
NITRITE, URINE: NEGATIVE
PDW BLD-RTO: 16.7 %
PH, URINE: 6 (ref 4.5–8)
PLATELET # BLD: 179 K/UL (ref 140–400)
PMV BLD AUTO: 11.9 FL (ref 7.2–11.7)
POTASSIUM SERPL-SCNC: 4.3 MEQ/L (ref 3.4–5.3)
PROTEIN, URINE: 50 MG/DL
RBC # BLD: 3.74 M/UL (ref 3.95–5.26)
RETICULOCYTE ABSOLUTE COUNT: 1.1 /100 WBC
SODIUM BLD-SCNC: 137 MEQ/L (ref 135–148)
SPECIFIC GRAVITY UA: 1.02 (ref 1–1.03)
TOTAL PROTEIN: 6.2 G/DL (ref 6–8.3)
TRIGL SERPL-MCNC: 118 MG/DL
TSH ULTRASENSITIVE: 2.1 MCIU/ML (ref 0.4–4.5)
URINE HGB: NEGATIVE MG/DL
UROBILINOGEN, URINE: <2 MG/DL (ref 0–2)
VITAMIN B-12: 1371 PG/ML (ref 200–1100)
VITAMIN D 25-HYDROXY: 53 NG/ML (ref 30–100)
VLDLC SERPL CALC-MCNC: 24 MG/DL (ref 4–38)
WBC # BLD: 7.4 K/UL (ref 3.5–10.9)

## 2025-03-10 PROCEDURE — 6370000000 HC RX 637 (ALT 250 FOR IP): Performed by: EMERGENCY MEDICINE

## 2025-03-10 PROCEDURE — 99284 EMERGENCY DEPT VISIT MOD MDM: CPT

## 2025-03-10 PROCEDURE — 70450 CT HEAD/BRAIN W/O DYE: CPT

## 2025-03-10 PROCEDURE — 90715 TDAP VACCINE 7 YRS/> IM: CPT | Performed by: EMERGENCY MEDICINE

## 2025-03-10 PROCEDURE — 12001 RPR S/N/AX/GEN/TRNK 2.5CM/<: CPT

## 2025-03-10 PROCEDURE — 90471 IMMUNIZATION ADMIN: CPT | Performed by: EMERGENCY MEDICINE

## 2025-03-10 PROCEDURE — 72125 CT NECK SPINE W/O DYE: CPT

## 2025-03-10 PROCEDURE — 73502 X-RAY EXAM HIP UNI 2-3 VIEWS: CPT

## 2025-03-10 PROCEDURE — 6360000002 HC RX W HCPCS: Performed by: EMERGENCY MEDICINE

## 2025-03-10 RX ORDER — ACETAMINOPHEN 325 MG/1
650 TABLET ORAL ONCE
Status: COMPLETED | OUTPATIENT
Start: 2025-03-10 | End: 2025-03-10

## 2025-03-10 RX ADMIN — TETANUS TOXOID, REDUCED DIPHTHERIA TOXOID AND ACELLULAR PERTUSSIS VACCINE, ADSORBED 0.5 ML: 5; 2.5; 8; 8; 2.5 SUSPENSION INTRAMUSCULAR at 09:35

## 2025-03-10 RX ADMIN — ACETAMINOPHEN 650 MG: 325 TABLET ORAL at 09:35

## 2025-03-10 ASSESSMENT — ENCOUNTER SYMPTOMS
DIARRHEA: 0
NAUSEA: 0
VOMITING: 0
TROUBLE SWALLOWING: 0
VOICE CHANGE: 0
SHORTNESS OF BREATH: 0

## 2025-03-10 ASSESSMENT — PAIN SCALES - GENERAL: PAINLEVEL_OUTOF10: 5

## 2025-03-10 ASSESSMENT — PAIN DESCRIPTION - PAIN TYPE: TYPE: ACUTE PAIN

## 2025-03-10 ASSESSMENT — PAIN DESCRIPTION - LOCATION: LOCATION: HEAD

## 2025-03-10 ASSESSMENT — PAIN DESCRIPTION - FREQUENCY: FREQUENCY: CONTINUOUS

## 2025-03-10 ASSESSMENT — PAIN DESCRIPTION - ORIENTATION: ORIENTATION: POSTERIOR

## 2025-03-10 ASSESSMENT — PAIN - FUNCTIONAL ASSESSMENT: PAIN_FUNCTIONAL_ASSESSMENT: 0-10

## 2025-03-10 NOTE — ED NOTES
Pt stooled on self. RN cleaned pt up and placed on purewick. Pt tolerated well. Warm blanket applied.

## 2025-03-10 NOTE — ED NOTES
Discharge paperwork given to and reviewed with EMS. EMS verbalized understanding and all questions answered. Pt encouraged to return if having worsening symptoms or new symptoms discussed in discharge paperwork.  Pt to follow up with PCP for staple removal   Pt in NAD, RR even and unlabored. Pt off unit via stretcher by EMS

## 2025-03-10 NOTE — ED PROVIDER NOTES
[facility] EMERGENCY DEPARTMENT  eMERGENCY dEPARTMENT eNCOUnter      Pt Name: Silvia Irwin  MRN: [id number]  Birthdate 1940  Date of evaluation: 3/10/2025  Provider: Fab Villaseñor MD    CHIEF COMPLAINT       Chief Complaint   Patient presents with    Head Injury     Rolled out of bed at nursing home in attempt to get up and use bathroom, hit back of head. On eliquis for afib. Bleeding controlled.       HISTORY OF PRESENT ILLNESS   (Location/Symptom, Timing/Onset, Context/Setting, Quality, Duration, Modifying Factors, Severity)  Note limiting factors.     History obtained from: the patient    Silvia Irwin is a 84 y.o. female with history of Alzheimer's disease who lives in skilled nursing facility who presents after rolling out of bed.  Patient reportedly fell out of bed this morning and landed on her right side is complaining of right-sided head pain as well as right hip pain however apparently right hip pain has been ongoing since prior to the fall.  Patient and EMS deny any medical events preceding the fall reporting the patient simply tried to get out of bed to go to the bathroom and ended up rolling out of bed and hitting the ground.  No loss of consciousness or seizure activity.  The patient is on Eliquis.      HPI    Nursing Notes were reviewed.    REVIEW OFSYSTEMS    (2-9 systems for level 4, 10 or more for level 5)     Review of Systems   Constitutional:  Negative for appetite change and fever.   HENT:  Negative for trouble swallowing and voice change.    Eyes:  Negative for visual disturbance.   Respiratory:  Negative for shortness of breath.    Cardiovascular:  Negative for chest pain and palpitations.   Gastrointestinal:  Negative for diarrhea, nausea and vomiting.   Genitourinary:  Negative for dysuria.   Musculoskeletal:  Positive for arthralgias. Negative for gait problem.   Skin:  Positive for wound.   Neurological:  Positive for headaches. Negative for seizures and syncope.

## 2025-03-10 NOTE — ED NOTES
RN cleaning wound and hematoma to back of head on right side. Pt tolerating well. Using sterile saline, dexaclens, gauze pads.

## 2025-03-22 LAB
BACTERIA, URINE: NORMAL /HPF
BILIRUBIN, URINE: NORMAL MG/DL
BROAD CASTS: NORMAL /LPF
CALCIUM OXALATE CRYSTALS: NORMAL /HPF
CALCIUM OXALATE CRYSTALS: NORMAL /HPF
CALCIUM PHOSPHATE CRYSTALS: NORMAL /HPF
CELLULAR CASTS: NORMAL /LPF
CLARITY, UA: NORMAL
COLOR, UA: NORMAL
COMMENT: NORMAL
CRYSTALS: NORMAL /HPF
CYSTINE CRYSTALS: NORMAL /HPF
EPITHELIAL CASTS: NORMAL /LPF
EPITHELIAL CELLS, UA: NORMAL /HPF (ref 0–5)
EPITHELIAL CELLS, UA: NORMAL /HPF (ref 0–5)
ERYTHROCYTES URINE: NORMAL /HPF (ref 0–2)
FATTY CASTS: NORMAL /LPF
GLUCOSE URINE: NORMAL MG/DL
GRANULAR CASTS: NORMAL /LPF
HYALINE CASTS: NORMAL /LPF (ref 0–5)
KETONES, URINE: NORMAL MG/DL
LEUCINE CRYSTALS: NORMAL /HPF
LEUKOCYTE CLUMPS IN URINE BY AUTOMATED COUNT: NORMAL (ref 0–1)
LEUKOCYTE ESTERASE, URINE: NORMAL
LEUKOCYTES, UA: NORMAL /HPF (ref 0–5)
MUCUS: NORMAL
NITRITE, URINE: NORMAL
PH, URINE: NORMAL (ref 4.5–8)
PROTEIN, URINE: NORMAL MG/DL
RBC CASTS: NORMAL /LPF
SPECIFIC GRAVITY UA: NORMAL (ref 1–1.03)
SPERM OBSERVATION: NORMAL
TRIPLE PHOSPHATE CRYSTALS: NORMAL /HPF
TYROSINE CRYSTALS: NORMAL /HPF
URIC ACID CRYSTALS: NORMAL /HPF
URINE HGB: NORMAL MG/DL
UROBILINOGEN, URINE: NORMAL MG/DL (ref 0–2)
WAXY CASTS: NORMAL /LPF
WBC CASTS: NORMAL /LPF
YEAST, URINE: NORMAL /HPF

## 2025-05-21 LAB
B-TYPE NATRIURETIC PEPTIDE: 1039 PG/ML (ref 0–125)
DIFFERENTIAL COUNT: NORMAL
HCT VFR BLD CALC: NORMAL % (ref 34–49)
HEMOGLOBIN: NORMAL G/DL (ref 11.2–15.7)
MCH RBC QN AUTO: NORMAL PG (ref 26–34)
MCHC RBC AUTO-ENTMCNC: NORMAL G/DL (ref 30.7–35.5)
MCV RBC AUTO: NORMAL FL (ref 80–100)
PDW BLD-RTO: NORMAL %
PLATELET # BLD: NORMAL K/UL (ref 140–400)
PLATELET COMMENT: NORMAL
PMV BLD AUTO: NORMAL FL (ref 7.2–11.7)
RBC # BLD: NORMAL M/UL (ref 3.95–5.26)
RBC COMMENT: NORMAL
WBC # BLD: NORMAL K/UL (ref 3.5–10.9)
WBC COMMENT: NORMAL

## 2025-06-16 LAB
BUN / CREAT RATIO: 12 (ref 7–25)
BUN BLDV-MCNC: 12 MG/DL (ref 3–29)
CALCIUM SERPL-MCNC: 8.8 MG/DL (ref 8.5–10.5)
CHLORIDE BLD-SCNC: 106 MEQ/L (ref 96–110)
CO2: 24 MEQ/L (ref 19–32)
CREAT SERPL-MCNC: 1 MG/DL (ref 0.5–1.2)
ESTIMATED GLOMERULAR FILTRATION RATE CREATININE EQUATION: 56 MLS/MIN/1.73M2
FASTING STATUS: ABNORMAL
GLUCOSE BLD-MCNC: 90 MG/DL (ref 70–99)
POTASSIUM SERPL-SCNC: 3.7 MEQ/L (ref 3.4–5.3)
SODIUM BLD-SCNC: 141 MEQ/L (ref 135–148)

## 2025-06-17 LAB
BACTERIA, URINE: ABNORMAL /HPF
BASOPHILS ABSOLUTE: 0.1 K/UL (ref 0–0.3)
BASOPHILS RELATIVE PERCENT: 0.8 % (ref 0–2)
BILIRUBIN, URINE: NEGATIVE MG/DL
CLARITY, UA: CLEAR
COLOR, UA: COLORLESS
DIFFERENTIAL COUNT: ABNORMAL
EOSINOPHILS ABSOLUTE: 0.2 K/UL (ref 0–0.5)
EOSINOPHILS RELATIVE PERCENT: 3.6 % (ref 0–5)
EPITHELIAL CELLS, UA: ABNORMAL /HPF (ref 0–5)
EPITHELIAL CELLS, UA: ABNORMAL /HPF (ref 0–5)
ERYTHROCYTES URINE: ABNORMAL /HPF (ref 0–2)
GLUCOSE URINE: 500 MG/DL
HCT VFR BLD CALC: 29.8 % (ref 34–49)
HEMOGLOBIN: 8.2 G/DL (ref 11.2–15.7)
KETONES, URINE: NEGATIVE MG/DL
LEUKOCYTE ESTERASE, URINE: ABNORMAL
LEUKOCYTES, UA: ABNORMAL /HPF (ref 0–5)
LYMPHOCYTES ABSOLUTE: 1.2 K/UL (ref 0.9–4.1)
LYMPHOCYTES RELATIVE PERCENT: 19.7 % (ref 14–51)
MCH RBC QN AUTO: 20.6 PG (ref 26–34)
MCHC RBC AUTO-ENTMCNC: 27.5 G/DL (ref 30.7–35.5)
MCV RBC AUTO: 74.9 FL (ref 80–100)
MONOCYTES ABSOLUTE: 0.7 K/UL (ref 0.2–1)
MONOCYTES RELATIVE PERCENT: 12.3 % (ref 4–12)
MUCUS: PRESENT
NEUTROPHILS ABSOLUTE: 3.8 K/UL (ref 1.8–7.5)
NEUTROPHILS RELATIVE PERCENT: 63.6 % (ref 42–80)
NITRITE, URINE: NEGATIVE
PDW BLD-RTO: 17.8 %
PH, URINE: 6.5 (ref 4.5–8)
PLATELET # BLD: 118 K/UL (ref 140–400)
PLATELET ESTIMATE: ABNORMAL
PMV BLD AUTO: 11.2 FL (ref 7.2–11.7)
PROTEIN, URINE: NEGATIVE MG/DL
RBC # BLD: 3.98 M/UL (ref 3.95–5.26)
SPECIFIC GRAVITY UA: 1.01 (ref 1–1.03)
URINE HGB: NEGATIVE MG/DL
UROBILINOGEN, URINE: <2 MG/DL (ref 0–2)
WBC # BLD: 6 K/UL (ref 3.5–10.9)

## 2025-08-22 LAB
BASOPHILS ABSOLUTE: 0.2 K/UL (ref 0–0.3)
BASOPHILS RELATIVE PERCENT: 2 % (ref 0–2)
DIFFERENTIAL COUNT: ABNORMAL
EOSINOPHILS ABSOLUTE: 0.2 K/UL (ref 0–0.5)
EOSINOPHILS RELATIVE PERCENT: 2 % (ref 0–5)
HCT VFR BLD CALC: 33.8 % (ref 34–49)
HEMOGLOBIN: 9 G/DL (ref 11.2–15.7)
IMMATURE GRANS (ABS): 0.1 K/UL (ref 0–0.1)
LYMPHOCYTES ABSOLUTE: 0.7 K/UL (ref 0.9–4.1)
LYMPHOCYTES RELATIVE PERCENT: 8 % (ref 14–51)
MCH RBC QN AUTO: 20 PG (ref 26–34)
MCHC RBC AUTO-ENTMCNC: 26.6 G/DL (ref 30.7–35.5)
MCV RBC AUTO: 74.9 FL (ref 80–100)
METAMYELOCYTES RELATIVE PERCENT: 1 %
MONOCYTES ABSOLUTE: 0.6 K/UL (ref 0.2–1)
MONOCYTES RELATIVE PERCENT: 7 % (ref 4–12)
NEUTROPHILS ABSOLUTE: 7.1 K/UL (ref 1.8–7.5)
NEUTROPHILS RELATIVE PERCENT: 80 % (ref 42–80)
PDW BLD-RTO: 18.9 %
PLATELET # BLD: 166 K/UL (ref 140–400)
PLATELET ESTIMATE: ABNORMAL
PMV BLD AUTO: 11.2 FL (ref 7.2–11.7)
RBC # BLD: 4.51 M/UL (ref 3.95–5.26)
RBC # BLD: ABNORMAL 10*6/UL
WBC # BLD: 8.9 K/UL (ref 3.5–10.9)
WBC # BLD: ABNORMAL 10*3/UL

## (undated) DEVICE — PATIENT CARE KIT EYE POST OP

## (undated) DEVICE — SURGICAL PROCEDURE PACK PPK8711] ALCON LABORATORIES INC]

## (undated) DEVICE — GLOVE,SURG,SENSICARE,ALOE,LF,PF,7: Brand: MEDLINE

## (undated) DEVICE — ELECTRODE ECG MONITR FOAM TEAR DROP ADLT RED

## (undated) DEVICE — SOLUTION IV 1000ML LAC RINGERS PH 6.5 INJ USP VIAFLX PLAS

## (undated) DEVICE — SET GRAV VENT NVENT CK VLV 3 NDL FREE PRT 10 GTT

## (undated) DEVICE — SOLUTION IRRIG 250ML STRL H2O PLAS POUR BTL USP

## (undated) DEVICE — CLIP LIG L235CM RESOL 360 BX/20

## (undated) DEVICE — ENDO CARRY-ON PROCEDURE KIT INCLUDES SUCTION TUBING, LUBRICANT, GAUZE, BIOHAZARD STICKER, TRANSPORT PAD AND INTERCEPT BEDSIDE KIT.: Brand: ENDO CARRY-ON PROCEDURE KIT

## (undated) DEVICE — TRAP POLYP ETRAP

## (undated) DEVICE — SILICONE I/A TIP STRAIGHT: Brand: ALCON

## (undated) DEVICE — AIRLIFE™ NASAL OXYGEN CANNULA CURVED, FLARED TIP, WITH 7 FEET (2.1 M) CRUSH RESISTANT TUBING, OVER-THE-EAR STYLE: Brand: AIRLIFE™

## (undated) DEVICE — SURGICAL PROCEDURE PACK EYE ANDRSN

## (undated) DEVICE — NEEDLE ANES 23GA L1.5IN RETROBLB

## (undated) DEVICE — ENDOSCOPIC KIT 2 12 FT OP4 DE2 GWN SYR

## (undated) DEVICE — SET ADMIN PRIMING 7ML L30IN 7.35LB 20 GTT 2ND RLER CLMP

## (undated) DEVICE — ELECTRODE,RADIOTRANSLUCENT,FOAM,3PK: Brand: MEDLINE

## (undated) DEVICE — CONMED SCOPE SAVER BITE BLOCK, 20X27 MM: Brand: SCOPE SAVER

## (undated) DEVICE — ACUSNARE POLYPECTOMY SNARE SOFT: Brand: ACUSNARE

## (undated) DEVICE — TOWEL,OR,DSP,ST,BLUE,STD,4/PK,20PK/CS: Brand: MEDLINE

## (undated) DEVICE — CATHETER IV 20GA L1.25IN PNK FEP SFTY STR HUB RADPQ DISP

## (undated) DEVICE — YANKAUER,BULB TIP,W/O VENT,RIGID,STERILE: Brand: MEDLINE

## (undated) DEVICE — FORCEP BX STD CAP 240CM RAD JAW 4

## (undated) DEVICE — CANNULA,OXY,ADULT,SUPERSOFT,W/7'TUB,SC: Brand: MEDLINE INDUSTRIES, INC.

## (undated) DEVICE — GLOVE SURG SZ 65 L12IN FNGR THK94MIL STD WHT LTX FREE

## (undated) DEVICE — 3M™ TEGADERM™ TRANSPARENT FILM DRESSING FRAME STYLE, 1624W, 2-3/8 IN X 2-3/4 IN (6 CM X 7 CM), 100/CT 4CT/CASE: Brand: 3M™ TEGADERM™